# Patient Record
Sex: MALE | Race: BLACK OR AFRICAN AMERICAN | Employment: OTHER | URBAN - METROPOLITAN AREA
[De-identification: names, ages, dates, MRNs, and addresses within clinical notes are randomized per-mention and may not be internally consistent; named-entity substitution may affect disease eponyms.]

---

## 2018-09-11 ENCOUNTER — HOSPITAL ENCOUNTER (OUTPATIENT)
Dept: GENERAL RADIOLOGY | Age: 63
Discharge: HOME | End: 2018-09-11
Attending: INTERNAL MEDICINE | Admitting: INTERNAL MEDICINE

## 2018-09-11 LAB
ALBUMIN SERPL-MCNC: 3.7 GM/DL (ref 3.4–5)
ALP BLD-CCNC: 76 IU/L (ref 40–128)
ALT SERPL-CCNC: 11 U/L (ref 10–40)
ANION GAP SERPL CALCULATED.3IONS-SCNC: 11 MMOL/L (ref 4–16)
AST SERPL-CCNC: 14 IU/L (ref 15–37)
BASOPHILS ABSOLUTE: 0.1 K/CU MM
BASOPHILS RELATIVE PERCENT: 0.7 % (ref 0–1)
BILIRUB SERPL-MCNC: 0.2 MG/DL (ref 0–1)
BUN BLDV-MCNC: 9 MG/DL (ref 6–23)
CALCIUM SERPL-MCNC: 9.3 MG/DL (ref 8.3–10.6)
CHLORIDE BLD-SCNC: 104 MMOL/L (ref 99–110)
CO2: 26 MMOL/L (ref 21–32)
CREAT SERPL-MCNC: 0.9 MG/DL (ref 0.9–1.3)
DIFFERENTIAL TYPE: ABNORMAL
EOSINOPHILS ABSOLUTE: 0.3 K/CU MM
EOSINOPHILS RELATIVE PERCENT: 3.6 % (ref 0–3)
GFR AFRICAN AMERICAN: >60 ML/MIN/1.73M2
GFR NON-AFRICAN AMERICAN: >60 ML/MIN/1.73M2
GLUCOSE BLD-MCNC: 84 MG/DL (ref 70–99)
HCT VFR BLD CALC: 43.2 % (ref 42–52)
HEMOGLOBIN: 13.7 GM/DL (ref 13.5–18)
IMMATURE NEUTROPHIL %: 0.3 % (ref 0–0.43)
LYMPHOCYTES ABSOLUTE: 1.2 K/CU MM
LYMPHOCYTES RELATIVE PERCENT: 15.3 % (ref 24–44)
MCH RBC QN AUTO: 28 PG (ref 27–31)
MCHC RBC AUTO-ENTMCNC: 31.7 % (ref 32–36)
MCV RBC AUTO: 88.3 FL (ref 78–100)
MONOCYTES ABSOLUTE: 0.7 K/CU MM
MONOCYTES RELATIVE PERCENT: 8.7 % (ref 0–4)
NUCLEATED RBC %: 0 %
PDW BLD-RTO: 15.4 % (ref 11.7–14.9)
PLATELET # BLD: 285 K/CU MM (ref 140–440)
PMV BLD AUTO: 10.8 FL (ref 7.5–11.1)
POTASSIUM SERPL-SCNC: 4.7 MMOL/L (ref 3.5–5.1)
RBC # BLD: 4.89 M/CU MM (ref 4.6–6.2)
SEGMENTED NEUTROPHILS ABSOLUTE COUNT: 5.4 K/CU MM
SEGMENTED NEUTROPHILS RELATIVE PERCENT: 71.4 % (ref 36–66)
SODIUM BLD-SCNC: 141 MMOL/L (ref 135–145)
TOTAL IMMATURE NEUTOROPHIL: 0.02 K/CU MM
TOTAL NUCLEATED RBC: 0 K/CU MM
TOTAL PROTEIN: 6.2 GM/DL (ref 6.4–8.2)
WBC # BLD: 7.6 K/CU MM (ref 4–10.5)

## 2018-09-20 PROBLEM — I73.9 PVD (PERIPHERAL VASCULAR DISEASE) (HCC): Status: ACTIVE | Noted: 2018-09-20

## 2018-09-28 ENCOUNTER — HOSPITAL ENCOUNTER (OUTPATIENT)
Age: 63
Setting detail: OBSERVATION
Discharge: HOME | End: 2018-09-29
Attending: EMERGENCY MEDICINE | Admitting: INTERNAL MEDICINE
Payer: MEDICARE

## 2018-09-28 ENCOUNTER — APPOINTMENT (OUTPATIENT)
Dept: CT IMAGING | Age: 63
End: 2018-09-28
Payer: MEDICARE

## 2018-09-28 ENCOUNTER — APPOINTMENT (OUTPATIENT)
Dept: GENERAL RADIOLOGY | Age: 63
End: 2018-09-28
Payer: MEDICARE

## 2018-09-28 ENCOUNTER — APPOINTMENT (OUTPATIENT)
Dept: ULTRASOUND IMAGING | Age: 63
End: 2018-09-28
Payer: MEDICARE

## 2018-09-28 DIAGNOSIS — R94.31 ABNORMAL EKG: ICD-10-CM

## 2018-09-28 DIAGNOSIS — R07.9 ACUTE CHEST PAIN: Primary | ICD-10-CM

## 2018-09-28 DIAGNOSIS — I73.9 PVD (PERIPHERAL VASCULAR DISEASE) (HCC): ICD-10-CM

## 2018-09-28 LAB
ALBUMIN SERPL-MCNC: 3.2 GM/DL (ref 3.4–5)
ALP BLD-CCNC: 65 IU/L (ref 40–129)
ALT SERPL-CCNC: 8 U/L (ref 10–40)
AMPHETAMINES: NEGATIVE
ANION GAP SERPL CALCULATED.3IONS-SCNC: 10 MMOL/L (ref 4–16)
APTT: 29.1 SECONDS (ref 21.2–33)
AST SERPL-CCNC: 15 IU/L (ref 15–37)
BACTERIA: NEGATIVE /HPF
BARBITURATE SCREEN URINE: NEGATIVE
BASOPHILS ABSOLUTE: 0 K/CU MM
BASOPHILS RELATIVE PERCENT: 0.4 % (ref 0–1)
BENZODIAZEPINE SCREEN, URINE: NEGATIVE
BILIRUB SERPL-MCNC: 0.2 MG/DL (ref 0–1)
BILIRUBIN URINE: NEGATIVE MG/DL
BLOOD, URINE: NEGATIVE
BUN BLDV-MCNC: 11 MG/DL (ref 6–23)
CALCIUM SERPL-MCNC: 8.8 MG/DL (ref 8.3–10.6)
CANNABINOID SCREEN URINE: ABNORMAL
CHLORIDE BLD-SCNC: 102 MMOL/L (ref 99–110)
CLARITY: CLEAR
CO2: 26 MMOL/L (ref 21–32)
COCAINE METABOLITE: ABNORMAL
COLOR: YELLOW
CREAT SERPL-MCNC: 0.9 MG/DL (ref 0.9–1.3)
DIFFERENTIAL TYPE: ABNORMAL
EOSINOPHILS ABSOLUTE: 0.3 K/CU MM
EOSINOPHILS RELATIVE PERCENT: 3.2 % (ref 0–3)
GFR AFRICAN AMERICAN: >60 ML/MIN/1.73M2
GFR NON-AFRICAN AMERICAN: >60 ML/MIN/1.73M2
GLUCOSE BLD-MCNC: 115 MG/DL (ref 70–99)
GLUCOSE, URINE: NEGATIVE MG/DL
HCT VFR BLD CALC: 40.7 % (ref 42–52)
HEMOGLOBIN: 13.2 GM/DL (ref 13.5–18)
IMMATURE NEUTROPHIL %: 0.6 % (ref 0–0.43)
INR BLD: 1.18 INDEX
KETONES, URINE: NEGATIVE MG/DL
LEUKOCYTE ESTERASE, URINE: NEGATIVE
LIPASE: 31 IU/L (ref 13–60)
LYMPHOCYTES ABSOLUTE: 1.3 K/CU MM
LYMPHOCYTES RELATIVE PERCENT: 14 % (ref 24–44)
MAGNESIUM: 2.1 MG/DL (ref 1.8–2.4)
MCH RBC QN AUTO: 27.9 PG (ref 27–31)
MCHC RBC AUTO-ENTMCNC: 32.4 % (ref 32–36)
MCV RBC AUTO: 86 FL (ref 78–100)
MONOCYTES ABSOLUTE: 1 K/CU MM
MONOCYTES RELATIVE PERCENT: 10.8 % (ref 0–4)
MUCUS: ABNORMAL HPF
NITRITE URINE, QUANTITATIVE: NEGATIVE
NUCLEATED RBC %: 0 %
OPIATES, URINE: NEGATIVE
OXYCODONE: NEGATIVE
PDW BLD-RTO: 14.6 % (ref 11.7–14.9)
PH, URINE: 5 (ref 5–8)
PHENCYCLIDINE, URINE: NEGATIVE
PLATELET # BLD: 298 K/CU MM (ref 140–440)
PMV BLD AUTO: 9.5 FL (ref 7.5–11.1)
POTASSIUM SERPL-SCNC: 4.2 MMOL/L (ref 3.5–5.1)
PROTEIN UA: NEGATIVE MG/DL
PROTHROMBIN TIME: 13.4 SECONDS (ref 9.12–12.5)
RBC # BLD: 4.73 M/CU MM (ref 4.6–6.2)
RBC URINE: 1 /HPF (ref 0–3)
SEGMENTED NEUTROPHILS ABSOLUTE COUNT: 6.4 K/CU MM
SEGMENTED NEUTROPHILS RELATIVE PERCENT: 71 % (ref 36–66)
SODIUM BLD-SCNC: 138 MMOL/L (ref 135–145)
SPECIFIC GRAVITY UA: 1.04 (ref 1–1.03)
SQUAMOUS EPITHELIAL: <1 /HPF
T4 FREE: 1.07 NG/DL (ref 0.9–1.8)
TOTAL IMMATURE NEUTOROPHIL: 0.05 K/CU MM
TOTAL NUCLEATED RBC: 0 K/CU MM
TOTAL PROTEIN: 6.9 GM/DL (ref 6.4–8.2)
TRICHOMONAS: ABNORMAL /HPF
TROPONIN T: <0.01 NG/ML
TROPONIN T: <0.01 NG/ML
TSH HIGH SENSITIVITY: 1.98 UIU/ML (ref 0.27–4.2)
UROBILINOGEN, URINE: 1 MG/DL (ref 0.2–1)
WBC # BLD: 9 K/CU MM (ref 4–10.5)
WBC UA: <1 /HPF (ref 0–2)

## 2018-09-28 PROCEDURE — 81001 URINALYSIS AUTO W/SCOPE: CPT

## 2018-09-28 PROCEDURE — 85610 PROTHROMBIN TIME: CPT

## 2018-09-28 PROCEDURE — 71046 X-RAY EXAM CHEST 2 VIEWS: CPT

## 2018-09-28 PROCEDURE — 84484 ASSAY OF TROPONIN QUANT: CPT

## 2018-09-28 PROCEDURE — 6370000000 HC RX 637 (ALT 250 FOR IP): Performed by: INTERNAL MEDICINE

## 2018-09-28 PROCEDURE — 80050 GENERAL HEALTH PANEL: CPT

## 2018-09-28 PROCEDURE — 2580000003 HC RX 258: Performed by: INTERNAL MEDICINE

## 2018-09-28 PROCEDURE — 93925 LOWER EXTREMITY STUDY: CPT

## 2018-09-28 PROCEDURE — 93005 ELECTROCARDIOGRAM TRACING: CPT | Performed by: EMERGENCY MEDICINE

## 2018-09-28 PROCEDURE — G0378 HOSPITAL OBSERVATION PER HR: HCPCS

## 2018-09-28 PROCEDURE — 83735 ASSAY OF MAGNESIUM: CPT

## 2018-09-28 PROCEDURE — G0480 DRUG TEST DEF 1-7 CLASSES: HCPCS

## 2018-09-28 PROCEDURE — 96372 THER/PROPH/DIAG INJ SC/IM: CPT

## 2018-09-28 PROCEDURE — 36415 COLL VENOUS BLD VENIPUNCTURE: CPT

## 2018-09-28 PROCEDURE — 83690 ASSAY OF LIPASE: CPT

## 2018-09-28 PROCEDURE — 6360000004 HC RX CONTRAST MEDICATION: Performed by: EMERGENCY MEDICINE

## 2018-09-28 PROCEDURE — 80053 COMPREHEN METABOLIC PANEL: CPT

## 2018-09-28 PROCEDURE — 85025 COMPLETE CBC W/AUTO DIFF WBC: CPT

## 2018-09-28 PROCEDURE — 84439 ASSAY OF FREE THYROXINE: CPT

## 2018-09-28 PROCEDURE — 85730 THROMBOPLASTIN TIME PARTIAL: CPT

## 2018-09-28 PROCEDURE — 99285 EMERGENCY DEPT VISIT HI MDM: CPT

## 2018-09-28 PROCEDURE — 74174 CTA ABD&PLVS W/CONTRAST: CPT

## 2018-09-28 PROCEDURE — 84443 ASSAY THYROID STIM HORMONE: CPT

## 2018-09-28 PROCEDURE — 6360000002 HC RX W HCPCS: Performed by: INTERNAL MEDICINE

## 2018-09-28 PROCEDURE — 71275 CT ANGIOGRAPHY CHEST: CPT

## 2018-09-28 RX ORDER — POLYETHYLENE GLYCOL 3350 17 G/17G
17 POWDER, FOR SOLUTION ORAL 2 TIMES DAILY
Status: DISCONTINUED | OUTPATIENT
Start: 2018-09-28 | End: 2018-09-29 | Stop reason: HOSPADM

## 2018-09-28 RX ORDER — ASPIRIN 81 MG/1
81 TABLET, CHEWABLE ORAL DAILY
Status: DISCONTINUED | OUTPATIENT
Start: 2018-09-29 | End: 2018-09-29 | Stop reason: HOSPADM

## 2018-09-28 RX ORDER — SODIUM CHLORIDE 0.9 % (FLUSH) 0.9 %
10 SYRINGE (ML) INJECTION PRN
Status: DISCONTINUED | OUTPATIENT
Start: 2018-09-28 | End: 2018-09-29 | Stop reason: HOSPADM

## 2018-09-28 RX ORDER — ACETAMINOPHEN 325 MG/1
650 TABLET ORAL EVERY 4 HOURS PRN
Status: DISCONTINUED | OUTPATIENT
Start: 2018-09-28 | End: 2018-09-29 | Stop reason: HOSPADM

## 2018-09-28 RX ORDER — ASPIRIN 81 MG/1
324 TABLET, CHEWABLE ORAL ONCE
Status: DISCONTINUED | OUTPATIENT
Start: 2018-09-28 | End: 2018-09-29 | Stop reason: HOSPADM

## 2018-09-28 RX ORDER — ONDANSETRON 2 MG/ML
4 INJECTION INTRAMUSCULAR; INTRAVENOUS EVERY 6 HOURS PRN
Status: DISCONTINUED | OUTPATIENT
Start: 2018-09-28 | End: 2018-09-29 | Stop reason: HOSPADM

## 2018-09-28 RX ORDER — SODIUM CHLORIDE 0.9 % (FLUSH) 0.9 %
10 SYRINGE (ML) INJECTION EVERY 12 HOURS SCHEDULED
Status: DISCONTINUED | OUTPATIENT
Start: 2018-09-28 | End: 2018-09-29 | Stop reason: HOSPADM

## 2018-09-28 RX ORDER — SODIUM CHLORIDE 9 MG/ML
INJECTION, SOLUTION INTRAVENOUS CONTINUOUS
Status: DISCONTINUED | OUTPATIENT
Start: 2018-09-28 | End: 2018-09-29 | Stop reason: HOSPADM

## 2018-09-28 RX ORDER — NITROGLYCERIN 0.4 MG/1
0.4 TABLET SUBLINGUAL EVERY 5 MIN PRN
Status: DISCONTINUED | OUTPATIENT
Start: 2018-09-28 | End: 2018-09-29 | Stop reason: HOSPADM

## 2018-09-28 RX ADMIN — SODIUM CHLORIDE: 9 INJECTION, SOLUTION INTRAVENOUS at 18:36

## 2018-09-28 RX ADMIN — ENOXAPARIN SODIUM 40 MG: 40 INJECTION SUBCUTANEOUS at 20:53

## 2018-09-28 RX ADMIN — IOPAMIDOL 90 ML: 755 INJECTION, SOLUTION INTRAVENOUS at 13:54

## 2018-09-28 RX ADMIN — POLYETHYLENE GLYCOL 3350 17 G: 17 POWDER, FOR SOLUTION ORAL at 20:53

## 2018-09-28 ASSESSMENT — PAIN DESCRIPTION - PAIN TYPE: TYPE: ACUTE PAIN

## 2018-09-28 ASSESSMENT — PAIN SCALES - GENERAL
PAINLEVEL_OUTOF10: 7
PAINLEVEL_OUTOF10: 0

## 2018-09-28 ASSESSMENT — PAIN DESCRIPTION - ORIENTATION: ORIENTATION: MID

## 2018-09-28 ASSESSMENT — PAIN DESCRIPTION - LOCATION: LOCATION: ABDOMEN

## 2018-09-28 ASSESSMENT — PAIN DESCRIPTION - DESCRIPTORS: DESCRIPTORS: ACHING

## 2018-09-28 ASSESSMENT — PAIN DESCRIPTION - FREQUENCY: FREQUENCY: CONTINUOUS

## 2018-09-28 NOTE — PROGRESS NOTES
Medication History  [facility]    Patient Name: Ruy Kessler 1955     Medication history has been completed by: Britany Galvin CPhT    Source(s) of information: Patient and Insurance claims    Primary Care Physician: Che Lindsey MD     Pharmacy: [facility]    Allergies as of 09/28/2018 - Review Complete 09/28/2018   Allergen Reaction Noted    Ibuprofen Other (See Comments) 10/18/2012        Prior to Admission medications    Not on File       Other Comments:  · Patient states he is not taking any medications and claims support this     To my knowledge the above medication history is accurate as of 9/28/2018 3:51 PM.   Britany Galvin CPhT   9/28/2018 3:51 PM

## 2018-09-29 VITALS
TEMPERATURE: 97.6 F | WEIGHT: 130.4 LBS | RESPIRATION RATE: 16 BRPM | BODY MASS INDEX: 18.26 KG/M2 | HEART RATE: 83 BPM | SYSTOLIC BLOOD PRESSURE: 121 MMHG | DIASTOLIC BLOOD PRESSURE: 71 MMHG | HEIGHT: 71 IN | OXYGEN SATURATION: 96 %

## 2018-09-29 LAB
CHOLESTEROL: 167 MG/DL
EKG ATRIAL RATE: 71 BPM
EKG ATRIAL RATE: 77 BPM
EKG DIAGNOSIS: NORMAL
EKG DIAGNOSIS: NORMAL
EKG P AXIS: 74 DEGREES
EKG P AXIS: 80 DEGREES
EKG P-R INTERVAL: 148 MS
EKG P-R INTERVAL: 150 MS
EKG Q-T INTERVAL: 388 MS
EKG Q-T INTERVAL: 398 MS
EKG QRS DURATION: 80 MS
EKG QRS DURATION: 90 MS
EKG QTC CALCULATION (BAZETT): 432 MS
EKG QTC CALCULATION (BAZETT): 439 MS
EKG R AXIS: -30 DEGREES
EKG R AXIS: 66 DEGREES
EKG T AXIS: -15 DEGREES
EKG T AXIS: 49 DEGREES
EKG VENTRICULAR RATE: 71 BPM
EKG VENTRICULAR RATE: 77 BPM
HDLC SERPL-MCNC: 45 MG/DL
LDL CHOLESTEROL DIRECT: 122 MG/DL
TRIGL SERPL-MCNC: 56 MG/DL
TROPONIN T: <0.01 NG/ML

## 2018-09-29 PROCEDURE — G0378 HOSPITAL OBSERVATION PER HR: HCPCS

## 2018-09-29 PROCEDURE — 2580000003 HC RX 258: Performed by: INTERNAL MEDICINE

## 2018-09-29 PROCEDURE — 96372 THER/PROPH/DIAG INJ SC/IM: CPT

## 2018-09-29 PROCEDURE — 6370000000 HC RX 637 (ALT 250 FOR IP): Performed by: INTERNAL MEDICINE

## 2018-09-29 PROCEDURE — 84484 ASSAY OF TROPONIN QUANT: CPT

## 2018-09-29 PROCEDURE — 36415 COLL VENOUS BLD VENIPUNCTURE: CPT

## 2018-09-29 PROCEDURE — 6360000002 HC RX W HCPCS: Performed by: INTERNAL MEDICINE

## 2018-09-29 PROCEDURE — 80061 LIPID PANEL: CPT

## 2018-09-29 PROCEDURE — 93010 ELECTROCARDIOGRAM REPORT: CPT | Performed by: INTERNAL MEDICINE

## 2018-09-29 PROCEDURE — 83721 ASSAY OF BLOOD LIPOPROTEIN: CPT

## 2018-09-29 RX ORDER — TRAMADOL HYDROCHLORIDE 50 MG/1
50 TABLET ORAL EVERY 6 HOURS PRN
Qty: 20 TABLET | Refills: 0 | Status: SHIPPED | OUTPATIENT
Start: 2018-09-29 | End: 2018-10-09

## 2018-09-29 RX ORDER — ASPIRIN 81 MG/1
81 TABLET, CHEWABLE ORAL DAILY
Qty: 30 TABLET | Refills: 3 | Status: SHIPPED | OUTPATIENT
Start: 2018-09-30 | End: 2019-01-31

## 2018-09-29 RX ORDER — NITROGLYCERIN 0.4 MG/1
TABLET SUBLINGUAL
Qty: 25 TABLET | Refills: 3 | Status: SHIPPED | OUTPATIENT
Start: 2018-09-29 | End: 2019-01-31

## 2018-09-29 RX ORDER — POLYETHYLENE GLYCOL 3350 17 G/17G
17 POWDER, FOR SOLUTION ORAL DAILY
Qty: 30 EACH | Refills: 1 | Status: SHIPPED | OUTPATIENT
Start: 2018-09-29 | End: 2018-10-29

## 2018-09-29 RX ADMIN — POLYETHYLENE GLYCOL 3350 17 G: 17 POWDER, FOR SOLUTION ORAL at 08:59

## 2018-09-29 RX ADMIN — SODIUM CHLORIDE: 9 INJECTION, SOLUTION INTRAVENOUS at 02:46

## 2018-09-29 RX ADMIN — SODIUM CHLORIDE: 9 INJECTION, SOLUTION INTRAVENOUS at 12:57

## 2018-09-29 RX ADMIN — ENOXAPARIN SODIUM 40 MG: 40 INJECTION SUBCUTANEOUS at 08:59

## 2018-09-29 RX ADMIN — ASPIRIN 81 MG CHEWABLE TABLET 81 MG: 81 TABLET CHEWABLE at 08:59

## 2018-09-29 NOTE — PROGRESS NOTES
Daily Progress Note     patient is awake alert feeling better  No chest pain  Stable from cardiac stand  Positive for cocaine use  Ok to d/c home  Will plan for outpatient echo and stress test  Keep on ASA for descending chronic aortic dissection   Check duplex of legs will follow up      Objective:   /71   Pulse 83   Temp 97.6 °F (36.4 °C) (Oral)   Resp 16   Ht 5' 11\" (1.803 m)   Wt 130 lb 6.4 oz (59.1 kg)   SpO2 96%   BMI 18.19 kg/m²     Intake/Output Summary (Last 24 hours) at 09/29/18 0943  Last data filed at 09/29/18 5147   Gross per 24 hour   Intake              360 ml   Output              651 ml   Net             -291 ml       Medications:   Scheduled Meds:   aspirin  324 mg Oral Once    sodium chloride flush  10 mL Intravenous 2 times per day    aspirin  81 mg Oral Daily    enoxaparin  40 mg Subcutaneous Daily    polyethylene glycol  17 g Oral BID      Infusions:   sodium chloride 100 mL/hr at 09/29/18 0246      PRN Meds:  sodium chloride flush, acetaminophen, ondansetron, nitroGLYCERIN       Physical Exam:  Vitals:    09/29/18 0904   BP: 121/71   Pulse: 83   Resp: 16   Temp: 97.6 °F (36.4 °C)   SpO2:         General: AAO, NAD  Chest: Nontender  Cardiac: First and Second Heart Sounds are Normal, No Murmurs or Gallops noted  Lungs:Clear to auscultation and percussion. Abdomen: Soft, NT, ND, +BS  Extremities: No clubbing, no edema  Vascular:  Equal 2+ peripheral pulses.         Lab Data:  CBC: Recent Labs      09/28/18   1255   WBC  9.0   HGB  13.2*   HCT  40.7*   MCV  86.0   PLT  298     BMP: Recent Labs      09/28/18   1255   NA  138   K  4.2   CL  102   CO2  26   BUN  11   CREATININE  0.9     LIVER PROFILE:   Recent Labs      09/28/18   1255  09/28/18 1958   AST  15   --    ALT  8*   --    LIPASE   --   31   BILITOT  0.2   --    ALKPHOS  65   --      PT/INR:   Recent Labs      09/28/18 1958   PROTIME  13.4*   INR  1.18     APTT:   Recent Labs      09/28/18 1958   APTT  29.1

## 2018-09-29 NOTE — ED PROVIDER NOTES
EOM's grossly intact. Sclera anicteric. ENT: Tolerates saliva. No trismus. NECK: Supple. Trachea midline. CARDIO: RRR. Radial pulse 2+. LUNGS: Respirations unlabored. CTAB. ABDOMEN: Soft. Non-distended. Non-tender. EXTREMITIES: No acute deformities. No lower extremity tenderness, edema or asymmetry. SKIN: Warm and dry. NEUROLOGICAL: No gross facial drooping. Moves all 4 extremities spontaneously. PSYCHIATRIC: Normal mood.      Labs:  Results for orders placed or performed during the hospital encounter of 09/28/18   CBC Auto Differential   Result Value Ref Range    WBC 9.0 4.0 - 10.5 K/CU MM    RBC 4.73 4.6 - 6.2 M/CU MM    Hemoglobin 13.2 (L) 13.5 - 18.0 GM/DL    Hematocrit 40.7 (L) 42 - 52 %    MCV 86.0 78 - 100 FL    MCH 27.9 27 - 31 PG    MCHC 32.4 32.0 - 36.0 %    RDW 14.6 11.7 - 14.9 %    Platelets 824 911 - 317 K/CU MM    MPV 9.5 7.5 - 11.1 FL    Differential Type AUTOMATED DIFFERENTIAL     Segs Relative 71.0 (H) 36 - 66 %    Lymphocytes % 14.0 (L) 24 - 44 %    Monocytes % 10.8 (H) 0 - 4 %    Eosinophils % 3.2 (H) 0 - 3 %    Basophils % 0.4 0 - 1 %    Segs Absolute 6.4 K/CU MM    Lymphocytes # 1.3 K/CU MM    Monocytes # 1.0 K/CU MM    Eosinophils # 0.3 K/CU MM    Basophils # 0.0 K/CU MM    Nucleated RBC % 0.0 %    Total Nucleated RBC 0.0 K/CU MM    Total Immature Neutrophil 0.05 K/CU MM    Immature Neutrophil % 0.6 (H) 0 - 0.43 %   Comprehensive Metabolic Panel w/ Reflex to MG   Result Value Ref Range    Sodium 138 135 - 145 MMOL/L    Potassium 4.2 3.5 - 5.1 MMOL/L    Chloride 102 99 - 110 mMol/L    CO2 26 21 - 32 MMOL/L    BUN 11 6 - 23 MG/DL    CREATININE 0.9 0.9 - 1.3 MG/DL    Glucose 115 (H) 70 - 99 MG/DL    Calcium 8.8 8.3 - 10.6 MG/DL    Alb 3.2 (L) 3.4 - 5.0 GM/DL    Total Protein 6.9 6.4 - 8.2 GM/DL    Total Bilirubin 0.2 0.0 - 1.0 MG/DL    ALT 8 (L) 10 - 40 U/L    AST 15 15 - 37 IU/L    Alkaline Phosphatase 65 40 - 129 IU/L    GFR Non-African American >60 >60 mL/min/1.73m2    GFR contrast. Multiplanar reformatted images are provided for review. Dose modulation, iterative reconstruction, and/or weight based adjustment of the mA/kV was utilized to reduce the radiation dose to as low as reasonably achievable. COMPARISON: None. HISTORY: ORDERING SYSTEM PROVIDED HISTORY: lower abdominal pain, BRBPR TECHNOLOGIST PROVIDED HISTORY: Additional Contrast?->None Ordering Physician Provided Reason for Exam: lower abdominal pain, BRBPR Acuity: Acute Type of Encounter: Initial Additional signs and symptoms: None Relevant Medical/Surgical History: 80cc [id number] FINDINGS: Lower Chest: Calcified granuloma within the right lung base. Lung bases otherwise appear clear. Organs: The liver appears unremarkable. The gallbladder is within normal limits with no evidence for biliary dilatation. The pancreas appears unremarkable. No significant dilatation of the pancreatic duct identified. The spleen is within normal limits. Enlarged bilateral adrenal glands with hypoattenuating lesions present which are indeterminate on this exam.  These measure approximately 1.4 by 2.1 cm on the left and 1.4 x 2.7 cm on the right. The kidneys enhance symmetrically with no evidence for hydronephrosis. GI/Bowel: No evidence for bowel obstruction. Evaluation of the distal bowel loops within the pelvis is severely limited due to streak artifact from bilateral hip arthroplasties. Note is made of crowding of bowel loops related to paucity of intra-fat. Findings also limited evaluation of the bowel wall. Pelvis: Evaluation of the pelvic structures is severely limited due to metallic streak artifact. Peritoneum/Retroperitoneum: The infrarenal abdominal aorta demonstrates a distal dissection spanning a length of approximately 5.6 cm to the level of the aortic bifurcation. The dissection extends into the left and right common iliac origins. Moderate to severe atherosclerotic plaque noted within the distal abdominal aorta.   Soft

## 2018-09-29 NOTE — CONSULTS
[address] [place] [address], [address] [place] [address]                                   CONSULTATION    PATIENT NAME: Pearl Riddle                     :        1955  MED REC NO:   [id number]                          ROOM:       9629  ACCOUNT NO:   [de-identified]                           ADMIT DATE: 2018  PROVIDER:     Stefano Jean MD    CONSULT DATE:  2018    INDICATION:  Chest pain. HISTORY OF PRESENT ILLNESS:  This is a 27-year-old  male  patient who is kind of hard of hearing. He had a nerve damage I think that  is why he has difficulty hearing. He came in to the hospital with having  abdominal pain and chest pain present. The patient is having significant  abdominal pain and chest pain present. Pain with exertion present. No  fevers. No chills. No cough or sputum production. No other  or GI  complaints. No syncopal episode is present. He has some difficulty with  walking also. PAST MEDICAL HISTORY:  History of having nerve damage present leading to  difficulty with hearing because of that. PAST SURGICAL HISTORY:  None. SOCIAL HISTORY:  He does not smoke, does not drink. ALLERGIES:  IBUPROFEN. PHYSICAL EXAMINATION:  GENERAL:  The patient is awake, alert and answering questions, not in acute  distress, in sinus rhythm. VITAL SIGNS:  Temperature is afebrile, pulse is 70, blood pressure is  125/80. HEENT:  Head is normocephalic and atraumatic. Pupils are equal and  reactive to light. CHEST:  Equal expansion. LUNGS:  Clear to auscultation. No wheezing or rhonchi. HEART:  Regular rate and rhythm. ABDOMEN:  Soft and nontender. Bowel sounds are present. No  hepatosplenomegaly or guarding appreciated. EXTREMITIES:  No cyanosis or clubbing noted. NEUROLOGIC:  Cranial nerves II through XII are grossly intact. DATA:  EKG shows sinus rhythm present.   Electrolytes were normal.  BUN

## 2018-09-29 NOTE — PROGRESS NOTES
9/29/2018  No further chest pain. VSS. Heart regular, lungs clear, abd soft. Stable for discharge and further outpt testing.   Acosta Rodriguez MD

## 2018-11-02 ENCOUNTER — APPOINTMENT (OUTPATIENT)
Dept: GENERAL RADIOLOGY | Age: 63
DRG: 331 | End: 2018-11-02
Attending: INTERNAL MEDICINE
Payer: MEDICARE

## 2018-11-02 ENCOUNTER — HOSPITAL ENCOUNTER (INPATIENT)
Age: 63
LOS: 7 days | Discharge: NURSING, REHAB OR LONG-TERM CARE | DRG: 331 | End: 2018-11-09
Attending: INTERNAL MEDICINE | Admitting: INTERNAL MEDICINE
Payer: MEDICARE

## 2018-11-02 ENCOUNTER — ANESTHESIA (OUTPATIENT)
Dept: OPERATING ROOM | Age: 63
DRG: 331 | End: 2018-11-02
Payer: MEDICARE

## 2018-11-02 ENCOUNTER — ANESTHESIA EVENT (OUTPATIENT)
Dept: OPERATING ROOM | Age: 63
DRG: 331 | End: 2018-11-02
Payer: MEDICARE

## 2018-11-02 VITALS
OXYGEN SATURATION: 100 % | RESPIRATION RATE: 10 BRPM | SYSTOLIC BLOOD PRESSURE: 130 MMHG | TEMPERATURE: 97.7 F | DIASTOLIC BLOOD PRESSURE: 94 MMHG

## 2018-11-02 DIAGNOSIS — D49.0 RECTAL TUMOR: Primary | ICD-10-CM

## 2018-11-02 LAB
ALBUMIN SERPL-MCNC: 3.4 GM/DL (ref 3.4–5)
ALP BLD-CCNC: 64 IU/L (ref 40–129)
ALT SERPL-CCNC: 11 U/L (ref 10–40)
ANION GAP SERPL CALCULATED.3IONS-SCNC: 9 MMOL/L (ref 4–16)
AST SERPL-CCNC: 14 IU/L (ref 15–37)
BASOPHILS ABSOLUTE: 0.1 K/CU MM
BASOPHILS RELATIVE PERCENT: 0.6 % (ref 0–1)
BILIRUB SERPL-MCNC: 0.2 MG/DL (ref 0–1)
BUN BLDV-MCNC: 10 MG/DL (ref 6–23)
CALCIUM SERPL-MCNC: 8.7 MG/DL (ref 8.3–10.6)
CHLORIDE BLD-SCNC: 96 MMOL/L (ref 99–110)
CO2: 28 MMOL/L (ref 21–32)
CREAT SERPL-MCNC: 0.8 MG/DL (ref 0.9–1.3)
DIFFERENTIAL TYPE: ABNORMAL
EOSINOPHILS ABSOLUTE: 0.3 K/CU MM
EOSINOPHILS RELATIVE PERCENT: 3 % (ref 0–3)
GFR AFRICAN AMERICAN: >60 ML/MIN/1.73M2
GFR NON-AFRICAN AMERICAN: >60 ML/MIN/1.73M2
GLUCOSE BLD-MCNC: 84 MG/DL (ref 70–99)
HCT VFR BLD CALC: 37.7 % (ref 42–52)
HEMOGLOBIN: 11.7 GM/DL (ref 13.5–18)
IMMATURE NEUTROPHIL %: 0.4 % (ref 0–0.43)
LYMPHOCYTES ABSOLUTE: 1.2 K/CU MM
LYMPHOCYTES RELATIVE PERCENT: 11.9 % (ref 24–44)
MCH RBC QN AUTO: 26.7 PG (ref 27–31)
MCHC RBC AUTO-ENTMCNC: 31 % (ref 32–36)
MCV RBC AUTO: 86.1 FL (ref 78–100)
MONOCYTES ABSOLUTE: 1.1 K/CU MM
MONOCYTES RELATIVE PERCENT: 10.7 % (ref 0–4)
NUCLEATED RBC %: 0 %
PDW BLD-RTO: 14.8 % (ref 11.7–14.9)
PLATELET # BLD: 338 K/CU MM (ref 140–440)
PMV BLD AUTO: 9.5 FL (ref 7.5–11.1)
POTASSIUM SERPL-SCNC: 4.3 MMOL/L (ref 3.5–5.1)
RBC # BLD: 4.38 M/CU MM (ref 4.6–6.2)
SEGMENTED NEUTROPHILS ABSOLUTE COUNT: 7.4 K/CU MM
SEGMENTED NEUTROPHILS RELATIVE PERCENT: 73.4 % (ref 36–66)
SODIUM BLD-SCNC: 133 MMOL/L (ref 135–145)
TOTAL IMMATURE NEUTOROPHIL: 0.04 K/CU MM
TOTAL NUCLEATED RBC: 0 K/CU MM
TOTAL PROTEIN: 6.6 GM/DL (ref 6.4–8.2)
WBC # BLD: 10.1 K/CU MM (ref 4–10.5)

## 2018-11-02 PROCEDURE — 36415 COLL VENOUS BLD VENIPUNCTURE: CPT

## 2018-11-02 PROCEDURE — 6360000002 HC RX W HCPCS: Performed by: NURSE ANESTHETIST, CERTIFIED REGISTERED

## 2018-11-02 PROCEDURE — 7100000001 HC PACU RECOVERY - ADDTL 15 MIN: Performed by: SURGERY

## 2018-11-02 PROCEDURE — 6370000000 HC RX 637 (ALT 250 FOR IP): Performed by: NURSE ANESTHETIST, CERTIFIED REGISTERED

## 2018-11-02 PROCEDURE — 2580000003 HC RX 258: Performed by: INTERNAL MEDICINE

## 2018-11-02 PROCEDURE — 94761 N-INVAS EAR/PLS OXIMETRY MLT: CPT

## 2018-11-02 PROCEDURE — 6360000002 HC RX W HCPCS: Performed by: SURGERY

## 2018-11-02 PROCEDURE — 3700000000 HC ANESTHESIA ATTENDED CARE: Performed by: SURGERY

## 2018-11-02 PROCEDURE — 44206 LAP PART COLECTOMY W/STOMA: CPT | Performed by: SURGERY

## 2018-11-02 PROCEDURE — 2500000003 HC RX 250 WO HCPCS: Performed by: NURSE ANESTHETIST, CERTIFIED REGISTERED

## 2018-11-02 PROCEDURE — 3600000014 HC SURGERY LEVEL 4 ADDTL 15MIN: Performed by: SURGERY

## 2018-11-02 PROCEDURE — 7100000000 HC PACU RECOVERY - FIRST 15 MIN: Performed by: SURGERY

## 2018-11-02 PROCEDURE — 6360000002 HC RX W HCPCS: Performed by: ANESTHESIOLOGY

## 2018-11-02 PROCEDURE — 2720000010 HC SURG SUPPLY STERILE: Performed by: SURGERY

## 2018-11-02 PROCEDURE — 85025 COMPLETE CBC W/AUTO DIFF WBC: CPT

## 2018-11-02 PROCEDURE — 80053 COMPREHEN METABOLIC PANEL: CPT

## 2018-11-02 PROCEDURE — 2580000003 HC RX 258: Performed by: SURGERY

## 2018-11-02 PROCEDURE — 2500000003 HC RX 250 WO HCPCS: Performed by: SURGERY

## 2018-11-02 PROCEDURE — 3600000004 HC SURGERY LEVEL 4 BASE: Performed by: SURGERY

## 2018-11-02 PROCEDURE — 2709999900 HC NON-CHARGEABLE SUPPLY: Performed by: SURGERY

## 2018-11-02 PROCEDURE — 1200000000 HC SEMI PRIVATE

## 2018-11-02 PROCEDURE — 0D1N4Z4 BYPASS SIGMOID COLON TO CUTANEOUS, PERCUTANEOUS ENDOSCOPIC APPROACH: ICD-10-PCS | Performed by: SURGERY

## 2018-11-02 PROCEDURE — 74019 RADEX ABDOMEN 2 VIEWS: CPT

## 2018-11-02 PROCEDURE — 3700000001 HC ADD 15 MINUTES (ANESTHESIA): Performed by: SURGERY

## 2018-11-02 PROCEDURE — 71046 X-RAY EXAM CHEST 2 VIEWS: CPT

## 2018-11-02 RX ORDER — HYDROMORPHONE HCL 110MG/55ML
0.5 PATIENT CONTROLLED ANALGESIA SYRINGE INTRAVENOUS EVERY 5 MIN PRN
Status: DISCONTINUED | OUTPATIENT
Start: 2018-11-02 | End: 2018-11-02 | Stop reason: HOSPADM

## 2018-11-02 RX ORDER — BUPIVACAINE HYDROCHLORIDE 5 MG/ML
INJECTION, SOLUTION EPIDURAL; INTRACAUDAL
Status: COMPLETED | OUTPATIENT
Start: 2018-11-02 | End: 2018-11-02

## 2018-11-02 RX ORDER — SUCCINYLCHOLINE/SOD CL,ISO/PF 100 MG/5ML
SYRINGE (ML) INTRAVENOUS PRN
Status: DISCONTINUED | OUTPATIENT
Start: 2018-11-02 | End: 2018-11-02 | Stop reason: SDUPTHER

## 2018-11-02 RX ORDER — FENTANYL CITRATE 50 UG/ML
50 INJECTION, SOLUTION INTRAMUSCULAR; INTRAVENOUS EVERY 5 MIN PRN
Status: DISCONTINUED | OUTPATIENT
Start: 2018-11-02 | End: 2018-11-02 | Stop reason: HOSPADM

## 2018-11-02 RX ORDER — MAGNESIUM HYDROXIDE 1200 MG/15ML
LIQUID ORAL
Status: COMPLETED | OUTPATIENT
Start: 2018-11-02 | End: 2018-11-02

## 2018-11-02 RX ORDER — SODIUM CHLORIDE 0.9 % (FLUSH) 0.9 %
10 SYRINGE (ML) INJECTION PRN
Status: DISCONTINUED | OUTPATIENT
Start: 2018-11-02 | End: 2018-11-09 | Stop reason: HOSPADM

## 2018-11-02 RX ORDER — ONDANSETRON 2 MG/ML
INJECTION INTRAMUSCULAR; INTRAVENOUS PRN
Status: DISCONTINUED | OUTPATIENT
Start: 2018-11-02 | End: 2018-11-02 | Stop reason: SDUPTHER

## 2018-11-02 RX ORDER — PROPOFOL 10 MG/ML
INJECTION, EMULSION INTRAVENOUS PRN
Status: DISCONTINUED | OUTPATIENT
Start: 2018-11-02 | End: 2018-11-02 | Stop reason: SDUPTHER

## 2018-11-02 RX ORDER — HYDROMORPHONE HCL 110MG/55ML
PATIENT CONTROLLED ANALGESIA SYRINGE INTRAVENOUS PRN
Status: DISCONTINUED | OUTPATIENT
Start: 2018-11-02 | End: 2018-11-02 | Stop reason: SDUPTHER

## 2018-11-02 RX ORDER — SODIUM CHLORIDE 0.9 % (FLUSH) 0.9 %
10 SYRINGE (ML) INJECTION EVERY 12 HOURS SCHEDULED
Status: DISCONTINUED | OUTPATIENT
Start: 2018-11-02 | End: 2018-11-09 | Stop reason: HOSPADM

## 2018-11-02 RX ORDER — HYDROMORPHONE HCL 110MG/55ML
1 PATIENT CONTROLLED ANALGESIA SYRINGE INTRAVENOUS
Status: DISCONTINUED | OUTPATIENT
Start: 2018-11-02 | End: 2018-11-09 | Stop reason: HOSPADM

## 2018-11-02 RX ORDER — FENTANYL CITRATE 50 UG/ML
INJECTION, SOLUTION INTRAMUSCULAR; INTRAVENOUS PRN
Status: DISCONTINUED | OUTPATIENT
Start: 2018-11-02 | End: 2018-11-02 | Stop reason: SDUPTHER

## 2018-11-02 RX ORDER — ALBUTEROL SULFATE 90 UG/1
AEROSOL, METERED RESPIRATORY (INHALATION) PRN
Status: DISCONTINUED | OUTPATIENT
Start: 2018-11-02 | End: 2018-11-02 | Stop reason: SDUPTHER

## 2018-11-02 RX ORDER — HYDRALAZINE HYDROCHLORIDE 20 MG/ML
5 INJECTION INTRAMUSCULAR; INTRAVENOUS EVERY 10 MIN PRN
Status: DISCONTINUED | OUTPATIENT
Start: 2018-11-02 | End: 2018-11-02 | Stop reason: HOSPADM

## 2018-11-02 RX ORDER — DIPHENHYDRAMINE HYDROCHLORIDE 50 MG/ML
12.5 INJECTION INTRAMUSCULAR; INTRAVENOUS
Status: DISCONTINUED | OUTPATIENT
Start: 2018-11-02 | End: 2018-11-02 | Stop reason: HOSPADM

## 2018-11-02 RX ORDER — CEFAZOLIN SODIUM 2 G/100ML
2 INJECTION, SOLUTION INTRAVENOUS ONCE
Status: COMPLETED | OUTPATIENT
Start: 2018-11-02 | End: 2018-11-02

## 2018-11-02 RX ORDER — ACETAMINOPHEN 325 MG/1
650 TABLET ORAL EVERY 4 HOURS PRN
Status: DISCONTINUED | OUTPATIENT
Start: 2018-11-02 | End: 2018-11-09 | Stop reason: HOSPADM

## 2018-11-02 RX ORDER — NITROGLYCERIN 0.4 MG/1
0.4 TABLET SUBLINGUAL EVERY 5 MIN PRN
Status: DISCONTINUED | OUTPATIENT
Start: 2018-11-02 | End: 2018-11-09 | Stop reason: HOSPADM

## 2018-11-02 RX ORDER — HYDROMORPHONE HCL 110MG/55ML
0.5 PATIENT CONTROLLED ANALGESIA SYRINGE INTRAVENOUS EVERY 4 HOURS PRN
Status: DISCONTINUED | OUTPATIENT
Start: 2018-11-02 | End: 2018-11-09 | Stop reason: HOSPADM

## 2018-11-02 RX ORDER — LABETALOL HYDROCHLORIDE 5 MG/ML
5 INJECTION, SOLUTION INTRAVENOUS EVERY 10 MIN PRN
Status: DISCONTINUED | OUTPATIENT
Start: 2018-11-02 | End: 2018-11-02 | Stop reason: HOSPADM

## 2018-11-02 RX ORDER — ONDANSETRON 2 MG/ML
4 INJECTION INTRAMUSCULAR; INTRAVENOUS EVERY 6 HOURS PRN
Status: DISCONTINUED | OUTPATIENT
Start: 2018-11-02 | End: 2018-11-09 | Stop reason: HOSPADM

## 2018-11-02 RX ORDER — LIDOCAINE HYDROCHLORIDE 20 MG/ML
INJECTION, SOLUTION INFILTRATION; PERINEURAL PRN
Status: DISCONTINUED | OUTPATIENT
Start: 2018-11-02 | End: 2018-11-02 | Stop reason: SDUPTHER

## 2018-11-02 RX ORDER — SODIUM CHLORIDE 9 MG/ML
INJECTION, SOLUTION INTRAVENOUS CONTINUOUS
Status: DISCONTINUED | OUTPATIENT
Start: 2018-11-02 | End: 2018-11-06

## 2018-11-02 RX ORDER — DEXAMETHASONE SODIUM PHOSPHATE 4 MG/ML
INJECTION, SOLUTION INTRA-ARTICULAR; INTRALESIONAL; INTRAMUSCULAR; INTRAVENOUS; SOFT TISSUE PRN
Status: DISCONTINUED | OUTPATIENT
Start: 2018-11-02 | End: 2018-11-02 | Stop reason: SDUPTHER

## 2018-11-02 RX ORDER — PROMETHAZINE HYDROCHLORIDE 25 MG/ML
6.25 INJECTION, SOLUTION INTRAMUSCULAR; INTRAVENOUS
Status: DISCONTINUED | OUTPATIENT
Start: 2018-11-02 | End: 2018-11-02 | Stop reason: HOSPADM

## 2018-11-02 RX ORDER — MEPERIDINE HYDROCHLORIDE 25 MG/ML
12.5 INJECTION INTRAMUSCULAR; INTRAVENOUS; SUBCUTANEOUS EVERY 5 MIN PRN
Status: DISCONTINUED | OUTPATIENT
Start: 2018-11-02 | End: 2018-11-02 | Stop reason: HOSPADM

## 2018-11-02 RX ORDER — LABETALOL HYDROCHLORIDE 5 MG/ML
INJECTION, SOLUTION INTRAVENOUS PRN
Status: DISCONTINUED | OUTPATIENT
Start: 2018-11-02 | End: 2018-11-02 | Stop reason: SDUPTHER

## 2018-11-02 RX ORDER — MIDAZOLAM HYDROCHLORIDE 1 MG/ML
INJECTION INTRAMUSCULAR; INTRAVENOUS PRN
Status: DISCONTINUED | OUTPATIENT
Start: 2018-11-02 | End: 2018-11-02 | Stop reason: SDUPTHER

## 2018-11-02 RX ORDER — ROCURONIUM BROMIDE 10 MG/ML
INJECTION, SOLUTION INTRAVENOUS PRN
Status: DISCONTINUED | OUTPATIENT
Start: 2018-11-02 | End: 2018-11-02 | Stop reason: SDUPTHER

## 2018-11-02 RX ADMIN — FENTANYL CITRATE 50 MCG: 50 INJECTION INTRAMUSCULAR; INTRAVENOUS at 14:40

## 2018-11-02 RX ADMIN — PROPOFOL 60 MG: 10 INJECTION, EMULSION INTRAVENOUS at 14:46

## 2018-11-02 RX ADMIN — ROCURONIUM BROMIDE 30 MG: 50 INJECTION, SOLUTION INTRAVENOUS at 14:51

## 2018-11-02 RX ADMIN — HYDROMORPHONE HYDROCHLORIDE 1 MG: 2 INJECTION INTRAMUSCULAR; INTRAVENOUS; SUBCUTANEOUS at 21:50

## 2018-11-02 RX ADMIN — HYDRALAZINE HYDROCHLORIDE 5 MG: 20 INJECTION INTRAMUSCULAR; INTRAVENOUS at 17:04

## 2018-11-02 RX ADMIN — DEXAMETHASONE SODIUM PHOSPHATE 4 MG: 4 INJECTION, SOLUTION INTRAMUSCULAR; INTRAVENOUS at 14:51

## 2018-11-02 RX ADMIN — ROCURONIUM BROMIDE 20 MG: 50 INJECTION, SOLUTION INTRAVENOUS at 15:30

## 2018-11-02 RX ADMIN — HYDRALAZINE HYDROCHLORIDE 5 MG: 20 INJECTION INTRAMUSCULAR; INTRAVENOUS at 17:17

## 2018-11-02 RX ADMIN — ONDANSETRON HYDROCHLORIDE 4 MG: 2 SOLUTION INTRAMUSCULAR; INTRAVENOUS at 15:55

## 2018-11-02 RX ADMIN — LIDOCAINE HYDROCHLORIDE 100 MG: 20 INJECTION, SOLUTION INFILTRATION; PERINEURAL at 14:43

## 2018-11-02 RX ADMIN — PROPOFOL 20 MG: 10 INJECTION, EMULSION INTRAVENOUS at 15:18

## 2018-11-02 RX ADMIN — CEFAZOLIN SODIUM 2 G: 2 INJECTION, SOLUTION INTRAVENOUS at 15:03

## 2018-11-02 RX ADMIN — HYDRALAZINE HYDROCHLORIDE 5 MG: 20 INJECTION INTRAMUSCULAR; INTRAVENOUS at 16:40

## 2018-11-02 RX ADMIN — SODIUM CHLORIDE: 9 INJECTION, SOLUTION INTRAVENOUS at 17:39

## 2018-11-02 RX ADMIN — FENTANYL CITRATE 50 MCG: 50 INJECTION, SOLUTION INTRAMUSCULAR; INTRAVENOUS at 17:00

## 2018-11-02 RX ADMIN — HYDROMORPHONE HYDROCHLORIDE 0.5 MG: 2 INJECTION INTRAMUSCULAR; INTRAVENOUS; SUBCUTANEOUS at 16:20

## 2018-11-02 RX ADMIN — PROPOFOL 120 MG: 10 INJECTION, EMULSION INTRAVENOUS at 14:43

## 2018-11-02 RX ADMIN — LABETALOL HYDROCHLORIDE 5 MG: 5 INJECTION, SOLUTION INTRAVENOUS at 15:18

## 2018-11-02 RX ADMIN — Medication 100 MG: at 14:44

## 2018-11-02 RX ADMIN — SODIUM CHLORIDE: 9 INJECTION, SOLUTION INTRAVENOUS at 12:15

## 2018-11-02 RX ADMIN — HYDROMORPHONE HYDROCHLORIDE 1 MG: 2 INJECTION INTRAMUSCULAR; INTRAVENOUS; SUBCUTANEOUS at 15:23

## 2018-11-02 RX ADMIN — MIDAZOLAM HYDROCHLORIDE 2 MG: 1 INJECTION, SOLUTION INTRAMUSCULAR; INTRAVENOUS at 14:35

## 2018-11-02 RX ADMIN — FENTANYL CITRATE 100 MCG: 50 INJECTION INTRAMUSCULAR; INTRAVENOUS at 15:15

## 2018-11-02 RX ADMIN — FENTANYL CITRATE 50 MCG: 50 INJECTION, SOLUTION INTRAMUSCULAR; INTRAVENOUS at 16:44

## 2018-11-02 RX ADMIN — HYDROMORPHONE HYDROCHLORIDE 0.5 MG: 2 INJECTION INTRAMUSCULAR; INTRAVENOUS; SUBCUTANEOUS at 15:25

## 2018-11-02 RX ADMIN — ALBUTEROL SULFATE 6 PUFF: 90 AEROSOL, METERED RESPIRATORY (INHALATION) at 16:21

## 2018-11-02 RX ADMIN — FENTANYL CITRATE 50 MCG: 50 INJECTION INTRAMUSCULAR; INTRAVENOUS at 14:43

## 2018-11-02 RX ADMIN — LABETALOL HYDROCHLORIDE 5 MG: 5 INJECTION, SOLUTION INTRAVENOUS at 15:30

## 2018-11-02 RX ADMIN — SUGAMMADEX 200 MG: 100 INJECTION, SOLUTION INTRAVENOUS at 16:08

## 2018-11-02 ASSESSMENT — PULMONARY FUNCTION TESTS
PIF_VALUE: 18
PIF_VALUE: 1
PIF_VALUE: 18
PIF_VALUE: 23
PIF_VALUE: 17
PIF_VALUE: 23
PIF_VALUE: 18
PIF_VALUE: 15
PIF_VALUE: 17
PIF_VALUE: 12
PIF_VALUE: 16
PIF_VALUE: 16
PIF_VALUE: 20
PIF_VALUE: 17
PIF_VALUE: 17
PIF_VALUE: 23
PIF_VALUE: 4
PIF_VALUE: 17
PIF_VALUE: 1
PIF_VALUE: 23
PIF_VALUE: 17
PIF_VALUE: 17
PIF_VALUE: 23
PIF_VALUE: 16
PIF_VALUE: 18
PIF_VALUE: 1
PIF_VALUE: 1
PIF_VALUE: 20
PIF_VALUE: 17
PIF_VALUE: 18
PIF_VALUE: 23
PIF_VALUE: 20
PIF_VALUE: 17
PIF_VALUE: 11
PIF_VALUE: 17
PIF_VALUE: 17
PIF_VALUE: 16
PIF_VALUE: 17
PIF_VALUE: 17
PIF_VALUE: 18
PIF_VALUE: 14
PIF_VALUE: 17
PIF_VALUE: 18
PIF_VALUE: 23
PIF_VALUE: 17
PIF_VALUE: 18
PIF_VALUE: 17
PIF_VALUE: 0
PIF_VALUE: 1
PIF_VALUE: 1
PIF_VALUE: 22
PIF_VALUE: 23
PIF_VALUE: 17
PIF_VALUE: 1
PIF_VALUE: 18
PIF_VALUE: 22
PIF_VALUE: 18
PIF_VALUE: 23
PIF_VALUE: 0
PIF_VALUE: 17
PIF_VALUE: 21
PIF_VALUE: 20
PIF_VALUE: 18
PIF_VALUE: 24
PIF_VALUE: 0
PIF_VALUE: 17
PIF_VALUE: 19
PIF_VALUE: 17
PIF_VALUE: 15
PIF_VALUE: 18
PIF_VALUE: 17
PIF_VALUE: 15
PIF_VALUE: 23
PIF_VALUE: 23
PIF_VALUE: 0
PIF_VALUE: 22
PIF_VALUE: 24
PIF_VALUE: 0
PIF_VALUE: 23
PIF_VALUE: 16
PIF_VALUE: 17
PIF_VALUE: 14
PIF_VALUE: 12
PIF_VALUE: 16
PIF_VALUE: 17
PIF_VALUE: 18
PIF_VALUE: 18
PIF_VALUE: 14
PIF_VALUE: 6
PIF_VALUE: 17
PIF_VALUE: 24
PIF_VALUE: 17
PIF_VALUE: 23
PIF_VALUE: 18
PIF_VALUE: 1
PIF_VALUE: 17
PIF_VALUE: 17
PIF_VALUE: 16
PIF_VALUE: 19
PIF_VALUE: 23
PIF_VALUE: 23
PIF_VALUE: 1

## 2018-11-02 ASSESSMENT — LIFESTYLE VARIABLES: SMOKING_STATUS: 0

## 2018-11-02 ASSESSMENT — PAIN DESCRIPTION - PAIN TYPE
TYPE: SURGICAL PAIN
TYPE: SURGICAL PAIN
TYPE: ACUTE PAIN;SURGICAL PAIN

## 2018-11-02 ASSESSMENT — PAIN DESCRIPTION - DESCRIPTORS
DESCRIPTORS: PATIENT UNABLE TO DESCRIBE
DESCRIPTORS: PATIENT UNABLE TO DESCRIBE

## 2018-11-02 ASSESSMENT — PAIN DESCRIPTION - ORIENTATION
ORIENTATION: LOWER

## 2018-11-02 ASSESSMENT — PAIN SCALES - GENERAL
PAINLEVEL_OUTOF10: 7
PAINLEVEL_OUTOF10: 0
PAINLEVEL_OUTOF10: 7
PAINLEVEL_OUTOF10: 8

## 2018-11-02 ASSESSMENT — ENCOUNTER SYMPTOMS
CONSTIPATION: 0
EYE REDNESS: 0
ABDOMINAL PAIN: 1
BLOOD IN STOOL: 1
RECTAL PAIN: 0
SORE THROAT: 0
ABDOMINAL DISTENTION: 1
BACK PAIN: 0
STRIDOR: 0
PHOTOPHOBIA: 0
CHOKING: 0
EYE ITCHING: 0
APNEA: 0
ANAL BLEEDING: 0
COLOR CHANGE: 0

## 2018-11-02 ASSESSMENT — PAIN DESCRIPTION - LOCATION
LOCATION: ABDOMEN

## 2018-11-02 NOTE — ANESTHESIA PRE PROCEDURE
Value Date    PROTIME 13.4 09/28/2018    INR 1.18 09/28/2018    APTT 29.1 09/28/2018       HCG (If Applicable): No results found for: PREGTESTUR, PREGSERUM, HCG, HCGQUANT     ABGs: No results found for: PHART, PO2ART, BGH3ITC, KGS1COT, BEART, W3LOWBRX     Type & Screen (If Applicable):  No results found for: LABABO, [address]    Anesthesia Evaluation  Patient summary reviewed and Nursing notes reviewed  Airway: Mallampati: II  TM distance: >3 FB   Neck ROM: full  Mouth opening: > = 3 FB Dental:    (+) upper dentures  Comment: Multiple missing lower teeth. Denies loose teeth    Pulmonary:Negative Pulmonary ROS breath sounds clear to auscultation      (-) not a current smoker                           Cardiovascular:Negative CV ROS  Exercise tolerance: poor (<4 METS),           Rhythm: regular  Rate: normal           Beta Blocker:  Not on Beta Blocker      ROS comment: ECG:  Sinus rhythm with sinus arrhythmia with occasional premature ventricular complexes   Nonspecific T wave abnormality   Abnormal ECG   When compared with ECG of 28-SEP-2018 12:44,   premature ventricular complexes are now present     Neuro/Psych:   Negative Neuro/Psych ROS              GI/Hepatic/Renal:             Endo/Other:    (+) malignancy/cancer ( rectal). ROS comment: Deaf on right side, [place] on left side Abdominal:           Vascular:   + PVD, aortic or cerebral, . Anesthesia Plan      general     ASA 3 - emergent       Induction: intravenous and rapid sequence. MIPS: Postoperative opioids intended and Prophylactic antiemetics administered. Plan discussed with CRNA.                   Melvin Escalera, RUBEN - CRNA   11/2/2018

## 2018-11-03 LAB
CEA: 9.5 NG/ML
FERRITIN: 165 NG/ML (ref 30–400)
INR BLD: 1.27 INDEX
PROTHROMBIN TIME: 14.7 SECONDS (ref 9.12–12.5)

## 2018-11-03 PROCEDURE — 82728 ASSAY OF FERRITIN: CPT

## 2018-11-03 PROCEDURE — 82378 CARCINOEMBRYONIC ANTIGEN: CPT

## 2018-11-03 PROCEDURE — 2580000003 HC RX 258: Performed by: INTERNAL MEDICINE

## 2018-11-03 PROCEDURE — 1200000000 HC SEMI PRIVATE

## 2018-11-03 PROCEDURE — 6360000002 HC RX W HCPCS: Performed by: SURGERY

## 2018-11-03 PROCEDURE — 85610 PROTHROMBIN TIME: CPT

## 2018-11-03 PROCEDURE — 6370000000 HC RX 637 (ALT 250 FOR IP): Performed by: INTERNAL MEDICINE

## 2018-11-03 PROCEDURE — 99024 POSTOP FOLLOW-UP VISIT: CPT | Performed by: SURGERY

## 2018-11-03 PROCEDURE — 36415 COLL VENOUS BLD VENIPUNCTURE: CPT

## 2018-11-03 RX ORDER — AMLODIPINE BESYLATE 5 MG/1
5 TABLET ORAL ONCE
Status: COMPLETED | OUTPATIENT
Start: 2018-11-03 | End: 2018-11-03

## 2018-11-03 RX ORDER — TRAMADOL HYDROCHLORIDE 50 MG/1
50 TABLET ORAL
COMMUNITY
End: 2019-03-08 | Stop reason: ALTCHOICE

## 2018-11-03 RX ADMIN — SODIUM CHLORIDE: 9 INJECTION, SOLUTION INTRAVENOUS at 14:41

## 2018-11-03 RX ADMIN — AMLODIPINE BESYLATE 5 MG: 5 TABLET ORAL at 22:36

## 2018-11-03 RX ADMIN — SODIUM CHLORIDE, PRESERVATIVE FREE 10 ML: 5 INJECTION INTRAVENOUS at 22:38

## 2018-11-03 RX ADMIN — HYDROMORPHONE HYDROCHLORIDE 1 MG: 2 INJECTION INTRAMUSCULAR; INTRAVENOUS; SUBCUTANEOUS at 09:31

## 2018-11-03 RX ADMIN — HYDROMORPHONE HYDROCHLORIDE 1 MG: 2 INJECTION INTRAMUSCULAR; INTRAVENOUS; SUBCUTANEOUS at 02:44

## 2018-11-03 RX ADMIN — HYDROMORPHONE HYDROCHLORIDE 1 MG: 2 INJECTION INTRAMUSCULAR; INTRAVENOUS; SUBCUTANEOUS at 21:10

## 2018-11-03 RX ADMIN — HYDROMORPHONE HYDROCHLORIDE 1 MG: 2 INJECTION INTRAMUSCULAR; INTRAVENOUS; SUBCUTANEOUS at 14:30

## 2018-11-03 RX ADMIN — SODIUM CHLORIDE: 9 INJECTION, SOLUTION INTRAVENOUS at 05:05

## 2018-11-03 RX ADMIN — HYDROMORPHONE HYDROCHLORIDE 1 MG: 2 INJECTION INTRAMUSCULAR; INTRAVENOUS; SUBCUTANEOUS at 18:11

## 2018-11-03 ASSESSMENT — ENCOUNTER SYMPTOMS
ABDOMINAL PAIN: 1
EYE ITCHING: 0
APNEA: 0
BACK PAIN: 0
CHOKING: 0
STRIDOR: 0
SORE THROAT: 0
EYE REDNESS: 0
ANAL BLEEDING: 0
COLOR CHANGE: 0
CONSTIPATION: 0
PHOTOPHOBIA: 0
RECTAL PAIN: 0

## 2018-11-03 ASSESSMENT — PAIN DESCRIPTION - ONSET
ONSET: ON-GOING
ONSET: ON-GOING

## 2018-11-03 ASSESSMENT — PAIN DESCRIPTION - PROGRESSION
CLINICAL_PROGRESSION: NOT CHANGED

## 2018-11-03 ASSESSMENT — PAIN SCALES - GENERAL
PAINLEVEL_OUTOF10: 8
PAINLEVEL_OUTOF10: 9
PAINLEVEL_OUTOF10: 9
PAINLEVEL_OUTOF10: 8
PAINLEVEL_OUTOF10: 8

## 2018-11-03 ASSESSMENT — PAIN DESCRIPTION - PAIN TYPE
TYPE: SURGICAL PAIN

## 2018-11-03 ASSESSMENT — PAIN DESCRIPTION - DESCRIPTORS
DESCRIPTORS: SHARP;SORE
DESCRIPTORS: SHARP;SORE

## 2018-11-03 ASSESSMENT — PAIN DESCRIPTION - FREQUENCY
FREQUENCY: CONTINUOUS
FREQUENCY: CONTINUOUS

## 2018-11-03 ASSESSMENT — PAIN DESCRIPTION - ORIENTATION
ORIENTATION: LEFT;LOWER

## 2018-11-03 ASSESSMENT — PAIN DESCRIPTION - LOCATION
LOCATION: ABDOMEN

## 2018-11-03 NOTE — DISCHARGE INSTR - COC
applicable)   · Name:  · Address:  · Dialysis Schedule:  · Phone:  · Fax:    / signature: Electronically signed by OSMIN Espinal on 18 at 9:42 AM    PHYSICIAN SECTION    Nutrition Therapy:  Current Nutrition Therapy:   - Oral Diet:  General    Routes of Feeding: Oral  Liquids: Thin Liquids  Daily Fluid Restriction: no  Last Modified Barium Swallow with Video (Video Swallowing Test): not done    Treatments at the Time of Hospital Discharge:   Respiratory Treatments: none  Oxygen Therapy:  {Therapy; copd oxygen:09504}  Ventilator:    { CC Vent VIIB:478235640}    Rehab Therapies: Physical Therapy, Occupational Therapy and ostomy nurse/wound care for new ostomy. Weight Bearing Status/Restrictions: [facility] Weight Bearin}  Other Medical Equipment (for information only, NOT a DME order):  {EQUIPMENT:603975951}  Other Treatments: ***    Prognosis: Good    Condition at Discharge: Stable    Rehab Potential (if transferring to Rehab): Good    Recommended Labs or Other Treatments After Discharge: cmp, cbc in one week. Physician at [facility] can determine if any meds needed for constipation, etc.     Physician Certification: I certify the above information and transfer of Ben Sims  is necessary for the continuing treatment of the diagnosis listed and that he requires [facility] for less 30 days. Update Admission H&P: Changes in H&P as follows - pt had diverting colostomy placed by Dr Aide Calderón.      PHYSICIAN SIGNATURE:  Electronically signed by Clayton Betancur MD on 18 at 3:55 PM

## 2018-11-03 NOTE — PROGRESS NOTES
11/3/2018  Pt resting comfortably in bed, ostomy bag contains small amount of blood. Heart regular lungs clear, abd soft with rare bowel sounds. No leg edema. Will follow labs and blood count. Notes from oncology appreciated. Awaiting function of colostomy. Continue with post surgical management.     Roosevelt Dunlap MD

## 2018-11-03 NOTE — OP NOTE
Operative Report    Patient ID:  Riki Lee  [id number]  47 y.o.  1955    Indications: The patient is a 59 y.o. male who presents with lower abdominal pain associated with rectal cancer with obstruction. Pain is described as colicky, cramping and dull. There is no radiation. Pt feels like he is constipated. Pt was seen at the cancer center today and was direct admitted for obstructive symptoms. He is distended. He has severe hearing loss. Pt was recently scoped by Dr Lucita Degroot for this. Dr Lucita Degroot did traverse the rectal tumor which was at 5 cm from anal verge. Pt was recommended for radiation tx but he is too obstructed for that. Pre-operative Diagnosis: Obstructing rectal cancer    Post-operative Diagnosis: same    Procedure:  Laparoscopic loop colostomy placement    Surgeon: Emerson Hsu MD    Findings:  Same, no liver mets seen    Estimated Blood Loss:  Minimal           Total IV Fluids: 500 ml            Complications:  None; patient tolerated the procedure well. Disposition: PACU - hemodynamically stable. Condition: stable    Procedure Details: The patient was seen again in the Holding Room. The risks, benefits, complications, treatment options, and expected outcomes were discussed with the patient. The possibilities of reaction to medication, pulmonary aspiration, perforation of viscus, bleeding, recurrent infection, the need for additional procedures, and development of a complication requiring transfusion or further operation were discussed with the patient and/or family. There was concurrence with the proposed plan, and informed consent was obtained. The site of surgery was properly noted/marked. The patient was taken to the Operating Room, identified as Riki Lee, and the procedure verified. A Time Out was held and the above information confirmed. The patient was brought into the operating room and placed supine.  The abdomen was prepped and draped in the usual

## 2018-11-04 LAB
ANION GAP SERPL CALCULATED.3IONS-SCNC: 10 MMOL/L (ref 4–16)
BUN BLDV-MCNC: 9 MG/DL (ref 6–23)
CALCIUM SERPL-MCNC: 8.7 MG/DL (ref 8.3–10.6)
CHLORIDE BLD-SCNC: 97 MMOL/L (ref 99–110)
CO2: 27 MMOL/L (ref 21–32)
CREAT SERPL-MCNC: 0.7 MG/DL (ref 0.9–1.3)
GFR AFRICAN AMERICAN: >60 ML/MIN/1.73M2
GFR NON-AFRICAN AMERICAN: >60 ML/MIN/1.73M2
GLUCOSE BLD-MCNC: 104 MG/DL (ref 70–99)
HCT VFR BLD CALC: 41.3 % (ref 42–52)
HEMOGLOBIN: 12.9 GM/DL (ref 13.5–18)
MCH RBC QN AUTO: 26.5 PG (ref 27–31)
MCHC RBC AUTO-ENTMCNC: 31.2 % (ref 32–36)
MCV RBC AUTO: 85 FL (ref 78–100)
PDW BLD-RTO: 14.7 % (ref 11.7–14.9)
PLATELET # BLD: 332 K/CU MM (ref 140–440)
PMV BLD AUTO: 9.5 FL (ref 7.5–11.1)
POTASSIUM SERPL-SCNC: 5.2 MMOL/L (ref 3.5–5.1)
RBC # BLD: 4.86 M/CU MM (ref 4.6–6.2)
SODIUM BLD-SCNC: 134 MMOL/L (ref 135–145)
WBC # BLD: 12.3 K/CU MM (ref 4–10.5)

## 2018-11-04 PROCEDURE — 85027 COMPLETE CBC AUTOMATED: CPT

## 2018-11-04 PROCEDURE — 2580000003 HC RX 258: Performed by: INTERNAL MEDICINE

## 2018-11-04 PROCEDURE — 36415 COLL VENOUS BLD VENIPUNCTURE: CPT

## 2018-11-04 PROCEDURE — G8979 MOBILITY GOAL STATUS: HCPCS

## 2018-11-04 PROCEDURE — 6360000002 HC RX W HCPCS: Performed by: INTERNAL MEDICINE

## 2018-11-04 PROCEDURE — 97530 THERAPEUTIC ACTIVITIES: CPT

## 2018-11-04 PROCEDURE — 1200000000 HC SEMI PRIVATE

## 2018-11-04 PROCEDURE — 6370000000 HC RX 637 (ALT 250 FOR IP): Performed by: INTERNAL MEDICINE

## 2018-11-04 PROCEDURE — 6360000002 HC RX W HCPCS: Performed by: SURGERY

## 2018-11-04 PROCEDURE — G8978 MOBILITY CURRENT STATUS: HCPCS

## 2018-11-04 PROCEDURE — 94761 N-INVAS EAR/PLS OXIMETRY MLT: CPT

## 2018-11-04 PROCEDURE — 97163 PT EVAL HIGH COMPLEX 45 MIN: CPT

## 2018-11-04 PROCEDURE — 99024 POSTOP FOLLOW-UP VISIT: CPT | Performed by: SURGERY

## 2018-11-04 PROCEDURE — 80048 BASIC METABOLIC PNL TOTAL CA: CPT

## 2018-11-04 RX ORDER — AMLODIPINE BESYLATE 5 MG/1
5 TABLET ORAL DAILY
Status: DISCONTINUED | OUTPATIENT
Start: 2018-11-04 | End: 2018-11-08

## 2018-11-04 RX ADMIN — SODIUM CHLORIDE: 9 INJECTION, SOLUTION INTRAVENOUS at 19:52

## 2018-11-04 RX ADMIN — HYDROMORPHONE HYDROCHLORIDE 1 MG: 2 INJECTION INTRAMUSCULAR; INTRAVENOUS; SUBCUTANEOUS at 19:01

## 2018-11-04 RX ADMIN — SODIUM CHLORIDE: 9 INJECTION, SOLUTION INTRAVENOUS at 02:15

## 2018-11-04 RX ADMIN — SODIUM CHLORIDE, PRESERVATIVE FREE 10 ML: 5 INJECTION INTRAVENOUS at 22:07

## 2018-11-04 RX ADMIN — SODIUM CHLORIDE: 9 INJECTION, SOLUTION INTRAVENOUS at 09:22

## 2018-11-04 RX ADMIN — HYDROMORPHONE HYDROCHLORIDE 1 MG: 2 INJECTION INTRAMUSCULAR; INTRAVENOUS; SUBCUTANEOUS at 15:42

## 2018-11-04 RX ADMIN — HYDROMORPHONE HYDROCHLORIDE 0.5 MG: 2 INJECTION INTRAMUSCULAR; INTRAVENOUS; SUBCUTANEOUS at 22:06

## 2018-11-04 RX ADMIN — HYDROMORPHONE HYDROCHLORIDE 1 MG: 2 INJECTION INTRAMUSCULAR; INTRAVENOUS; SUBCUTANEOUS at 08:11

## 2018-11-04 RX ADMIN — AMLODIPINE BESYLATE 5 MG: 5 TABLET ORAL at 09:23

## 2018-11-04 RX ADMIN — HYDROMORPHONE HYDROCHLORIDE 1 MG: 2 INJECTION INTRAMUSCULAR; INTRAVENOUS; SUBCUTANEOUS at 02:14

## 2018-11-04 RX ADMIN — HYDROMORPHONE HYDROCHLORIDE 1 MG: 2 INJECTION INTRAMUSCULAR; INTRAVENOUS; SUBCUTANEOUS at 05:17

## 2018-11-04 RX ADMIN — HYDROMORPHONE HYDROCHLORIDE 1 MG: 2 INJECTION INTRAMUSCULAR; INTRAVENOUS; SUBCUTANEOUS at 12:07

## 2018-11-04 RX ADMIN — HYDROMORPHONE HYDROCHLORIDE 1 MG: 2 INJECTION INTRAMUSCULAR; INTRAVENOUS; SUBCUTANEOUS at 00:02

## 2018-11-04 ASSESSMENT — ENCOUNTER SYMPTOMS
RECTAL PAIN: 0
EYE REDNESS: 0
CONSTIPATION: 0
EYE ITCHING: 0
APNEA: 0
STRIDOR: 0
CHOKING: 0
COLOR CHANGE: 0
ABDOMINAL PAIN: 1
BACK PAIN: 0
PHOTOPHOBIA: 0
SORE THROAT: 0
ANAL BLEEDING: 0

## 2018-11-04 ASSESSMENT — PAIN DESCRIPTION - ORIENTATION
ORIENTATION: LEFT;UPPER
ORIENTATION: LEFT;UPPER

## 2018-11-04 ASSESSMENT — PAIN DESCRIPTION - PAIN TYPE
TYPE: SURGICAL PAIN

## 2018-11-04 ASSESSMENT — PAIN DESCRIPTION - FREQUENCY
FREQUENCY: CONTINUOUS
FREQUENCY: CONTINUOUS

## 2018-11-04 ASSESSMENT — PAIN DESCRIPTION - DESCRIPTORS: DESCRIPTORS: CONSTANT;SHARP

## 2018-11-04 ASSESSMENT — PAIN SCALES - GENERAL
PAINLEVEL_OUTOF10: 8
PAINLEVEL_OUTOF10: 8
PAINLEVEL_OUTOF10: 5
PAINLEVEL_OUTOF10: 8
PAINLEVEL_OUTOF10: 8
PAINLEVEL_OUTOF10: 7
PAINLEVEL_OUTOF10: 7
PAINLEVEL_OUTOF10: 8
PAINLEVEL_OUTOF10: 10
PAINLEVEL_OUTOF10: 8
PAINLEVEL_OUTOF10: 5
PAINLEVEL_OUTOF10: 8

## 2018-11-04 ASSESSMENT — PAIN DESCRIPTION - LOCATION
LOCATION: ABDOMEN

## 2018-11-04 NOTE — PROGRESS NOTES
Physical Therapy    Facility/Department: [facility] 4E  Initial Assessment    NAME: Mary Roe  : 1955  MRN: [id number]    Date of Service: 2018    Discharge Recommendations:  Subacute/Skilled Nursing Facility   PT Equipment Recommendations  Other: defer    Patient Diagnosis(es): There were no encounter diagnoses. has a past medical history of Nerve deafness on one side only. has no past surgical history on file. Restrictions  Restrictions/Precautions  Restrictions/Precautions: General Precautions, Fall Risk, Up as Tolerated  Position Activity Restriction  Other position/activity restrictions: colostomy; 2 L NC  Vision/Hearing  Vision: Impaired  Vision Exceptions:  (pt squints when reading words from 5 ft away )  Hearing: Exceptions to [facility]  Hearing Exceptions: Hard of hearing/hearing concerns (extremely [place] with poor lip reading skills)     Subjective  General  Chart Reviewed: Yes  Patient assessed for rehabilitation services?: Yes  Family / Caregiver Present: No  Referring Practitioner: Mecca Cross MD  Referral Date : 18  Diagnosis: Rectal Tumor  Follows Commands: Within Functional Limits  General Comment  Comments: in bed upon arrival. extremely [place]  Subjective  Subjective: pt is perplexed as to why he should move with the amount of pain he is in--attempted to explain importance of mobility for pain mgt  Pain Screening  Patient Currently in Pain: Yes  Pain Assessment  Pain Assessment: 0-10  Pain Level: 8  Vital Signs  Patient Currently in Pain: Yes       Orientation  Orientation  Overall Orientation Status: Within Functional Limits  Social/Functional History  Social/Functional History  Additional Comments: see CM note--pt lives alone in 2 story home and does not use device for mobility at baseline.  Unable to communicate effectively d/t [place] concerns for complete social/functional at this time  Cognition        Objective     Observation/Palpation  Posture: Fair  Observation: red abdominal pain. Colostomy 11/2. evaluated POD where pt exhibits the above impairments. He is significantly limited from baseline mobility and is experiencing intense abdominal and LBP with movement. currently min-mod A for mobility, but pt does exhibit foundational abilities to demonstrate greater independence if he can better cope with pain. pt is extremely [place] and does not interpret mouthing of words or gesturing well, which limits his ability to utilize education and cueing. He will require skilled PT to address impairments and activity limitation with rec d/c to SNF when medically appropriate  Specific instructions for Next Treatment: trial written communication   Prognosis: Good  Decision Making: High Complexity  Patient Education: log roll, abd precautions, importance of mobility for pain mgt and recovery , POC  REQUIRES PT FOLLOW UP: Yes  Activity Tolerance  Activity Tolerance: Patient limited by pain  Activity Tolerance: limited by pain, insight into recovery process, and difficulty hearing          Plan   Plan  Times per week: 3+  Times per day: Daily  Specific instructions for Next Treatment: trial written communication   Current Treatment Recommendations: Strengthening, Balance Training, Functional Mobility Training, Transfer Training, Endurance Training, Gait Training, Patient/Caregiver Education & Training, Equipment Evaluation, Education, & procurement  Safety Devices  Type of devices: Patient at risk for falls, Left in chair, Chair alarm in place, Call light within reach    G-Code  PT G-Codes  Functional Assessment Tool Used: [facility] mobility   Functional Limitation: Mobility: Walking and moving around  Mobility: Walking and Moving Around Current Status (): At least 40 percent but less than 60 percent impaired, limited or restricted  Mobility: Walking and Moving Around Goal Status ():  At least 20 percent but less than 40 percent impaired, limited or restricted  OutComes Score     AM-PAC

## 2018-11-04 NOTE — PROGRESS NOTES
11/4/2018  Afebrile, with some elevated BP. Amlodipine has been ordered. Lungs clear, heart regular, abd soft with acitve bowel sounds, tender in lower quadrants. Sodium 134 (L) MMOL/L    Potassium 5.2 (H) MMOL/L    Chloride 97 (L) mMol/L    CO2 27 MMOL/L    Anion Gap 10    BUN 9 MG/DL    CREATININE 0.7 (L) MG/DL    Glucose 104 (H) MG/DL    Calcium 8.7 MG/DL    GFR Non-African American >60 mL/min/1.73m2      WBC 12.3 (H) K/CU MM    RBC 4.86 M/CU MM    Hemoglobin 12.9 (L) GM/DL    Hematocrit 41.3 (L) %    MCV 85.0 FL    MCH 26.5 (L) PG    MCHC 31.2 (L) %    RDW 14.7 %    Platelets 404 K/CU MM   Diet being advanced by surgery. Will follow electrolytes, if hyperkalemia perissts, may add HCTZ for improved BP control and potassium excretion.    Roosevelt Dunlap MD

## 2018-11-05 PROBLEM — I10 ESSENTIAL HYPERTENSION: Status: ACTIVE | Noted: 2018-11-05

## 2018-11-05 LAB
ANION GAP SERPL CALCULATED.3IONS-SCNC: 10 MMOL/L (ref 4–16)
BUN BLDV-MCNC: 11 MG/DL (ref 6–23)
CALCIUM SERPL-MCNC: 8.6 MG/DL (ref 8.3–10.6)
CHLORIDE BLD-SCNC: 95 MMOL/L (ref 99–110)
CO2: 27 MMOL/L (ref 21–32)
CREAT SERPL-MCNC: 0.7 MG/DL (ref 0.9–1.3)
GFR AFRICAN AMERICAN: >60 ML/MIN/1.73M2
GFR NON-AFRICAN AMERICAN: >60 ML/MIN/1.73M2
GLUCOSE BLD-MCNC: 100 MG/DL (ref 70–99)
POTASSIUM SERPL-SCNC: 4.3 MMOL/L (ref 3.5–5.1)
SODIUM BLD-SCNC: 132 MMOL/L (ref 135–145)

## 2018-11-05 PROCEDURE — 99211 OFF/OP EST MAY X REQ PHY/QHP: CPT

## 2018-11-05 PROCEDURE — 6370000000 HC RX 637 (ALT 250 FOR IP): Performed by: INTERNAL MEDICINE

## 2018-11-05 PROCEDURE — 2580000003 HC RX 258: Performed by: INTERNAL MEDICINE

## 2018-11-05 PROCEDURE — 6360000002 HC RX W HCPCS: Performed by: SURGERY

## 2018-11-05 PROCEDURE — 1200000000 HC SEMI PRIVATE

## 2018-11-05 PROCEDURE — 80048 BASIC METABOLIC PNL TOTAL CA: CPT

## 2018-11-05 PROCEDURE — 99024 POSTOP FOLLOW-UP VISIT: CPT | Performed by: SURGERY

## 2018-11-05 PROCEDURE — 36415 COLL VENOUS BLD VENIPUNCTURE: CPT

## 2018-11-05 RX ADMIN — HYDROMORPHONE HYDROCHLORIDE 1 MG: 2 INJECTION INTRAMUSCULAR; INTRAVENOUS; SUBCUTANEOUS at 07:34

## 2018-11-05 RX ADMIN — AMLODIPINE BESYLATE 5 MG: 5 TABLET ORAL at 10:38

## 2018-11-05 RX ADMIN — SODIUM CHLORIDE: 9 INJECTION, SOLUTION INTRAVENOUS at 17:55

## 2018-11-05 RX ADMIN — HYDROMORPHONE HYDROCHLORIDE 1 MG: 2 INJECTION INTRAMUSCULAR; INTRAVENOUS; SUBCUTANEOUS at 21:21

## 2018-11-05 RX ADMIN — HYDROMORPHONE HYDROCHLORIDE 1 MG: 2 INJECTION INTRAMUSCULAR; INTRAVENOUS; SUBCUTANEOUS at 18:15

## 2018-11-05 RX ADMIN — SODIUM CHLORIDE: 9 INJECTION, SOLUTION INTRAVENOUS at 07:36

## 2018-11-05 RX ADMIN — HYDROMORPHONE HYDROCHLORIDE 1 MG: 2 INJECTION INTRAMUSCULAR; INTRAVENOUS; SUBCUTANEOUS at 04:22

## 2018-11-05 RX ADMIN — HYDROMORPHONE HYDROCHLORIDE 1 MG: 2 INJECTION INTRAMUSCULAR; INTRAVENOUS; SUBCUTANEOUS at 10:38

## 2018-11-05 RX ADMIN — HYDROMORPHONE HYDROCHLORIDE 1 MG: 2 INJECTION INTRAMUSCULAR; INTRAVENOUS; SUBCUTANEOUS at 13:46

## 2018-11-05 RX ADMIN — HYDROMORPHONE HYDROCHLORIDE 1 MG: 2 INJECTION INTRAMUSCULAR; INTRAVENOUS; SUBCUTANEOUS at 01:18

## 2018-11-05 ASSESSMENT — ENCOUNTER SYMPTOMS
EYE ITCHING: 0
CONSTIPATION: 0
ANAL BLEEDING: 0
EYE REDNESS: 0
RECTAL PAIN: 0
ABDOMINAL PAIN: 1
PHOTOPHOBIA: 0
CHOKING: 0
APNEA: 0
STRIDOR: 0
COLOR CHANGE: 0
SORE THROAT: 0
BACK PAIN: 0

## 2018-11-05 ASSESSMENT — PAIN SCALES - GENERAL
PAINLEVEL_OUTOF10: 9
PAINLEVEL_OUTOF10: 10
PAINLEVEL_OUTOF10: 8
PAINLEVEL_OUTOF10: 8
PAINLEVEL_OUTOF10: 9
PAINLEVEL_OUTOF10: 0
PAINLEVEL_OUTOF10: 8
PAINLEVEL_OUTOF10: 9
PAINLEVEL_OUTOF10: 9
PAINLEVEL_OUTOF10: 8

## 2018-11-05 ASSESSMENT — PAIN DESCRIPTION - PAIN TYPE: TYPE: SURGICAL PAIN

## 2018-11-05 ASSESSMENT — PAIN DESCRIPTION - ORIENTATION: ORIENTATION: LOWER

## 2018-11-05 ASSESSMENT — PAIN DESCRIPTION - DESCRIPTORS: DESCRIPTORS: ACHING;DISCOMFORT

## 2018-11-05 ASSESSMENT — PAIN DESCRIPTION - FREQUENCY: FREQUENCY: CONTINUOUS

## 2018-11-05 ASSESSMENT — PAIN DESCRIPTION - PROGRESSION: CLINICAL_PROGRESSION: NOT CHANGED

## 2018-11-05 ASSESSMENT — PAIN DESCRIPTION - ONSET: ONSET: ON-GOING

## 2018-11-05 ASSESSMENT — PAIN DESCRIPTION - LOCATION: LOCATION: ABDOMEN

## 2018-11-05 NOTE — PROGRESS NOTES
Recovering from surgery fairly ok  Ferritin ok, CEA 9.5  For port tomorrow.   Plan to start chemo and RT next couple of weeks

## 2018-11-05 NOTE — PROGRESS NOTES
meals at least 70%    · Monitoring: Diet Progression, Meal Intake, Diet Tolerance, Pertinent Labs, Weight    See Adult Nutrition Doc Flowsheet for more detail.      Electronically signed by Mariah Dejesus RD, MINH on 11/5/18 at 2:37 PM    Contact Number: [phone]

## 2018-11-05 NOTE — PROGRESS NOTES
General Surgery-Dr [facility] Day: 3    ChiefComplaint on Admission: obstructive rectal cancer      Subjective:     Buzz Esparza is a 59 y.o. male with POD #2 s/p lap colostomy placement . Patient reports abdominal pain. Tolerating DIET CLEAR LIQUID;. - BM. Has flatus in the bag    ROS:  Review of Systems   Constitutional: Negative for chills and fever. HENT: Negative for ear pain, mouth sores, sore throat and tinnitus. Eyes: Negative for photophobia, redness and itching. Respiratory: Negative for apnea, choking and stridor. Cardiovascular: Negative for chest pain and palpitations. Gastrointestinal: Positive for abdominal pain. Negative for anal bleeding, constipation and rectal pain. Endocrine: Negative for polydipsia. Genitourinary: Negative for enuresis, flank pain and hematuria. Musculoskeletal: Negative for back pain, joint swelling and myalgias. Skin: Negative for color change and pallor. Allergic/Immunologic: Negative for environmental allergies. Neurological: Negative for syncope and speech difficulty. Psychiatric/Behavioral: Negative for confusion and hallucinations. Allergies  Ibuprofen          Diagnosis Date    Nerve deafness on one side only     states can hear nothing on right side and only a little hearing out of left side. denies injury       Objective:     Vitals:    18 1701   BP:    Pulse:    Resp: 18   Temp:    SpO2: 99%       TEMPERATURE:  Current - Temp: 99 °F (37.2 °C); Max - Temp  Av.5 °F (36.9 °C)  Min: 98 °F (36.7 °C)  Max: 99 °F (37.2 °C)    I/O this shift:  In: -   Out: 600 [Urine:600]I/O last 3 completed shifts: In: 8297 [P.O.:120; I.V.:1494]  Out: 1305 [Urine:1305]      Physical Exam:    Physical Exam   Constitutional: He is oriented to person, place, and time. He appears well-developed and well-nourished. HENT:   Head: Normocephalic. Eyes: Pupils are equal, round, and reactive to light. Neck: Normal range of motion.  Neck

## 2018-11-05 NOTE — H&P
heart rate is 74, he is afebrile  and respirations are 10. HEENT:  Head is normocephalic, atraumatic. PERRLA. EOMI. Sclerae  anicteric. Oral mucosa is moist.  NECK:  Supple. No TMG or LA. No carotid bruits were appreciated. LUNGS:  Clear to A and P with good air movement. No wheezes, rales or  rhonchi. HEART:  Shows a regular rate and rhythm. No thrills, murmurs or rubs. Pulse 1 to 2+ in all extremities. ABDOMEN:  Distended, but no masses are noted. Mildly tender in the  lower quadrant of the abdomen. No rebound or guarding. No CVAT. RECTAL:  He has a mass that is felt above the anorectal junction. MUSCULOSKELETAL:  Shows normal range of motion. No cyanosis or edema,  but appeared to have slight clubbing. NEUROLOGICALLY:  He is oriented to person, place and time. He is quite  hard of hearing. SKIN:  Warm and dry. PSYCHIATRIC:  Appears appropriate. DIAGNOSTIC DATA:  Laboratory values obtained day after admission show  white count is 10,100, H and H of 11 and 37, platelets are 537,094. Biochem profile, sodium 133, potassium 4.3, chloride 96, CO2 is 28, BUN  is 10, creatinine is 0.8. Chest x-ray is negative for acute infiltrate. IMPRESSION:  Rectal tumor with obstruction, chest pain intermittently,  peripheral vascular disease. At this time, the patient has been  referred to surgery for consultation. He will need to have a diverting  colostomy. He may also need a port placed for future chemotherapy. He  would also benefit in the future from radiation and chemo to _____ his  tumor before definitive surgery. The patient does live alone and may need some help with his care after  discharge to learn how to use his ostomy correctly and to help with his  activity. He has severe arthritis of the hips, although he had previous  hip replacements, but he has not been eating or drinking very well and  may need some recovery time.         Annmarie Torre MD    D: 11/05/2018 12:26:55

## 2018-11-06 ENCOUNTER — APPOINTMENT (OUTPATIENT)
Dept: GENERAL RADIOLOGY | Age: 63
DRG: 331 | End: 2018-11-06
Attending: INTERNAL MEDICINE
Payer: MEDICARE

## 2018-11-06 ENCOUNTER — ANESTHESIA (OUTPATIENT)
Dept: OPERATING ROOM | Age: 63
DRG: 331 | End: 2018-11-06
Payer: MEDICARE

## 2018-11-06 ENCOUNTER — ANESTHESIA EVENT (OUTPATIENT)
Dept: OPERATING ROOM | Age: 63
DRG: 331 | End: 2018-11-06
Payer: MEDICARE

## 2018-11-06 VITALS — DIASTOLIC BLOOD PRESSURE: 85 MMHG | SYSTOLIC BLOOD PRESSURE: 147 MMHG | OXYGEN SATURATION: 100 %

## 2018-11-06 PROCEDURE — 3700000001 HC ADD 15 MINUTES (ANESTHESIA): Performed by: SURGERY

## 2018-11-06 PROCEDURE — 36561 INSERT TUNNELED CV CATH: CPT | Performed by: SURGERY

## 2018-11-06 PROCEDURE — 2709999900 HC NON-CHARGEABLE SUPPLY: Performed by: SURGERY

## 2018-11-06 PROCEDURE — 1200000000 HC SEMI PRIVATE

## 2018-11-06 PROCEDURE — 2500000003 HC RX 250 WO HCPCS: Performed by: SURGERY

## 2018-11-06 PROCEDURE — 2580000003 HC RX 258: Performed by: NURSE ANESTHETIST, CERTIFIED REGISTERED

## 2018-11-06 PROCEDURE — 3600000013 HC SURGERY LEVEL 3 ADDTL 15MIN: Performed by: SURGERY

## 2018-11-06 PROCEDURE — 76000 FLUOROSCOPY <1 HR PHYS/QHP: CPT

## 2018-11-06 PROCEDURE — 6360000002 HC RX W HCPCS: Performed by: SURGERY

## 2018-11-06 PROCEDURE — 77001 FLUOROGUIDE FOR VEIN DEVICE: CPT | Performed by: SURGERY

## 2018-11-06 PROCEDURE — 2700000000 HC OXYGEN THERAPY PER DAY

## 2018-11-06 PROCEDURE — 3600000003 HC SURGERY LEVEL 3 BASE: Performed by: SURGERY

## 2018-11-06 PROCEDURE — 6370000000 HC RX 637 (ALT 250 FOR IP): Performed by: INTERNAL MEDICINE

## 2018-11-06 PROCEDURE — 05H633Z INSERTION OF INFUSION DEVICE INTO LEFT SUBCLAVIAN VEIN, PERCUTANEOUS APPROACH: ICD-10-PCS | Performed by: SURGERY

## 2018-11-06 PROCEDURE — 3700000000 HC ANESTHESIA ATTENDED CARE: Performed by: SURGERY

## 2018-11-06 PROCEDURE — C1788 PORT, INDWELLING, IMP: HCPCS | Performed by: SURGERY

## 2018-11-06 PROCEDURE — 2580000003 HC RX 258: Performed by: INTERNAL MEDICINE

## 2018-11-06 PROCEDURE — 94761 N-INVAS EAR/PLS OXIMETRY MLT: CPT

## 2018-11-06 PROCEDURE — 6360000002 HC RX W HCPCS: Performed by: NURSE ANESTHETIST, CERTIFIED REGISTERED

## 2018-11-06 DEVICE — PORT INFUS L55CM 0.016ML 0.4ML CATH OD2.2MM ID1.4MM INTRO: Type: IMPLANTABLE DEVICE | Site: CHEST | Status: FUNCTIONAL

## 2018-11-06 RX ORDER — SODIUM CHLORIDE, SODIUM LACTATE, POTASSIUM CHLORIDE, CALCIUM CHLORIDE 600; 310; 30; 20 MG/100ML; MG/100ML; MG/100ML; MG/100ML
INJECTION, SOLUTION INTRAVENOUS CONTINUOUS PRN
Status: DISCONTINUED | OUTPATIENT
Start: 2018-11-06 | End: 2018-11-06 | Stop reason: SDUPTHER

## 2018-11-06 RX ORDER — HYDROCODONE BITARTRATE AND ACETAMINOPHEN 7.5; 325 MG/1; MG/1
1 TABLET ORAL EVERY 4 HOURS PRN
Status: DISCONTINUED | OUTPATIENT
Start: 2018-11-06 | End: 2018-11-07

## 2018-11-06 RX ORDER — HEPARIN SODIUM 5000 [USP'U]/ML
INJECTION, SOLUTION INTRAVENOUS; SUBCUTANEOUS
Status: COMPLETED | OUTPATIENT
Start: 2018-11-06 | End: 2018-11-06

## 2018-11-06 RX ORDER — LIDOCAINE HYDROCHLORIDE 10 MG/ML
INJECTION, SOLUTION INFILTRATION; PERINEURAL
Status: COMPLETED | OUTPATIENT
Start: 2018-11-06 | End: 2018-11-06

## 2018-11-06 RX ORDER — CEFAZOLIN SODIUM 1 G/50ML
1 INJECTION, SOLUTION INTRAVENOUS EVERY 8 HOURS
Status: DISCONTINUED | OUTPATIENT
Start: 2018-11-06 | End: 2018-11-06 | Stop reason: HOSPADM

## 2018-11-06 RX ORDER — PROPOFOL 10 MG/ML
INJECTION, EMULSION INTRAVENOUS PRN
Status: DISCONTINUED | OUTPATIENT
Start: 2018-11-06 | End: 2018-11-06 | Stop reason: SDUPTHER

## 2018-11-06 RX ORDER — CEFAZOLIN SODIUM 1 G/50ML
INJECTION, SOLUTION INTRAVENOUS
Status: COMPLETED
Start: 2018-11-06 | End: 2018-11-06

## 2018-11-06 RX ADMIN — PROPOFOL 50 MG: 10 INJECTION, EMULSION INTRAVENOUS at 13:49

## 2018-11-06 RX ADMIN — SODIUM CHLORIDE, POTASSIUM CHLORIDE, SODIUM LACTATE AND CALCIUM CHLORIDE: 600; 310; 30; 20 INJECTION, SOLUTION INTRAVENOUS at 13:35

## 2018-11-06 RX ADMIN — PROPOFOL 50 MG: 10 INJECTION, EMULSION INTRAVENOUS at 13:51

## 2018-11-06 RX ADMIN — PROPOFOL 20 MG: 10 INJECTION, EMULSION INTRAVENOUS at 13:58

## 2018-11-06 RX ADMIN — PROPOFOL 50 MG: 10 INJECTION, EMULSION INTRAVENOUS at 13:53

## 2018-11-06 RX ADMIN — CEFAZOLIN SODIUM 1 G: 1 INJECTION, SOLUTION INTRAVENOUS at 13:50

## 2018-11-06 RX ADMIN — HYDROMORPHONE HYDROCHLORIDE 1 MG: 2 INJECTION INTRAMUSCULAR; INTRAVENOUS; SUBCUTANEOUS at 16:08

## 2018-11-06 RX ADMIN — HYDROMORPHONE HYDROCHLORIDE 1 MG: 2 INJECTION INTRAMUSCULAR; INTRAVENOUS; SUBCUTANEOUS at 08:50

## 2018-11-06 RX ADMIN — HYDROCODONE BITARTRATE AND ACETAMINOPHEN 1 TABLET: 7.5; 325 TABLET ORAL at 19:55

## 2018-11-06 RX ADMIN — PROPOFOL 50 MG: 10 INJECTION, EMULSION INTRAVENOUS at 13:48

## 2018-11-06 RX ADMIN — SODIUM CHLORIDE: 9 INJECTION, SOLUTION INTRAVENOUS at 04:47

## 2018-11-06 RX ADMIN — PROPOFOL 20 MG: 10 INJECTION, EMULSION INTRAVENOUS at 14:01

## 2018-11-06 RX ADMIN — HYDROMORPHONE HYDROCHLORIDE 1 MG: 2 INJECTION INTRAMUSCULAR; INTRAVENOUS; SUBCUTANEOUS at 00:28

## 2018-11-06 RX ADMIN — PROPOFOL 20 MG: 10 INJECTION, EMULSION INTRAVENOUS at 14:04

## 2018-11-06 RX ADMIN — PROPOFOL 20 MG: 10 INJECTION, EMULSION INTRAVENOUS at 13:56

## 2018-11-06 RX ADMIN — PROPOFOL 20 MG: 10 INJECTION, EMULSION INTRAVENOUS at 14:06

## 2018-11-06 RX ADMIN — HYDROMORPHONE HYDROCHLORIDE 1 MG: 2 INJECTION INTRAMUSCULAR; INTRAVENOUS; SUBCUTANEOUS at 04:47

## 2018-11-06 RX ADMIN — PROPOFOL 20 MG: 10 INJECTION, EMULSION INTRAVENOUS at 14:08

## 2018-11-06 ASSESSMENT — PAIN SCALES - GENERAL
PAINLEVEL_OUTOF10: 8

## 2018-11-06 ASSESSMENT — PULMONARY FUNCTION TESTS
PIF_VALUE: 1
PIF_VALUE: 0
PIF_VALUE: 1

## 2018-11-06 ASSESSMENT — PAIN DESCRIPTION - DESCRIPTORS
DESCRIPTORS: ACHING;DISCOMFORT

## 2018-11-06 ASSESSMENT — PAIN DESCRIPTION - ORIENTATION
ORIENTATION: LEFT;LOWER
ORIENTATION: LEFT;LOWER
ORIENTATION: LOWER;LEFT;MID

## 2018-11-06 ASSESSMENT — PAIN DESCRIPTION - ONSET
ONSET: ON-GOING

## 2018-11-06 ASSESSMENT — PAIN DESCRIPTION - LOCATION
LOCATION: ABDOMEN

## 2018-11-06 ASSESSMENT — PAIN DESCRIPTION - FREQUENCY
FREQUENCY: CONTINUOUS

## 2018-11-06 ASSESSMENT — PAIN DESCRIPTION - PROGRESSION
CLINICAL_PROGRESSION: GRADUALLY WORSENING

## 2018-11-06 ASSESSMENT — PAIN DESCRIPTION - PAIN TYPE
TYPE: SURGICAL PAIN

## 2018-11-06 NOTE — ANESTHESIA PRE PROCEDURE
Department of Anesthesiology  Preprocedure Note       Name:  Terence Capone   Age:  59 y.o.  :  1955                                          MRN:  [id number]         Date:  2018      Surgeon: Jeremy Boykin):  Irineo Oh MD    Procedure: PORT INSERTION (N/A Chest)    Medications prior to admission:   Prior to Admission medications    Medication Sig Start Date End Date Taking? Authorizing Provider   traMADol (ULTRAM) 50 MG tablet Take 50 mg by mouth. Halley Fried Historical Provider, MD   aspirin 81 MG chewable tablet Take 1 tablet by mouth daily 18  Yes Mikie Calderon MD   nitroGLYCERIN (NITROSTAT) 0.4 MG SL tablet up to max of 3 total doses.  If no relief after 1 dose, call 911. 18   Mikie Calderon MD       Current medications:    Current Facility-Administered Medications   Medication Dose Route Frequency Provider Last Rate Last Dose    amLODIPine (NORVASC) tablet 5 mg  5 mg Oral Daily Mikie Calderon MD   5 mg at 18 1038    nitroGLYCERIN (NITROSTAT) SL tablet 0.4 mg  0.4 mg Sublingual Q5 Min PRN Mikie Calderon MD        sodium chloride flush 0.9 % injection 10 mL  10 mL Intravenous 2 times per day Mikie Calderon MD   10 mL at 18    sodium chloride flush 0.9 % injection 10 mL  10 mL Intravenous PRN Mikie Calderon MD        magnesium hydroxide (MILK OF MAGNESIA) 400 MG/5ML suspension 30 mL  30 mL Oral Daily PRN Mikie Calderon MD        ondansetron [facility]) injection 4 mg  4 mg Intravenous Q6H PRN Mikie Calderon MD        0.9 % sodium chloride infusion   Intravenous Continuous Mikie Calderon  mL/hr at 18 0447      acetaminophen (TYLENOL) tablet 650 mg  650 mg Oral Q4H PRN Mikie Calderon MD        HYDROmorphone (DILAUDID) injection 0.5 mg  0.5 mg Intravenous Q4H PRN Mikie Calderon MD   0.5 mg at 18    HYDROmorphone (DILAUDID) injection 1 mg  1 mg Intravenous Q3H PRN Edna Holguin HGB 12.9 11/04/2018    HCT 41.3 11/04/2018    MCV 85.0 11/04/2018    RDW 14.7 11/04/2018     11/04/2018       CMP:   Lab Results   Component Value Date     11/05/2018    K 4.3 11/05/2018    CL 95 11/05/2018    CO2 27 11/05/2018    BUN 11 11/05/2018    CREATININE 0.7 11/05/2018    GFRAA >60 11/05/2018    LABGLOM >60 11/05/2018    GLUCOSE 100 11/05/2018    PROT 6.6 11/02/2018    CALCIUM 8.6 11/05/2018    BILITOT 0.2 11/02/2018    ALKPHOS 64 11/02/2018    AST 14 11/02/2018    ALT 11 11/02/2018       POC Tests: No results for input(s): POCGLU, POCNA, POCK, POCCL, POCBUN, POCHEMO, POCHCT in the last 72 hours. Coags:   Lab Results   Component Value Date    PROTIME 14.7 11/03/2018    INR 1.27 11/03/2018    APTT 29.1 09/28/2018       HCG (If Applicable): No results found for: PREGTESTUR, PREGSERUM, HCG, HCGQUANT     ABGs: No results found for: PHART, PO2ART, ITI4HBP, BNV2FPR, BEART, D1HWGRSY     Type & Screen (If Applicable):  No results found for: LABABO, [address]    Anesthesia Evaluation  Patient summary reviewed and Nursing notes reviewed  Airway: Mallampati: II  TM distance: >3 FB   Neck ROM: limited  Mouth opening: > = 3 FB Dental:          Pulmonary:Negative Pulmonary ROS and normal exam                               Cardiovascular:  Exercise tolerance: good (>4 METS),   (+) hypertension:,       NYHA Classification: I                 Beta Blocker:  Not on Beta Blocker         Neuro/Psych:   Negative Neuro/Psych ROS              GI/Hepatic/Renal:            ROS comment: Rectal cancer. Endo/Other: Negative Endo/Other ROS                    Abdominal:   (+) scaphoid        Vascular:   + PVD, aortic or cerebral, . Anesthesia Plan      general and MAC     ASA 3       Induction: intravenous. Anesthetic plan and risks discussed with patient. Plan discussed with CRNA. Pre Anesthesia Assessment complete.  Chart reviewed on 11/6/2018        RUBEN Felix -

## 2018-11-06 NOTE — FLOWSHEET NOTE
11/05/18 2045   Mobility   Activity Return to bed;Ambulate in room  (pt ambulated to door of room and back)   Level of Assistance Contact guard assist, steading assist   Assistive Device None   Distance Ambulated (ft) 30 ft   Ambulation Response Tolerated poorly

## 2018-11-07 PROCEDURE — 6370000000 HC RX 637 (ALT 250 FOR IP): Performed by: INTERNAL MEDICINE

## 2018-11-07 PROCEDURE — 2700000000 HC OXYGEN THERAPY PER DAY

## 2018-11-07 PROCEDURE — 6360000002 HC RX W HCPCS: Performed by: INTERNAL MEDICINE

## 2018-11-07 PROCEDURE — 2580000003 HC RX 258: Performed by: INTERNAL MEDICINE

## 2018-11-07 PROCEDURE — 1200000000 HC SEMI PRIVATE

## 2018-11-07 PROCEDURE — 94761 N-INVAS EAR/PLS OXIMETRY MLT: CPT

## 2018-11-07 PROCEDURE — 99213 OFFICE O/P EST LOW 20 MIN: CPT

## 2018-11-07 RX ORDER — OXYCODONE HYDROCHLORIDE AND ACETAMINOPHEN 5; 325 MG/1; MG/1
1 TABLET ORAL EVERY 6 HOURS PRN
Status: DISCONTINUED | OUTPATIENT
Start: 2018-11-07 | End: 2018-11-09 | Stop reason: HOSPADM

## 2018-11-07 RX ADMIN — AMLODIPINE BESYLATE 5 MG: 5 TABLET ORAL at 08:51

## 2018-11-07 RX ADMIN — HYDROMORPHONE HYDROCHLORIDE 0.5 MG: 2 INJECTION INTRAMUSCULAR; INTRAVENOUS; SUBCUTANEOUS at 01:11

## 2018-11-07 RX ADMIN — HYDROMORPHONE HYDROCHLORIDE 0.5 MG: 2 INJECTION INTRAMUSCULAR; INTRAVENOUS; SUBCUTANEOUS at 10:05

## 2018-11-07 RX ADMIN — HYDROMORPHONE HYDROCHLORIDE 0.5 MG: 2 INJECTION INTRAMUSCULAR; INTRAVENOUS; SUBCUTANEOUS at 05:44

## 2018-11-07 RX ADMIN — SODIUM CHLORIDE, PRESERVATIVE FREE 10 ML: 5 INJECTION INTRAVENOUS at 22:04

## 2018-11-07 RX ADMIN — HYDROMORPHONE HYDROCHLORIDE 0.5 MG: 2 INJECTION INTRAMUSCULAR; INTRAVENOUS; SUBCUTANEOUS at 20:22

## 2018-11-07 RX ADMIN — HYDROMORPHONE HYDROCHLORIDE 0.5 MG: 2 INJECTION INTRAMUSCULAR; INTRAVENOUS; SUBCUTANEOUS at 16:06

## 2018-11-07 ASSESSMENT — PAIN DESCRIPTION - PROGRESSION
CLINICAL_PROGRESSION: GRADUALLY WORSENING
CLINICAL_PROGRESSION: GRADUALLY WORSENING

## 2018-11-07 ASSESSMENT — PAIN SCALES - GENERAL
PAINLEVEL_OUTOF10: 8
PAINLEVEL_OUTOF10: 0
PAINLEVEL_OUTOF10: 0
PAINLEVEL_OUTOF10: 6
PAINLEVEL_OUTOF10: 8
PAINLEVEL_OUTOF10: 4
PAINLEVEL_OUTOF10: 8

## 2018-11-07 ASSESSMENT — PAIN DESCRIPTION - ONSET
ONSET: PROGRESSIVE
ONSET: PROGRESSIVE

## 2018-11-07 ASSESSMENT — PAIN DESCRIPTION - PAIN TYPE
TYPE: SURGICAL PAIN
TYPE: SURGICAL PAIN

## 2018-11-07 ASSESSMENT — PAIN DESCRIPTION - LOCATION
LOCATION: ABDOMEN
LOCATION: ABDOMEN

## 2018-11-07 ASSESSMENT — PAIN DESCRIPTION - ORIENTATION
ORIENTATION: LEFT;MID
ORIENTATION: LEFT;MID

## 2018-11-07 ASSESSMENT — PAIN DESCRIPTION - DESCRIPTORS
DESCRIPTORS: ACHING;CRAMPING
DESCRIPTORS: ACHING;CRAMPING

## 2018-11-07 ASSESSMENT — PAIN DESCRIPTION - FREQUENCY
FREQUENCY: INTERMITTENT
FREQUENCY: CONTINUOUS

## 2018-11-07 NOTE — CONSULTS
11/04/2018    RDW 14.7 11/04/2018     11/04/2018    MPV 9.5 11/04/2018     CMP:    Lab Results   Component Value Date     11/05/2018    K 4.3 11/05/2018    CL 95 11/05/2018    CO2 27 11/05/2018    BUN 11 11/05/2018    CREATININE 0.7 11/05/2018    GFRAA >60 11/05/2018    LABGLOM >60 11/05/2018    GLUCOSE 100 11/05/2018    PROT 6.6 11/02/2018    LABALBU 3.4 11/02/2018    CALCIUM 8.6 11/05/2018    BILITOT 0.2 11/02/2018    ALKPHOS 64 11/02/2018    AST 14 11/02/2018    ALT 11 11/02/2018     Albumin:    Lab Results   Component Value Date    LABALBU 3.4 11/02/2018     PT/INR:    Lab Results   Component Value Date    PROTIME 14.7 11/03/2018    INR 1.27 11/03/2018     HgBA1c:  No results found for: LABA1C      Assessment:     Patient Active Problem List   Diagnosis    PVD (peripheral vascular disease) ([facility] [address]    Chest pain    Rectal tumor    Essential hypertension       Measurements:  Incision 11/02/18 Abdomen (Active)   Wound Assessment MANDA 11/7/2018  4:27 PM   Tawana-wound Assessment Other (Comment) 11/7/2018  8:51 AM   Closure Other (Comment) 11/7/2018  8:51 AM   Culture Taken No 11/4/2018  9:47 PM   Drainage Amount None 11/7/2018  8:51 AM   Odor None 11/5/2018 10:24 AM   Dressing/Treatment Dry dressing 11/7/2018  4:27 PM   Dressing Changed Changed/New 11/2/2018  5:47 PM   Dressing Status Clean;Dry; Intact 11/7/2018  4:27 PM   Number of days: 5       Incision 11/06/18 Chest Left (Active)   Wound Assessment Clean;Dry; Intact 11/7/2018  8:51 AM   Tawana-wound Assessment Clean;Dry; Intact 11/7/2018  8:51 AM   Closure Surgical glue 11/7/2018  8:51 AM   Drainage Amount None 11/7/2018  8:51 AM   Odor None 11/7/2018  8:51 AM   Dressing/Treatment Open to air 11/7/2018  8:51 AM   Number of days: 1       Response to treatment:  Well tolerated by patient. Pain Assessment:  Severity:  denies  Quality of pain: na  Wound Pain Timing/Severity: na  Premedicated: no    Plan:     Plan of Care:  Incision 11/06/18 Chest
reactive to light. Neck: Normal range of motion. Neck supple. Cardiovascular: Normal rate. Pulmonary/Chest: Effort normal.   Abdominal: Soft. He exhibits distension. He exhibits no mass. There is no tenderness. There is no rebound and no guarding. Genitourinary: Rectal exam shows mass. Musculoskeletal: Normal range of motion. Neurological: He is alert and oriented to person, place, and time. Skin: Skin is warm. Psychiatric: He has a normal mood and affect. DATA:    CBC with Differential:    Lab Results   Component Value Date    WBC 10.1 11/02/2018    RBC 4.38 11/02/2018    HGB 11.7 11/02/2018    HCT 37.7 11/02/2018     11/02/2018    MCV 86.1 11/02/2018    MCH 26.7 11/02/2018    MCHC 31.0 11/02/2018    RDW 14.8 11/02/2018    SEGSPCT 73.4 11/02/2018    LYMPHOPCT 11.9 11/02/2018    MONOPCT 10.7 11/02/2018    BASOPCT 0.6 11/02/2018    MONOSABS 1.1 11/02/2018    LYMPHSABS 1.2 11/02/2018    EOSABS 0.3 11/02/2018    BASOSABS 0.1 11/02/2018    DIFFTYPE AUTOMATED DIFFERENTIAL 11/02/2018     CMP:    Lab Results   Component Value Date     11/02/2018    K 4.3 11/02/2018    CL 96 11/02/2018    CO2 28 11/02/2018    BUN 10 11/02/2018    CREATININE 0.8 11/02/2018    GFRAA >60 11/02/2018    LABGLOM >60 11/02/2018    GLUCOSE 84 11/02/2018    PROT 6.6 11/02/2018    LABALBU 3.4 11/02/2018    CALCIUM 8.7 11/02/2018    BILITOT 0.2 11/02/2018    ALKPHOS 64 11/02/2018    AST 14 11/02/2018    ALT 11 11/02/2018       IMPRESSION:        Patient Active Problem List:     PVD (peripheral vascular disease) ([address]     Chest pain     Rectal tumor with obstruction    PLAN:  Care discussed with his brother and Dr Yuliet Gomes. Pt is obstructed and distended as a result of rectal cancer. Will proceed with laparoscopic diverting colostomy placement. Pt will need mediport for chemo.          Lauren Singh MD

## 2018-11-07 NOTE — PROGRESS NOTES
Patient had a couple episodes of incontinence of watery stool from the rectum. Bed pad changed as needed. Serosanguinous fluid and flatus noted in colostomy bag.

## 2018-11-08 LAB
ALBUMIN SERPL-MCNC: 3 GM/DL (ref 3.4–5)
ALP BLD-CCNC: 57 IU/L (ref 40–128)
ALT SERPL-CCNC: 9 U/L (ref 10–40)
ANION GAP SERPL CALCULATED.3IONS-SCNC: 7 MMOL/L (ref 4–16)
AST SERPL-CCNC: 13 IU/L (ref 15–37)
BASOPHILS ABSOLUTE: 0 K/CU MM
BASOPHILS RELATIVE PERCENT: 0.3 % (ref 0–1)
BILIRUB SERPL-MCNC: 0.2 MG/DL (ref 0–1)
BUN BLDV-MCNC: 9 MG/DL (ref 6–23)
CALCIUM SERPL-MCNC: 8.2 MG/DL (ref 8.3–10.6)
CHLORIDE BLD-SCNC: 97 MMOL/L (ref 99–110)
CO2: 31 MMOL/L (ref 21–32)
CREAT SERPL-MCNC: 0.7 MG/DL (ref 0.9–1.3)
DIFFERENTIAL TYPE: ABNORMAL
EOSINOPHILS ABSOLUTE: 0.3 K/CU MM
EOSINOPHILS RELATIVE PERCENT: 3.2 % (ref 0–3)
GFR AFRICAN AMERICAN: >60 ML/MIN/1.73M2
GFR NON-AFRICAN AMERICAN: >60 ML/MIN/1.73M2
GLUCOSE BLD-MCNC: 117 MG/DL (ref 70–99)
HCT VFR BLD CALC: 33.7 % (ref 42–52)
HEMOGLOBIN: 10.7 GM/DL (ref 13.5–18)
IMMATURE NEUTROPHIL %: 0.5 % (ref 0–0.43)
LYMPHOCYTES ABSOLUTE: 0.9 K/CU MM
LYMPHOCYTES RELATIVE PERCENT: 8.9 % (ref 24–44)
MCH RBC QN AUTO: 27 PG (ref 27–31)
MCHC RBC AUTO-ENTMCNC: 31.8 % (ref 32–36)
MCV RBC AUTO: 84.9 FL (ref 78–100)
MONOCYTES ABSOLUTE: 1.2 K/CU MM
MONOCYTES RELATIVE PERCENT: 12.3 % (ref 0–4)
NUCLEATED RBC %: 0 %
PDW BLD-RTO: 14.6 % (ref 11.7–14.9)
PLATELET # BLD: 247 K/CU MM (ref 140–440)
PMV BLD AUTO: 9.3 FL (ref 7.5–11.1)
POTASSIUM SERPL-SCNC: 3.6 MMOL/L (ref 3.5–5.1)
RBC # BLD: 3.97 M/CU MM (ref 4.6–6.2)
SEGMENTED NEUTROPHILS ABSOLUTE COUNT: 7.2 K/CU MM
SEGMENTED NEUTROPHILS RELATIVE PERCENT: 74.8 % (ref 36–66)
SODIUM BLD-SCNC: 135 MMOL/L (ref 135–145)
TOTAL IMMATURE NEUTOROPHIL: 0.05 K/CU MM
TOTAL NUCLEATED RBC: 0 K/CU MM
TOTAL PROTEIN: 5.3 GM/DL (ref 6.4–8.2)
WBC # BLD: 9.7 K/CU MM (ref 4–10.5)

## 2018-11-08 PROCEDURE — 99024 POSTOP FOLLOW-UP VISIT: CPT | Performed by: SURGERY

## 2018-11-08 PROCEDURE — 6370000000 HC RX 637 (ALT 250 FOR IP): Performed by: SURGERY

## 2018-11-08 PROCEDURE — 6360000002 HC RX W HCPCS: Performed by: SURGERY

## 2018-11-08 PROCEDURE — 6360000002 HC RX W HCPCS: Performed by: INTERNAL MEDICINE

## 2018-11-08 PROCEDURE — 1200000000 HC SEMI PRIVATE

## 2018-11-08 PROCEDURE — 36415 COLL VENOUS BLD VENIPUNCTURE: CPT

## 2018-11-08 PROCEDURE — 99213 OFFICE O/P EST LOW 20 MIN: CPT

## 2018-11-08 PROCEDURE — 2700000000 HC OXYGEN THERAPY PER DAY

## 2018-11-08 PROCEDURE — 94761 N-INVAS EAR/PLS OXIMETRY MLT: CPT

## 2018-11-08 PROCEDURE — 97530 THERAPEUTIC ACTIVITIES: CPT

## 2018-11-08 PROCEDURE — 6370000000 HC RX 637 (ALT 250 FOR IP): Performed by: INTERNAL MEDICINE

## 2018-11-08 PROCEDURE — G8987 SELF CARE CURRENT STATUS: HCPCS

## 2018-11-08 PROCEDURE — G8988 SELF CARE GOAL STATUS: HCPCS

## 2018-11-08 PROCEDURE — 80053 COMPREHEN METABOLIC PANEL: CPT

## 2018-11-08 PROCEDURE — 85025 COMPLETE CBC W/AUTO DIFF WBC: CPT

## 2018-11-08 PROCEDURE — 97167 OT EVAL HIGH COMPLEX 60 MIN: CPT

## 2018-11-08 PROCEDURE — 2580000003 HC RX 258: Performed by: INTERNAL MEDICINE

## 2018-11-08 RX ORDER — AMLODIPINE BESYLATE 2.5 MG/1
2.5 TABLET ORAL DAILY
Status: DISCONTINUED | OUTPATIENT
Start: 2018-11-08 | End: 2018-11-09 | Stop reason: HOSPADM

## 2018-11-08 RX ADMIN — MAGNESIUM HYDROXIDE 30 ML: 400 SUSPENSION ORAL at 17:24

## 2018-11-08 RX ADMIN — HYDROMORPHONE HYDROCHLORIDE 0.5 MG: 2 INJECTION INTRAMUSCULAR; INTRAVENOUS; SUBCUTANEOUS at 08:57

## 2018-11-08 RX ADMIN — SODIUM CHLORIDE, PRESERVATIVE FREE 10 ML: 5 INJECTION INTRAVENOUS at 22:38

## 2018-11-08 RX ADMIN — HYDROMORPHONE HYDROCHLORIDE 0.5 MG: 2 INJECTION INTRAMUSCULAR; INTRAVENOUS; SUBCUTANEOUS at 17:33

## 2018-11-08 RX ADMIN — SODIUM CHLORIDE, PRESERVATIVE FREE 10 ML: 5 INJECTION INTRAVENOUS at 09:00

## 2018-11-08 RX ADMIN — HYDROMORPHONE HYDROCHLORIDE 0.5 MG: 2 INJECTION INTRAMUSCULAR; INTRAVENOUS; SUBCUTANEOUS at 12:57

## 2018-11-08 RX ADMIN — HYDROMORPHONE HYDROCHLORIDE 0.5 MG: 2 INJECTION INTRAMUSCULAR; INTRAVENOUS; SUBCUTANEOUS at 04:54

## 2018-11-08 RX ADMIN — HYDROMORPHONE HYDROCHLORIDE 1 MG: 2 INJECTION INTRAMUSCULAR; INTRAVENOUS; SUBCUTANEOUS at 22:37

## 2018-11-08 RX ADMIN — AMLODIPINE BESYLATE 2.5 MG: 2.5 TABLET ORAL at 08:57

## 2018-11-08 RX ADMIN — HYDROMORPHONE HYDROCHLORIDE 0.5 MG: 2 INJECTION INTRAMUSCULAR; INTRAVENOUS; SUBCUTANEOUS at 00:27

## 2018-11-08 ASSESSMENT — PAIN SCALES - GENERAL
PAINLEVEL_OUTOF10: 0
PAINLEVEL_OUTOF10: 7
PAINLEVEL_OUTOF10: 4
PAINLEVEL_OUTOF10: 8
PAINLEVEL_OUTOF10: 0
PAINLEVEL_OUTOF10: 0
PAINLEVEL_OUTOF10: 8
PAINLEVEL_OUTOF10: 0
PAINLEVEL_OUTOF10: 9

## 2018-11-08 ASSESSMENT — PAIN DESCRIPTION - LOCATION
LOCATION: ABDOMEN

## 2018-11-08 ASSESSMENT — PAIN DESCRIPTION - PROGRESSION
CLINICAL_PROGRESSION: GRADUALLY WORSENING

## 2018-11-08 ASSESSMENT — PAIN DESCRIPTION - DESCRIPTORS
DESCRIPTORS: ACHING
DESCRIPTORS: ACHING
DESCRIPTORS: ACHING;DISCOMFORT
DESCRIPTORS: ACHING
DESCRIPTORS: ACHING;DISCOMFORT

## 2018-11-08 ASSESSMENT — PAIN DESCRIPTION - FREQUENCY
FREQUENCY: INTERMITTENT

## 2018-11-08 ASSESSMENT — PAIN DESCRIPTION - PAIN TYPE
TYPE: SURGICAL PAIN

## 2018-11-08 ASSESSMENT — PAIN DESCRIPTION - ONSET
ONSET: ON-GOING

## 2018-11-08 NOTE — PROGRESS NOTES
Nutrition Assessment    Type and Reason for Visit: Reassess    Nutrition Recommendations:   · Standard high calorie oral supplement BID     Nutrition Assessment: Pt with mild malnutrition 2/2 moderate muscle mass loss. Per pt, his appetite was poor PTA. However, it is now \"coming around\" and intake has improved. Will provide supplement to ensure nutritional needs are met. Encouraged continued good po, will follow. Malnutrition Assessment:  · Malnutrition Status: Mild Malnutrition  · Context: Chronic illness  · Findings of the 6 clinical characteristics of malnutrition (Minimum of 2 out of 6 clinical characteristics is required to make the diagnosis of moderate or severe Protein Calorie Malnutrition based on AND/ASPEN Guidelines):  1. Energy Intake-Less than 50% of estimated energy requirement for greater than or equal to 5 days, greater than or equal to 5 days    2. Weight Loss-No significant weight loss, in 1 month  3. Fat Loss-No significant subcutaneous fat loss, Orbital  4. Muscle Loss-Moderate muscle mass loss, Clavicles (pectoralis and deltoids)  5. Fluid Accumulation-No significant fluid accumulation, Extremities  6.  Strength-Not measured    Nutrition Risk Level:  Moderate    Nutrient Needs:  · Estimated Daily Total Kcal: 1191-3370  · Estimated Daily Protein (g):   · Estimated Daily Total Fluid (ml/day): 4561-0027    Nutrition Diagnosis:   · Problem: Inadequate oral intake  · Etiology: related to Alteration in GI function, Other (GI sugery )     Signs and symptoms:  as evidenced by Diet history of poor intake, GI abnormality    Objective Information:  · Wound Type: Surgical Wound  · Current Nutrition Therapies:  · Oral Diet Orders: General   · Oral Diet intake: 26-50%, %  · Oral Nutrition Supplement (ONS) Orders: None  · Anthropometric Measures:  · Ht: 5' 11\" (180.3 cm)   · Current Body Wt: 165 lb (74.8 kg)  · Ideal Body Wt: 172 lb (78 kg), % [place] Body 95  · BMI Classification: BMI 18.5 - 24.9 Normal Weight (BMI-23.1 )    Nutrition Interventions:   Continue current diet, Start ONS  Continued Inpatient Monitoring, Education Not Indicated, Coordination of Care    Nutrition Evaluation:   · Evaluation: Goals set   · Goals: Meal and supplement intake will be at least 75% for LOS    · Monitoring: Meal Intake, Supplement Intake, Diet Tolerance, Weight, Pertinent Labs      Electronically signed by Vicenta Rodriguez RD, MINH on 11/8/18 at 12:29 PM    Contact Number: [phone]

## 2018-11-08 NOTE — PROGRESS NOTES
11/8/2018  Afebrile,VSS. Some liquid stool  In ostomy bag. Tolerating full diet. Heart regular abd soft lungs clear. Sodium 135 MMOL/L    Potassium 3.6 MMOL/L    Chloride 97 (L) mMol/L    CO2 31 MMOL/L    BUN 9 MG/DL    CREATININE 0.7 (L) MG/DL    Glucose 117 (H) MG/DL    Calcium 8.2 (L) MG/DL    Alb 3.0 (L) GM/DL    Total Protein 5.3 (L) GM/DL    Total Bilirubin 0.2 MG/DL    ALT 9 (L) U/L    AST 13 (L) IU/L    Alkaline Phosphatase 57 IU/L    GFR Non-African American >60 mL/min/1.73m2      WBC 9.7 K/CU MM    RBC 3.97 (L) M/CU MM    Hemoglobin 10.7 (L) GM/DL    Hematocrit 33.7 (L) %    MCV 84.9 FL    MCH 27.0 PG    MCHC 31.8 (L) %    RDW 14.6 %    Platelets 208 K/CU MM    MPV 9.3 FL    Differential Type AUTOMATED DIFFERENTIAL    Segs Relative 74.8 (H) %    Lymphocytes % 8.9 (L) %    Monocytes % 12.3 (H) %   Anticipate discharge tomorrow if he passes more solid stool. MOM being given tonight.    Yadira Dean MD

## 2018-11-08 NOTE — PROGRESS NOTES
Night Shift RN Notes:  0000 Patient refused to have bath tonight when asked and would like to have it tomorrow. 0400 Patient had 200cc brownish loose colostomy output. Patient was given education on how to empty colostomy bag as well as to burp bag.

## 2018-11-08 NOTE — PROGRESS NOTES
General Surgery- [facility] Day: 7    ChiefComplaint on Admission: rectal cancer with obstruction      Subjective:     Addison Aj is a 59 y.o. male with POD #6 lap colostomy and #2 mediport placement . Patient reports abdominal pain. Tolerating DIET GENERAL;  Dietary Nutrition Supplements: Standard High Calorie Oral Supplement. - BM but has flatus. 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 denies injury       Objective:     Vitals:    18 1607   BP: 109/65   Pulse: 88   Resp: 16   Temp: 98.9 °F (37.2 °C)   SpO2: 96%       TEMPERATURE:  Current - Temp: 98.9 °F (37.2 °C); Max - Temp  Av.4 °F (36.9 °C)  Min: 97.9 °F (36.6 °C)  Max: 98.9 °F (37.2 °C)    No intake/output data recorded. I/O last 3 completed shifts: In: 0 [P.O.:1735]  Out: 0 [Urine:450; Stool:200]      Physical Exam:    Physical Exam   Constitutional: He is oriented to person, place, and time. He appears well-developed and well-nourished. HENT:   Head: Normocephalic. Eyes: Pupils are equal, round, and reactive to light. Neck: Normal range of motion. Neck supple. Cardiovascular: Normal rate. Pulmonary/Chest: Effort normal.   Abdominal: Soft. He exhibits no distension and no mass. There is tenderness. There is no rebound and no guarding. Stoma with flatus pink and viable   Musculoskeletal: Normal range of motion. Neurological: He is alert and oriented to person, place, and time. Skin: Skin is warm. Psychiatric: He has a normal mood and affect.            Scheduled Meds:   amLODIPine  2.5 mg Oral Daily    magnesium hydroxide  30 mL Oral Once    sodium chloride flush  10 mL Intravenous 2 times per day     Continuous Infusions:  PRN Meds:oxyCODONE-acetaminophen, nitroGLYCERIN, sodium chloride flush, magnesium hydroxide, ondansetron, acetaminophen, HYDROmorphone, HYDROmorphone      Labs/Imaging Results:   Lab Results   Component Value Date    WBC 9.7 11/08/2018    HGB 10.7 (L) 11/08/2018    HCT 33.7 (L) 11/08/2018    MCV 84.9 11/08/2018     11/08/2018     Lab Results   Component Value Date     11/08/2018    K 3.6 11/08/2018    CL 97 (L) 11/08/2018    CO2 31 11/08/2018    BUN 9 11/08/2018    CREATININE 0.7 (L) 11/08/2018    GLUCOSE 117 (H) 11/08/2018    CALCIUM 8.2 (L) 11/08/2018    PROT 5.3 (L) 11/08/2018    LABALBU 3.0 (L) 11/08/2018    BILITOT 0.2 11/08/2018    ALKPHOS 57 11/08/2018    AST 13 (L) 11/08/2018    ALT 9 (L) 11/08/2018    LABGLOM >60 11/08/2018    GFRAA >60 11/08/2018       Assessment:     Patient Active Problem List:     PVD (peripheral vascular disease) ([facility] [address]     Chest pain     Rectal tumor     Essential hypertension    Plan:        Will give milk of mag  Awaiting full bowel function prior to d/c      Kaleigh Biggs MD

## 2018-11-08 NOTE — PROGRESS NOTES
Occupational Therapy   Occupational Therapy Initial Assessment  Date: 2018   Patient Name: Terence Capone  MRN: [id number]     : 1955    Date of Service: 2018    Discharge Recommendations:  [address]       Patient Diagnosis(es): There were no encounter diagnoses. 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 has a past surgical history that includes colostomy (N/A, 2018) and pr insj prph ctr vad w/subq port age 12 yr/> (N/A, 2018). Treatment Diagnosis: Obstructing rectal cancer s/p Laparoscopic loop colostomy placement        Restrictions  Restrictions/Precautions  Restrictions/Precautions: General Precautions, Fall Risk, Up as Tolerated  Position Activity Restriction  Other position/activity restrictions: Colostomy, Bed exit alarm      Subjective   General  Chart Reviewed: Yes  Patient assessed for rehabilitation services?: Yes  Family / Caregiver Present: No  Subjective  Subjective: Pt received in supine upon OT arrival. Pt agreeable to therapy eval. Became frustrated mid-session with application of gait belt. Became more pleasant again at end of session. Pain Assessment  Patient Currently in Pain: Denies (at rest)      Social/Functional History  Social/Functional History  Lives With: Alone  Type of Home: House  Home Layout: Two level  ADL Assistance: Independent  Homemaking Assistance: Independent  Homemaking Responsibilities: Yes  Ambulation Assistance: Independent  Transfer Assistance: Independent  Occupation: Retired  Additional Comments: Above information taken from case mgmt note. Limited social history this date due to pt's severe hearing impairments.          Objective   Vision: Impaired (Squints with reading therapist's badge and paper)  Hearing: Exceptions to [facility]  Hearing Exceptions: Hard of hearing/hearing concerns ([place]-communication via pen/paper this date)    Orientation  Overall Orientation Status: Within Functional Limits  Observation/Palpation  Posture: Fair       Balance  Sitting Balance: Stand by assistance  Standing Balance: Contact guard assistance  Standing Balance  Sit to stand: Contact guard assistance (with min cues for safe hand/foot placement)  Stand to sit: Contact guard assistance (with min cues for reaching back with arms)  Functional Mobility  Functional - Mobility Device: No device  Assist Level: Minimal assistance  Functional Mobility Comments: 50 ft; unsteady gait with deviations to both Rt and Lt sides, intermittent reaching for falls, unpredictable. Would benefit from RW at this time.        ADL  Feeding: Independent (clear liquid diet)  Grooming: Contact guard assistance;Setup (in standing at sink)  UE Bathing: Stand by assistance;Setup  LE Bathing: Minimal assistance;Setup  UE Dressing: Stand by assistance;Setup (for donning robe EOB)  LE Dressing: Maximum assistance;Setup (for donning socks due to abdominal discomfort when leaning forward)  Toileting: Contact guard assistance;Setup (with urinating in standing-colostomy in place)  Additional Comments: Full ADL performance based on functional observation this date       Tone RUE  RUE Tone: Normotonic  Tone LUE  LUE Tone: Normotonic  Coordination  Movements Are Fluid And Coordinated: Yes        Bed mobility  Supine to Sit: Stand by assistance (with HOB elevated, transitioned to long sitting prior to sitting EOB)  Sit to Supine: Stand by assistance  Transfers  Sit to stand: Contact guard assistance (with min cues for safe hand/foot placement)  Stand to sit: Contact guard assistance (with min cues for reaching back with arms)        Cognition  Overall Cognitive Status: WFL  Cognition Comment: Pt becomes frustrated easily, very pleasant at times however           Sensation  Overall Sensation Status: WFL  Left Hand PROM (degrees)  Left Hand PROM: WFL  RUE AROM (degrees)  RUE AROM : WFL  LUE Strength  Gross LUE Strength: WFL  L Hand presents with the above impairments, and is not safe for immediate return home alone. Recommend continued OT services in SNF at discharge. Treatment Diagnosis: Obstructing rectal cancer s/p Laparoscopic loop colostomy placement  Prognosis: Good  Decision Making: High Complexity  Barriers to Learning: Intermittent agitation  REQUIRES OT FOLLOW UP: Yes  Activity Tolerance  Activity Tolerance: Patient Tolerated treatment well;Patient limited by fatigue  Safety Devices  Safety Devices in place: Yes  Type of devices: All fall risk precautions in place; Patient at risk for falls;Nurse notified;Gait belt;Call light within reach; Left in bed;Bed alarm in place          Time In: 1305  Time Out: 1325  Timed Treatment Minutes: 10  Total Treatment Time: 20       Plan   Plan  Times per week: 2x  Times per day: Daily  Current Treatment Recommendations: Strengthening, ROM, Balance Training, Functional Mobility Training, Endurance Training, Neuromuscular Re-education, Pain Management, Safety Education & Training, Patient/Caregiver Education & Training, Equipment Evaluation, Education, & procurement, Positioning, Self-Care / ADL, Home Management Training      G-Code  OT G-codes  Functional Limitation: Self care  Self Care Current Status (): At least 40 percent but less than 60 percent impaired, limited or restricted  Self Care Goal Status ():  At least 20 percent but less than 40 percent impaired, limited or restricted      Goals  Short term goals  Time Frame for Short term goals: Until discharge or all goals met  Short term goal 1: Pt will complete all apsects of bed mobility for EOB/OOB ADLs mod I with HOB flat  Short term goal 2: Pt will complete UB/LB bathing SBA with setup  Short term goal 3: Pt will complete all aspects of LB dressing CGA with setup using AE PRN  Short term goal 4: Pt will complete all functional transfers to and from bed, chair, toilet, shower chair with supervision/good safety awareness  Short term

## 2018-11-09 VITALS
RESPIRATION RATE: 17 BRPM | HEIGHT: 71 IN | DIASTOLIC BLOOD PRESSURE: 61 MMHG | HEART RATE: 80 BPM | BODY MASS INDEX: 23.1 KG/M2 | TEMPERATURE: 98.2 F | OXYGEN SATURATION: 96 % | SYSTOLIC BLOOD PRESSURE: 109 MMHG | WEIGHT: 165 LBS

## 2018-11-09 PROBLEM — Z93.3 COLOSTOMY PRESENT (HCC): Status: ACTIVE | Noted: 2018-11-09

## 2018-11-09 PROCEDURE — 2580000003 HC RX 258: Performed by: INTERNAL MEDICINE

## 2018-11-09 PROCEDURE — 6370000000 HC RX 637 (ALT 250 FOR IP): Performed by: INTERNAL MEDICINE

## 2018-11-09 PROCEDURE — 6360000002 HC RX W HCPCS: Performed by: SURGERY

## 2018-11-09 PROCEDURE — 99211 OFF/OP EST MAY X REQ PHY/QHP: CPT

## 2018-11-09 PROCEDURE — 97116 GAIT TRAINING THERAPY: CPT

## 2018-11-09 PROCEDURE — 94761 N-INVAS EAR/PLS OXIMETRY MLT: CPT

## 2018-11-09 RX ORDER — AMLODIPINE BESYLATE 2.5 MG/1
2.5 TABLET ORAL DAILY
Qty: 30 TABLET | Refills: 3 | DISCHARGE
Start: 2018-11-10 | End: 2019-01-31

## 2018-11-09 RX ORDER — OXYCODONE HYDROCHLORIDE AND ACETAMINOPHEN 5; 325 MG/1; MG/1
1 TABLET ORAL EVERY 6 HOURS PRN
Qty: 15 TABLET | Refills: 0 | Status: SHIPPED | OUTPATIENT
Start: 2018-11-09 | End: 2018-11-16

## 2018-11-09 RX ADMIN — HYDROMORPHONE HYDROCHLORIDE 1 MG: 2 INJECTION INTRAMUSCULAR; INTRAVENOUS; SUBCUTANEOUS at 07:41

## 2018-11-09 RX ADMIN — SODIUM CHLORIDE, PRESERVATIVE FREE 10 ML: 5 INJECTION INTRAVENOUS at 11:04

## 2018-11-09 RX ADMIN — HYDROMORPHONE HYDROCHLORIDE 1 MG: 2 INJECTION INTRAMUSCULAR; INTRAVENOUS; SUBCUTANEOUS at 04:42

## 2018-11-09 RX ADMIN — AMLODIPINE BESYLATE 2.5 MG: 2.5 TABLET ORAL at 11:04

## 2018-11-09 RX ADMIN — HYDROMORPHONE HYDROCHLORIDE 1 MG: 2 INJECTION INTRAMUSCULAR; INTRAVENOUS; SUBCUTANEOUS at 01:39

## 2018-11-09 ASSESSMENT — PAIN SCALES - GENERAL
PAINLEVEL_OUTOF10: 8

## 2018-11-12 LAB
EKG ATRIAL RATE: 79 BPM
EKG DIAGNOSIS: NORMAL
EKG P AXIS: 75 DEGREES
EKG P-R INTERVAL: 140 MS
EKG Q-T INTERVAL: 398 MS
EKG QRS DURATION: 90 MS
EKG QTC CALCULATION (BAZETT): 456 MS
EKG R AXIS: -13 DEGREES
EKG T AXIS: 40 DEGREES
EKG VENTRICULAR RATE: 79 BPM

## 2018-11-21 ENCOUNTER — HOSPITAL ENCOUNTER (OUTPATIENT)
Age: 63
Discharge: HOME | End: 2018-11-21
Payer: MEDICARE

## 2018-11-21 ENCOUNTER — HOSPITAL ENCOUNTER (OUTPATIENT)
Dept: MRI IMAGING | Age: 63
Discharge: HOME | End: 2018-11-21
Payer: MEDICARE

## 2018-11-21 DIAGNOSIS — C20 RECTAL CANCER (HCC): ICD-10-CM

## 2018-11-21 LAB
BUN BLDV-MCNC: 11 MG/DL (ref 6–23)
CREAT SERPL-MCNC: 0.7 MG/DL (ref 0.9–1.3)
GFR AFRICAN AMERICAN: >60 ML/MIN/1.73M2
GFR NON-AFRICAN AMERICAN: >60 ML/MIN/1.73M2

## 2018-11-21 PROCEDURE — 82565 ASSAY OF CREATININE: CPT

## 2018-11-21 PROCEDURE — 36415 COLL VENOUS BLD VENIPUNCTURE: CPT

## 2018-11-21 PROCEDURE — 72195 MRI PELVIS W/O DYE: CPT

## 2018-11-21 PROCEDURE — 84520 ASSAY OF UREA NITROGEN: CPT

## 2018-12-07 ENCOUNTER — HOSPITAL ENCOUNTER (OUTPATIENT)
Age: 63
Setting detail: SPECIMEN
Discharge: HOME | End: 2018-12-07
Payer: MEDICARE

## 2018-12-07 LAB
ALBUMIN SERPL-MCNC: 3.5 GM/DL (ref 3.4–5)
ALP BLD-CCNC: 58 IU/L (ref 40–128)
ALT SERPL-CCNC: 8 U/L (ref 10–40)
ANION GAP SERPL CALCULATED.3IONS-SCNC: 10 MMOL/L (ref 4–16)
AST SERPL-CCNC: 11 IU/L (ref 15–37)
BILIRUB SERPL-MCNC: 0.2 MG/DL (ref 0–1)
BUN BLDV-MCNC: 13 MG/DL (ref 6–23)
CALCIUM SERPL-MCNC: 9 MG/DL (ref 8.3–10.6)
CHLORIDE BLD-SCNC: 96 MMOL/L (ref 99–110)
CO2: 30 MMOL/L (ref 21–32)
CREAT SERPL-MCNC: 0.7 MG/DL (ref 0.9–1.3)
GFR AFRICAN AMERICAN: >60 ML/MIN/1.73M2
GFR NON-AFRICAN AMERICAN: >60 ML/MIN/1.73M2
GLUCOSE BLD-MCNC: 113 MG/DL (ref 70–99)
POTASSIUM SERPL-SCNC: 4.3 MMOL/L (ref 3.5–5.1)
SODIUM BLD-SCNC: 136 MMOL/L (ref 135–145)
TOTAL PROTEIN: 6.6 GM/DL (ref 6.4–8.2)

## 2018-12-07 PROCEDURE — 80053 COMPREHEN METABOLIC PANEL: CPT

## 2019-01-04 ENCOUNTER — HOSPITAL ENCOUNTER (OUTPATIENT)
Age: 64
Setting detail: SPECIMEN
Discharge: HOME | End: 2019-01-04
Payer: MEDICARE

## 2019-01-04 LAB
ALBUMIN SERPL-MCNC: 3.5 GM/DL (ref 3.4–5)
ALP BLD-CCNC: 54 IU/L (ref 40–128)
ALT SERPL-CCNC: 5 U/L (ref 10–40)
ANION GAP SERPL CALCULATED.3IONS-SCNC: 12 MMOL/L (ref 4–16)
AST SERPL-CCNC: 10 IU/L (ref 15–37)
BILIRUB SERPL-MCNC: 0.3 MG/DL (ref 0–1)
BUN BLDV-MCNC: 12 MG/DL (ref 6–23)
CALCIUM SERPL-MCNC: 9.1 MG/DL (ref 8.3–10.6)
CHLORIDE BLD-SCNC: 92 MMOL/L (ref 99–110)
CO2: 32 MMOL/L (ref 21–32)
CREAT SERPL-MCNC: 0.8 MG/DL (ref 0.9–1.3)
GFR AFRICAN AMERICAN: >60 ML/MIN/1.73M2
GFR NON-AFRICAN AMERICAN: >60 ML/MIN/1.73M2
GLUCOSE BLD-MCNC: 96 MG/DL (ref 70–99)
POTASSIUM SERPL-SCNC: 4.3 MMOL/L (ref 3.5–5.1)
SODIUM BLD-SCNC: 136 MMOL/L (ref 135–145)
TOTAL PROTEIN: 6.8 GM/DL (ref 6.4–8.2)

## 2019-01-04 PROCEDURE — 80053 COMPREHEN METABOLIC PANEL: CPT

## 2019-01-31 ENCOUNTER — OFFICE VISIT (OUTPATIENT)
Dept: SURGERY | Age: 64
End: 2019-01-31

## 2019-01-31 VITALS
DIASTOLIC BLOOD PRESSURE: 64 MMHG | RESPIRATION RATE: 16 BRPM | SYSTOLIC BLOOD PRESSURE: 116 MMHG | WEIGHT: 126.6 LBS | BODY MASS INDEX: 17.66 KG/M2 | HEART RATE: 95 BPM | OXYGEN SATURATION: 96 %

## 2019-01-31 DIAGNOSIS — D49.0 RECTAL TUMOR: Primary | ICD-10-CM

## 2019-01-31 PROCEDURE — 99024 POSTOP FOLLOW-UP VISIT: CPT | Performed by: SURGERY

## 2019-01-31 PROCEDURE — 1036F TOBACCO NON-USER: CPT | Performed by: SURGERY

## 2019-01-31 PROCEDURE — G8427 DOCREV CUR MEDS BY ELIG CLIN: HCPCS | Performed by: SURGERY

## 2019-01-31 PROCEDURE — G8484 FLU IMMUNIZE NO ADMIN: HCPCS | Performed by: SURGERY

## 2019-01-31 PROCEDURE — 3017F COLORECTAL CA SCREEN DOC REV: CPT | Performed by: SURGERY

## 2019-01-31 PROCEDURE — G8419 CALC BMI OUT NRM PARAM NOF/U: HCPCS | Performed by: SURGERY

## 2019-02-11 ENCOUNTER — HOSPITAL ENCOUNTER (OUTPATIENT)
Dept: MRI IMAGING | Age: 64
Discharge: HOME | End: 2019-02-11
Payer: MEDICARE

## 2019-02-11 DIAGNOSIS — D49.0 RECTAL TUMOR: ICD-10-CM

## 2019-02-11 DIAGNOSIS — D49.0 RECTAL NEOPLASM: ICD-10-CM

## 2019-02-11 DIAGNOSIS — D49.0 RECTAL NEOPLASM: Primary | ICD-10-CM

## 2019-02-11 LAB
GFR AFRICAN AMERICAN: >60 ML/MIN/1.73M2
GFR NON-AFRICAN AMERICAN: >60 ML/MIN/1.73M2
POC CREATININE: 0.7 MG/DL (ref 0.9–1.3)

## 2019-02-11 PROCEDURE — A9577 INJ MULTIHANCE: HCPCS | Performed by: SURGERY

## 2019-02-11 PROCEDURE — 72197 MRI PELVIS W/O & W/DYE: CPT

## 2019-02-11 PROCEDURE — 6360000004 HC RX CONTRAST MEDICATION: Performed by: SURGERY

## 2019-02-11 RX ADMIN — GADOBENATE DIMEGLUMINE 12 ML: 529 INJECTION, SOLUTION INTRAVENOUS at 12:14

## 2019-02-18 ASSESSMENT — ENCOUNTER SYMPTOMS
SORE THROAT: 0
PHOTOPHOBIA: 0
EYE REDNESS: 0
BACK PAIN: 0
EYE ITCHING: 0
COLOR CHANGE: 0
ANAL BLEEDING: 0
STRIDOR: 0
CONSTIPATION: 0
CHOKING: 0
RECTAL PAIN: 0
APNEA: 0

## 2019-03-04 ENCOUNTER — TELEPHONE (OUTPATIENT)
Dept: SURGERY | Age: 64
End: 2019-03-04

## 2019-03-04 ENCOUNTER — OFFICE VISIT (OUTPATIENT)
Dept: SURGERY | Age: 64
End: 2019-03-04
Payer: MEDICARE

## 2019-03-04 VITALS
DIASTOLIC BLOOD PRESSURE: 76 MMHG | RESPIRATION RATE: 16 BRPM | HEART RATE: 76 BPM | WEIGHT: 128 LBS | BODY MASS INDEX: 17.85 KG/M2 | OXYGEN SATURATION: 97 % | SYSTOLIC BLOOD PRESSURE: 132 MMHG

## 2019-03-04 DIAGNOSIS — D49.0 RECTAL NEOPLASM: Primary | ICD-10-CM

## 2019-03-04 PROCEDURE — G8484 FLU IMMUNIZE NO ADMIN: HCPCS | Performed by: SURGERY

## 2019-03-04 PROCEDURE — 99214 OFFICE O/P EST MOD 30 MIN: CPT | Performed by: SURGERY

## 2019-03-04 PROCEDURE — 1036F TOBACCO NON-USER: CPT | Performed by: SURGERY

## 2019-03-04 PROCEDURE — 3017F COLORECTAL CA SCREEN DOC REV: CPT | Performed by: SURGERY

## 2019-03-04 PROCEDURE — G8427 DOCREV CUR MEDS BY ELIG CLIN: HCPCS | Performed by: SURGERY

## 2019-03-04 PROCEDURE — G8419 CALC BMI OUT NRM PARAM NOF/U: HCPCS | Performed by: SURGERY

## 2019-03-04 ASSESSMENT — ENCOUNTER SYMPTOMS
ANAL BLEEDING: 0
APNEA: 0
EYE REDNESS: 0
SORE THROAT: 0
STRIDOR: 0
PHOTOPHOBIA: 0
CHOKING: 0
COLOR CHANGE: 0
EYE ITCHING: 0
CONSTIPATION: 0
RECTAL PAIN: 0
BACK PAIN: 0

## 2019-03-05 ENCOUNTER — TELEPHONE (OUTPATIENT)
Dept: SURGERY | Age: 64
End: 2019-03-05

## 2019-03-06 ENCOUNTER — ANESTHESIA EVENT (OUTPATIENT)
Dept: OPERATING ROOM | Age: 64
DRG: 331 | End: 2019-03-06
Payer: MEDICARE

## 2019-03-08 ENCOUNTER — HOSPITAL ENCOUNTER (OUTPATIENT)
Dept: PREADMISSION TESTING | Age: 64
Discharge: HOME | End: 2019-03-12
Payer: MEDICARE

## 2019-03-08 VITALS
WEIGHT: 125 LBS | OXYGEN SATURATION: 99 % | RESPIRATION RATE: 16 BRPM | TEMPERATURE: 99.8 F | DIASTOLIC BLOOD PRESSURE: 79 MMHG | HEIGHT: 71 IN | HEART RATE: 72 BPM | SYSTOLIC BLOOD PRESSURE: 126 MMHG | BODY MASS INDEX: 17.5 KG/M2

## 2019-03-08 LAB
ANION GAP SERPL CALCULATED.3IONS-SCNC: 9 MMOL/L (ref 4–16)
BUN BLDV-MCNC: 13 MG/DL (ref 6–23)
CALCIUM SERPL-MCNC: 8.9 MG/DL (ref 8.3–10.6)
CHLORIDE BLD-SCNC: 102 MMOL/L (ref 99–110)
CO2: 28 MMOL/L (ref 21–32)
CREAT SERPL-MCNC: 0.8 MG/DL (ref 0.9–1.3)
EKG ATRIAL RATE: 71 BPM
EKG DIAGNOSIS: NORMAL
EKG P AXIS: 67 DEGREES
EKG P-R INTERVAL: 154 MS
EKG Q-T INTERVAL: 412 MS
EKG QRS DURATION: 84 MS
EKG QTC CALCULATION (BAZETT): 447 MS
EKG R AXIS: -18 DEGREES
EKG T AXIS: 9 DEGREES
EKG VENTRICULAR RATE: 71 BPM
GFR AFRICAN AMERICAN: >60 ML/MIN/1.73M2
GFR NON-AFRICAN AMERICAN: >60 ML/MIN/1.73M2
GLUCOSE BLD-MCNC: 67 MG/DL (ref 70–99)
HCT VFR BLD CALC: 36 % (ref 42–52)
HEMOGLOBIN: 10.8 GM/DL (ref 13.5–18)
MCH RBC QN AUTO: 25.7 PG (ref 27–31)
MCHC RBC AUTO-ENTMCNC: 30 % (ref 32–36)
MCV RBC AUTO: 85.5 FL (ref 78–100)
PDW BLD-RTO: 18.7 % (ref 11.7–14.9)
PLATELET # BLD: 270 K/CU MM (ref 140–440)
PMV BLD AUTO: 9.1 FL (ref 7.5–11.1)
POTASSIUM SERPL-SCNC: 4.1 MMOL/L (ref 3.5–5.1)
RBC # BLD: 4.21 M/CU MM (ref 4.6–6.2)
SODIUM BLD-SCNC: 139 MMOL/L (ref 135–145)
WBC # BLD: 6.4 K/CU MM (ref 4–10.5)

## 2019-03-08 PROCEDURE — 93005 ELECTROCARDIOGRAM TRACING: CPT

## 2019-03-08 PROCEDURE — 85027 COMPLETE CBC AUTOMATED: CPT

## 2019-03-08 PROCEDURE — 80048 BASIC METABOLIC PNL TOTAL CA: CPT

## 2019-03-08 PROCEDURE — 36415 COLL VENOUS BLD VENIPUNCTURE: CPT

## 2019-03-13 ENCOUNTER — HOSPITAL ENCOUNTER (INPATIENT)
Age: 64
LOS: 5 days | Discharge: NURSING, REHAB OR LONG-TERM CARE | DRG: 331 | End: 2019-03-18
Attending: SURGERY | Admitting: SURGERY
Payer: MEDICARE

## 2019-03-13 ENCOUNTER — ANESTHESIA (OUTPATIENT)
Dept: OPERATING ROOM | Age: 64
DRG: 331 | End: 2019-03-13
Payer: MEDICARE

## 2019-03-13 VITALS
RESPIRATION RATE: 17 BRPM | SYSTOLIC BLOOD PRESSURE: 186 MMHG | DIASTOLIC BLOOD PRESSURE: 105 MMHG | OXYGEN SATURATION: 100 % | TEMPERATURE: 97.3 F

## 2019-03-13 DIAGNOSIS — C20 RECTAL CANCER (HCC): Primary | ICD-10-CM

## 2019-03-13 PROCEDURE — 88304 TISSUE EXAM BY PATHOLOGIST: CPT

## 2019-03-13 PROCEDURE — 0DTP4ZZ RESECTION OF RECTUM, PERCUTANEOUS ENDOSCOPIC APPROACH: ICD-10-PCS | Performed by: SURGERY

## 2019-03-13 PROCEDURE — 2580000003 HC RX 258: Performed by: NURSE ANESTHETIST, CERTIFIED REGISTERED

## 2019-03-13 PROCEDURE — 2580000003 HC RX 258

## 2019-03-13 PROCEDURE — 2500000003 HC RX 250 WO HCPCS: Performed by: ANESTHESIOLOGY

## 2019-03-13 PROCEDURE — 2060000000 HC ICU INTERMEDIATE R&B

## 2019-03-13 PROCEDURE — 44213 LAP MOBIL SPLENIC FL ADD-ON: CPT | Performed by: SURGERY

## 2019-03-13 PROCEDURE — 0DJD8ZZ INSPECTION OF LOWER INTESTINAL TRACT, VIA NATURAL OR ARTIFICIAL OPENING ENDOSCOPIC: ICD-10-PCS | Performed by: SURGERY

## 2019-03-13 PROCEDURE — 6360000002 HC RX W HCPCS: Performed by: NURSE ANESTHETIST, CERTIFIED REGISTERED

## 2019-03-13 PROCEDURE — 3600000009 HC SURGERY ROBOT BASE: Performed by: SURGERY

## 2019-03-13 PROCEDURE — 3700000000 HC ANESTHESIA ATTENDED CARE: Performed by: SURGERY

## 2019-03-13 PROCEDURE — 44187 LAP ILEO/JEJUNO-STOMY: CPT | Performed by: SURGERY

## 2019-03-13 PROCEDURE — 3600000019 HC SURGERY ROBOT ADDTL 15MIN: Performed by: SURGERY

## 2019-03-13 PROCEDURE — 44207 L COLECTOMY/COLOPROCTOSTOMY: CPT | Performed by: SURGERY

## 2019-03-13 PROCEDURE — 6360000002 HC RX W HCPCS: Performed by: SURGERY

## 2019-03-13 PROCEDURE — 2720000010 HC SURG SUPPLY STERILE: Performed by: SURGERY

## 2019-03-13 PROCEDURE — 88305 TISSUE EXAM BY PATHOLOGIST: CPT

## 2019-03-13 PROCEDURE — 2500000003 HC RX 250 WO HCPCS: Performed by: NURSE ANESTHETIST, CERTIFIED REGISTERED

## 2019-03-13 PROCEDURE — 6360000002 HC RX W HCPCS: Performed by: ANESTHESIOLOGY

## 2019-03-13 PROCEDURE — 8E0W4CZ ROBOTIC ASSISTED PROCEDURE OF TRUNK REGION, PERCUTANEOUS ENDOSCOPIC APPROACH: ICD-10-PCS | Performed by: SURGERY

## 2019-03-13 PROCEDURE — 2580000003 HC RX 258: Performed by: SURGERY

## 2019-03-13 PROCEDURE — S2900 ROBOTIC SURGICAL SYSTEM: HCPCS | Performed by: SURGERY

## 2019-03-13 PROCEDURE — 0D1B0Z4 BYPASS ILEUM TO CUTANEOUS, OPEN APPROACH: ICD-10-PCS | Performed by: SURGERY

## 2019-03-13 PROCEDURE — 6360000002 HC RX W HCPCS: Performed by: PHYSICIAN ASSISTANT

## 2019-03-13 PROCEDURE — 7100000001 HC PACU RECOVERY - ADDTL 15 MIN: Performed by: SURGERY

## 2019-03-13 PROCEDURE — 2709999900 HC NON-CHARGEABLE SUPPLY: Performed by: SURGERY

## 2019-03-13 PROCEDURE — 3700000001 HC ADD 15 MINUTES (ANESTHESIA): Performed by: SURGERY

## 2019-03-13 PROCEDURE — 88309 TISSUE EXAM BY PATHOLOGIST: CPT

## 2019-03-13 PROCEDURE — 2500000003 HC RX 250 WO HCPCS: Performed by: PHYSICIAN ASSISTANT

## 2019-03-13 PROCEDURE — 2500000003 HC RX 250 WO HCPCS: Performed by: SURGERY

## 2019-03-13 PROCEDURE — 7100000000 HC PACU RECOVERY - FIRST 15 MIN: Performed by: SURGERY

## 2019-03-13 RX ORDER — HYDRALAZINE HYDROCHLORIDE 20 MG/ML
5 INJECTION INTRAMUSCULAR; INTRAVENOUS EVERY 10 MIN PRN
Status: DISCONTINUED | OUTPATIENT
Start: 2019-03-13 | End: 2019-03-13

## 2019-03-13 RX ORDER — ONDANSETRON 2 MG/ML
4 INJECTION INTRAMUSCULAR; INTRAVENOUS
Status: DISCONTINUED | OUTPATIENT
Start: 2019-03-13 | End: 2019-03-13

## 2019-03-13 RX ORDER — FENTANYL CITRATE 50 UG/ML
50 INJECTION, SOLUTION INTRAMUSCULAR; INTRAVENOUS EVERY 5 MIN PRN
Status: DISCONTINUED | OUTPATIENT
Start: 2019-03-13 | End: 2019-03-13

## 2019-03-13 RX ORDER — PANTOPRAZOLE SODIUM 40 MG/10ML
40 INJECTION, POWDER, LYOPHILIZED, FOR SOLUTION INTRAVENOUS DAILY
Status: DISCONTINUED | OUTPATIENT
Start: 2019-03-14 | End: 2019-03-14

## 2019-03-13 RX ORDER — SODIUM CHLORIDE 0.9 % (FLUSH) 0.9 %
10 SYRINGE (ML) INJECTION PRN
Status: DISCONTINUED | OUTPATIENT
Start: 2019-03-13 | End: 2019-03-19 | Stop reason: HOSPADM

## 2019-03-13 RX ORDER — SODIUM CHLORIDE, SODIUM LACTATE, POTASSIUM CHLORIDE, CALCIUM CHLORIDE 600; 310; 30; 20 MG/100ML; MG/100ML; MG/100ML; MG/100ML
INJECTION, SOLUTION INTRAVENOUS
Status: COMPLETED
Start: 2019-03-13 | End: 2019-03-13

## 2019-03-13 RX ORDER — PROPOFOL 10 MG/ML
INJECTION, EMULSION INTRAVENOUS PRN
Status: DISCONTINUED | OUTPATIENT
Start: 2019-03-13 | End: 2019-03-13 | Stop reason: SDUPTHER

## 2019-03-13 RX ORDER — BUPIVACAINE HYDROCHLORIDE 5 MG/ML
INJECTION, SOLUTION EPIDURAL; INTRACAUDAL
Status: COMPLETED | OUTPATIENT
Start: 2019-03-13 | End: 2019-03-13

## 2019-03-13 RX ORDER — HYDROMORPHONE HCL 110MG/55ML
1 PATIENT CONTROLLED ANALGESIA SYRINGE INTRAVENOUS
Status: DISCONTINUED | OUTPATIENT
Start: 2019-03-13 | End: 2019-03-14

## 2019-03-13 RX ORDER — SODIUM CHLORIDE, SODIUM LACTATE, POTASSIUM CHLORIDE, CALCIUM CHLORIDE 600; 310; 30; 20 MG/100ML; MG/100ML; MG/100ML; MG/100ML
INJECTION, SOLUTION INTRAVENOUS CONTINUOUS PRN
Status: DISCONTINUED | OUTPATIENT
Start: 2019-03-13 | End: 2019-03-13 | Stop reason: SDUPTHER

## 2019-03-13 RX ORDER — SODIUM CHLORIDE, SODIUM LACTATE, POTASSIUM CHLORIDE, CALCIUM CHLORIDE 600; 310; 30; 20 MG/100ML; MG/100ML; MG/100ML; MG/100ML
INJECTION, SOLUTION INTRAVENOUS ONCE
Status: COMPLETED | OUTPATIENT
Start: 2019-03-13 | End: 2019-03-13

## 2019-03-13 RX ORDER — HYDROMORPHONE HCL 110MG/55ML
PATIENT CONTROLLED ANALGESIA SYRINGE INTRAVENOUS PRN
Status: DISCONTINUED | OUTPATIENT
Start: 2019-03-13 | End: 2019-03-13 | Stop reason: SDUPTHER

## 2019-03-13 RX ORDER — DEXAMETHASONE SODIUM PHOSPHATE 4 MG/ML
INJECTION, SOLUTION INTRA-ARTICULAR; INTRALESIONAL; INTRAMUSCULAR; INTRAVENOUS; SOFT TISSUE PRN
Status: DISCONTINUED | OUTPATIENT
Start: 2019-03-13 | End: 2019-03-13 | Stop reason: SDUPTHER

## 2019-03-13 RX ORDER — FENTANYL CITRATE 50 UG/ML
INJECTION, SOLUTION INTRAMUSCULAR; INTRAVENOUS PRN
Status: DISCONTINUED | OUTPATIENT
Start: 2019-03-13 | End: 2019-03-13 | Stop reason: SDUPTHER

## 2019-03-13 RX ORDER — LABETALOL HYDROCHLORIDE 5 MG/ML
INJECTION, SOLUTION INTRAVENOUS
Status: DISPENSED
Start: 2019-03-13 | End: 2019-03-14

## 2019-03-13 RX ORDER — FENTANYL CITRATE 50 UG/ML
25 INJECTION, SOLUTION INTRAMUSCULAR; INTRAVENOUS EVERY 5 MIN PRN
Status: DISCONTINUED | OUTPATIENT
Start: 2019-03-13 | End: 2019-03-13

## 2019-03-13 RX ORDER — 0.9 % SODIUM CHLORIDE 0.9 %
10 VIAL (ML) INJECTION DAILY
Status: DISCONTINUED | OUTPATIENT
Start: 2019-03-14 | End: 2019-03-14

## 2019-03-13 RX ORDER — ONDANSETRON 2 MG/ML
4 INJECTION INTRAMUSCULAR; INTRAVENOUS EVERY 6 HOURS PRN
Status: DISCONTINUED | OUTPATIENT
Start: 2019-03-13 | End: 2019-03-19 | Stop reason: HOSPADM

## 2019-03-13 RX ORDER — LABETALOL HYDROCHLORIDE 5 MG/ML
5 INJECTION, SOLUTION INTRAVENOUS EVERY 10 MIN PRN
Status: DISCONTINUED | OUTPATIENT
Start: 2019-03-13 | End: 2019-03-13

## 2019-03-13 RX ORDER — HYDROMORPHONE HCL 110MG/55ML
1 PATIENT CONTROLLED ANALGESIA SYRINGE INTRAVENOUS ONCE
Status: COMPLETED | OUTPATIENT
Start: 2019-03-13 | End: 2019-03-13

## 2019-03-13 RX ORDER — SODIUM CHLORIDE 9 MG/ML
INJECTION, SOLUTION INTRAVENOUS CONTINUOUS
Status: DISCONTINUED | OUTPATIENT
Start: 2019-03-13 | End: 2019-03-19 | Stop reason: HOSPADM

## 2019-03-13 RX ORDER — ROCURONIUM BROMIDE 10 MG/ML
INJECTION, SOLUTION INTRAVENOUS PRN
Status: DISCONTINUED | OUTPATIENT
Start: 2019-03-13 | End: 2019-03-13 | Stop reason: SDUPTHER

## 2019-03-13 RX ORDER — CIPROFLOXACIN 2 MG/ML
400 INJECTION, SOLUTION INTRAVENOUS ONCE
Status: COMPLETED | OUTPATIENT
Start: 2019-03-13 | End: 2019-03-13

## 2019-03-13 RX ORDER — ONDANSETRON 2 MG/ML
INJECTION INTRAMUSCULAR; INTRAVENOUS PRN
Status: DISCONTINUED | OUTPATIENT
Start: 2019-03-13 | End: 2019-03-13 | Stop reason: SDUPTHER

## 2019-03-13 RX ORDER — CIPROFLOXACIN 2 MG/ML
INJECTION, SOLUTION INTRAVENOUS
Status: COMPLETED
Start: 2019-03-13 | End: 2019-03-13

## 2019-03-13 RX ORDER — HYDRALAZINE HYDROCHLORIDE 20 MG/ML
INJECTION INTRAMUSCULAR; INTRAVENOUS PRN
Status: DISCONTINUED | OUTPATIENT
Start: 2019-03-13 | End: 2019-03-13 | Stop reason: SDUPTHER

## 2019-03-13 RX ORDER — SODIUM CHLORIDE 0.9 % (FLUSH) 0.9 %
10 SYRINGE (ML) INJECTION EVERY 12 HOURS SCHEDULED
Status: DISCONTINUED | OUTPATIENT
Start: 2019-03-13 | End: 2019-03-19 | Stop reason: HOSPADM

## 2019-03-13 RX ADMIN — LABETALOL HYDROCHLORIDE 5 MG: 5 INJECTION, SOLUTION INTRAVENOUS at 19:06

## 2019-03-13 RX ADMIN — ROCURONIUM BROMIDE 50 MG: 50 INJECTION, SOLUTION INTRAVENOUS at 11:46

## 2019-03-13 RX ADMIN — PROPOFOL 100 MG: 10 INJECTION, EMULSION INTRAVENOUS at 11:46

## 2019-03-13 RX ADMIN — SODIUM CHLORIDE, POTASSIUM CHLORIDE, SODIUM LACTATE AND CALCIUM CHLORIDE: 600; 310; 30; 20 INJECTION, SOLUTION INTRAVENOUS at 14:30

## 2019-03-13 RX ADMIN — FENTANYL CITRATE 50 MCG: 50 INJECTION INTRAMUSCULAR; INTRAVENOUS at 17:23

## 2019-03-13 RX ADMIN — ONDANSETRON 4 MG: 2 INJECTION INTRAMUSCULAR; INTRAVENOUS at 11:46

## 2019-03-13 RX ADMIN — FENTANYL CITRATE 100 MCG: 50 INJECTION INTRAMUSCULAR; INTRAVENOUS at 11:46

## 2019-03-13 RX ADMIN — DEXAMETHASONE SODIUM PHOSPHATE 8 MG: 4 INJECTION, SOLUTION INTRAMUSCULAR; INTRAVENOUS at 11:46

## 2019-03-13 RX ADMIN — FENTANYL CITRATE 50 MCG: 50 INJECTION INTRAMUSCULAR; INTRAVENOUS at 17:50

## 2019-03-13 RX ADMIN — HYDRALAZINE HYDROCHLORIDE 4 MG: 20 INJECTION INTRAMUSCULAR; INTRAVENOUS at 13:01

## 2019-03-13 RX ADMIN — FENTANYL CITRATE 50 MCG: 50 INJECTION INTRAMUSCULAR; INTRAVENOUS at 15:47

## 2019-03-13 RX ADMIN — HYDRALAZINE HYDROCHLORIDE 10 MG: 20 INJECTION INTRAMUSCULAR; INTRAVENOUS at 13:33

## 2019-03-13 RX ADMIN — ROCURONIUM BROMIDE 30 MG: 50 INJECTION, SOLUTION INTRAVENOUS at 14:18

## 2019-03-13 RX ADMIN — HYDRALAZINE HYDROCHLORIDE 6 MG: 20 INJECTION INTRAMUSCULAR; INTRAVENOUS at 13:12

## 2019-03-13 RX ADMIN — HYDROMORPHONE HYDROCHLORIDE 0.5 MG: 2 INJECTION INTRAMUSCULAR; INTRAVENOUS; SUBCUTANEOUS at 17:06

## 2019-03-13 RX ADMIN — HYDROMORPHONE HYDROCHLORIDE 0.5 MG: 2 INJECTION INTRAMUSCULAR; INTRAVENOUS; SUBCUTANEOUS at 16:56

## 2019-03-13 RX ADMIN — MIDAZOLAM HYDROCHLORIDE 2 MG: 1 INJECTION, SOLUTION INTRAMUSCULAR; INTRAVENOUS at 11:46

## 2019-03-13 RX ADMIN — CIPROFLOXACIN 400 MG: 2 INJECTION, SOLUTION INTRAVENOUS at 11:45

## 2019-03-13 RX ADMIN — FENTANYL CITRATE 50 MCG: 50 INJECTION INTRAMUSCULAR; INTRAVENOUS at 16:56

## 2019-03-13 RX ADMIN — HYDROMORPHONE HYDROCHLORIDE 0.5 MG: 2 INJECTION INTRAMUSCULAR; INTRAVENOUS; SUBCUTANEOUS at 16:58

## 2019-03-13 RX ADMIN — SODIUM CHLORIDE, POTASSIUM CHLORIDE, SODIUM LACTATE AND CALCIUM CHLORIDE: 600; 310; 30; 20 INJECTION, SOLUTION INTRAVENOUS at 11:40

## 2019-03-13 RX ADMIN — HYDROMORPHONE HYDROCHLORIDE 1 MG: 2 INJECTION INTRAMUSCULAR; INTRAVENOUS; SUBCUTANEOUS at 18:40

## 2019-03-13 RX ADMIN — SODIUM CHLORIDE: 9 INJECTION, SOLUTION INTRAVENOUS at 18:18

## 2019-03-13 RX ADMIN — LIDOCAINE HYDROCHLORIDE 50 MG: 20 INJECTION, SOLUTION INTRAVENOUS at 11:46

## 2019-03-13 RX ADMIN — ONDANSETRON 4 MG: 2 INJECTION INTRAMUSCULAR; INTRAVENOUS at 21:34

## 2019-03-13 RX ADMIN — SODIUM CHLORIDE, SODIUM LACTATE, POTASSIUM CHLORIDE, CALCIUM CHLORIDE: 600; 310; 30; 20 INJECTION, SOLUTION INTRAVENOUS at 09:35

## 2019-03-13 RX ADMIN — SODIUM CHLORIDE, POTASSIUM CHLORIDE, SODIUM LACTATE AND CALCIUM CHLORIDE: 600; 310; 30; 20 INJECTION, SOLUTION INTRAVENOUS at 09:35

## 2019-03-13 RX ADMIN — HYDROMORPHONE HYDROCHLORIDE 0.5 MG: 2 INJECTION INTRAMUSCULAR; INTRAVENOUS; SUBCUTANEOUS at 17:03

## 2019-03-13 RX ADMIN — FENTANYL CITRATE 100 MCG: 50 INJECTION INTRAMUSCULAR; INTRAVENOUS at 12:48

## 2019-03-13 RX ADMIN — ROCURONIUM BROMIDE 20 MG: 50 INJECTION, SOLUTION INTRAVENOUS at 12:36

## 2019-03-13 RX ADMIN — HYDROMORPHONE HYDROCHLORIDE 1 MG: 2 INJECTION INTRAMUSCULAR; INTRAVENOUS; SUBCUTANEOUS at 21:33

## 2019-03-13 RX ADMIN — LABETALOL HYDROCHLORIDE 5 MG: 5 INJECTION, SOLUTION INTRAVENOUS at 18:22

## 2019-03-13 RX ADMIN — METRONIDAZOLE 500 MG: 500 INJECTION, SOLUTION INTRAVENOUS at 12:08

## 2019-03-13 RX ADMIN — SODIUM CHLORIDE, POTASSIUM CHLORIDE, SODIUM LACTATE AND CALCIUM CHLORIDE: 600; 310; 30; 20 INJECTION, SOLUTION INTRAVENOUS at 16:31

## 2019-03-13 ASSESSMENT — PULMONARY FUNCTION TESTS
PIF_VALUE: 21
PIF_VALUE: 16
PIF_VALUE: 0
PIF_VALUE: 26
PIF_VALUE: 27
PIF_VALUE: 28
PIF_VALUE: 26
PIF_VALUE: 16
PIF_VALUE: 27
PIF_VALUE: 1
PIF_VALUE: 19
PIF_VALUE: 17
PIF_VALUE: 27
PIF_VALUE: 16
PIF_VALUE: 22
PIF_VALUE: 27
PIF_VALUE: 27
PIF_VALUE: 16
PIF_VALUE: 28
PIF_VALUE: 28
PIF_VALUE: 25
PIF_VALUE: 27
PIF_VALUE: 25
PIF_VALUE: 29
PIF_VALUE: 27
PIF_VALUE: 27
PIF_VALUE: 25
PIF_VALUE: 26
PIF_VALUE: 25
PIF_VALUE: 26
PIF_VALUE: 26
PIF_VALUE: 27
PIF_VALUE: 19
PIF_VALUE: 15
PIF_VALUE: 16
PIF_VALUE: 27
PIF_VALUE: 28
PIF_VALUE: 25
PIF_VALUE: 27
PIF_VALUE: 28
PIF_VALUE: 16
PIF_VALUE: 28
PIF_VALUE: 26
PIF_VALUE: 27
PIF_VALUE: 18
PIF_VALUE: 28
PIF_VALUE: 16
PIF_VALUE: 26
PIF_VALUE: 29
PIF_VALUE: 15
PIF_VALUE: 27
PIF_VALUE: 18
PIF_VALUE: 27
PIF_VALUE: 26
PIF_VALUE: 27
PIF_VALUE: 16
PIF_VALUE: 27
PIF_VALUE: 26
PIF_VALUE: 24
PIF_VALUE: 28
PIF_VALUE: 16
PIF_VALUE: 0
PIF_VALUE: 27
PIF_VALUE: 26
PIF_VALUE: 26
PIF_VALUE: 28
PIF_VALUE: 18
PIF_VALUE: 6
PIF_VALUE: 26
PIF_VALUE: 25
PIF_VALUE: 16
PIF_VALUE: 0
PIF_VALUE: 14
PIF_VALUE: 27
PIF_VALUE: 16
PIF_VALUE: 18
PIF_VALUE: 16
PIF_VALUE: 18
PIF_VALUE: 26
PIF_VALUE: 25
PIF_VALUE: 27
PIF_VALUE: 26
PIF_VALUE: 21
PIF_VALUE: 18
PIF_VALUE: 1
PIF_VALUE: 27
PIF_VALUE: 19
PIF_VALUE: 26
PIF_VALUE: 27
PIF_VALUE: 28
PIF_VALUE: 0
PIF_VALUE: 16
PIF_VALUE: 19
PIF_VALUE: 18
PIF_VALUE: 17
PIF_VALUE: 27
PIF_VALUE: 25
PIF_VALUE: 28
PIF_VALUE: 27
PIF_VALUE: 20
PIF_VALUE: 10
PIF_VALUE: 26
PIF_VALUE: 27
PIF_VALUE: 25
PIF_VALUE: 17
PIF_VALUE: 17
PIF_VALUE: 27
PIF_VALUE: 27
PIF_VALUE: 1
PIF_VALUE: 27
PIF_VALUE: 23
PIF_VALUE: 26
PIF_VALUE: 27
PIF_VALUE: 18
PIF_VALUE: 0
PIF_VALUE: 27
PIF_VALUE: 18
PIF_VALUE: 17
PIF_VALUE: 26
PIF_VALUE: 27
PIF_VALUE: 27
PIF_VALUE: 2
PIF_VALUE: 23
PIF_VALUE: 18
PIF_VALUE: 27
PIF_VALUE: 27
PIF_VALUE: 16
PIF_VALUE: 26
PIF_VALUE: 20
PIF_VALUE: 28
PIF_VALUE: 16
PIF_VALUE: 20
PIF_VALUE: 18
PIF_VALUE: 18
PIF_VALUE: 19
PIF_VALUE: 27
PIF_VALUE: 2
PIF_VALUE: 21
PIF_VALUE: 16
PIF_VALUE: 28
PIF_VALUE: 27
PIF_VALUE: 26
PIF_VALUE: 27
PIF_VALUE: 23
PIF_VALUE: 19
PIF_VALUE: 27
PIF_VALUE: 27
PIF_VALUE: 28
PIF_VALUE: 28
PIF_VALUE: 16
PIF_VALUE: 1
PIF_VALUE: 26
PIF_VALUE: 8
PIF_VALUE: 18
PIF_VALUE: 20
PIF_VALUE: 26
PIF_VALUE: 19
PIF_VALUE: 24
PIF_VALUE: 27
PIF_VALUE: 27
PIF_VALUE: 19
PIF_VALUE: 26
PIF_VALUE: 26
PIF_VALUE: 18
PIF_VALUE: 19
PIF_VALUE: 17
PIF_VALUE: 7
PIF_VALUE: 27
PIF_VALUE: 27
PIF_VALUE: 24
PIF_VALUE: 19
PIF_VALUE: 16
PIF_VALUE: 19
PIF_VALUE: 25
PIF_VALUE: 18
PIF_VALUE: 26
PIF_VALUE: 1
PIF_VALUE: 26
PIF_VALUE: 17
PIF_VALUE: 24
PIF_VALUE: 27
PIF_VALUE: 26
PIF_VALUE: 18
PIF_VALUE: 18
PIF_VALUE: 26
PIF_VALUE: 28
PIF_VALUE: 19
PIF_VALUE: 25
PIF_VALUE: 27
PIF_VALUE: 6
PIF_VALUE: 20
PIF_VALUE: 24
PIF_VALUE: 26
PIF_VALUE: 27
PIF_VALUE: 26
PIF_VALUE: 28
PIF_VALUE: 27
PIF_VALUE: 18
PIF_VALUE: 27
PIF_VALUE: 26
PIF_VALUE: 20
PIF_VALUE: 21
PIF_VALUE: 24
PIF_VALUE: 19
PIF_VALUE: 26
PIF_VALUE: 25
PIF_VALUE: 26
PIF_VALUE: 18
PIF_VALUE: 27
PIF_VALUE: 27
PIF_VALUE: 17
PIF_VALUE: 28
PIF_VALUE: 20
PIF_VALUE: 26
PIF_VALUE: 16
PIF_VALUE: 28
PIF_VALUE: 27
PIF_VALUE: 28
PIF_VALUE: 26
PIF_VALUE: 27
PIF_VALUE: 27
PIF_VALUE: 26
PIF_VALUE: 15
PIF_VALUE: 28
PIF_VALUE: 16
PIF_VALUE: 26
PIF_VALUE: 27
PIF_VALUE: 28
PIF_VALUE: 20
PIF_VALUE: 24
PIF_VALUE: 27
PIF_VALUE: 15
PIF_VALUE: 24
PIF_VALUE: 15
PIF_VALUE: 26
PIF_VALUE: 26
PIF_VALUE: 18
PIF_VALUE: 16
PIF_VALUE: 16
PIF_VALUE: 26
PIF_VALUE: 26
PIF_VALUE: 28
PIF_VALUE: 18
PIF_VALUE: 15
PIF_VALUE: 27
PIF_VALUE: 25
PIF_VALUE: 26
PIF_VALUE: 16
PIF_VALUE: 17
PIF_VALUE: 27
PIF_VALUE: 26
PIF_VALUE: 28
PIF_VALUE: 23
PIF_VALUE: 27
PIF_VALUE: 27
PIF_VALUE: 25
PIF_VALUE: 26
PIF_VALUE: 27
PIF_VALUE: 16
PIF_VALUE: 28
PIF_VALUE: 27
PIF_VALUE: 20
PIF_VALUE: 27
PIF_VALUE: 23
PIF_VALUE: 19
PIF_VALUE: 1
PIF_VALUE: 16
PIF_VALUE: 28
PIF_VALUE: 23
PIF_VALUE: 28
PIF_VALUE: 28
PIF_VALUE: 24
PIF_VALUE: 16
PIF_VALUE: 27
PIF_VALUE: 21
PIF_VALUE: 28
PIF_VALUE: 16
PIF_VALUE: 24
PIF_VALUE: 5
PIF_VALUE: 22
PIF_VALUE: 29
PIF_VALUE: 20
PIF_VALUE: 27
PIF_VALUE: 26
PIF_VALUE: 18
PIF_VALUE: 28
PIF_VALUE: 19
PIF_VALUE: 28
PIF_VALUE: 27
PIF_VALUE: 4
PIF_VALUE: 26
PIF_VALUE: 26
PIF_VALUE: 15
PIF_VALUE: 24
PIF_VALUE: 18
PIF_VALUE: 21
PIF_VALUE: 27
PIF_VALUE: 16
PIF_VALUE: 16
PIF_VALUE: 26
PIF_VALUE: 22
PIF_VALUE: 27
PIF_VALUE: 19
PIF_VALUE: 29
PIF_VALUE: 26
PIF_VALUE: 25
PIF_VALUE: 16
PIF_VALUE: 16
PIF_VALUE: 26
PIF_VALUE: 27
PIF_VALUE: 18
PIF_VALUE: 27
PIF_VALUE: 16

## 2019-03-13 ASSESSMENT — PAIN DESCRIPTION - LOCATION
LOCATION: ABDOMEN

## 2019-03-13 ASSESSMENT — PAIN SCALES - GENERAL
PAINLEVEL_OUTOF10: 9
PAINLEVEL_OUTOF10: 7
PAINLEVEL_OUTOF10: 8
PAINLEVEL_OUTOF10: 8

## 2019-03-13 ASSESSMENT — PAIN DESCRIPTION - DESCRIPTORS
DESCRIPTORS: CONSTANT

## 2019-03-13 ASSESSMENT — PAIN DESCRIPTION - PAIN TYPE
TYPE: SURGICAL PAIN

## 2019-03-13 ASSESSMENT — PAIN - FUNCTIONAL ASSESSMENT: PAIN_FUNCTIONAL_ASSESSMENT: 0-10

## 2019-03-14 PROBLEM — E43 SEVERE MALNUTRITION (HCC): Chronic | Status: ACTIVE | Noted: 2019-03-14

## 2019-03-14 LAB
ANION GAP SERPL CALCULATED.3IONS-SCNC: 11 MMOL/L (ref 4–16)
BASOPHILS ABSOLUTE: 0 K/CU MM
BASOPHILS RELATIVE PERCENT: 0.1 % (ref 0–1)
BUN BLDV-MCNC: 10 MG/DL (ref 6–23)
CALCIUM SERPL-MCNC: 8.5 MG/DL (ref 8.3–10.6)
CHLORIDE BLD-SCNC: 100 MMOL/L (ref 99–110)
CO2: 27 MMOL/L (ref 21–32)
CREAT SERPL-MCNC: 0.7 MG/DL (ref 0.9–1.3)
DIFFERENTIAL TYPE: ABNORMAL
EOSINOPHILS ABSOLUTE: 0 K/CU MM
EOSINOPHILS RELATIVE PERCENT: 0 % (ref 0–3)
GFR AFRICAN AMERICAN: >60 ML/MIN/1.73M2
GFR NON-AFRICAN AMERICAN: >60 ML/MIN/1.73M2
GLUCOSE BLD-MCNC: 112 MG/DL (ref 70–99)
HCT VFR BLD CALC: 40.1 % (ref 42–52)
HEMOGLOBIN: 12.9 GM/DL (ref 13.5–18)
IMMATURE NEUTROPHIL %: 0.3 % (ref 0–0.43)
LYMPHOCYTES ABSOLUTE: 0.3 K/CU MM
LYMPHOCYTES RELATIVE PERCENT: 1.8 % (ref 24–44)
MCH RBC QN AUTO: 25.7 PG (ref 27–31)
MCHC RBC AUTO-ENTMCNC: 32.2 % (ref 32–36)
MCV RBC AUTO: 79.9 FL (ref 78–100)
MONOCYTES ABSOLUTE: 0.7 K/CU MM
MONOCYTES RELATIVE PERCENT: 4.7 % (ref 0–4)
PDW BLD-RTO: 17.8 % (ref 11.7–14.9)
PLATELET # BLD: 270 K/CU MM (ref 140–440)
PMV BLD AUTO: 9.7 FL (ref 7.5–11.1)
POTASSIUM SERPL-SCNC: 3.7 MMOL/L (ref 3.5–5.1)
RBC # BLD: 5.02 M/CU MM (ref 4.6–6.2)
SEGMENTED NEUTROPHILS ABSOLUTE COUNT: 13.8 K/CU MM
SEGMENTED NEUTROPHILS RELATIVE PERCENT: 93.1 % (ref 36–66)
SODIUM BLD-SCNC: 138 MMOL/L (ref 135–145)
TOTAL IMMATURE NEUTOROPHIL: 0.04 K/CU MM
WBC # BLD: 14.8 K/CU MM (ref 4–10.5)

## 2019-03-14 PROCEDURE — 94761 N-INVAS EAR/PLS OXIMETRY MLT: CPT

## 2019-03-14 PROCEDURE — 6370000000 HC RX 637 (ALT 250 FOR IP): Performed by: INTERNAL MEDICINE

## 2019-03-14 PROCEDURE — 85025 COMPLETE CBC W/AUTO DIFF WBC: CPT

## 2019-03-14 PROCEDURE — 36415 COLL VENOUS BLD VENIPUNCTURE: CPT

## 2019-03-14 PROCEDURE — 99024 POSTOP FOLLOW-UP VISIT: CPT | Performed by: SURGERY

## 2019-03-14 PROCEDURE — 97163 PT EVAL HIGH COMPLEX 45 MIN: CPT

## 2019-03-14 PROCEDURE — 97530 THERAPEUTIC ACTIVITIES: CPT

## 2019-03-14 PROCEDURE — 80048 BASIC METABOLIC PNL TOTAL CA: CPT

## 2019-03-14 PROCEDURE — 2580000003 HC RX 258: Performed by: SURGERY

## 2019-03-14 PROCEDURE — 6360000002 HC RX W HCPCS: Performed by: SURGERY

## 2019-03-14 PROCEDURE — 2060000000 HC ICU INTERMEDIATE R&B

## 2019-03-14 PROCEDURE — 94150 VITAL CAPACITY TEST: CPT

## 2019-03-14 PROCEDURE — C9113 INJ PANTOPRAZOLE SODIUM, VIA: HCPCS | Performed by: SURGERY

## 2019-03-14 PROCEDURE — 97167 OT EVAL HIGH COMPLEX 60 MIN: CPT

## 2019-03-14 RX ORDER — HYDROMORPHONE HCL 110MG/55ML
1 PATIENT CONTROLLED ANALGESIA SYRINGE INTRAVENOUS
Status: DISCONTINUED | OUTPATIENT
Start: 2019-03-14 | End: 2019-03-19 | Stop reason: HOSPADM

## 2019-03-14 RX ORDER — 0.9 % SODIUM CHLORIDE 0.9 %
10 VIAL (ML) INJECTION DAILY
Status: DISCONTINUED | OUTPATIENT
Start: 2019-03-14 | End: 2019-03-19 | Stop reason: HOSPADM

## 2019-03-14 RX ORDER — MIDAZOLAM HYDROCHLORIDE 1 MG/ML
INJECTION INTRAMUSCULAR; INTRAVENOUS PRN
Status: DISCONTINUED | OUTPATIENT
Start: 2019-03-13 | End: 2019-03-14 | Stop reason: SDUPTHER

## 2019-03-14 RX ORDER — PANTOPRAZOLE SODIUM 40 MG/10ML
40 INJECTION, POWDER, LYOPHILIZED, FOR SOLUTION INTRAVENOUS DAILY
Status: DISCONTINUED | OUTPATIENT
Start: 2019-03-14 | End: 2019-03-19 | Stop reason: HOSPADM

## 2019-03-14 RX ORDER — METOPROLOL SUCCINATE 25 MG/1
25 TABLET, EXTENDED RELEASE ORAL DAILY
Status: DISCONTINUED | OUTPATIENT
Start: 2019-03-14 | End: 2019-03-19 | Stop reason: HOSPADM

## 2019-03-14 RX ORDER — AMLODIPINE BESYLATE 5 MG/1
5 TABLET ORAL DAILY
Status: DISCONTINUED | OUTPATIENT
Start: 2019-03-14 | End: 2019-03-19 | Stop reason: HOSPADM

## 2019-03-14 RX ADMIN — HYDROMORPHONE HYDROCHLORIDE 1 MG: 2 INJECTION INTRAMUSCULAR; INTRAVENOUS; SUBCUTANEOUS at 07:28

## 2019-03-14 RX ADMIN — HYDROMORPHONE HYDROCHLORIDE 1 MG: 2 INJECTION INTRAMUSCULAR; INTRAVENOUS; SUBCUTANEOUS at 16:40

## 2019-03-14 RX ADMIN — SODIUM CHLORIDE: 9 INJECTION, SOLUTION INTRAVENOUS at 04:06

## 2019-03-14 RX ADMIN — HYDROMORPHONE HYDROCHLORIDE 1 MG: 2 INJECTION, SOLUTION INTRAMUSCULAR; INTRAVENOUS; SUBCUTANEOUS at 20:49

## 2019-03-14 RX ADMIN — HYDROMORPHONE HYDROCHLORIDE 1 MG: 2 INJECTION, SOLUTION INTRAMUSCULAR; INTRAVENOUS; SUBCUTANEOUS at 18:47

## 2019-03-14 RX ADMIN — SODIUM CHLORIDE, PRESERVATIVE FREE 10 ML: 5 INJECTION INTRAVENOUS at 20:50

## 2019-03-14 RX ADMIN — AMLODIPINE BESYLATE 5 MG: 5 TABLET ORAL at 09:32

## 2019-03-14 RX ADMIN — METOPROLOL SUCCINATE 25 MG: 25 TABLET, EXTENDED RELEASE ORAL at 18:52

## 2019-03-14 RX ADMIN — SODIUM CHLORIDE, PRESERVATIVE FREE 10 ML: 5 INJECTION INTRAVENOUS at 18:58

## 2019-03-14 RX ADMIN — HYDROMORPHONE HYDROCHLORIDE 1 MG: 2 INJECTION INTRAMUSCULAR; INTRAVENOUS; SUBCUTANEOUS at 13:39

## 2019-03-14 RX ADMIN — HYDROMORPHONE HYDROCHLORIDE 1 MG: 2 INJECTION INTRAMUSCULAR; INTRAVENOUS; SUBCUTANEOUS at 03:56

## 2019-03-14 RX ADMIN — PANTOPRAZOLE SODIUM 40 MG: 40 INJECTION, POWDER, FOR SOLUTION INTRAVENOUS at 18:47

## 2019-03-14 RX ADMIN — ENOXAPARIN SODIUM 40 MG: 40 INJECTION SUBCUTANEOUS at 09:34

## 2019-03-14 RX ADMIN — HYDROMORPHONE HYDROCHLORIDE 1 MG: 2 INJECTION INTRAMUSCULAR; INTRAVENOUS; SUBCUTANEOUS at 00:49

## 2019-03-14 RX ADMIN — SODIUM CHLORIDE: 9 INJECTION, SOLUTION INTRAVENOUS at 13:44

## 2019-03-14 RX ADMIN — HYDROMORPHONE HYDROCHLORIDE 1 MG: 2 INJECTION, SOLUTION INTRAMUSCULAR; INTRAVENOUS; SUBCUTANEOUS at 22:50

## 2019-03-14 RX ADMIN — HYDROMORPHONE HYDROCHLORIDE 1 MG: 2 INJECTION INTRAMUSCULAR; INTRAVENOUS; SUBCUTANEOUS at 10:29

## 2019-03-14 ASSESSMENT — PAIN DESCRIPTION - ONSET
ONSET: ON-GOING

## 2019-03-14 ASSESSMENT — PAIN DESCRIPTION - DESCRIPTORS
DESCRIPTORS: ACHING
DESCRIPTORS: ACHING;CONSTANT
DESCRIPTORS: ACHING

## 2019-03-14 ASSESSMENT — PAIN DESCRIPTION - PAIN TYPE
TYPE: SURGICAL PAIN
TYPE: CHRONIC PAIN
TYPE: SURGICAL PAIN

## 2019-03-14 ASSESSMENT — PAIN DESCRIPTION - ORIENTATION
ORIENTATION: MID
ORIENTATION: RIGHT

## 2019-03-14 ASSESSMENT — PAIN DESCRIPTION - LOCATION
LOCATION: ABDOMEN

## 2019-03-14 ASSESSMENT — PAIN SCALES - GENERAL
PAINLEVEL_OUTOF10: 2
PAINLEVEL_OUTOF10: 9
PAINLEVEL_OUTOF10: 2
PAINLEVEL_OUTOF10: 6
PAINLEVEL_OUTOF10: 9
PAINLEVEL_OUTOF10: 3
PAINLEVEL_OUTOF10: 9
PAINLEVEL_OUTOF10: 9
PAINLEVEL_OUTOF10: 10
PAINLEVEL_OUTOF10: 1
PAINLEVEL_OUTOF10: 6
PAINLEVEL_OUTOF10: 10
PAINLEVEL_OUTOF10: 9
PAINLEVEL_OUTOF10: 2
PAINLEVEL_OUTOF10: 2
PAINLEVEL_OUTOF10: 9
PAINLEVEL_OUTOF10: 6
PAINLEVEL_OUTOF10: 6

## 2019-03-14 ASSESSMENT — PAIN DESCRIPTION - FREQUENCY
FREQUENCY: CONTINUOUS

## 2019-03-14 ASSESSMENT — PAIN DESCRIPTION - PROGRESSION
CLINICAL_PROGRESSION: NOT CHANGED

## 2019-03-15 PROCEDURE — 2060000000 HC ICU INTERMEDIATE R&B

## 2019-03-15 PROCEDURE — 6360000002 HC RX W HCPCS: Performed by: SURGERY

## 2019-03-15 PROCEDURE — 2580000003 HC RX 258: Performed by: SURGERY

## 2019-03-15 PROCEDURE — 99024 POSTOP FOLLOW-UP VISIT: CPT | Performed by: SURGERY

## 2019-03-15 PROCEDURE — 2700000000 HC OXYGEN THERAPY PER DAY

## 2019-03-15 PROCEDURE — 94761 N-INVAS EAR/PLS OXIMETRY MLT: CPT

## 2019-03-15 PROCEDURE — 6370000000 HC RX 637 (ALT 250 FOR IP): Performed by: SURGERY

## 2019-03-15 PROCEDURE — 6370000000 HC RX 637 (ALT 250 FOR IP): Performed by: INTERNAL MEDICINE

## 2019-03-15 RX ORDER — HYDROCODONE BITARTRATE AND ACETAMINOPHEN 5; 325 MG/1; MG/1
1 TABLET ORAL EVERY 4 HOURS PRN
Status: DISCONTINUED | OUTPATIENT
Start: 2019-03-15 | End: 2019-03-19 | Stop reason: HOSPADM

## 2019-03-15 RX ADMIN — HYDROMORPHONE HYDROCHLORIDE 1 MG: 2 INJECTION, SOLUTION INTRAMUSCULAR; INTRAVENOUS; SUBCUTANEOUS at 11:46

## 2019-03-15 RX ADMIN — ENOXAPARIN SODIUM 40 MG: 40 INJECTION SUBCUTANEOUS at 08:41

## 2019-03-15 RX ADMIN — HYDROCODONE BITARTRATE AND ACETAMINOPHEN 1 TABLET: 5; 325 TABLET ORAL at 18:00

## 2019-03-15 RX ADMIN — AMLODIPINE BESYLATE 5 MG: 5 TABLET ORAL at 08:40

## 2019-03-15 RX ADMIN — SODIUM CHLORIDE: 9 INJECTION, SOLUTION INTRAVENOUS at 08:40

## 2019-03-15 RX ADMIN — SODIUM CHLORIDE, PRESERVATIVE FREE 10 ML: 5 INJECTION INTRAVENOUS at 19:32

## 2019-03-15 RX ADMIN — HYDROMORPHONE HYDROCHLORIDE 1 MG: 2 INJECTION, SOLUTION INTRAMUSCULAR; INTRAVENOUS; SUBCUTANEOUS at 01:30

## 2019-03-15 RX ADMIN — HYDROMORPHONE HYDROCHLORIDE 1 MG: 2 INJECTION, SOLUTION INTRAMUSCULAR; INTRAVENOUS; SUBCUTANEOUS at 19:31

## 2019-03-15 RX ADMIN — METOPROLOL SUCCINATE 25 MG: 25 TABLET, EXTENDED RELEASE ORAL at 08:40

## 2019-03-15 RX ADMIN — HYDROMORPHONE HYDROCHLORIDE 1 MG: 2 INJECTION, SOLUTION INTRAMUSCULAR; INTRAVENOUS; SUBCUTANEOUS at 14:29

## 2019-03-15 RX ADMIN — HYDROMORPHONE HYDROCHLORIDE 1 MG: 2 INJECTION, SOLUTION INTRAMUSCULAR; INTRAVENOUS; SUBCUTANEOUS at 23:06

## 2019-03-15 RX ADMIN — SODIUM CHLORIDE: 9 INJECTION, SOLUTION INTRAVENOUS at 01:31

## 2019-03-15 RX ADMIN — HYDROMORPHONE HYDROCHLORIDE 1 MG: 2 INJECTION, SOLUTION INTRAMUSCULAR; INTRAVENOUS; SUBCUTANEOUS at 09:14

## 2019-03-15 RX ADMIN — HYDROMORPHONE HYDROCHLORIDE 1 MG: 2 INJECTION, SOLUTION INTRAMUSCULAR; INTRAVENOUS; SUBCUTANEOUS at 04:06

## 2019-03-15 RX ADMIN — SODIUM CHLORIDE: 9 INJECTION, SOLUTION INTRAVENOUS at 23:08

## 2019-03-15 RX ADMIN — HYDROMORPHONE HYDROCHLORIDE 1 MG: 2 INJECTION, SOLUTION INTRAMUSCULAR; INTRAVENOUS; SUBCUTANEOUS at 06:24

## 2019-03-15 ASSESSMENT — PAIN DESCRIPTION - DESCRIPTORS
DESCRIPTORS: ACHING

## 2019-03-15 ASSESSMENT — PAIN DESCRIPTION - FREQUENCY
FREQUENCY: INTERMITTENT
FREQUENCY: CONTINUOUS
FREQUENCY: INTERMITTENT

## 2019-03-15 ASSESSMENT — PAIN DESCRIPTION - ONSET
ONSET: GRADUAL
ONSET: GRADUAL
ONSET: ON-GOING

## 2019-03-15 ASSESSMENT — PAIN SCALES - GENERAL
PAINLEVEL_OUTOF10: 0
PAINLEVEL_OUTOF10: 3
PAINLEVEL_OUTOF10: 5
PAINLEVEL_OUTOF10: 9
PAINLEVEL_OUTOF10: 2
PAINLEVEL_OUTOF10: 9
PAINLEVEL_OUTOF10: 2
PAINLEVEL_OUTOF10: 9
PAINLEVEL_OUTOF10: 5
PAINLEVEL_OUTOF10: 2
PAINLEVEL_OUTOF10: 9
PAINLEVEL_OUTOF10: 5
PAINLEVEL_OUTOF10: 6
PAINLEVEL_OUTOF10: 2

## 2019-03-15 ASSESSMENT — PAIN DESCRIPTION - PAIN TYPE
TYPE: SURGICAL PAIN

## 2019-03-15 ASSESSMENT — PAIN DESCRIPTION - LOCATION
LOCATION: ABDOMEN

## 2019-03-15 ASSESSMENT — PAIN DESCRIPTION - PROGRESSION
CLINICAL_PROGRESSION: NOT CHANGED
CLINICAL_PROGRESSION: GRADUALLY IMPROVING

## 2019-03-15 ASSESSMENT — PAIN DESCRIPTION - ORIENTATION
ORIENTATION: RIGHT

## 2019-03-16 PROCEDURE — 6370000000 HC RX 637 (ALT 250 FOR IP): Performed by: SURGERY

## 2019-03-16 PROCEDURE — 6360000002 HC RX W HCPCS: Performed by: SURGERY

## 2019-03-16 PROCEDURE — 94761 N-INVAS EAR/PLS OXIMETRY MLT: CPT

## 2019-03-16 PROCEDURE — 6370000000 HC RX 637 (ALT 250 FOR IP): Performed by: INTERNAL MEDICINE

## 2019-03-16 PROCEDURE — 99024 POSTOP FOLLOW-UP VISIT: CPT | Performed by: SURGERY

## 2019-03-16 PROCEDURE — 2580000003 HC RX 258: Performed by: SURGERY

## 2019-03-16 PROCEDURE — 2060000000 HC ICU INTERMEDIATE R&B

## 2019-03-16 RX ADMIN — ENOXAPARIN SODIUM 40 MG: 40 INJECTION SUBCUTANEOUS at 09:44

## 2019-03-16 RX ADMIN — HYDROMORPHONE HYDROCHLORIDE 1 MG: 2 INJECTION, SOLUTION INTRAMUSCULAR; INTRAVENOUS; SUBCUTANEOUS at 05:23

## 2019-03-16 RX ADMIN — HYDROMORPHONE HYDROCHLORIDE 1 MG: 2 INJECTION, SOLUTION INTRAMUSCULAR; INTRAVENOUS; SUBCUTANEOUS at 02:44

## 2019-03-16 RX ADMIN — HYDROCODONE BITARTRATE AND ACETAMINOPHEN 1 TABLET: 5; 325 TABLET ORAL at 18:21

## 2019-03-16 RX ADMIN — HYDROCODONE BITARTRATE AND ACETAMINOPHEN 1 TABLET: 5; 325 TABLET ORAL at 14:03

## 2019-03-16 RX ADMIN — METOPROLOL SUCCINATE 25 MG: 25 TABLET, EXTENDED RELEASE ORAL at 09:42

## 2019-03-16 RX ADMIN — SODIUM CHLORIDE: 9 INJECTION, SOLUTION INTRAVENOUS at 18:24

## 2019-03-16 RX ADMIN — HYDROCODONE BITARTRATE AND ACETAMINOPHEN 1 TABLET: 5; 325 TABLET ORAL at 22:30

## 2019-03-16 RX ADMIN — AMLODIPINE BESYLATE 5 MG: 5 TABLET ORAL at 09:44

## 2019-03-16 RX ADMIN — HYDROCODONE BITARTRATE AND ACETAMINOPHEN 1 TABLET: 5; 325 TABLET ORAL at 09:44

## 2019-03-16 ASSESSMENT — PAIN DESCRIPTION - ORIENTATION
ORIENTATION: RIGHT
ORIENTATION: RIGHT

## 2019-03-16 ASSESSMENT — PAIN DESCRIPTION - PAIN TYPE
TYPE: SURGICAL PAIN

## 2019-03-16 ASSESSMENT — PAIN SCALES - GENERAL
PAINLEVEL_OUTOF10: 8
PAINLEVEL_OUTOF10: 9
PAINLEVEL_OUTOF10: 2
PAINLEVEL_OUTOF10: 2
PAINLEVEL_OUTOF10: 9
PAINLEVEL_OUTOF10: 6
PAINLEVEL_OUTOF10: 5
PAINLEVEL_OUTOF10: 2
PAINLEVEL_OUTOF10: 6
PAINLEVEL_OUTOF10: 1

## 2019-03-16 ASSESSMENT — PAIN DESCRIPTION - FREQUENCY
FREQUENCY: CONTINUOUS
FREQUENCY: CONTINUOUS

## 2019-03-16 ASSESSMENT — PAIN DESCRIPTION - ONSET
ONSET: ON-GOING
ONSET: ON-GOING

## 2019-03-16 ASSESSMENT — PAIN DESCRIPTION - LOCATION
LOCATION: ABDOMEN

## 2019-03-16 ASSESSMENT — PAIN DESCRIPTION - PROGRESSION
CLINICAL_PROGRESSION: NOT CHANGED
CLINICAL_PROGRESSION: NOT CHANGED

## 2019-03-16 ASSESSMENT — PAIN DESCRIPTION - DESCRIPTORS
DESCRIPTORS: ACHING
DESCRIPTORS: ACHING

## 2019-03-17 PROCEDURE — 6370000000 HC RX 637 (ALT 250 FOR IP): Performed by: INTERNAL MEDICINE

## 2019-03-17 PROCEDURE — 6370000000 HC RX 637 (ALT 250 FOR IP): Performed by: SURGERY

## 2019-03-17 PROCEDURE — 6360000002 HC RX W HCPCS: Performed by: SURGERY

## 2019-03-17 PROCEDURE — 2060000000 HC ICU INTERMEDIATE R&B

## 2019-03-17 PROCEDURE — C9113 INJ PANTOPRAZOLE SODIUM, VIA: HCPCS | Performed by: SURGERY

## 2019-03-17 PROCEDURE — 99024 POSTOP FOLLOW-UP VISIT: CPT | Performed by: SURGERY

## 2019-03-17 PROCEDURE — 2580000003 HC RX 258: Performed by: SURGERY

## 2019-03-17 RX ADMIN — HYDROCODONE BITARTRATE AND ACETAMINOPHEN 1 TABLET: 5; 325 TABLET ORAL at 16:21

## 2019-03-17 RX ADMIN — METOPROLOL SUCCINATE 25 MG: 25 TABLET, EXTENDED RELEASE ORAL at 08:18

## 2019-03-17 RX ADMIN — AMLODIPINE BESYLATE 5 MG: 5 TABLET ORAL at 08:17

## 2019-03-17 RX ADMIN — HYDROCODONE BITARTRATE AND ACETAMINOPHEN 1 TABLET: 5; 325 TABLET ORAL at 11:38

## 2019-03-17 RX ADMIN — PANTOPRAZOLE SODIUM 40 MG: 40 INJECTION, POWDER, FOR SOLUTION INTRAVENOUS at 20:32

## 2019-03-17 RX ADMIN — HYDROCODONE BITARTRATE AND ACETAMINOPHEN 1 TABLET: 5; 325 TABLET ORAL at 07:22

## 2019-03-17 RX ADMIN — SODIUM CHLORIDE: 9 INJECTION, SOLUTION INTRAVENOUS at 19:33

## 2019-03-17 RX ADMIN — ENOXAPARIN SODIUM 40 MG: 40 INJECTION SUBCUTANEOUS at 08:18

## 2019-03-17 RX ADMIN — HYDROCODONE BITARTRATE AND ACETAMINOPHEN 1 TABLET: 5; 325 TABLET ORAL at 20:31

## 2019-03-17 RX ADMIN — HYDROCODONE BITARTRATE AND ACETAMINOPHEN 1 TABLET: 5; 325 TABLET ORAL at 02:41

## 2019-03-17 ASSESSMENT — PAIN SCALES - GENERAL
PAINLEVEL_OUTOF10: 6
PAINLEVEL_OUTOF10: 7
PAINLEVEL_OUTOF10: 4
PAINLEVEL_OUTOF10: 5
PAINLEVEL_OUTOF10: 6
PAINLEVEL_OUTOF10: 1
PAINLEVEL_OUTOF10: 5

## 2019-03-17 ASSESSMENT — PAIN DESCRIPTION - DESCRIPTORS
DESCRIPTORS: DISCOMFORT
DESCRIPTORS: ACHING

## 2019-03-17 ASSESSMENT — PAIN DESCRIPTION - ORIENTATION
ORIENTATION: RIGHT
ORIENTATION: RIGHT

## 2019-03-17 ASSESSMENT — PAIN DESCRIPTION - PAIN TYPE
TYPE: SURGICAL PAIN
TYPE: ACUTE PAIN

## 2019-03-17 ASSESSMENT — PAIN DESCRIPTION - ONSET: ONSET: GRADUAL

## 2019-03-17 ASSESSMENT — PAIN DESCRIPTION - FREQUENCY: FREQUENCY: INTERMITTENT

## 2019-03-17 ASSESSMENT — PAIN DESCRIPTION - LOCATION
LOCATION: ABDOMEN
LOCATION: ABDOMEN

## 2019-03-18 PROCEDURE — 94761 N-INVAS EAR/PLS OXIMETRY MLT: CPT

## 2019-03-18 PROCEDURE — C9113 INJ PANTOPRAZOLE SODIUM, VIA: HCPCS | Performed by: SURGERY

## 2019-03-18 PROCEDURE — 2060000000 HC ICU INTERMEDIATE R&B

## 2019-03-18 PROCEDURE — 6370000000 HC RX 637 (ALT 250 FOR IP): Performed by: SURGERY

## 2019-03-18 PROCEDURE — 2580000003 HC RX 258: Performed by: SURGERY

## 2019-03-18 PROCEDURE — 97110 THERAPEUTIC EXERCISES: CPT

## 2019-03-18 PROCEDURE — 99024 POSTOP FOLLOW-UP VISIT: CPT | Performed by: PHYSICIAN ASSISTANT

## 2019-03-18 PROCEDURE — 6370000000 HC RX 637 (ALT 250 FOR IP): Performed by: INTERNAL MEDICINE

## 2019-03-18 PROCEDURE — 6360000002 HC RX W HCPCS: Performed by: SURGERY

## 2019-03-18 PROCEDURE — 94150 VITAL CAPACITY TEST: CPT

## 2019-03-18 RX ORDER — HYDROCODONE BITARTRATE AND ACETAMINOPHEN 5; 325 MG/1; MG/1
1 TABLET ORAL EVERY 6 HOURS PRN
Qty: 28 TABLET | Refills: 0 | Status: SHIPPED | OUTPATIENT
Start: 2019-03-18 | End: 2019-03-25

## 2019-03-18 RX ADMIN — HYDROCODONE BITARTRATE AND ACETAMINOPHEN 1 TABLET: 5; 325 TABLET ORAL at 18:50

## 2019-03-18 RX ADMIN — HYDROCODONE BITARTRATE AND ACETAMINOPHEN 1 TABLET: 5; 325 TABLET ORAL at 10:23

## 2019-03-18 RX ADMIN — HYDROCODONE BITARTRATE AND ACETAMINOPHEN 1 TABLET: 5; 325 TABLET ORAL at 23:11

## 2019-03-18 RX ADMIN — ENOXAPARIN SODIUM 40 MG: 40 INJECTION SUBCUTANEOUS at 09:03

## 2019-03-18 RX ADMIN — AMLODIPINE BESYLATE 5 MG: 5 TABLET ORAL at 09:02

## 2019-03-18 RX ADMIN — HYDROCODONE BITARTRATE AND ACETAMINOPHEN 1 TABLET: 5; 325 TABLET ORAL at 05:42

## 2019-03-18 RX ADMIN — HYDROCODONE BITARTRATE AND ACETAMINOPHEN 1 TABLET: 5; 325 TABLET ORAL at 00:36

## 2019-03-18 RX ADMIN — METOPROLOL SUCCINATE 25 MG: 25 TABLET, EXTENDED RELEASE ORAL at 09:03

## 2019-03-18 ASSESSMENT — PAIN DESCRIPTION - ORIENTATION
ORIENTATION: RIGHT
ORIENTATION: RIGHT

## 2019-03-18 ASSESSMENT — PAIN SCALES - GENERAL
PAINLEVEL_OUTOF10: 9
PAINLEVEL_OUTOF10: 6
PAINLEVEL_OUTOF10: 8
PAINLEVEL_OUTOF10: 6
PAINLEVEL_OUTOF10: 8
PAINLEVEL_OUTOF10: 8

## 2019-03-18 ASSESSMENT — PAIN DESCRIPTION - PROGRESSION
CLINICAL_PROGRESSION: NOT CHANGED
CLINICAL_PROGRESSION: NOT CHANGED

## 2019-03-18 ASSESSMENT — PAIN DESCRIPTION - PAIN TYPE
TYPE: SURGICAL PAIN

## 2019-03-18 ASSESSMENT — PAIN DESCRIPTION - LOCATION
LOCATION: ABDOMEN

## 2019-03-18 ASSESSMENT — PAIN DESCRIPTION - DESCRIPTORS
DESCRIPTORS: CONSTANT
DESCRIPTORS: PRESSURE;DISCOMFORT
DESCRIPTORS: PRESSURE
DESCRIPTORS: CONSTANT;ACHING;PRESSURE
DESCRIPTORS: DISCOMFORT;PRESSURE

## 2019-03-18 ASSESSMENT — PAIN DESCRIPTION - ONSET
ONSET: GRADUAL
ONSET: ON-GOING
ONSET: GRADUAL
ONSET: ON-GOING

## 2019-03-18 ASSESSMENT — PAIN DESCRIPTION - FREQUENCY
FREQUENCY: CONTINUOUS

## 2019-03-19 VITALS
BODY MASS INDEX: 17.53 KG/M2 | DIASTOLIC BLOOD PRESSURE: 83 MMHG | SYSTOLIC BLOOD PRESSURE: 130 MMHG | TEMPERATURE: 98.4 F | OXYGEN SATURATION: 100 % | HEIGHT: 71 IN | WEIGHT: 125.22 LBS | HEART RATE: 82 BPM | RESPIRATION RATE: 17 BRPM

## 2019-03-19 PROCEDURE — 6370000000 HC RX 637 (ALT 250 FOR IP): Performed by: INTERNAL MEDICINE

## 2019-03-19 PROCEDURE — 6360000002 HC RX W HCPCS: Performed by: SURGERY

## 2019-03-19 PROCEDURE — 6370000000 HC RX 637 (ALT 250 FOR IP): Performed by: SURGERY

## 2019-03-19 RX ADMIN — METOPROLOL SUCCINATE 25 MG: 25 TABLET, EXTENDED RELEASE ORAL at 09:13

## 2019-03-19 RX ADMIN — HYDROCODONE BITARTRATE AND ACETAMINOPHEN 1 TABLET: 5; 325 TABLET ORAL at 13:45

## 2019-03-19 RX ADMIN — AMLODIPINE BESYLATE 5 MG: 5 TABLET ORAL at 09:13

## 2019-03-19 RX ADMIN — ENOXAPARIN SODIUM 40 MG: 40 INJECTION SUBCUTANEOUS at 09:13

## 2019-03-19 RX ADMIN — HYDROCODONE BITARTRATE AND ACETAMINOPHEN 1 TABLET: 5; 325 TABLET ORAL at 03:41

## 2019-03-19 RX ADMIN — HYDROCODONE BITARTRATE AND ACETAMINOPHEN 1 TABLET: 5; 325 TABLET ORAL at 09:12

## 2019-03-19 RX ADMIN — HYDROCODONE BITARTRATE AND ACETAMINOPHEN 1 TABLET: 5; 325 TABLET ORAL at 17:49

## 2019-03-19 ASSESSMENT — PAIN SCALES - GENERAL
PAINLEVEL_OUTOF10: 8

## 2019-03-19 ASSESSMENT — PAIN DESCRIPTION - FREQUENCY
FREQUENCY: CONTINUOUS
FREQUENCY: CONTINUOUS

## 2019-03-19 ASSESSMENT — PAIN DESCRIPTION - PAIN TYPE
TYPE: SURGICAL PAIN
TYPE: SURGICAL PAIN

## 2019-03-19 ASSESSMENT — PAIN DESCRIPTION - ORIENTATION
ORIENTATION: RIGHT
ORIENTATION: RIGHT

## 2019-03-19 ASSESSMENT — PAIN DESCRIPTION - DESCRIPTORS
DESCRIPTORS: CONSTANT;DISCOMFORT
DESCRIPTORS: CONSTANT;DISCOMFORT

## 2019-03-19 ASSESSMENT — PAIN DESCRIPTION - PROGRESSION
CLINICAL_PROGRESSION: NOT CHANGED
CLINICAL_PROGRESSION: NOT CHANGED

## 2019-03-19 ASSESSMENT — PAIN DESCRIPTION - LOCATION
LOCATION: ABDOMEN
LOCATION: ABDOMEN

## 2019-03-19 ASSESSMENT — PAIN DESCRIPTION - ONSET
ONSET: ON-GOING
ONSET: ON-GOING

## 2019-03-28 ENCOUNTER — OFFICE VISIT (OUTPATIENT)
Dept: SURGERY | Age: 64
End: 2019-03-28

## 2019-03-28 VITALS
SYSTOLIC BLOOD PRESSURE: 112 MMHG | DIASTOLIC BLOOD PRESSURE: 66 MMHG | HEART RATE: 80 BPM | BODY MASS INDEX: 17.46 KG/M2 | WEIGHT: 125.22 LBS

## 2019-03-28 DIAGNOSIS — D49.0 RECTAL NEOPLASM: Primary | ICD-10-CM

## 2019-03-28 PROCEDURE — 99024 POSTOP FOLLOW-UP VISIT: CPT | Performed by: SURGERY

## 2019-03-28 RX ORDER — ONDANSETRON HYDROCHLORIDE 8 MG/1
TABLET, FILM COATED ORAL
Refills: 0 | Status: ON HOLD | COMMUNITY
Start: 2019-01-14 | End: 2019-05-21 | Stop reason: ALTCHOICE

## 2019-03-28 RX ORDER — ACETAMINOPHEN 325 MG/1
650 TABLET ORAL EVERY 6 HOURS PRN
Status: ON HOLD | COMMUNITY
End: 2019-05-25 | Stop reason: HOSPADM

## 2019-04-18 ENCOUNTER — TELEPHONE (OUTPATIENT)
Dept: SURGERY | Age: 64
End: 2019-04-18

## 2019-04-18 RX ORDER — ONDANSETRON 4 MG/1
4 TABLET, ORALLY DISINTEGRATING ORAL EVERY 6 HOURS PRN
Qty: 20 TABLET | Refills: 0 | Status: ON HOLD | OUTPATIENT
Start: 2019-04-18 | End: 2019-05-21 | Stop reason: ALTCHOICE

## 2019-04-18 NOTE — TELEPHONE ENCOUNTER
Hugo  nurse calling about patient, he had been vomiting for 3 days and now that has subsided he still has severe nausea with food intake. Pt is post op colon resection as of 3/13/19, nurse reports good ostomy output, just requesting an antiemetic to be sent to pharmacy. Rescheduled patient to return 4/22/19.

## 2019-04-22 ENCOUNTER — OFFICE VISIT (OUTPATIENT)
Dept: SURGERY | Age: 64
End: 2019-04-22

## 2019-04-22 VITALS
WEIGHT: 127 LBS | HEART RATE: 80 BPM | DIASTOLIC BLOOD PRESSURE: 62 MMHG | SYSTOLIC BLOOD PRESSURE: 86 MMHG | HEIGHT: 71 IN | BODY MASS INDEX: 17.78 KG/M2

## 2019-04-22 DIAGNOSIS — Z93.3 COLOSTOMY PRESENT (HCC): Primary | ICD-10-CM

## 2019-04-22 DIAGNOSIS — D49.0 RECTAL NEOPLASM: ICD-10-CM

## 2019-04-22 PROCEDURE — G8419 CALC BMI OUT NRM PARAM NOF/U: HCPCS | Performed by: SURGERY

## 2019-04-22 PROCEDURE — 1036F TOBACCO NON-USER: CPT | Performed by: SURGERY

## 2019-04-22 PROCEDURE — 99024 POSTOP FOLLOW-UP VISIT: CPT | Performed by: SURGERY

## 2019-04-22 PROCEDURE — 3017F COLORECTAL CA SCREEN DOC REV: CPT | Performed by: SURGERY

## 2019-04-22 PROCEDURE — G8427 DOCREV CUR MEDS BY ELIG CLIN: HCPCS | Performed by: SURGERY

## 2019-04-22 RX ORDER — ZINC OXIDE 216 MG/ML
237 LOTION TOPICAL
Qty: 90 CAN | Refills: 0 | Status: SHIPPED | OUTPATIENT
Start: 2019-04-22 | End: 2020-10-26

## 2019-04-22 NOTE — PROGRESS NOTES
Chief Complaint   Patient presents with    Post-Op Check     2nd P/O Lap Low Anterior Colon Resection with Splenic Fixture Mobilization, 3/13/19         SUBJECTIVE:  Patient here for post op visit. Pain is minimal.  Wounds: minbruising and no discharge.     Past Surgical History:   Procedure Laterality Date    COLOSTOMY N/A 11/2/2018    DIVERTING COLOSTOMY PLACEMENT LAPAROSCOPIC performed by Glen Pena MD at [facility] Bilateral early 2010's    hips    [place] INSJ PRPH CTR VAD W/SUBQ PORT AGE 5 YR/> N/A 11/6/2018    PORT INSERTION performed by Glen Pena MD at [facility] N/A 3/13/2019    BOWEL RESECTION LOW ANTERIOR LAPAROSCOPIC ROBOTIC WITH DIVERTING LOOP ILEOSTOMY performed by Glen Pena MD at [address] [facility] OR     Past Medical History:   Diagnosis Date    Cancer [facility])     Rectal cancer--treated with both chemo and radiation--finished 01/2019    [address]     Profound hearing loss of both ears     no hearing in right ear --very little in left    PVD (peripheral vascular disease) ([facility]      Family History   Problem Relation Age of Onset    Dementia Mother     Early Death Father     High Blood Pressure Father     Early Death Sister     Diabetes Brother     No Known Problems Brother      Social History     Socioeconomic History    Marital status: Single     Spouse name: Not on file    Number of children: Not on file    Years of education: Not on file    Highest education level: Not on file   Occupational History    Not on file   Social Needs    Financial resource strain: Not on file    Food insecurity:     Worry: Not on file     Inability: Not on file    Transportation needs:     Medical: Not on file     Non-medical: Not on file   Tobacco Use    Smoking status: Never Smoker    Smokeless tobacco: Never Used   Substance and Sexual Activity    Alcohol use: No    Drug use: No    Sexual activity: Not on file   Lifestyle    Physical activity: Days per week: Not on file     Minutes per session: Not on file    Stress: Not on file   Relationships    Social connections:     Talks on phone: Not on file     Gets together: Not on file     Attends Jainism service: Not on file     Active member of club or organization: Not on file     Attends meetings of clubs or organizations: Not on file     Relationship status: Not on file    Intimate partner violence:     Fear of current or ex partner: Not on file     Emotionally abused: Not on file     Physically abused: Not on file     Forced sexual activity: Not on file   Other Topics Concern    Not on file   Social History Narrative    Not on file       OBJECTIVE:   Physical Exam   Constitutional: He is oriented to person, place, and time. He appears well-developed and well-nourished. HENT:   Head: Normocephalic. Eyes: Pupils are equal, round, and reactive to light. Neck: Normal range of motion. Neck supple. Cardiovascular: Normal rate. Pulmonary/Chest: Effort normal.   Abdominal: Soft. He exhibits no distension and no mass. There is no tenderness. There is no rebound and no guarding. Musculoskeletal: Normal range of motion. Neurological: He is alert and oriented to person, place, and time. Skin: Skin is warm. Psychiatric: He has a normal mood and affect. Wound well healed without signs of active infection. Suture line intact. Abdomen soft, nontender, nondistended. ASSESSMENT:  Patient doing well on this post operative check. Wounds well healed. 1. Colostomy present ([facility] [address]        PLAN:  Will need a barium enema prior to reversal of the small bowel.   Continue same  Increase activity as tolerated        Orders Placed This Encounter   Procedures    FL BARIUM ENEMA        Orders Placed This Encounter   Medications    Nutritional Supplements (NUTRITIONAL SUPPLEMENT PLUS) LIQD     Sig: Take 237 mLs by mouth 3 times daily (with meals)     Dispense:  90 Can     Refill:  0 Follow Up: No follow-ups on file.     Rachel Israel MD

## 2019-04-23 ENCOUNTER — TELEPHONE (OUTPATIENT)
Dept: SURGERY | Age: 64
End: 2019-04-23

## 2019-04-23 RX ORDER — BISACODYL 10 MG
10 SUPPOSITORY, RECTAL RECTAL ONCE
Qty: 1 SUPPOSITORY | Refills: 0 | Status: SHIPPED | OUTPATIENT
Start: 2019-04-23 | End: 2019-04-23

## 2019-04-23 NOTE — TELEPHONE ENCOUNTER
Called christina to inform him about [facility] Barium Enema, it is scheduled at [facility] [address] 4/29/19 @1200 (arrival 1100). No answer, LMTC. Instructions mailed to patient. rx's sent tp pharmacy.

## 2019-04-29 ENCOUNTER — HOSPITAL ENCOUNTER (OUTPATIENT)
Dept: GENERAL RADIOLOGY | Age: 64
Discharge: HOME | End: 2019-04-29
Payer: MEDICARE

## 2019-04-29 DIAGNOSIS — Z93.3 COLOSTOMY PRESENT (HCC): ICD-10-CM

## 2019-04-29 PROCEDURE — 74270 X-RAY XM COLON 1CNTRST STD: CPT

## 2019-04-30 ENCOUNTER — TELEPHONE (OUTPATIENT)
Dept: BARIATRICS/WEIGHT MGMT | Age: 64
End: 2019-04-30

## 2019-05-06 ENCOUNTER — OFFICE VISIT (OUTPATIENT)
Dept: SURGERY | Age: 64
End: 2019-05-06

## 2019-05-06 VITALS
BODY MASS INDEX: 17.78 KG/M2 | HEIGHT: 71 IN | SYSTOLIC BLOOD PRESSURE: 90 MMHG | WEIGHT: 126.98 LBS | DIASTOLIC BLOOD PRESSURE: 60 MMHG | HEART RATE: 75 BPM

## 2019-05-06 DIAGNOSIS — Z93.3 COLOSTOMY PRESENT (HCC): Primary | ICD-10-CM

## 2019-05-06 PROCEDURE — 99024 POSTOP FOLLOW-UP VISIT: CPT | Performed by: PHYSICIAN ASSISTANT

## 2019-05-07 ENCOUNTER — TELEPHONE (OUTPATIENT)
Dept: SURGERY | Age: 64
End: 2019-05-07

## 2019-05-07 NOTE — TELEPHONE ENCOUNTER
Scheduled Flex Sigmoidoscopy & Ileostomy Takedown at [facility]  Flex Si/10/19 at 1130. Surgery: 19 at 1030. PAT: 19 at 0900. P/O appt: Call after discharge to schedule appointment  Prep: NPO after midnight. Called and left message for Avis Harrell to call back.

## 2019-05-08 ENCOUNTER — TELEPHONE (OUTPATIENT)
Dept: SURGERY | Age: 64
End: 2019-05-08

## 2019-05-08 ENCOUNTER — ANESTHESIA EVENT (OUTPATIENT)
Dept: OPERATING ROOM | Age: 64
DRG: 331 | End: 2019-05-08
Payer: COMMERCIAL

## 2019-05-09 NOTE — ANESTHESIA PRE PROCEDURE
Department of Anesthesiology  Preprocedure Note       Name:  Osmel Yates   Age:  59 y.o.  :  1955                                          MRN:  [id number]         Date:  2019      Surgeon: Kevin Enriquez):  Mami Gunderson MD    Procedure: COLOSTOMY ILEOSTOMY TAKEDOWN/REVERSAL (N/A )    Medications prior to admission:   Prior to Admission medications    Medication Sig Start Date End Date Taking? Authorizing Provider   magnesium citrate solution Take 296 mLs by mouth once for 1 dose 19  Mami Gunderson MD   Nutritional Supplements (NUTRITIONAL SUPPLEMENT PLUS) LIQD Take 237 mLs by mouth 3 times daily (with meals) 19  Mary Zepeda PA-C   ondansetron (ZOFRAN-ODT) 4 MG disintegrating tablet Take 1 tablet by mouth every 6 hours as needed for Nausea or Vomiting 19   Mami Gunderson MD   ondansetron (ZOFRAN) 8 MG tablet TAKE 1 TABLET BY MOUTH EVERY 8 HOURS AS NEEDED FOR NAUSEA OR VOMITING 19   Historical Provider, MD   acetaminophen (TYLENOL) 325 MG tablet Take 650 mg by mouth every 6 hours as needed for Pain    Historical Provider, MD       Current medications:    Current Outpatient Medications   Medication Sig Dispense Refill    magnesium citrate solution Take 296 mLs by mouth once for 1 dose 296 mL 0    Nutritional Supplements (NUTRITIONAL SUPPLEMENT PLUS) LIQD Take 237 mLs by mouth 3 times daily (with meals) 90 Can 0    ondansetron (ZOFRAN-ODT) 4 MG disintegrating tablet Take 1 tablet by mouth every 6 hours as needed for Nausea or Vomiting 20 tablet 0    ondansetron (ZOFRAN) 8 MG tablet TAKE 1 TABLET BY MOUTH EVERY 8 HOURS AS NEEDED FOR NAUSEA OR VOMITING  0    acetaminophen (TYLENOL) 325 MG tablet Take 650 mg by mouth every 6 hours as needed for Pain       No current facility-administered medications for this encounter. Allergies:     Allergies   Allergen Reactions    Oxycodone Anaphylaxis    Ibuprofen        Problem List:    Patient Active Problem List RDW 17.8 03/14/2019     03/14/2019       CMP:   Lab Results   Component Value Date     03/14/2019    K 3.7 03/14/2019     03/14/2019    CO2 27 03/14/2019    BUN 10 03/14/2019    CREATININE 0.7 03/14/2019    GFRAA >60 03/14/2019    LABGLOM >60 03/14/2019    GLUCOSE 112 03/14/2019    PROT 6.8 01/04/2019    CALCIUM 8.5 03/14/2019    BILITOT 0.3 01/04/2019    ALKPHOS 54 01/04/2019    AST 10 01/04/2019    ALT 5 01/04/2019       POC Tests: No results for input(s): POCGLU, POCNA, POCK, POCCL, POCBUN, POCHEMO, POCHCT in the last 72 hours. Coags:   Lab Results   Component Value Date    PROTIME 14.7 11/03/2018    INR 1.27 11/03/2018    APTT 29.1 09/28/2018       HCG (If Applicable): No results found for: PREGTESTUR, PREGSERUM, HCG, HCGQUANT     ABGs: No results found for: PHART, PO2ART, ZQO2HIU, IHM7NHU, BEART, N1ZVOVXC     Type & Screen (If Applicable):  No results found for: LABABO, [address]    Anesthesia Evaluation  Patient summary reviewed and Nursing notes reviewed  Airway: Mallampati: II        Dental:          Pulmonary:                             ROS comment:      Cardiovascular:  Exercise tolerance: poor (<4 METS),   (+) hypertension:,       ECG reviewed                     ROS comment:     EKG: 3/2019  Sinus rhythm with marked sinus arrhythmia   Nonspecific T wave abnormality   Abnormal ECG   When compared with ECG of 02-NOV-2018 12:47,   premature ventricular complexes are no longer present   Nonspecific T wave abnormality, worse in Inferior leads      Neuro/Psych:   (+) neuromuscular disease (multiple brain lesion ,2015):, psychiatric history (hx cocaine and marijuana use. UDS + 5/13/2019):             ROS comment: Profound [place]    MRI 2015  BILOBED HYPERDENSE LESION WITHIN THE REGION OF THE SEPTUM PELLUCIDUM ALONG THE ROOF OF THE THIRD VENTRICLE. PRIMARY DIFFERENTIAL CONSIDERATIONS INCLUDE SUBEPENDYMOMA, DERMOID, OR MENINGIOMA.  GI/Hepatic/Renal:            ROS comment: Colon CA s/p bowel resection with colostomy, 11/2018. Treated with chemo/radiation, completed 1/2019    C-scope 5/13/2019 . Endo/Other:    (+) : arthritis (s/p stevenson ALESIA, ~2011): OA., . Pt had no PAT visit       Abdominal:           Vascular:   + PVD, aortic or cerebral, . Anesthesia Plan      general     ASA 3       Induction: intravenous. Anesthetic plan and risks discussed with patient. Plan discussed with CRNA.                 [facility] , RUBEN - CNP   5/9/2019

## 2019-05-10 ENCOUNTER — TELEPHONE (OUTPATIENT)
Dept: SURGERY | Age: 64
End: 2019-05-10

## 2019-05-10 NOTE — TELEPHONE ENCOUNTER
LEFT A MESSAGE REGARDING ERICS FLEX SIG BEING RESCHEDULED.  PATIENT MUST HAVE FLEX SIG BEFORE SURGERY CAN BE PERFORMED PER DR KLEIN    FLEX SIG - 5/13/19 @ 745  ARRIVE @ 600AM   NPO AFTER MIDNIGHT

## 2019-05-13 ENCOUNTER — HOSPITAL ENCOUNTER (OUTPATIENT)
Age: 64
Setting detail: OUTPATIENT SURGERY
Discharge: HOME | End: 2019-05-13
Attending: SURGERY | Admitting: SURGERY
Payer: COMMERCIAL

## 2019-05-13 VITALS
BODY MASS INDEX: 17.64 KG/M2 | SYSTOLIC BLOOD PRESSURE: 132 MMHG | HEART RATE: 62 BPM | OXYGEN SATURATION: 98 % | TEMPERATURE: 97.1 F | DIASTOLIC BLOOD PRESSURE: 86 MMHG | HEIGHT: 71 IN | WEIGHT: 126 LBS | RESPIRATION RATE: 16 BRPM

## 2019-05-13 LAB
AMPHETAMINES: NEGATIVE
ANION GAP SERPL CALCULATED.3IONS-SCNC: 5 MMOL/L (ref 4–16)
BARBITURATE SCREEN URINE: NEGATIVE
BENZODIAZEPINE SCREEN, URINE: NEGATIVE
BUN BLDV-MCNC: 5 MG/DL (ref 6–23)
CALCIUM SERPL-MCNC: 8.6 MG/DL (ref 8.3–10.6)
CANNABINOID SCREEN URINE: NEGATIVE
CHLORIDE BLD-SCNC: 107 MMOL/L (ref 99–110)
CO2: 28 MMOL/L (ref 21–32)
COCAINE METABOLITE: ABNORMAL
CREAT SERPL-MCNC: 0.8 MG/DL (ref 0.9–1.3)
GFR AFRICAN AMERICAN: >60 ML/MIN/1.73M2
GFR NON-AFRICAN AMERICAN: >60 ML/MIN/1.73M2
GLUCOSE BLD-MCNC: 96 MG/DL (ref 70–99)
HCT VFR BLD CALC: 37.1 % (ref 42–52)
HEMOGLOBIN: 11.4 GM/DL (ref 13.5–18)
MCH RBC QN AUTO: 25.6 PG (ref 27–31)
MCHC RBC AUTO-ENTMCNC: 30.7 % (ref 32–36)
MCV RBC AUTO: 83.4 FL (ref 78–100)
OPIATES, URINE: ABNORMAL
OXYCODONE: ABNORMAL
PDW BLD-RTO: 18.7 % (ref 11.7–14.9)
PHENCYCLIDINE, URINE: NEGATIVE
PLATELET # BLD: 230 K/CU MM (ref 140–440)
PMV BLD AUTO: 9.6 FL (ref 7.5–11.1)
POTASSIUM SERPL-SCNC: 4.1 MMOL/L (ref 3.5–5.1)
RBC # BLD: 4.45 M/CU MM (ref 4.6–6.2)
SODIUM BLD-SCNC: 140 MMOL/L (ref 135–145)
WBC # BLD: 3.4 K/CU MM (ref 4–10.5)

## 2019-05-13 PROCEDURE — 88304 TISSUE EXAM BY PATHOLOGIST: CPT

## 2019-05-13 PROCEDURE — 85027 COMPLETE CBC AUTOMATED: CPT

## 2019-05-13 PROCEDURE — 7100000010 HC PHASE II RECOVERY - FIRST 15 MIN: Performed by: SURGERY

## 2019-05-13 PROCEDURE — 2580000003 HC RX 258: Performed by: ANESTHESIOLOGY

## 2019-05-13 PROCEDURE — 80307 DRUG TEST PRSMV CHEM ANLYZR: CPT

## 2019-05-13 PROCEDURE — 80048 BASIC METABOLIC PNL TOTAL CA: CPT

## 2019-05-13 PROCEDURE — 2709999900 HC NON-CHARGEABLE SUPPLY: Performed by: SURGERY

## 2019-05-13 PROCEDURE — 45331 SIGMOIDOSCOPY AND BIOPSY: CPT | Performed by: SURGERY

## 2019-05-13 PROCEDURE — 3609008300 HC SIGMOIDOSCOPY W/BIOPSY SINGLE/MULTIPLE: Performed by: SURGERY

## 2019-05-13 RX ORDER — SODIUM CHLORIDE, SODIUM LACTATE, POTASSIUM CHLORIDE, CALCIUM CHLORIDE 600; 310; 30; 20 MG/100ML; MG/100ML; MG/100ML; MG/100ML
INJECTION, SOLUTION INTRAVENOUS CONTINUOUS
Status: DISCONTINUED | OUTPATIENT
Start: 2019-05-13 | End: 2019-05-13 | Stop reason: HOSPADM

## 2019-05-13 RX ADMIN — SODIUM CHLORIDE, POTASSIUM CHLORIDE, SODIUM LACTATE AND CALCIUM CHLORIDE: 600; 310; 30; 20 INJECTION, SOLUTION INTRAVENOUS at 07:12

## 2019-05-13 ASSESSMENT — ENCOUNTER SYMPTOMS
COLOR CHANGE: 0
APNEA: 0
EYE ITCHING: 0
PHOTOPHOBIA: 0
ANAL BLEEDING: 0
CHOKING: 0
CONSTIPATION: 0
EYE REDNESS: 0
BACK PAIN: 0
RECTAL PAIN: 0
STRIDOR: 0
SORE THROAT: 0

## 2019-05-13 ASSESSMENT — PAIN SCALES - GENERAL
PAINLEVEL_OUTOF10: 0
PAINLEVEL_OUTOF10: 0

## 2019-05-13 ASSESSMENT — PAIN - FUNCTIONAL ASSESSMENT: PAIN_FUNCTIONAL_ASSESSMENT: 0-10

## 2019-05-13 NOTE — H&P
Ayush Mcgovern MD      General Surgery       Subjective:     Patient is a 59 y.o.  male scheduled for flex sig. Indications for procedure are att to ileostomy. Pt is post rectal resection for cancer.       Discussed Blood/Blood Products: yes    Patient Active Problem List    Diagnosis Date Noted    Severe malnutrition ([address] 03/14/2019    Rectal cancer ([address] 03/13/2019    Colostomy present ([address] 11/09/2018    Essential hypertension 11/05/2018    Rectal tumor 11/02/2018    Chest pain 09/28/2018    PVD (peripheral vascular disease) ([address] 09/20/2018     Past Medical History:   Diagnosis Date    Cancer [facility])     Rectal cancer--treated with both chemo and radiation--finished 01/2019    Colostomy present ([address]     Profound hearing loss of both ears     no hearing in right ear --very little in left    PVD (peripheral vascular disease) ([address]       Past Surgical History:   Procedure Laterality Date    COLOSTOMY N/A 11/2/2018    DIVERTING COLOSTOMY PLACEMENT LAPAROSCOPIC performed by Yimi Mcgovern MD at [address] [facility] Bilateral early 2010's    hips    [place] INSJ PRPH CTR VAD W/SUBQ PORT AGE 5 YR/> N/A 11/6/2018    PORT INSERTION performed by Yimi Mcgovern MD at [address] N/A 3/13/2019    BOWEL RESECTION LOW ANTERIOR LAPAROSCOPIC ROBOTIC WITH DIVERTING LOOP ILEOSTOMY performed by Yimi Mcgovern MD at [facility] OR      Current Facility-Administered Medications   Medication Dose Route Frequency Provider Last Rate Last Dose    lactated ringers infusion   Intravenous Continuous Ashlee Steward DO 50 mL/hr at 05/13/19 0712       Allergies   Allergen Reactions    Oxycodone Anaphylaxis    Ibuprofen       Social History     Tobacco Use    Smoking status: Never Smoker    Smokeless tobacco: Never Used   Substance Use Topics    Alcohol use: No      Family History   Problem Relation Age of Onset    Dementia Mother     Early Death Father     High Blood Pressure Father     Early Death Sister     Diabetes Brother           Review of Systems  Review of Systems   Constitutional: Negative for chills and fever. HENT: Negative for ear pain, mouth sores, sore throat and tinnitus. Eyes: Negative for photophobia, redness and itching. Respiratory: Negative for apnea, choking and stridor. Cardiovascular: Negative for chest pain and palpitations. Gastrointestinal: Negative for anal bleeding, constipation and rectal pain. Endocrine: Negative for polydipsia. Genitourinary: Negative for enuresis, flank pain and hematuria. Musculoskeletal: Negative for back pain, joint swelling and myalgias. Skin: Negative for color change and pallor. Allergic/Immunologic: Negative for environmental allergies. Neurological: Negative for syncope and speech difficulty. Psychiatric/Behavioral: Negative for confusion and hallucinations. Objective:     Patient Vitals for the past 8 hrs:   BP Temp src Pulse Resp SpO2 Height Weight   05/13/19 0645 136/81 Temporal 64 16 99 % 5' 11\" (1.803 m) 126 lb (57.2 kg)       Physical Exam   Constitutional: He is oriented to person, place, and time. He appears well-developed and well-nourished. HENT:   Head: Normocephalic. Eyes: Pupils are equal, round, and reactive to light. Neck: Normal range of motion. Neck supple. Cardiovascular: Normal rate. Pulmonary/Chest: Effort normal.   Abdominal: Soft. He exhibits no distension and no mass. There is no tenderness. There is no rebound and no guarding. Musculoskeletal: Normal range of motion. Neurological: He is alert and oriented to person, place, and time. Skin: Skin is warm. Psychiatric: He has a normal mood and affect.        Data Review  CBC:   Lab Results   Component Value Date    WBC 14.8 03/14/2019    RBC 5.02 03/14/2019     BMP:   Lab Results   Component Value Date    GLUCOSE 112 03/14/2019    CO2 27 03/14/2019    BUN 10 03/14/2019    CREATININE 0.7 03/14/2019

## 2019-05-14 ENCOUNTER — ANESTHESIA (OUTPATIENT)
Dept: OPERATING ROOM | Age: 64
DRG: 331 | End: 2019-05-14
Payer: COMMERCIAL

## 2019-05-14 ENCOUNTER — TELEPHONE (OUTPATIENT)
Dept: SURGERY | Age: 64
End: 2019-05-14

## 2019-05-14 NOTE — TELEPHONE ENCOUNTER
LEFT MESSAGE FOR [address] (ileostomy takedown/reversal) SCHEDULED @ [facility]     PHONE ASSESSMENT /PAT - 5/17/19 @ 10  SURGERY - 5/21/19 @ 8529  P/O - call after discharge    *patient will need have a drug screening @ PAT.  If positive, will cx surgery per anesthesia    NPO AFTER MIDNIGHT  HOLD BLOOD THINNERS 5 days prior if taking any   SENT

## 2019-05-16 NOTE — OP NOTE
PROCEDURE NOTE    DATE OF PROCEDURE: 5/16/2019    SURGEON: DARWIN Gonzales Pat: None    PREOPERATIVE DIAGNOSIS: rectal cancer    POSTOPERATIVE DIAGNOSIS: Same     OPERATION: Sigmoidoscope with forceps biospy    ANESTHESIA: Local monitored anesthesia. ESTIMATED BLOOD LOSS: None. COMPLICATIONS: None. SPECIMENS: were obtained    HISTORY: The patient is a 59y.o. year old male with history of above preop diagnosis. I recommended colonoscopy with possible biopsy or polypectomy and I explained the risk, benefits, expected outcome, and alternatives to the procedure. Risks included but are not limited to bleeding, infection, respiratory distress, hypotension, and perforation of the colon. The patient understands and is in agreement. PROCEDURE: The patient was given IV conscious sedation per anesthesia. The patient was given 4 L of O2 /minute by nasal cannula. The patient's SPO2 remained above 90% throughout the procedure. The colonoscope was inserted per rectum and advanced under direct vision to the descending colon without difficulty. Findings:    Descending/Sigmoid colon: normal    Rectum/Anus: examined in normal and retroflexed positions and was normal. Random bx obtained from the anastomosis. The anastomosis appeared well intact    The colon was decompressed and the scope was removed. The patient tolerated the procedure well. IMPRESSION/PLAN:   1. Normal anastomosis   2.  Will proceed with ileostomy reversal    Electronically signed by Amy Soto MD  on 5/16/2019 at 8:50 AM

## 2019-05-20 ENCOUNTER — TELEPHONE (OUTPATIENT)
Dept: SURGERY | Age: 64
End: 2019-05-20

## 2019-05-20 NOTE — PROGRESS NOTES
Patient no show for pretesting 5/17/2019- called Dr Solorzano Solvay office at [phone] today- per  pt was probably going to be cancelled for tomorrow- would let us know. At 8503- pt still on OR list for tomorrow- called procedure scheduling they said the office said to leave him on OR schedule for tomorrow.  Called the patient 5/20/2019@ 1615- left msg surg 5/21/2019@ 1300 now with arrival at 1100- to bring med list, insurance info- npo after midnight except any medications as directed by Dr Kristal Finch am of surgery and follow bowel prep per office

## 2019-05-21 ENCOUNTER — HOSPITAL ENCOUNTER (INPATIENT)
Age: 64
LOS: 4 days | Discharge: HOME HEALTH | DRG: 331 | End: 2019-05-25
Attending: SURGERY | Admitting: SURGERY
Payer: COMMERCIAL

## 2019-05-21 VITALS
OXYGEN SATURATION: 100 % | DIASTOLIC BLOOD PRESSURE: 100 MMHG | SYSTOLIC BLOOD PRESSURE: 148 MMHG | TEMPERATURE: 96 F | RESPIRATION RATE: 6 BRPM

## 2019-05-21 DIAGNOSIS — Z43.2 ATTENTION TO ILEOSTOMY (HCC): Primary | ICD-10-CM

## 2019-05-21 LAB
AMPHETAMINES: NEGATIVE
BARBITURATE SCREEN URINE: NEGATIVE
BENZODIAZEPINE SCREEN, URINE: NEGATIVE
CANNABINOID SCREEN URINE: NEGATIVE
COCAINE METABOLITE: NEGATIVE
OPIATES, URINE: NEGATIVE
OXYCODONE: NORMAL
PHENCYCLIDINE, URINE: NEGATIVE

## 2019-05-21 PROCEDURE — C9113 INJ PANTOPRAZOLE SODIUM, VIA: HCPCS | Performed by: PHYSICIAN ASSISTANT

## 2019-05-21 PROCEDURE — 3700000001 HC ADD 15 MINUTES (ANESTHESIA): Performed by: SURGERY

## 2019-05-21 PROCEDURE — 6360000002 HC RX W HCPCS: Performed by: NURSE ANESTHETIST, CERTIFIED REGISTERED

## 2019-05-21 PROCEDURE — 0D1B0Z4 BYPASS ILEUM TO CUTANEOUS, OPEN APPROACH: ICD-10-PCS | Performed by: SURGERY

## 2019-05-21 PROCEDURE — 2580000003 HC RX 258: Performed by: PHYSICIAN ASSISTANT

## 2019-05-21 PROCEDURE — 3600000004 HC SURGERY LEVEL 4 BASE: Performed by: SURGERY

## 2019-05-21 PROCEDURE — 3600000014 HC SURGERY LEVEL 4 ADDTL 15MIN: Performed by: SURGERY

## 2019-05-21 PROCEDURE — 80307 DRUG TEST PRSMV CHEM ANLYZR: CPT

## 2019-05-21 PROCEDURE — 6360000002 HC RX W HCPCS: Performed by: PHYSICIAN ASSISTANT

## 2019-05-21 PROCEDURE — 2709999900 HC NON-CHARGEABLE SUPPLY: Performed by: SURGERY

## 2019-05-21 PROCEDURE — 3700000000 HC ANESTHESIA ATTENDED CARE: Performed by: SURGERY

## 2019-05-21 PROCEDURE — 7100000001 HC PACU RECOVERY - ADDTL 15 MIN: Performed by: SURGERY

## 2019-05-21 PROCEDURE — 2500000003 HC RX 250 WO HCPCS: Performed by: NURSE ANESTHETIST, CERTIFIED REGISTERED

## 2019-05-21 PROCEDURE — 88307 TISSUE EXAM BY PATHOLOGIST: CPT

## 2019-05-21 PROCEDURE — 44625 REPAIR BOWEL OPENING: CPT | Performed by: SURGERY

## 2019-05-21 PROCEDURE — 44625 REPAIR BOWEL OPENING: CPT | Performed by: PHYSICIAN ASSISTANT

## 2019-05-21 PROCEDURE — 6360000002 HC RX W HCPCS: Performed by: ANESTHESIOLOGY

## 2019-05-21 PROCEDURE — 2580000003 HC RX 258: Performed by: ANESTHESIOLOGY

## 2019-05-21 PROCEDURE — 1200000000 HC SEMI PRIVATE

## 2019-05-21 PROCEDURE — 7100000000 HC PACU RECOVERY - FIRST 15 MIN: Performed by: SURGERY

## 2019-05-21 PROCEDURE — 2580000003 HC RX 258: Performed by: NURSE ANESTHETIST, CERTIFIED REGISTERED

## 2019-05-21 PROCEDURE — 6360000002 HC RX W HCPCS: Performed by: SURGERY

## 2019-05-21 PROCEDURE — 6370000000 HC RX 637 (ALT 250 FOR IP): Performed by: PHYSICIAN ASSISTANT

## 2019-05-21 PROCEDURE — 2500000003 HC RX 250 WO HCPCS: Performed by: ANESTHESIOLOGY

## 2019-05-21 RX ORDER — MORPHINE SULFATE 4 MG/ML
2 INJECTION, SOLUTION INTRAMUSCULAR; INTRAVENOUS
Status: DISCONTINUED | OUTPATIENT
Start: 2019-05-21 | End: 2019-05-25 | Stop reason: HOSPADM

## 2019-05-21 RX ORDER — LABETALOL HYDROCHLORIDE 5 MG/ML
INJECTION, SOLUTION INTRAVENOUS PRN
Status: DISCONTINUED | OUTPATIENT
Start: 2019-05-21 | End: 2019-05-21 | Stop reason: SDUPTHER

## 2019-05-21 RX ORDER — SODIUM CHLORIDE, SODIUM LACTATE, POTASSIUM CHLORIDE, CALCIUM CHLORIDE 600; 310; 30; 20 MG/100ML; MG/100ML; MG/100ML; MG/100ML
INJECTION, SOLUTION INTRAVENOUS CONTINUOUS PRN
Status: DISCONTINUED | OUTPATIENT
Start: 2019-05-21 | End: 2019-05-21 | Stop reason: SDUPTHER

## 2019-05-21 RX ORDER — SODIUM CHLORIDE 0.9 % (FLUSH) 0.9 %
10 SYRINGE (ML) INJECTION EVERY 12 HOURS SCHEDULED
Status: DISCONTINUED | OUTPATIENT
Start: 2019-05-21 | End: 2019-05-25 | Stop reason: HOSPADM

## 2019-05-21 RX ORDER — PANTOPRAZOLE SODIUM 40 MG/10ML
40 INJECTION, POWDER, LYOPHILIZED, FOR SOLUTION INTRAVENOUS DAILY
Status: DISCONTINUED | OUTPATIENT
Start: 2019-05-21 | End: 2019-05-25 | Stop reason: HOSPADM

## 2019-05-21 RX ORDER — CEFAZOLIN SODIUM 2 G/100ML
2 INJECTION, SOLUTION INTRAVENOUS ONCE
Status: COMPLETED | OUTPATIENT
Start: 2019-05-21 | End: 2019-05-21

## 2019-05-21 RX ORDER — HYDRALAZINE HYDROCHLORIDE 20 MG/ML
5 INJECTION INTRAMUSCULAR; INTRAVENOUS EVERY 10 MIN PRN
Status: DISCONTINUED | OUTPATIENT
Start: 2019-05-21 | End: 2019-05-21 | Stop reason: HOSPADM

## 2019-05-21 RX ORDER — MIDAZOLAM HYDROCHLORIDE 1 MG/ML
INJECTION INTRAMUSCULAR; INTRAVENOUS PRN
Status: DISCONTINUED | OUTPATIENT
Start: 2019-05-21 | End: 2019-05-21 | Stop reason: SDUPTHER

## 2019-05-21 RX ORDER — ESMOLOL HYDROCHLORIDE 10 MG/ML
INJECTION INTRAVENOUS PRN
Status: DISCONTINUED | OUTPATIENT
Start: 2019-05-21 | End: 2019-05-21 | Stop reason: SDUPTHER

## 2019-05-21 RX ORDER — SODIUM CHLORIDE 0.9 % (FLUSH) 0.9 %
10 SYRINGE (ML) INJECTION PRN
Status: DISCONTINUED | OUTPATIENT
Start: 2019-05-21 | End: 2019-05-25 | Stop reason: HOSPADM

## 2019-05-21 RX ORDER — DEXAMETHASONE SODIUM PHOSPHATE 4 MG/ML
INJECTION, SOLUTION INTRA-ARTICULAR; INTRALESIONAL; INTRAMUSCULAR; INTRAVENOUS; SOFT TISSUE PRN
Status: DISCONTINUED | OUTPATIENT
Start: 2019-05-21 | End: 2019-05-21 | Stop reason: SDUPTHER

## 2019-05-21 RX ORDER — HYDROCODONE BITARTRATE AND ACETAMINOPHEN 5; 325 MG/1; MG/1
1 TABLET ORAL EVERY 4 HOURS PRN
Status: DISCONTINUED | OUTPATIENT
Start: 2019-05-21 | End: 2019-05-25 | Stop reason: HOSPADM

## 2019-05-21 RX ORDER — FENTANYL CITRATE 50 UG/ML
25 INJECTION, SOLUTION INTRAMUSCULAR; INTRAVENOUS EVERY 5 MIN PRN
Status: DISCONTINUED | OUTPATIENT
Start: 2019-05-21 | End: 2019-05-21 | Stop reason: HOSPADM

## 2019-05-21 RX ORDER — HYDROMORPHONE HCL 110MG/55ML
PATIENT CONTROLLED ANALGESIA SYRINGE INTRAVENOUS PRN
Status: DISCONTINUED | OUTPATIENT
Start: 2019-05-21 | End: 2019-05-21 | Stop reason: SDUPTHER

## 2019-05-21 RX ORDER — 0.9 % SODIUM CHLORIDE 0.9 %
10 VIAL (ML) INJECTION DAILY
Status: DISCONTINUED | OUTPATIENT
Start: 2019-05-21 | End: 2019-05-25 | Stop reason: HOSPADM

## 2019-05-21 RX ORDER — FENTANYL CITRATE 50 UG/ML
INJECTION, SOLUTION INTRAMUSCULAR; INTRAVENOUS PRN
Status: DISCONTINUED | OUTPATIENT
Start: 2019-05-21 | End: 2019-05-21 | Stop reason: SDUPTHER

## 2019-05-21 RX ORDER — ONDANSETRON 2 MG/ML
4 INJECTION INTRAMUSCULAR; INTRAVENOUS EVERY 6 HOURS PRN
Status: DISCONTINUED | OUTPATIENT
Start: 2019-05-21 | End: 2019-05-25 | Stop reason: HOSPADM

## 2019-05-21 RX ORDER — ROCURONIUM BROMIDE 10 MG/ML
INJECTION, SOLUTION INTRAVENOUS PRN
Status: DISCONTINUED | OUTPATIENT
Start: 2019-05-21 | End: 2019-05-21 | Stop reason: SDUPTHER

## 2019-05-21 RX ORDER — ONDANSETRON 2 MG/ML
4 INJECTION INTRAMUSCULAR; INTRAVENOUS
Status: DISCONTINUED | OUTPATIENT
Start: 2019-05-21 | End: 2019-05-21 | Stop reason: HOSPADM

## 2019-05-21 RX ORDER — GLYCOPYRROLATE 1 MG/5 ML
SYRINGE (ML) INTRAVENOUS PRN
Status: DISCONTINUED | OUTPATIENT
Start: 2019-05-21 | End: 2019-05-21 | Stop reason: SDUPTHER

## 2019-05-21 RX ORDER — NEOSTIGMINE METHYLSULFATE 5 MG/5 ML
SYRINGE (ML) INTRAVENOUS PRN
Status: DISCONTINUED | OUTPATIENT
Start: 2019-05-21 | End: 2019-05-21 | Stop reason: SDUPTHER

## 2019-05-21 RX ORDER — LABETALOL HYDROCHLORIDE 5 MG/ML
5 INJECTION, SOLUTION INTRAVENOUS EVERY 10 MIN PRN
Status: DISCONTINUED | OUTPATIENT
Start: 2019-05-21 | End: 2019-05-21 | Stop reason: HOSPADM

## 2019-05-21 RX ORDER — PROPOFOL 10 MG/ML
INJECTION, EMULSION INTRAVENOUS PRN
Status: DISCONTINUED | OUTPATIENT
Start: 2019-05-21 | End: 2019-05-21 | Stop reason: SDUPTHER

## 2019-05-21 RX ORDER — SODIUM CHLORIDE 9 MG/ML
INJECTION, SOLUTION INTRAVENOUS CONTINUOUS
Status: DISCONTINUED | OUTPATIENT
Start: 2019-05-21 | End: 2019-05-25 | Stop reason: HOSPADM

## 2019-05-21 RX ORDER — SODIUM CHLORIDE, SODIUM LACTATE, POTASSIUM CHLORIDE, CALCIUM CHLORIDE 600; 310; 30; 20 MG/100ML; MG/100ML; MG/100ML; MG/100ML
INJECTION, SOLUTION INTRAVENOUS CONTINUOUS
Status: DISCONTINUED | OUTPATIENT
Start: 2019-05-21 | End: 2019-05-21

## 2019-05-21 RX ORDER — ONDANSETRON 2 MG/ML
INJECTION INTRAMUSCULAR; INTRAVENOUS PRN
Status: DISCONTINUED | OUTPATIENT
Start: 2019-05-21 | End: 2019-05-21 | Stop reason: SDUPTHER

## 2019-05-21 RX ORDER — LIDOCAINE HYDROCHLORIDE 20 MG/ML
INJECTION, SOLUTION INTRAVENOUS PRN
Status: DISCONTINUED | OUTPATIENT
Start: 2019-05-21 | End: 2019-05-21 | Stop reason: SDUPTHER

## 2019-05-21 RX ADMIN — MIDAZOLAM HYDROCHLORIDE 2 MG: 1 INJECTION, SOLUTION INTRAMUSCULAR; INTRAVENOUS at 15:14

## 2019-05-21 RX ADMIN — MORPHINE SULFATE 2 MG: 4 INJECTION, SOLUTION INTRAMUSCULAR; INTRAVENOUS at 23:13

## 2019-05-21 RX ADMIN — Medication 0.6 MG: at 16:56

## 2019-05-21 RX ADMIN — HYDRALAZINE HYDROCHLORIDE 5 MG: 20 INJECTION INTRAMUSCULAR; INTRAVENOUS at 17:50

## 2019-05-21 RX ADMIN — LIDOCAINE HYDROCHLORIDE 100 MG: 20 INJECTION, SOLUTION INTRAVENOUS at 15:28

## 2019-05-21 RX ADMIN — HYDRALAZINE HYDROCHLORIDE 5 MG: 20 INJECTION INTRAMUSCULAR; INTRAVENOUS at 18:04

## 2019-05-21 RX ADMIN — ONDANSETRON 4 MG: 2 INJECTION INTRAMUSCULAR; INTRAVENOUS at 20:45

## 2019-05-21 RX ADMIN — LABETALOL HYDROCHLORIDE 5 MG: 5 INJECTION, SOLUTION INTRAVENOUS at 18:33

## 2019-05-21 RX ADMIN — ESMOLOL HYDROCHLORIDE 40 MG: 10 INJECTION, SOLUTION INTRAVENOUS at 16:09

## 2019-05-21 RX ADMIN — FENTANYL CITRATE 100 MCG: 50 INJECTION INTRAMUSCULAR; INTRAVENOUS at 15:26

## 2019-05-21 RX ADMIN — HYDROMORPHONE HYDROCHLORIDE 0.5 MG: 2 INJECTION INTRAMUSCULAR; INTRAVENOUS; SUBCUTANEOUS at 16:08

## 2019-05-21 RX ADMIN — DEXAMETHASONE SODIUM PHOSPHATE 8 MG: 4 INJECTION, SOLUTION INTRAMUSCULAR; INTRAVENOUS at 15:54

## 2019-05-21 RX ADMIN — SODIUM CHLORIDE, POTASSIUM CHLORIDE, SODIUM LACTATE AND CALCIUM CHLORIDE: 600; 310; 30; 20 INJECTION, SOLUTION INTRAVENOUS at 11:39

## 2019-05-21 RX ADMIN — SODIUM CHLORIDE, PRESERVATIVE FREE 10 ML: 5 INJECTION INTRAVENOUS at 20:45

## 2019-05-21 RX ADMIN — ROCURONIUM BROMIDE 50 MG: 10 INJECTION INTRAVENOUS at 15:28

## 2019-05-21 RX ADMIN — FENTANYL CITRATE 25 MCG: 50 INJECTION, SOLUTION INTRAMUSCULAR; INTRAVENOUS at 17:59

## 2019-05-21 RX ADMIN — PROPOFOL 150 MG: 10 INJECTION, EMULSION INTRAVENOUS at 15:28

## 2019-05-21 RX ADMIN — Medication 3 MG: at 16:56

## 2019-05-21 RX ADMIN — CEFAZOLIN SODIUM 2 G: 2 INJECTION, SOLUTION INTRAVENOUS at 15:46

## 2019-05-21 RX ADMIN — HYDROMORPHONE HYDROCHLORIDE 0.5 MG: 2 INJECTION INTRAMUSCULAR; INTRAVENOUS; SUBCUTANEOUS at 17:09

## 2019-05-21 RX ADMIN — MORPHINE SULFATE 2 MG: 4 INJECTION, SOLUTION INTRAMUSCULAR; INTRAVENOUS at 19:23

## 2019-05-21 RX ADMIN — HYDROCODONE BITARTRATE AND ACETAMINOPHEN 1 TABLET: 5; 325 TABLET ORAL at 20:45

## 2019-05-21 RX ADMIN — LABETALOL HYDROCHLORIDE 10 MG: 5 INJECTION, SOLUTION INTRAVENOUS at 16:23

## 2019-05-21 RX ADMIN — SODIUM CHLORIDE, POTASSIUM CHLORIDE, SODIUM LACTATE AND CALCIUM CHLORIDE: 600; 310; 30; 20 INJECTION, SOLUTION INTRAVENOUS at 15:18

## 2019-05-21 RX ADMIN — FENTANYL CITRATE 25 MCG: 50 INJECTION, SOLUTION INTRAMUSCULAR; INTRAVENOUS at 17:40

## 2019-05-21 RX ADMIN — HYDROMORPHONE HYDROCHLORIDE 0.5 MG: 2 INJECTION INTRAMUSCULAR; INTRAVENOUS; SUBCUTANEOUS at 16:59

## 2019-05-21 RX ADMIN — SODIUM CHLORIDE: 9 INJECTION, SOLUTION INTRAVENOUS at 17:55

## 2019-05-21 RX ADMIN — PROPOFOL 40 MG: 10 INJECTION, EMULSION INTRAVENOUS at 16:02

## 2019-05-21 RX ADMIN — PANTOPRAZOLE SODIUM 40 MG: 40 INJECTION, POWDER, FOR SOLUTION INTRAVENOUS at 20:45

## 2019-05-21 RX ADMIN — ONDANSETRON 4 MG: 2 INJECTION INTRAMUSCULAR; INTRAVENOUS at 16:22

## 2019-05-21 RX ADMIN — HYDROMORPHONE HYDROCHLORIDE 0.5 MG: 2 INJECTION INTRAMUSCULAR; INTRAVENOUS; SUBCUTANEOUS at 16:20

## 2019-05-21 RX ADMIN — SODIUM CHLORIDE, POTASSIUM CHLORIDE, SODIUM LACTATE AND CALCIUM CHLORIDE: 600; 310; 30; 20 INJECTION, SOLUTION INTRAVENOUS at 16:33

## 2019-05-21 RX ADMIN — FENTANYL CITRATE 25 MCG: 50 INJECTION, SOLUTION INTRAMUSCULAR; INTRAVENOUS at 18:24

## 2019-05-21 RX ADMIN — LABETALOL HYDROCHLORIDE 5 MG: 5 INJECTION, SOLUTION INTRAVENOUS at 17:34

## 2019-05-21 RX ADMIN — HYDRALAZINE HYDROCHLORIDE 5 MG: 20 INJECTION INTRAMUSCULAR; INTRAVENOUS at 18:23

## 2019-05-21 RX ADMIN — FENTANYL CITRATE 100 MCG: 50 INJECTION INTRAMUSCULAR; INTRAVENOUS at 16:01

## 2019-05-21 ASSESSMENT — PULMONARY FUNCTION TESTS
PIF_VALUE: 2
PIF_VALUE: 19
PIF_VALUE: 14
PIF_VALUE: 1
PIF_VALUE: 4
PIF_VALUE: 14
PIF_VALUE: 0
PIF_VALUE: 14
PIF_VALUE: 14
PIF_VALUE: 15
PIF_VALUE: 14
PIF_VALUE: 13
PIF_VALUE: 13
PIF_VALUE: 4
PIF_VALUE: 13
PIF_VALUE: 14
PIF_VALUE: 14
PIF_VALUE: 1
PIF_VALUE: 15
PIF_VALUE: 14
PIF_VALUE: 12
PIF_VALUE: 14
PIF_VALUE: 14
PIF_VALUE: 15
PIF_VALUE: 14
PIF_VALUE: 3
PIF_VALUE: 14
PIF_VALUE: 15
PIF_VALUE: 15
PIF_VALUE: 13
PIF_VALUE: 14
PIF_VALUE: 10
PIF_VALUE: 13
PIF_VALUE: 14
PIF_VALUE: 19
PIF_VALUE: 13
PIF_VALUE: 14
PIF_VALUE: 3
PIF_VALUE: 14
PIF_VALUE: 14
PIF_VALUE: 13
PIF_VALUE: 14
PIF_VALUE: 3
PIF_VALUE: 1
PIF_VALUE: 15
PIF_VALUE: 14
PIF_VALUE: 13
PIF_VALUE: 14
PIF_VALUE: 2
PIF_VALUE: 15
PIF_VALUE: 0
PIF_VALUE: 13
PIF_VALUE: 15
PIF_VALUE: 15
PIF_VALUE: 14
PIF_VALUE: 13
PIF_VALUE: 14
PIF_VALUE: 15
PIF_VALUE: 2
PIF_VALUE: 13
PIF_VALUE: 0
PIF_VALUE: 14
PIF_VALUE: 15
PIF_VALUE: 3
PIF_VALUE: 2
PIF_VALUE: 12
PIF_VALUE: 14
PIF_VALUE: 14
PIF_VALUE: 0
PIF_VALUE: 12
PIF_VALUE: 14
PIF_VALUE: 4
PIF_VALUE: 14
PIF_VALUE: 15
PIF_VALUE: 14
PIF_VALUE: 13
PIF_VALUE: 14
PIF_VALUE: 14
PIF_VALUE: 2
PIF_VALUE: 0
PIF_VALUE: 15
PIF_VALUE: 21
PIF_VALUE: 15
PIF_VALUE: 3
PIF_VALUE: 14
PIF_VALUE: 13
PIF_VALUE: 14
PIF_VALUE: 1
PIF_VALUE: 14
PIF_VALUE: 3
PIF_VALUE: 14
PIF_VALUE: 15
PIF_VALUE: 1
PIF_VALUE: 14
PIF_VALUE: 1
PIF_VALUE: 12
PIF_VALUE: 14
PIF_VALUE: 13
PIF_VALUE: 0
PIF_VALUE: 13
PIF_VALUE: 4

## 2019-05-21 ASSESSMENT — PAIN SCALES - GENERAL
PAINLEVEL_OUTOF10: 5
PAINLEVEL_OUTOF10: 4
PAINLEVEL_OUTOF10: 5
PAINLEVEL_OUTOF10: 8
PAINLEVEL_OUTOF10: 4
PAINLEVEL_OUTOF10: 7

## 2019-05-21 ASSESSMENT — PAIN - FUNCTIONAL ASSESSMENT: PAIN_FUNCTIONAL_ASSESSMENT: 0-10

## 2019-05-21 ASSESSMENT — PAIN DESCRIPTION - PAIN TYPE: TYPE: SURGICAL PAIN

## 2019-05-21 ASSESSMENT — PAIN DESCRIPTION - LOCATION: LOCATION: ABDOMEN

## 2019-05-21 NOTE — PROGRESS NOTES
Dr J Carlos Mccann informed patient's SBP in 170s. Per dr J Carlos Mccann, do not give any more BP medications.

## 2019-05-21 NOTE — ANESTHESIA POSTPROCEDURE EVALUATION
Department of Anesthesiology  Postprocedure Note    Patient: Sandy Sepulveda  MRN: [id number]  YOB: 1955  Date of evaluation: 5/21/2019  Time:  5:17 PM     Procedure Summary     Date:  05/21/19 Room / Location:  [place] [address] / [facility] OR    Anesthesia Start:  1518 Anesthesia Stop:      Procedure:  COLOSTOMY ILEOSTOMY TAKEDOWN/REVERSAL (N/A ) Diagnosis:  (attn to ileostomy)    Surgeon:  Genaro Cárdenas MD Responsible Provider:  Livan Johnson MD    Anesthesia Type:  general ASA Status:  3          Anesthesia Type: general    Last vitals: Reviewed and per EMR flowsheets.        Anesthesia Post Evaluation    Patient location during evaluation: PACU  Airway patency: patent  Nausea & Vomiting: no nausea and no vomiting  Complications: no  Cardiovascular status: hemodynamically stable  Respiratory status: acceptable

## 2019-05-21 NOTE — H&P
Regina Beck MD                                                  General Surgery        Subjective:      Patient is a 59 y.o.  male scheduled for ileostomy reversal. Indications for procedure are att to ileostomy.  Pt is post rectal resection for cancer.        Discussed Blood/Blood Products: yes          Patient Active Problem List     Diagnosis Date Noted    Severe malnutrition ([address] 03/14/2019    Rectal cancer ([address] 03/13/2019    Colostomy present ([address] 11/09/2018    Essential hypertension 11/05/2018    Rectal tumor 11/02/2018    Chest pain 09/28/2018    PVD (peripheral vascular disease) ([address] 09/20/2018      Past Medical History        Past Medical History:   Diagnosis Date    Cancer [facility])       Rectal cancer--treated with both chemo and radiation--finished 01/2019    Colostomy present ([address]      Profound hearing loss of both ears       no hearing in right ear --very little in left    PVD (peripheral vascular disease) ([address]           Past Surgical History         Past Surgical History:   Procedure Laterality Date    COLOSTOMY N/A 11/2/2018     DIVERTING COLOSTOMY PLACEMENT LAPAROSCOPIC performed by Maura Beck MD at / [facility] Bilateral early 2010's     hips    [place] INSJ PRPH CTR VAD W/SUBQ PORT AGE 5 YR/> N/A 11/6/2018     PORT INSERTION performed by Maura Beck MD at [address] [facility] [address] N/A 3/13/2019     BOWEL RESECTION LOW ANTERIOR LAPAROSCOPIC ROBOTIC WITH DIVERTING LOOP ILEOSTOMY performed by Maura Beck MD at [address] [facility] OR         Current Facility-Administered Medications             Current Facility-Administered Medications   Medication Dose Route Frequency Provider Last Rate Last Dose    lactated ringers infusion   Intravenous Continuous Gary Conn DO 50 mL/hr at 05/13/19 7755                Allergies   Allergen Reactions    Oxycodone Anaphylaxis    Skin is warm.    Psychiatric: He has a normal mood and affect.         Data Review  CBC:         Lab Results   Component Value Date     WBC 14.8 03/14/2019     RBC 5.02 03/14/2019      BMP:         Lab Results   Component Value Date     GLUCOSE 112 03/14/2019     CO2 27 03/14/2019     BUN 10 03/14/2019     CREATININE 0.7 03/14/2019     CALCIUM 8.5 03/14/2019         Assessment:      Att to ileostomy     Plan:      Will proceed open ileostomy reversal     Sol Alfred MD

## 2019-05-21 NOTE — PROGRESS NOTES
Patient arrived to pacu from OR; placed on monitor. Vital signs stable; appropriately responsive. Bedside report taken from [facility] [place] rn and [place], crna.

## 2019-05-21 NOTE — BRIEF OP NOTE
Brief Postoperative Note  ______________________________________________________________    Patient: Rad Jaeger  YOB: 1955  MRN: [id number]  Date of Procedure: 5/21/2019    Pre-Op Diagnosis: attn to ileostomy    Post-Op Diagnosis: Same       Procedure(s):  COLOSTOMY ILEOSTOMY TAKEDOWN/REVERSAL    Anesthesia: General    Surgeon(s):  Andrés Bryant MD    Assistant: Kyle Byrne PA-C      Estimated Blood Loss (mL): 50    Complications: None    Specimens:   ID Type Source Tests Collected by Time Destination   A : small bowel  Tissue ileum SURGICAL PATHOLOGY Andrés Bryant MD 5/21/2019 1621        Implants:  * No implants in log *      Drains:   Colostomy LLQ (Active)       Urethral Catheter Non-latex 16 fr (Active)       Findings: Same.      Kyle Byrne PA-C  Date: 5/21/2019  Time: 5:11 PM

## 2019-05-21 NOTE — PROGRESS NOTES
Patient transported to [id number]. Vital signs stable; pain controlled; no nausea; awake and appropriate; tolerating po ice chips. Telephone report called to Michele Peng rn.  RN in room.

## 2019-05-22 LAB
ALBUMIN SERPL-MCNC: 3.6 GM/DL (ref 3.4–5)
ALP BLD-CCNC: 58 IU/L (ref 40–128)
ALT SERPL-CCNC: 17 U/L (ref 10–40)
ANION GAP SERPL CALCULATED.3IONS-SCNC: 13 MMOL/L (ref 4–16)
AST SERPL-CCNC: 12 IU/L (ref 15–37)
BASOPHILS ABSOLUTE: 0 K/CU MM
BASOPHILS RELATIVE PERCENT: 0.1 % (ref 0–1)
BILIRUB SERPL-MCNC: 0.2 MG/DL (ref 0–1)
BUN BLDV-MCNC: 11 MG/DL (ref 6–23)
CALCIUM SERPL-MCNC: 8.9 MG/DL (ref 8.3–10.6)
CHLORIDE BLD-SCNC: 99 MMOL/L (ref 99–110)
CO2: 23 MMOL/L (ref 21–32)
CREAT SERPL-MCNC: 0.7 MG/DL (ref 0.9–1.3)
DIFFERENTIAL TYPE: ABNORMAL
EOSINOPHILS ABSOLUTE: 0 K/CU MM
EOSINOPHILS RELATIVE PERCENT: 0 % (ref 0–3)
GFR AFRICAN AMERICAN: >60 ML/MIN/1.73M2
GFR NON-AFRICAN AMERICAN: >60 ML/MIN/1.73M2
GLUCOSE BLD-MCNC: 118 MG/DL (ref 70–99)
HCT VFR BLD CALC: 39.1 % (ref 42–52)
HEMOGLOBIN: 12.1 GM/DL (ref 13.5–18)
IMMATURE NEUTROPHIL %: 0.6 % (ref 0–0.43)
LYMPHOCYTES ABSOLUTE: 0.5 K/CU MM
LYMPHOCYTES RELATIVE PERCENT: 4 % (ref 24–44)
MCH RBC QN AUTO: 25.3 PG (ref 27–31)
MCHC RBC AUTO-ENTMCNC: 30.9 % (ref 32–36)
MCV RBC AUTO: 81.8 FL (ref 78–100)
MONOCYTES ABSOLUTE: 0.7 K/CU MM
MONOCYTES RELATIVE PERCENT: 6.5 % (ref 0–4)
NUCLEATED RBC %: 0 %
PDW BLD-RTO: 19.5 % (ref 11.7–14.9)
PLATELET # BLD: 266 K/CU MM (ref 140–440)
PMV BLD AUTO: 9.3 FL (ref 7.5–11.1)
POTASSIUM SERPL-SCNC: 4.3 MMOL/L (ref 3.5–5.1)
RBC # BLD: 4.78 M/CU MM (ref 4.6–6.2)
SEGMENTED NEUTROPHILS ABSOLUTE COUNT: 10 K/CU MM
SEGMENTED NEUTROPHILS RELATIVE PERCENT: 88.8 % (ref 36–66)
SODIUM BLD-SCNC: 135 MMOL/L (ref 135–145)
TOTAL IMMATURE NEUTOROPHIL: 0.07 K/CU MM
TOTAL NUCLEATED RBC: 0 K/CU MM
TOTAL PROTEIN: 5.8 GM/DL (ref 6.4–8.2)
WBC # BLD: 11.3 K/CU MM (ref 4–10.5)

## 2019-05-22 PROCEDURE — C9113 INJ PANTOPRAZOLE SODIUM, VIA: HCPCS | Performed by: PHYSICIAN ASSISTANT

## 2019-05-22 PROCEDURE — 80053 COMPREHEN METABOLIC PANEL: CPT

## 2019-05-22 PROCEDURE — 1200000000 HC SEMI PRIVATE

## 2019-05-22 PROCEDURE — 6360000002 HC RX W HCPCS: Performed by: PHYSICIAN ASSISTANT

## 2019-05-22 PROCEDURE — 85025 COMPLETE CBC W/AUTO DIFF WBC: CPT

## 2019-05-22 PROCEDURE — 94761 N-INVAS EAR/PLS OXIMETRY MLT: CPT

## 2019-05-22 PROCEDURE — 2580000003 HC RX 258: Performed by: PHYSICIAN ASSISTANT

## 2019-05-22 PROCEDURE — 36415 COLL VENOUS BLD VENIPUNCTURE: CPT

## 2019-05-22 PROCEDURE — 6370000000 HC RX 637 (ALT 250 FOR IP): Performed by: PHYSICIAN ASSISTANT

## 2019-05-22 PROCEDURE — 99024 POSTOP FOLLOW-UP VISIT: CPT | Performed by: PHYSICIAN ASSISTANT

## 2019-05-22 RX ADMIN — SODIUM CHLORIDE, PRESERVATIVE FREE 10 ML: 5 INJECTION INTRAVENOUS at 09:30

## 2019-05-22 RX ADMIN — MORPHINE SULFATE 2 MG: 4 INJECTION, SOLUTION INTRAMUSCULAR; INTRAVENOUS at 15:35

## 2019-05-22 RX ADMIN — SODIUM CHLORIDE: 9 INJECTION, SOLUTION INTRAVENOUS at 21:28

## 2019-05-22 RX ADMIN — MORPHINE SULFATE 2 MG: 4 INJECTION, SOLUTION INTRAMUSCULAR; INTRAVENOUS at 18:33

## 2019-05-22 RX ADMIN — HYDROCODONE BITARTRATE AND ACETAMINOPHEN 1 TABLET: 5; 325 TABLET ORAL at 00:39

## 2019-05-22 RX ADMIN — MORPHINE SULFATE 2 MG: 4 INJECTION, SOLUTION INTRAMUSCULAR; INTRAVENOUS at 05:51

## 2019-05-22 RX ADMIN — MORPHINE SULFATE 2 MG: 4 INJECTION, SOLUTION INTRAMUSCULAR; INTRAVENOUS at 11:18

## 2019-05-22 RX ADMIN — HYDROCODONE BITARTRATE AND ACETAMINOPHEN 1 TABLET: 5; 325 TABLET ORAL at 05:05

## 2019-05-22 RX ADMIN — MORPHINE SULFATE 2 MG: 4 INJECTION, SOLUTION INTRAMUSCULAR; INTRAVENOUS at 02:33

## 2019-05-22 RX ADMIN — PANTOPRAZOLE SODIUM 40 MG: 40 INJECTION, POWDER, FOR SOLUTION INTRAVENOUS at 09:29

## 2019-05-22 RX ADMIN — MORPHINE SULFATE 2 MG: 4 INJECTION, SOLUTION INTRAMUSCULAR; INTRAVENOUS at 21:28

## 2019-05-22 RX ADMIN — ONDANSETRON 4 MG: 2 INJECTION INTRAMUSCULAR; INTRAVENOUS at 05:51

## 2019-05-22 RX ADMIN — SODIUM CHLORIDE: 9 INJECTION, SOLUTION INTRAVENOUS at 11:19

## 2019-05-22 RX ADMIN — ENOXAPARIN SODIUM 40 MG: 40 INJECTION SUBCUTANEOUS at 09:29

## 2019-05-22 ASSESSMENT — PAIN DESCRIPTION - FREQUENCY: FREQUENCY: CONTINUOUS

## 2019-05-22 ASSESSMENT — PAIN - FUNCTIONAL ASSESSMENT: PAIN_FUNCTIONAL_ASSESSMENT: PREVENTS OR INTERFERES SOME ACTIVE ACTIVITIES AND ADLS

## 2019-05-22 ASSESSMENT — ENCOUNTER SYMPTOMS
BACK PAIN: 0
STRIDOR: 0
ABDOMINAL PAIN: 1
VOMITING: 0
ANAL BLEEDING: 0
PHOTOPHOBIA: 0
NAUSEA: 0
COLOR CHANGE: 0
EYE REDNESS: 0
SORE THROAT: 0
CHOKING: 0
EYE ITCHING: 0
RECTAL PAIN: 0
APNEA: 0
CONSTIPATION: 1

## 2019-05-22 ASSESSMENT — PAIN SCALES - GENERAL
PAINLEVEL_OUTOF10: 6
PAINLEVEL_OUTOF10: 7
PAINLEVEL_OUTOF10: 6
PAINLEVEL_OUTOF10: 7
PAINLEVEL_OUTOF10: 8
PAINLEVEL_OUTOF10: 7
PAINLEVEL_OUTOF10: 0

## 2019-05-22 ASSESSMENT — PAIN DESCRIPTION - PAIN TYPE: TYPE: SURGICAL PAIN

## 2019-05-22 ASSESSMENT — PAIN DESCRIPTION - ORIENTATION: ORIENTATION: RIGHT

## 2019-05-22 ASSESSMENT — PAIN DESCRIPTION - ONSET: ONSET: ON-GOING

## 2019-05-22 ASSESSMENT — PAIN DESCRIPTION - DESCRIPTORS: DESCRIPTORS: ACHING

## 2019-05-22 ASSESSMENT — PAIN DESCRIPTION - LOCATION: LOCATION: ABDOMEN

## 2019-05-22 ASSESSMENT — PAIN DESCRIPTION - PROGRESSION: CLINICAL_PROGRESSION: NOT CHANGED

## 2019-05-22 NOTE — CONSULTS
Per the physical rehabilitation triage process, the PT referral was discontinued. Recommend mobility with nursing staff.   No skilled, acute care physical therapy needs exist.  Delmer James, PT  5/22/2019, 10:48 AM

## 2019-05-22 NOTE — PROGRESS NOTES
General Surgery-[facility] Day: 2    ChiefComplaint on Admission: Attention to ileostomy      Subjective:     Andrea Clark is a 59 y.o. male with POD #1 s/p ileostomy reversal. Patient reports abdominal pain. Denies N//V. Is eager to eat. Denies passage of flatus. Tolerating DIET CLEAR LIQUID;. - BM.     ROS:  Review of Systems   Constitutional: Negative for chills and fever. HENT: Positive for hearing loss. Negative for ear pain, mouth sores, sore throat and tinnitus. Eyes: Negative for photophobia, redness and itching. Respiratory: Negative for apnea, choking and stridor. Cardiovascular: Negative for chest pain and palpitations. Gastrointestinal: Positive for abdominal pain and constipation. Negative for anal bleeding, nausea, rectal pain and vomiting. Endocrine: Negative for polydipsia. Genitourinary: Negative for enuresis, flank pain and hematuria. Musculoskeletal: Negative for back pain, joint swelling and myalgias. Skin: Negative for color change and pallor. Allergic/Immunologic: Negative for environmental allergies. Neurological: Negative for syncope and speech difficulty. Psychiatric/Behavioral: Negative for confusion and hallucinations. Allergies  Oxycodone and Ibuprofen          Diagnosis Date    Cancer [facility])     Rectal cancer--treated with both chemo and radiation--finished 2019    [address] [address]     Profound hearing loss of both ears     no hearing in right ear --very little in left    PVD (peripheral vascular disease) (HCC)        Objective:     Vitals:    19 1012   BP: (!) 137/91   Pulse: 71   Resp: 18   Temp: 98.7 °F (37.1 °C)   SpO2: 100%       TEMPERATURE:  Current - Temp: 98.7 °F (37.1 °C); Max - Temp  Av.9 °F (35.5 °C)  Min: 95.3 °F (35.2 °C)  Max: 98.7 °F (37.1 °C)    No intake/output data recorded. I/O last 3 completed shifts: In: 963.7 [P.O.:200;  I.V.:763.7]  Out: 1525 [Urine:1500; Blood:25]      Physical Exam:  Physical Exam   Constitutional: He is oriented to person, place, and time. He appears well-developed and well-nourished. HENT:   Head: Normocephalic. Eyes: Pupils are equal, round, and reactive to light. Neck: Normal range of motion. Neck supple. Cardiovascular: Normal rate. Pulmonary/Chest: Effort normal.   Abdominal: Soft. He exhibits no distension and no mass. There is tenderness. There is no rebound and no guarding. Musculoskeletal: Normal range of motion. Neurological: He is alert and oriented to person, place, and time. Skin: Skin is warm. Surgical dressing clean, dry and intact. Psychiatric: He has a normal mood and affect.          Scheduled Meds:   sodium chloride flush  10 mL Intravenous 2 times per day    pantoprazole  40 mg Intravenous Daily    And    sodium chloride (PF)  10 mL Intravenous Daily    enoxaparin  40 mg Subcutaneous Daily     Continuous Infusions:   sodium chloride 50 mL/hr at 05/22/19 1119     PRN Meds:sodium chloride flush, ondansetron, HYDROcodone 5 mg - acetaminophen, morphine, magnesium hydroxide      Labs/Imaging Results:   Lab Results   Component Value Date    WBC 11.3 (H) 05/22/2019    HGB 12.1 (L) 05/22/2019    HCT 39.1 (L) 05/22/2019    MCV 81.8 05/22/2019     05/22/2019     Lab Results   Component Value Date     05/22/2019    K 4.3 05/22/2019    CL 99 05/22/2019    CO2 23 05/22/2019    BUN 11 05/22/2019    CREATININE 0.7 (L) 05/22/2019    GLUCOSE 118 (H) 05/22/2019    CALCIUM 8.9 05/22/2019    PROT 5.8 (L) 05/22/2019    LABALBU 3.6 05/22/2019    BILITOT 0.2 05/22/2019    ALKPHOS 58 05/22/2019    AST 12 (L) 05/22/2019    ALT 17 05/22/2019    LABGLOM >60 05/22/2019    GFRAA >60 05/22/2019       Assessment:     Patient Active Problem List:     PVD (peripheral vascular disease) ([address]     Chest pain     Rectal tumor     Essential hypertension     Colostomy present ([address]     Rectal cancer ([address]     Severe malnutrition ([address]     Attention to ileostomy [facility])      Plan: Will start clear liquid diet today. Explained to patient that we will advance diet slowly to reduce the risk of post-op ileus. Encourage OOB. Awaiting bowel function.        Tory Dunn PA-C

## 2019-05-22 NOTE — CARE COORDINATION
Reviewed chart and communicated with him via written assessment tool d/t pt being very [place]. Pt lives alone, PTA he was independent with ADL's and family assists him with transportation. Pt has a walker, can and uses a chair in the shower. He is active with [facility], called/faxed info to [place] at [place]. Pt has PCP and insurance that covers medications. Pt has been to [address] 4 times in past and does not feel he needs short term rehab again. Instead he would like to return home with [facility]. CM will continue to follow for needs. Patients white board updated and  card provided to pt/family.

## 2019-05-22 NOTE — OP NOTE
Procedure Note:      Patient ID:  Geronimo Suero  [id number]  59 y.o.  1955    Indications: Attention to ileostomy. Pt with rectal cancer s/p low anterior resection with diverting loop ileostomy. Pt underwent flex sig and showed intact anastomosis. Pt is here today to have ileostomy reversed. Pre-operative Diagnosis: loop ileostomy    Post-operative Diagnosis: same    Procedure:  Open ileostomy reversal with small bowel resection     Surgeon: Karla Gaytan MD    First Assistant: Keely Welsh PA-C  The  Use of a first assistant was necessary for the proper positioning, prepping, and draping of the patient, as well as the safe and expeditious execution of the case and closure of skin and subcutaneous tissues. Findings:  same    Estimated Blood Loss:  Minimal           Total IV Fluids: 500 ml            Complications:  None; patient tolerated the procedure well. Disposition: PACU - hemodynamically stable. Condition: stable    Procedure Details   The patient was seen in the Holding Room. The risks, benefits, complications, treatment options, and expected outcomes were discussed with the patient. The possibilities of reaction to medication, pulmonary aspiration, perforation of viscus, bleeding, recurrent infection, finding a normal colon, the need for additional procedures, failure to diagnose a condition, and creating a complication requiring transfusion or operation were discussed with the patient. The patient concurred with the proposed plan, giving informed consent. The patient was taken to the operating room, identified and the procedure verified as the consent form. A Time Out was held and the above information confirmed. Procedure Description    The patient was brought into the operating room placed supine. After induction of anesthesia the abdomen was prepped and draped in a sterile fashion. Yo catheter was inserted aseptically.  The loop ileostomy was dissected circumferentially off the skin and rectus muscle. There was some dense adhesions noted and this took some time. The small bowel was examined and the I decided to resect and perform end-to-end hand sewn 2 layer anastomosis. This was done with 3-0 and 4-0 vicryl sutures. The mesenteric defect was also closed. Copious irrigation of the abdominal cavity was performed and hemostasis was achieved to my satisfaction. The abdominal wall fascia was closed using # 1 PDS suture. The subcutaneous tissue was then closed using 3-0 Vicryl followed by 4-0 nylon for the skin. The patient was ultimately transferred to recovery room in stable condition.       Cecelia Osborne MD

## 2019-05-23 PROCEDURE — 94761 N-INVAS EAR/PLS OXIMETRY MLT: CPT

## 2019-05-23 PROCEDURE — 6360000002 HC RX W HCPCS: Performed by: PHYSICIAN ASSISTANT

## 2019-05-23 PROCEDURE — C9113 INJ PANTOPRAZOLE SODIUM, VIA: HCPCS | Performed by: PHYSICIAN ASSISTANT

## 2019-05-23 PROCEDURE — 6370000000 HC RX 637 (ALT 250 FOR IP): Performed by: PHYSICIAN ASSISTANT

## 2019-05-23 PROCEDURE — 99024 POSTOP FOLLOW-UP VISIT: CPT | Performed by: PHYSICIAN ASSISTANT

## 2019-05-23 PROCEDURE — 2580000003 HC RX 258: Performed by: PHYSICIAN ASSISTANT

## 2019-05-23 PROCEDURE — 1200000000 HC SEMI PRIVATE

## 2019-05-23 RX ADMIN — MORPHINE SULFATE 2 MG: 4 INJECTION, SOLUTION INTRAMUSCULAR; INTRAVENOUS at 13:37

## 2019-05-23 RX ADMIN — HYDROCODONE BITARTRATE AND ACETAMINOPHEN 1 TABLET: 5; 325 TABLET ORAL at 04:14

## 2019-05-23 RX ADMIN — MORPHINE SULFATE 2 MG: 4 INJECTION, SOLUTION INTRAMUSCULAR; INTRAVENOUS at 08:21

## 2019-05-23 RX ADMIN — MORPHINE SULFATE 2 MG: 4 INJECTION, SOLUTION INTRAMUSCULAR; INTRAVENOUS at 05:12

## 2019-05-23 RX ADMIN — SODIUM CHLORIDE, PRESERVATIVE FREE 10 ML: 5 INJECTION INTRAVENOUS at 07:42

## 2019-05-23 RX ADMIN — ENOXAPARIN SODIUM 40 MG: 40 INJECTION SUBCUTANEOUS at 08:21

## 2019-05-23 RX ADMIN — MORPHINE SULFATE 2 MG: 4 INJECTION, SOLUTION INTRAMUSCULAR; INTRAVENOUS at 20:58

## 2019-05-23 RX ADMIN — PANTOPRAZOLE SODIUM 40 MG: 40 INJECTION, POWDER, FOR SOLUTION INTRAVENOUS at 05:12

## 2019-05-23 RX ADMIN — MORPHINE SULFATE 2 MG: 4 INJECTION, SOLUTION INTRAMUSCULAR; INTRAVENOUS at 17:18

## 2019-05-23 RX ADMIN — HYDROCODONE BITARTRATE AND ACETAMINOPHEN 1 TABLET: 5; 325 TABLET ORAL at 19:24

## 2019-05-23 RX ADMIN — MORPHINE SULFATE 2 MG: 4 INJECTION, SOLUTION INTRAMUSCULAR; INTRAVENOUS at 00:41

## 2019-05-23 ASSESSMENT — PAIN SCALES - GENERAL
PAINLEVEL_OUTOF10: 8
PAINLEVEL_OUTOF10: 5
PAINLEVEL_OUTOF10: 6
PAINLEVEL_OUTOF10: 7
PAINLEVEL_OUTOF10: 8
PAINLEVEL_OUTOF10: 7
PAINLEVEL_OUTOF10: 8
PAINLEVEL_OUTOF10: 5
PAINLEVEL_OUTOF10: 7
PAINLEVEL_OUTOF10: 7
PAINLEVEL_OUTOF10: 5

## 2019-05-23 ASSESSMENT — ENCOUNTER SYMPTOMS
VOMITING: 0
SORE THROAT: 0
EYE REDNESS: 0
RECTAL PAIN: 0
STRIDOR: 0
APNEA: 0
EYE ITCHING: 0
NAUSEA: 0
CONSTIPATION: 0
ABDOMINAL PAIN: 1
BACK PAIN: 0
COLOR CHANGE: 0
ANAL BLEEDING: 0
PHOTOPHOBIA: 0
CHOKING: 0

## 2019-05-23 NOTE — PLAN OF CARE
Problem: Pain:  Goal: Pain level will decrease  Description  Pain level will decrease  5/22/2019 2310 by Collin Curtis RN  Outcome: Ongoing  5/22/2019 1223 by Sabine Mauro RN  Outcome: Met This Shift  Goal: Control of acute pain  Description  Control of acute pain  5/22/2019 2310 by Collin Curtis RN  Outcome: Ongoing  5/22/2019 1223 by Sabine Mauro RN  Outcome: Met This Shift  Goal: Control of chronic pain  Description  Control of chronic pain  5/22/2019 2310 by Collin Curtis RN  Outcome: Ongoing  5/22/2019 1223 by Sabine Mauro RN  Outcome: Ongoing     Problem: Falls - Risk of:  Goal: Will remain free from falls  Description  Will remain free from falls  Outcome: Ongoing  Goal: Absence of physical injury  Description  Absence of physical injury  Outcome: Ongoing

## 2019-05-23 NOTE — PROGRESS NOTES
General Surgery-[facility] Day: 3    ChiefComplaint on Admission: Attention to ileostomy      Subjective:     Georges Adkins is a 59 y.o. male with POD #2 s/p ileostomy reversal. Patient reports abdominal pain that has slightly improved since yesterday. Denies N//V. Is eager to eat. Denies is passing flatus, and reports small BM last night. Tolerating DIET FULL LIQUID;. + BM. Refused to get OOB yesterday. ROS:  Review of Systems   Constitutional: Negative for chills and fever. HENT: Positive for hearing loss. Negative for ear pain, mouth sores, sore throat and tinnitus. Eyes: Negative for photophobia, redness and itching. Respiratory: Negative for apnea, choking and stridor. Cardiovascular: Negative for chest pain and palpitations. Gastrointestinal: Positive for abdominal pain. Negative for anal bleeding, constipation, nausea, rectal pain and vomiting. Endocrine: Negative for polydipsia. Genitourinary: Negative for enuresis, flank pain and hematuria. Musculoskeletal: Negative for back pain, joint swelling and myalgias. Skin: Negative for color change and pallor. Allergic/Immunologic: Negative for environmental allergies. Neurological: Negative for syncope and speech difficulty. Psychiatric/Behavioral: Negative for confusion and hallucinations. Allergies  Oxycodone and Ibuprofen          Diagnosis Date    Cancer [facility])     Rectal cancer--treated with both chemo and radiation--finished 2019    [facility] present ([facility] [address]     Profound hearing loss of both ears     no hearing in right ear --very little in left    PVD (peripheral vascular disease) (HCC)        Objective:     Vitals:    19 1706   BP: (!) 139/99   Pulse: 85   Resp: 16   Temp: 98.8 °F (37.1 °C)   SpO2: 99%       TEMPERATURE:  Current - Temp: 98.8 °F (37.1 °C); Max - Temp  Av.3 °F (36.8 °C)  Min: 97.4 °F (36.3 °C)  Max: 98.8 °F (37.1 °C)    No intake/output data recorded. I/O last 3 completed shifts: In: 80 [P.O.:480; I.V.:450]  Out: 500 [Urine:500]      Physical Exam:  Physical Exam   Constitutional: He is oriented to person, place, and time. He appears well-developed and well-nourished. HENT:   Head: Normocephalic. Eyes: Pupils are equal, round, and reactive to light. Neck: Normal range of motion. Neck supple. Cardiovascular: Normal rate. Pulmonary/Chest: Effort normal.   Abdominal: Soft. He exhibits no distension and no mass. There is tenderness. There is no rebound and no guarding. Musculoskeletal: Normal range of motion. Neurological: He is alert and oriented to person, place, and time. Skin: Skin is warm. Surgical dressing clean, dry and intact. Psychiatric: He has a normal mood and affect.          Scheduled Meds:   sodium chloride flush  10 mL Intravenous 2 times per day    pantoprazole  40 mg Intravenous Daily    And    sodium chloride (PF)  10 mL Intravenous Daily    enoxaparin  40 mg Subcutaneous Daily     Continuous Infusions:   sodium chloride 50 mL/hr at 05/22/19 2128     PRN Meds:sodium chloride flush, ondansetron, HYDROcodone 5 mg - acetaminophen, morphine, magnesium hydroxide      Labs/Imaging Results:   Lab Results   Component Value Date    WBC 11.3 (H) 05/22/2019    HGB 12.1 (L) 05/22/2019    HCT 39.1 (L) 05/22/2019    MCV 81.8 05/22/2019     05/22/2019     Lab Results   Component Value Date     05/22/2019    K 4.3 05/22/2019    CL 99 05/22/2019    CO2 23 05/22/2019    BUN 11 05/22/2019    CREATININE 0.7 (L) 05/22/2019    GLUCOSE 118 (H) 05/22/2019    CALCIUM 8.9 05/22/2019    PROT 5.8 (L) 05/22/2019    LABALBU 3.6 05/22/2019    BILITOT 0.2 05/22/2019    ALKPHOS 58 05/22/2019    AST 12 (L) 05/22/2019    ALT 17 05/22/2019    LABGLOM >60 05/22/2019    GFRAA >60 05/22/2019       Assessment:     Patient Active Problem List:     PVD (peripheral vascular disease) ([address]     Chest pain     Rectal tumor     Essential hypertension     Colostomy present [facility])     Rectal cancer ([facility] [address]     Severe malnutrition ([facility] [address]     Attention to ileostomy [facility])      Plan: Will advance to full liquid diet today. Explained to patient that we will advance diet slowly to reduce the risk of post-op ileus. Dressing change today    Encourage OOB. Awaiting bowel function.        Lupe Baltazar PA-C

## 2019-05-24 PROCEDURE — 99024 POSTOP FOLLOW-UP VISIT: CPT | Performed by: PHYSICIAN ASSISTANT

## 2019-05-24 PROCEDURE — 6370000000 HC RX 637 (ALT 250 FOR IP): Performed by: PHYSICIAN ASSISTANT

## 2019-05-24 PROCEDURE — 1200000000 HC SEMI PRIVATE

## 2019-05-24 PROCEDURE — C9113 INJ PANTOPRAZOLE SODIUM, VIA: HCPCS | Performed by: PHYSICIAN ASSISTANT

## 2019-05-24 PROCEDURE — 94761 N-INVAS EAR/PLS OXIMETRY MLT: CPT

## 2019-05-24 PROCEDURE — 2580000003 HC RX 258: Performed by: PHYSICIAN ASSISTANT

## 2019-05-24 PROCEDURE — 94150 VITAL CAPACITY TEST: CPT

## 2019-05-24 PROCEDURE — 6360000002 HC RX W HCPCS: Performed by: PHYSICIAN ASSISTANT

## 2019-05-24 RX ADMIN — SODIUM CHLORIDE: 9 INJECTION, SOLUTION INTRAVENOUS at 00:27

## 2019-05-24 RX ADMIN — MORPHINE SULFATE 2 MG: 4 INJECTION, SOLUTION INTRAMUSCULAR; INTRAVENOUS at 18:15

## 2019-05-24 RX ADMIN — HYDROCODONE BITARTRATE AND ACETAMINOPHEN 1 TABLET: 5; 325 TABLET ORAL at 08:22

## 2019-05-24 RX ADMIN — PANTOPRAZOLE SODIUM 40 MG: 40 INJECTION, POWDER, FOR SOLUTION INTRAVENOUS at 05:03

## 2019-05-24 RX ADMIN — MORPHINE SULFATE 2 MG: 4 INJECTION, SOLUTION INTRAMUSCULAR; INTRAVENOUS at 09:33

## 2019-05-24 RX ADMIN — HYDROCODONE BITARTRATE AND ACETAMINOPHEN 1 TABLET: 5; 325 TABLET ORAL at 20:39

## 2019-05-24 RX ADMIN — HYDROCODONE BITARTRATE AND ACETAMINOPHEN 1 TABLET: 5; 325 TABLET ORAL at 05:03

## 2019-05-24 RX ADMIN — SODIUM CHLORIDE, PRESERVATIVE FREE 10 ML: 5 INJECTION INTRAVENOUS at 07:27

## 2019-05-24 RX ADMIN — MORPHINE SULFATE 2 MG: 4 INJECTION, SOLUTION INTRAMUSCULAR; INTRAVENOUS at 06:01

## 2019-05-24 RX ADMIN — MORPHINE SULFATE 2 MG: 4 INJECTION, SOLUTION INTRAMUSCULAR; INTRAVENOUS at 22:11

## 2019-05-24 RX ADMIN — MORPHINE SULFATE 2 MG: 4 INJECTION, SOLUTION INTRAMUSCULAR; INTRAVENOUS at 01:29

## 2019-05-24 RX ADMIN — SODIUM CHLORIDE, PRESERVATIVE FREE 10 ML: 5 INJECTION INTRAVENOUS at 22:13

## 2019-05-24 RX ADMIN — ENOXAPARIN SODIUM 40 MG: 40 INJECTION SUBCUTANEOUS at 08:22

## 2019-05-24 RX ADMIN — HYDROCODONE BITARTRATE AND ACETAMINOPHEN 1 TABLET: 5; 325 TABLET ORAL at 16:23

## 2019-05-24 RX ADMIN — MORPHINE SULFATE 2 MG: 4 INJECTION, SOLUTION INTRAMUSCULAR; INTRAVENOUS at 14:55

## 2019-05-24 RX ADMIN — HYDROCODONE BITARTRATE AND ACETAMINOPHEN 1 TABLET: 5; 325 TABLET ORAL at 00:25

## 2019-05-24 ASSESSMENT — PAIN SCALES - GENERAL
PAINLEVEL_OUTOF10: 8
PAINLEVEL_OUTOF10: 6
PAINLEVEL_OUTOF10: 6
PAINLEVEL_OUTOF10: 7
PAINLEVEL_OUTOF10: 6
PAINLEVEL_OUTOF10: 7
PAINLEVEL_OUTOF10: 9
PAINLEVEL_OUTOF10: 6
PAINLEVEL_OUTOF10: 8
PAINLEVEL_OUTOF10: 9
PAINLEVEL_OUTOF10: 7
PAINLEVEL_OUTOF10: 8
PAINLEVEL_OUTOF10: 0
PAINLEVEL_OUTOF10: 6
PAINLEVEL_OUTOF10: 6
PAINLEVEL_OUTOF10: 9
PAINLEVEL_OUTOF10: 5

## 2019-05-24 ASSESSMENT — PAIN DESCRIPTION - ONSET: ONSET: ON-GOING

## 2019-05-24 ASSESSMENT — PAIN DESCRIPTION - PROGRESSION: CLINICAL_PROGRESSION: NOT CHANGED

## 2019-05-24 ASSESSMENT — PAIN DESCRIPTION - LOCATION: LOCATION: ABDOMEN

## 2019-05-24 ASSESSMENT — PAIN DESCRIPTION - PAIN TYPE: TYPE: SURGICAL PAIN

## 2019-05-24 ASSESSMENT — PAIN DESCRIPTION - ORIENTATION: ORIENTATION: RIGHT

## 2019-05-24 ASSESSMENT — PAIN DESCRIPTION - DESCRIPTORS: DESCRIPTORS: ACHING;CRAMPING

## 2019-05-24 ASSESSMENT — PAIN DESCRIPTION - FREQUENCY: FREQUENCY: CONTINUOUS

## 2019-05-24 NOTE — PROGRESS NOTES
General Surgery-[facility] Day: 4    ChiefComplaint on Admission: Attention to ileostomy      Subjective:     Dany Rowell is a 59 y.o. male with POD #3 s/p ileostomy reversal. Patient reports significant improvement in abdominal pain. Denies N//V. Denies is passing flatus, and reports 2 BMs yesterday. Tolerating DIET LOW FIBER;  DIET GENERAL;. + BM.      ROS:  Review of Systems   Constitutional: Negative for chills and fever. HENT: Positive for hearing loss. Negative for ear pain, mouth sores, sore throat and tinnitus. Eyes: Negative for photophobia, redness and itching. Respiratory: Negative for apnea, choking and stridor. Cardiovascular: Negative for chest pain and palpitations. Gastrointestinal: Positive for abdominal pain. Negative for anal bleeding, constipation, nausea, rectal pain and vomiting. Endocrine: Negative for polydipsia. Genitourinary: Negative for enuresis, flank pain and hematuria. Musculoskeletal: Negative for back pain, joint swelling and myalgias. Skin: Negative for color change and pallor. Allergic/Immunologic: Negative for environmental allergies. Neurological: Negative for syncope and speech difficulty. Psychiatric/Behavioral: Negative for confusion and hallucinations. Allergies  Oxycodone and Ibuprofen          Diagnosis Date    Cancer [facility])     Rectal cancer--treated with both chemo and radiation--finished 2019    [address] present [address] [address] [facility]     Profound hearing loss of both ears     no hearing in right ear --very little in left    PVD (peripheral vascular disease) (HCC)        Objective:     Vitals:    19 1257   BP: 103/68   Pulse: 73   Resp: 16   Temp: 98 °F (36.7 °C)   SpO2: 100%       TEMPERATURE:  Current - Temp: 98 °F (36.7 °C); Max - Temp  Av.2 °F (36.8 °C)  Min: 97.4 °F (36.3 °C)  Max: 98.8 °F (37.1 °C)    I/O this shift:  In: 680 [P.O.:680]  Out: 200 [Urine:200]I/O last 3 completed shifts:   In: 1080 [P.O.:480; I.V.:600]  Out: 1150 [Urine:750; Stool:400]      Physical Exam:  Physical Exam   Constitutional: He is oriented to person, place, and time. He appears well-developed and well-nourished. HENT:   Head: Normocephalic. Eyes: Pupils are equal, round, and reactive to light. Neck: Normal range of motion. Neck supple. Cardiovascular: Normal rate. Pulmonary/Chest: Effort normal.   Abdominal: Soft. He exhibits no distension and no mass. There is tenderness. There is no rebound and no guarding. Musculoskeletal: Normal range of motion. Neurological: He is alert and oriented to person, place, and time. Skin: Skin is warm. Dressing clean, dry and intact. Wound well approximated with simple interrupted sutures in place. Psychiatric: He has a normal mood and affect.          Scheduled Meds:   sodium chloride flush  10 mL Intravenous 2 times per day    pantoprazole  40 mg Intravenous Daily    And    sodium chloride (PF)  10 mL Intravenous Daily    enoxaparin  40 mg Subcutaneous Daily     Continuous Infusions:   sodium chloride 50 mL/hr at 05/24/19 0027     PRN Meds:sodium chloride flush, ondansetron, HYDROcodone 5 mg - acetaminophen, morphine, magnesium hydroxide      Labs/Imaging Results:   Lab Results   Component Value Date    WBC 11.3 (H) 05/22/2019    HGB 12.1 (L) 05/22/2019    HCT 39.1 (L) 05/22/2019    MCV 81.8 05/22/2019     05/22/2019     Lab Results   Component Value Date     05/22/2019    K 4.3 05/22/2019    CL 99 05/22/2019    CO2 23 05/22/2019    BUN 11 05/22/2019    CREATININE 0.7 (L) 05/22/2019    GLUCOSE 118 (H) 05/22/2019    CALCIUM 8.9 05/22/2019    PROT 5.8 (L) 05/22/2019    LABALBU 3.6 05/22/2019    BILITOT 0.2 05/22/2019    ALKPHOS 58 05/22/2019    AST 12 (L) 05/22/2019    ALT 17 05/22/2019    LABGLOM >60 05/22/2019    GFRAA >60 05/22/2019       Assessment:     Patient Active Problem List:     PVD (peripheral vascular disease) ([facility] [address] [facility]     Chest pain     Rectal tumor Essential hypertension     Colostomy present ([facility] [address]     Rectal cancer ([facility] [address]     Severe malnutrition ([facility] [address]     Attention to ileostomy [facility])      Plan:     Diet advanced to soft today. Will advance to general diet tomorrow. Encourage OOB. Probable d/c with [facility] this weekend if continues to do well.        Lupe Baltazar PA-C

## 2019-05-24 NOTE — PLAN OF CARE
Problem: Pain:  Goal: Pain level will decrease  Description  Pain level will decrease  5/24/2019 0200 by Bella Roberto RN  Outcome: Ongoing  5/23/2019 1617 by Mor Molina RN  Outcome: Met This Shift  Goal: Control of acute pain  Description  Control of acute pain  5/24/2019 0200 by Bella Roberto RN  Outcome: Ongoing  5/23/2019 1617 by Mor Molina RN  Outcome: Met This Shift  Goal: Control of chronic pain  Description  Control of chronic pain  5/24/2019 0200 by Bella Roberto RN  Outcome: Ongoing  5/23/2019 1617 by Mor Molina RN  Outcome: Ongoing     Problem: Falls - Risk of:  Goal: Will remain free from falls  Description  Will remain free from falls  5/24/2019 0200 by Bella Roberto RN  Outcome: Ongoing  5/23/2019 1617 by Mor Molina RN  Outcome: Met This Shift  Goal: Absence of physical injury  Description  Absence of physical injury  5/24/2019 0200 by Bella Roberto RN  Outcome: Ongoing  5/23/2019 1617 by Mor Molina RN  Outcome: Met This Shift

## 2019-05-25 VITALS
BODY MASS INDEX: 18.77 KG/M2 | SYSTOLIC BLOOD PRESSURE: 121 MMHG | RESPIRATION RATE: 16 BRPM | TEMPERATURE: 98.1 F | HEART RATE: 75 BPM | WEIGHT: 134.1 LBS | OXYGEN SATURATION: 95 % | HEIGHT: 71 IN | DIASTOLIC BLOOD PRESSURE: 77 MMHG

## 2019-05-25 PROCEDURE — 6360000002 HC RX W HCPCS: Performed by: PHYSICIAN ASSISTANT

## 2019-05-25 PROCEDURE — 99024 POSTOP FOLLOW-UP VISIT: CPT | Performed by: SURGERY

## 2019-05-25 PROCEDURE — 6370000000 HC RX 637 (ALT 250 FOR IP): Performed by: PHYSICIAN ASSISTANT

## 2019-05-25 PROCEDURE — C9113 INJ PANTOPRAZOLE SODIUM, VIA: HCPCS | Performed by: PHYSICIAN ASSISTANT

## 2019-05-25 PROCEDURE — 2580000003 HC RX 258: Performed by: PHYSICIAN ASSISTANT

## 2019-05-25 RX ORDER — HYDROCODONE BITARTRATE AND ACETAMINOPHEN 5; 325 MG/1; MG/1
1 TABLET ORAL EVERY 6 HOURS PRN
Qty: 28 TABLET | Refills: 0 | Status: SHIPPED | OUTPATIENT
Start: 2019-05-25 | End: 2019-06-01

## 2019-05-25 RX ADMIN — MORPHINE SULFATE 2 MG: 4 INJECTION, SOLUTION INTRAMUSCULAR; INTRAVENOUS at 01:17

## 2019-05-25 RX ADMIN — SODIUM CHLORIDE, PRESERVATIVE FREE 10 ML: 5 INJECTION INTRAVENOUS at 08:10

## 2019-05-25 RX ADMIN — HYDROCODONE BITARTRATE AND ACETAMINOPHEN 1 TABLET: 5; 325 TABLET ORAL at 08:10

## 2019-05-25 RX ADMIN — ENOXAPARIN SODIUM 40 MG: 40 INJECTION SUBCUTANEOUS at 08:10

## 2019-05-25 RX ADMIN — MORPHINE SULFATE 2 MG: 4 INJECTION, SOLUTION INTRAMUSCULAR; INTRAVENOUS at 06:46

## 2019-05-25 RX ADMIN — SODIUM CHLORIDE, PRESERVATIVE FREE 10 ML: 5 INJECTION INTRAVENOUS at 07:11

## 2019-05-25 RX ADMIN — PANTOPRAZOLE SODIUM 40 MG: 40 INJECTION, POWDER, FOR SOLUTION INTRAVENOUS at 07:11

## 2019-05-25 ASSESSMENT — PAIN SCALES - GENERAL
PAINLEVEL_OUTOF10: 8
PAINLEVEL_OUTOF10: 8
PAINLEVEL_OUTOF10: 0
PAINLEVEL_OUTOF10: 8

## 2019-05-25 ASSESSMENT — PAIN DESCRIPTION - PROGRESSION
CLINICAL_PROGRESSION: NOT CHANGED

## 2019-05-25 ASSESSMENT — PAIN DESCRIPTION - FREQUENCY: FREQUENCY: CONTINUOUS

## 2019-05-25 ASSESSMENT — PAIN DESCRIPTION - PAIN TYPE: TYPE: SURGICAL PAIN

## 2019-05-25 ASSESSMENT — PAIN DESCRIPTION - ONSET: ONSET: ON-GOING

## 2019-05-25 ASSESSMENT — PAIN DESCRIPTION - DESCRIPTORS: DESCRIPTORS: CRAMPING;DISCOMFORT;ACHING

## 2019-05-25 ASSESSMENT — PAIN DESCRIPTION - LOCATION: LOCATION: ABDOMEN

## 2019-05-25 ASSESSMENT — PAIN DESCRIPTION - ORIENTATION: ORIENTATION: LEFT

## 2019-05-25 NOTE — PROGRESS NOTES
Discharge instructions provided to patient with verbalized understanding received. Prescription for Norco given to patient. Colleen Alonso at [facility] notified of patient's discharge (patient currently followed by them)  AVS with Hugo Hadley orders included faxed to [facility]. Awaiting brother's return call to pick patient up for discharge.

## 2019-05-25 NOTE — DISCHARGE SUMMARY
[facility] - General Surgery    Patient ID:  Hemal Favor  Date: 2019 12:07 PM   MRN#: [id number] :1955   Admission Date:2019 Age/Sex:63 y.o. male       Discharge date and time: No discharge date for patient encounter., (expected 2019)    Admitting Physician: Corie Valencia MD     Discharge Physician: Mary Ortiz MD     Admission Diagnoses: Attention to ileostomy [facility]) [Z43.2]    Discharge Diagnoses: Active Problems:    Attention to ileostomy [facility])  Resolved Problems:    * No resolved hospital problems. *       Admission Condition: stable     Discharged Condition: stable    Indication for Admission: Active Problems:    Attention to ileostomy [facility])  Resolved Problems:    * No resolved hospital problems. *       Hospital Course:   Suresh Ren is a 59 y.o. male presented for scheduled surgery due to ileostomy and history of rectal cancer s/p resection. He underwent open ileostomy reversal with small bowel resection on 19. Postoperatively he was stable and tolerated a liquid diet until he started having bowel function; then was progressed to tolerating a diet, had adequate pain control, and felt ready for discharge home with home health. Consults:   n/a    Significant Diagnostic Studies: see chart    Treatments: surgery/procedure: open ileostomy reversal with small bowel resection on 19    Disposition: Home or Self Care    Patient Instructions:    Pao Proper   Home Medication Instructions TTA:[id number]    Printed on:19 5056   Medication Information                      HYDROcodone-acetaminophen (NORCO) 5-325 MG per tablet  Take 1 tablet by mouth every 6 hours as needed for Pain for up to 7 days. Intended supply: 7 days.  Take lowest dose possible to manage pain             Nutritional Supplements (NUTRITIONAL SUPPLEMENT PLUS) LIQD  Take 237 mLs by mouth 3 times daily (with meals)               Discharge Procedure Orders External Referral To Home Health   Referral Priority: Routine Referral Type: Home Health Care   Referral Reason: Specialty Services Required   Requested Specialty: [address]   Number of Visits Requested: 1     Discharge Procedure Orders   External Referral To Home Health   Referral Priority: Routine Referral Type: Home Health Care   Referral Reason: Specialty Services Required   Requested Specialty: [address]   Number of Visits Requested: 1       For further information regarding this hospitalization, please refer to the patient's medical record.      Electronically signed: Melody Gutierrez MD 5/25/2019 12:07 PM

## 2019-06-13 ENCOUNTER — OFFICE VISIT (OUTPATIENT)
Dept: SURGERY | Age: 64
End: 2019-06-13

## 2019-06-13 VITALS
HEART RATE: 84 BPM | OXYGEN SATURATION: 90 % | BODY MASS INDEX: 18.69 KG/M2 | SYSTOLIC BLOOD PRESSURE: 138 MMHG | DIASTOLIC BLOOD PRESSURE: 76 MMHG | WEIGHT: 134 LBS

## 2019-06-13 DIAGNOSIS — Z43.2 ATTENTION TO ILEOSTOMY (HCC): Primary | ICD-10-CM

## 2019-06-13 PROCEDURE — 99024 POSTOP FOLLOW-UP VISIT: CPT | Performed by: PHYSICIAN ASSISTANT

## 2019-06-13 RX ORDER — CEPHALEXIN 500 MG/1
500 CAPSULE ORAL 2 TIMES DAILY
Qty: 14 CAPSULE | Refills: 0 | Status: SHIPPED | OUTPATIENT
Start: 2019-06-13 | End: 2019-06-20

## 2019-06-14 NOTE — PROGRESS NOTES
insecurity:     Worry: Not on file     Inability: Not on file    Transportation needs:     Medical: Not on file     Non-medical: Not on file   Tobacco Use    Smoking status: Never Smoker    Smokeless tobacco: Never Used   Substance and Sexual Activity    Alcohol use: No    Drug use: No    Sexual activity: Not on file   Lifestyle    Physical activity:     Days per week: Not on file     Minutes per session: Not on file    Stress: Not on file   Relationships    Social connections:     Talks on phone: Not on file     Gets together: Not on file     Attends Gnosticism service: Not on file     Active member of club or organization: Not on file     Attends meetings of clubs or organizations: Not on file     Relationship status: Not on file    Intimate partner violence:     Fear of current or ex partner: Not on file     Emotionally abused: Not on file     Physically abused: Not on file     Forced sexual activity: Not on file   Other Topics Concern    Not on file   Social History Narrative    Not on file       OBJECTIVE:   Physical Exam    Wound well healed without signs of active infection. Suture line intact. Abdomen soft, nontender, nondistended. Simple interrupted sutures present at prior ileostomy site. Path reveals:   Final Pathologic Diagnosis:  Small bowel, ileum/ileostomy, excision:  -  Benign squamous epithelium and small bowel mucosa  containing focal chronic inflammation  and reactive change. -  Normal villi are identified.    -  Negative for malignancy. ASSESSMENT:  Patient doing well on this post operative check. Wound well healed. 1. Attention to ileostomy ([address]        PLAN:  Simple interrupted sutures removed without difficulty. Pt tolerated this well. Due to fact that sutures were in for prolonged period of time (3 weeks) will put on prophylactic antibiotic. Counseled on signs of infection that would require further evaluation. Diet as tolerated. Activity as tolerated.      RTC in one month. No orders of the defined types were placed in this encounter. Orders Placed This Encounter   Medications    cephALEXin (KEFLEX) 500 MG capsule     Sig: Take 1 capsule by mouth 2 times daily for 7 days     Dispense:  14 capsule     Refill:  0        Follow Up: Return in about 1 month (around 7/11/2019).     Nhi Baird PA-C

## 2019-07-30 ENCOUNTER — HOSPITAL ENCOUNTER (OUTPATIENT)
Age: 64
Setting detail: SPECIMEN
Discharge: HOME | End: 2019-07-30
Payer: COMMERCIAL

## 2019-07-30 LAB
ALBUMIN SERPL-MCNC: 4.2 GM/DL (ref 3.4–5)
ALP BLD-CCNC: 70 IU/L (ref 40–128)
ALT SERPL-CCNC: 14 U/L (ref 10–40)
ANION GAP SERPL CALCULATED.3IONS-SCNC: 10 MMOL/L (ref 4–16)
AST SERPL-CCNC: 16 IU/L (ref 15–37)
BILIRUB SERPL-MCNC: 0.3 MG/DL (ref 0–1)
BUN BLDV-MCNC: 13 MG/DL (ref 6–23)
CALCIUM SERPL-MCNC: 9.7 MG/DL (ref 8.3–10.6)
CHLORIDE BLD-SCNC: 103 MMOL/L (ref 99–110)
CO2: 28 MMOL/L (ref 21–32)
CREAT SERPL-MCNC: 0.9 MG/DL (ref 0.9–1.3)
GFR AFRICAN AMERICAN: >60 ML/MIN/1.73M2
GFR NON-AFRICAN AMERICAN: >60 ML/MIN/1.73M2
GLUCOSE BLD-MCNC: 72 MG/DL (ref 70–99)
POTASSIUM SERPL-SCNC: 4 MMOL/L (ref 3.5–5.1)
SODIUM BLD-SCNC: 141 MMOL/L (ref 135–145)
TOTAL PROTEIN: 7.3 GM/DL (ref 6.4–8.2)

## 2019-07-30 PROCEDURE — 80053 COMPREHEN METABOLIC PANEL: CPT

## 2020-10-12 ENCOUNTER — OFFICE VISIT (OUTPATIENT)
Dept: SURGERY | Age: 65
End: 2020-10-12
Payer: COMMERCIAL

## 2020-10-12 VITALS
TEMPERATURE: 97.2 F | WEIGHT: 134.5 LBS | OXYGEN SATURATION: 98 % | DIASTOLIC BLOOD PRESSURE: 80 MMHG | SYSTOLIC BLOOD PRESSURE: 120 MMHG | HEIGHT: 71 IN | HEART RATE: 76 BPM | BODY MASS INDEX: 18.83 KG/M2

## 2020-10-12 PROCEDURE — 99212 OFFICE O/P EST SF 10 MIN: CPT | Performed by: PHYSICIAN ASSISTANT

## 2020-10-12 PROCEDURE — G8420 CALC BMI NORM PARAMETERS: HCPCS | Performed by: PHYSICIAN ASSISTANT

## 2020-10-12 PROCEDURE — 1036F TOBACCO NON-USER: CPT | Performed by: PHYSICIAN ASSISTANT

## 2020-10-12 PROCEDURE — G8484 FLU IMMUNIZE NO ADMIN: HCPCS | Performed by: PHYSICIAN ASSISTANT

## 2020-10-12 PROCEDURE — G8428 CUR MEDS NOT DOCUMENT: HCPCS | Performed by: PHYSICIAN ASSISTANT

## 2020-10-12 PROCEDURE — 3017F COLORECTAL CA SCREEN DOC REV: CPT | Performed by: PHYSICIAN ASSISTANT

## 2020-10-12 ASSESSMENT — ENCOUNTER SYMPTOMS
EYE REDNESS: 0
CONSTIPATION: 0
RECTAL PAIN: 0
CHOKING: 0
SORE THROAT: 0
NAUSEA: 0
APNEA: 0
ABDOMINAL PAIN: 1
DIARRHEA: 0
STRIDOR: 0
VOMITING: 0
BACK PAIN: 0
EYE ITCHING: 0
ABDOMINAL DISTENTION: 0
BLOOD IN STOOL: 0
COLOR CHANGE: 0
ANAL BLEEDING: 0
PHOTOPHOBIA: 0

## 2020-10-12 NOTE — PROGRESS NOTES
of Onset    Dementia Mother     Early Death Father     High Blood Pressure Father     Early Death Sister     Diabetes Brother      Social History     Socioeconomic History    Marital status: Single     Spouse name: Not on file    Number of children: Not on file    Years of education: Not on file    Highest education level: Not on file   Occupational History    Not on file   Social Needs    Financial resource strain: Not on file    Food insecurity     Worry: Not on file     Inability: Not on file   [employer] needs     Medical: Not on file     Non-medical: Not on file   Tobacco Use    Smoking status: Never Smoker    Smokeless tobacco: Never Used   Substance and Sexual Activity    Alcohol use: No    Drug use: No    Sexual activity: Not on file   Lifestyle    Physical activity     Days per week: Not on file     Minutes per session: Not on file    Stress: Not on file   Relationships    Social connections     Talks on phone: Not on file     Gets together: Not on file     Attends Zoroastrian service: Not on file     Active member of club or organization: Not on file     Attends meetings of clubs or organizations: Not on file     Relationship status: Not on file    Intimate partner violence     Fear of current or ex partner: Not on file     Emotionally abused: Not on file     Physically abused: Not on file     Forced sexual activity: Not on file   Other Topics Concern    Not on file   Social History Narrative    Not on file        Current Outpatient Medications   Medication Sig Dispense Refill    Nutritional Supplements (NUTRITIONAL SUPPLEMENT PLUS) LIQD Take 237 mLs by mouth 3 times daily (with meals) 90 Can 0     No current facility-administered medications for this visit. Allergies   Allergen Reactions    Oxycodone Anaphylaxis    Ibuprofen      Review of Systems:       Review of Systems   Constitutional: Negative for chills and fever.    HENT: Negative for ear pain, mouth sores, sore throat and tinnitus. Eyes: Negative for photophobia, redness and itching. Respiratory: Negative for apnea, choking and stridor. Cardiovascular: Negative for chest pain and palpitations. Gastrointestinal: Positive for abdominal pain (intermittent). Negative for abdominal distention, anal bleeding, blood in stool, constipation, diarrhea, nausea, rectal pain and vomiting. Endocrine: Negative for polydipsia. Genitourinary: Negative for enuresis, flank pain and hematuria. Musculoskeletal: Negative for back pain, joint swelling and myalgias. Skin: Negative for color change and pallor. Allergic/Immunologic: Negative for environmental allergies. Neurological: Negative for syncope and speech difficulty. Psychiatric/Behavioral: Negative for confusion and hallucinations. OBJECTIVE:  Physical Exam:    There were no vitals taken for this visit. Physical Exam  Constitutional:       Appearance: He is well-developed. HENT:      Head: Normocephalic. Eyes:      Pupils: Pupils are equal, round, and reactive to light. Neck:      Musculoskeletal: Normal range of motion and neck supple. Cardiovascular:      Rate and Rhythm: Normal rate. Pulmonary:      Effort: Pulmonary effort is normal.   Abdominal:      General: There is no distension. Palpations: Abdomen is soft. There is no mass. Tenderness: There is no abdominal tenderness. There is no guarding or rebound. Hernia: No hernia is present. Comments: Abd incisions well healed. Musculoskeletal: Normal range of motion. Skin:     General: Skin is warm. Neurological:      Mental Status: He is alert and oriented to person, place, and time. ASSESSMENT:  1. Lower abdominal pain        PLAN:  Treatment: Recommend tylenol PRN for intermittent abd pain. If pain worsens, or becomes more frequent, recommend to call and we will consider further w/u. Pt will return PRN.      Patient counseled on risks, benefits, and alternatives of treatment plan at length today. Patient states an understanding and willingness to proceed with plan. No orders of the defined types were placed in this encounter. No orders of the defined types were placed in this encounter. Follow Up:  Return if symptoms worsen or fail to improve.       Gabriel Espitia PA-C

## 2020-10-20 NOTE — PROGRESS NOTES
Patient Name: Renella Meckel  Patient : 1955  Patient MRN: [id number]     Primary Oncologist: Tammy Frias MD  Referring Provider: Lizbeth Alaniz MD     Date of Service: 10/26/2020      Chief Complaint:   Chief Complaint   Patient presents with    Follow-up     Patient Active Problem List:     PVD (peripheral vascular disease) [facility])     Chest pain     Rectal tumor     Essential hypertension     Colostomy present ([address]     Rectal cancer ([address]     Severe malnutrition ([address]     Attention to ileostomy [facility])    HPI:   Very pleasant gentleman, with significant problem of not been able to hear, does most of the history obtained from his brother who had accompanied him to the office. He had been having the symptoms of significant lower suprapubic and anorectal area discomfort, since 2018, with gradual increase in intensity. Lately affecting his day-to-day life significantly. Also significant issue with progressive constipation, lately just having passage of some watery stools along with blood. Colonoscopy per Dr. Tiffany Benavides on 2018 noted to have a large hard polypoid appearing friable obstructing lesion noted in the rectum starting immediately above the anorectal junction moderate to large amount of solid and semisolid stools was noted throughout the colon therefore the mucosa could not be examined entirely. Pathology from the rectal mass revealed invasive adenocarcinoma. Earlier Mr. Mr. Ishan Moralez was admitted to the hospital in on 2018 with chest and abdominal pain at that time she had undergone CTA of the chest abdomen and pelvis in the chest there was no obvious lesions CTA of the abdomen and pelvis again revealed a large amount of stool no pathologic enlarged lymph nodes were noted there was redemonstration of chronic appearing dissection of the infrarenal abdominal aorta extending to the aortic bifurcation. .    He had also undergone CT scan of the abdomen and pelvis with IV contrast on September 18, 2018 was noted to have around 5.6 cm infrarenal abdominal aortic dissection spanning a length of 5.6 cm moderate to severe atherosclerotic plaque changes were noted slightly and enlarged bilateral adrenal glands were noted also described was changes of anasarca. Visit here on November 1, 2018 I had long discussion with him and his brother. I also discussed with Dr. Castro Johns. .  Basically large primary adenocarcinoma of the rectum with near complete obstruction. Quite uncomfortable. Would be difficult to proceed with neoadjuvant chemotherapy and radiation therapy. We could consider doing diverting colostomy first followed by definitive treatment for carcinoma of the rectum with chemotherapy radiation followed by surgery. We also would need to have port being placed. It might also be worthwhile to have vascular surgeon see him regarding the changes noted on the abdominal aorta. He was hospitalized at [facility] November 5, 2018. In the hospital was seen by cardiothoracic surgeon Dr. Ignacio Baker and advised him to follow up with him on an outpatient basis. He was also seen by a surgeon Dr. Alex Elder and did undergo a diverting colostomy no obvious abdominal metastases and/or liver metastases were noted. Prior to discharge from the hospital he also underwent placement of Mediport per Dr. Alex Elder . Blood work done in the hospital had revealed creatinine of 0.7 albumin of 3.0. Normal liver function studies hemoglobin 10.7 g% ferritin 165 and CEA of 9.5. MRI of the pelvis done on November 21, 2018 revealed 7 x 6.6 x 9.6 cm rectosigmoid mass was difficult to evaluate the muscularis propria given the lack of small field-of-view and rectal contrast but the mass seemed to be extending to the mesorectal fascia and possibly toward the peritoneal reflection finding favoring at least T3 lesion giving its size.   Also at least 1.3 cm left perirectal lymph node suspicious for metastatic disease there may be second 0.9 cm presacral lymph node at least was felt to have an N1 disease. He subsequently was started on 5-fluorouracil  infusion on December 10, 2018 and a few days later was also started on pelvic radiation therapy. He finished receiving radiation therapy treatments on January 23, 2019 and finished receiving chemotherapy treatments also of the week of January 14, 2019. He overall I think tolerated these treatments fairly and over the few months time his general condition actually did improve significantly he was able to eat more and denied any more pelvic pain. When seen in the office on February 26, 2019 he and his brother were overall quite pleased with his progress. MRI of the pelvis done on February 11, 2019 per Dr. Sisi López had revealed decreasing size of the large circumferential rectal mass still though fairly large mass persists and mass did about peritoneal reflection and mesorectal fascia no discrete pathologically enlarged lymph nodes were noted. He did undergo robotic assisted laparoscopic low anterior colon resection, splenic flexure mobilization, loop ileostomy and rigid proctoscopy per Dr. Sisi López on March 13, 2019. Pathology revealed residual very rare microscopic foci of adenocarcinoma and mucinous pool at the site of for neoplasm post neoadjuvant therapy effect. 20 mesenteric lymph nodes were negative for metastatic disease. Overall tumor size could not  assessed because of significant treatment effect mesorectal envelope though was quite incomplete with defect overall invasive moderate to high-grade adenocarcinoma per initial a biopsy report tumor cells were noted in the subserosal soft tissue deeper to the muscularis propria at least T3 difficult to  margins lymphovascular perineural invasion is were not noted. Overall pathology [id number]. Visit here on May 7, 2019 I did spend time with him and his brother.   We talked about MRI findings prior to surgery had revealed a large local disease as well as the presence of lymph nodes disease. We are talked about him benefiting significantly with neoadjuvant chemotherapy as well as radiation. We did talk about her considering the original presentation and having had significant benefit from the treatment still would like for him to receive additional adjuvant chemotherapy. We might consider giving him 4 months of treatment with FOLFOX type regimen but may hold off oxaliplatin after the first couple months he and his brother were were comfortable. May 16, 2019 he did undergo a sigmoidoscopy, was noted to have normal anastomosis. Subsequently on May 21, 2019 he did undergo open ileostomy reversal with small bowel resection. Tolerated the procedure well. June 20, 2019 we did talk about starting him back on adjuvant therapy with FOLFOX type regimen and may be stopping oxaliplatin after couple of months of the treatment. He was comfortable with that approach. But when seen in the office for the treatment on July 10 he did not want to spend 3 hours here and actually wanted to hold off any further treatments. He was subsequently advised to see me on July 23. Visit here on July 23 he was doing fairly well. Bowels and bladder were functioning fair appetite was fair no significant abdominal or pelvic pain. He was accompanied to the office by his brother    He finally agreed to have chemotherapy and he received his first cycle of FOLFOX on 7/31/2020. After first treatment, he seems to move to [place] to live with her daughter. He didn't receive any more therapy after that. On October 26, 2020, he presented to follow up with us for continuation of care for his rectal cancer. We have been following him for clinical stage IIIB rectal adenocarcinoma and he is status post neoadjuvant chemoradiation therapy, followed by surgery. He received one therapy with FOLFOX after surgery on 7/31/2020.  He didn't receive any more therapy after that. He missed follow up with us after 7/31/2019, until 10/26/2020. He stated that he is fine and he doesn't have any significant symptoms at today visit. He had sigmoidoscopy in 5/16/2019 by Dr. Gissell Klein before colostomy reversal.     I recommend him to have CT scan of the chest, abdomen and pelvis to make sure that he doesn't have any recurrent or metastatic rectal cancer. He does not have any scans after surgery. I also recommend him to check CBC, CMP and CEA level today. I will see him again after CAT scan. Review of Systems: \"Per interval history; otherwise 10 point ROS is negative. \"  His energy level is fine, appetite and sleep are good. He denies fever, chills, night sweats, cough, shortness of breath, chest pain, hemoptysis, or palpitations. He denies neuropathy and he does not have bleeding or clotting issues. He denies any pain on today visit. His bowel and bladder functions are normal. He doesn't have nausea, vomiting, abdominal pain, diarrhea, constipation, dysuria, loss of appetite, or weight loss. Denies anxiety or depression. The rest of the systems are unremarkable.      Vital Signs:  /79 (Site: Left Upper Arm, Position: Sitting, Cuff Size: Medium Adult)   Pulse 75   Temp 97.8 °F (36.6 °C) (Infrared)   Wt 135 lb 6.4 oz (61.4 kg)   SpO2 96%   BMI 18.88 kg/m²     Physical Exam:  CONSTITUTIONAL: awake, alert, cooperative, no apparent distress   EYES: pupils equal, round and reactive to light, sclera clear and conjunctiva normal  ENT: Normocephalic, without obvious abnormality, atraumatic  NECK: supple, symmetrical, no jugular venous distension and no carotid bruits   HEMATOLOGIC/LYMPHATIC: no cervical, supraclavicular or axillary lymphadenopathy   LUNGS: VBS, no wheezes, no crackles, no rhonchi, no increased work of breathing and clear to auscultation   CARDIOVASCULAR: regular rate and rhythm, normal S1 and S2, no murmur noted  ABDOMEN: normal bowel sounds x 4, soft, non-distended, non-tender, no masses palpated, no hepatosplenomegaly, previous scar noted. MUSCULOSKELETAL: full range of motion noted, tone is normal  NEUROLOGIC: awake, alert, oriented to name, place and time. Motor skills grossly intact. SKIN: Normal skin color, texture, turgor and no jaundice.  appears intact   EXTREMITIES: no LE edema, no leg swelling, no cyanosis, no clubbing     Labs:  Hematology:  Lab Results   Component Value Date    WBC 5.0 10/26/2020    RBC 4.90 10/26/2020    HGB 13.7 10/26/2020    HCT 39.8 (L) 10/26/2020    MCV 81.2 10/26/2020    MCH 28.0 10/26/2020    MCHC 34.4 10/26/2020    RDW 16.8 (H) 10/26/2020     10/26/2020    MPV 10.0 10/26/2020    SEGSPCT 66.0 10/26/2020    EOSRELPCT 2.4 10/26/2020    BASOPCT 0.4 10/26/2020    LYMPHOPCT 18.5 (L) 10/26/2020    MONOPCT 12.7 (H) 10/26/2020    SEGSABS 3.3 10/26/2020    EOSABS 0.1 10/26/2020    BASOSABS 0.0 10/26/2020    LYMPHSABS 0.9 10/26/2020    MONOSABS 0.6 10/26/2020    DIFFTYPE AUTOMATED DIFFERENTIAL 10/26/2020    POLYCHROM 1+ 02/23/2014    PLTM PLATELETS APPEAR NORMAL 02/23/2014     No results found for: ESR  Chemistry:  Lab Results   Component Value Date     07/30/2019    K 4.0 07/30/2019     07/30/2019    CO2 28 07/30/2019    BUN 13 07/30/2019    CREATININE 0.9 07/30/2019    GLUCOSE 72 07/30/2019    CALCIUM 9.7 07/30/2019    PROT 7.3 07/30/2019    LABALBU 4.2 07/30/2019    BILITOT 0.3 07/30/2019    ALKPHOS 70 07/30/2019    AST 16 07/30/2019    ALT 14 07/30/2019    LABGLOM >60 07/30/2019    GFRAA >60 07/30/2019    MG 2.1 09/28/2018    POCGLU 127 (H) 07/17/2013     No results found for: MMA, LDH, HOMOCYSTEINE  No components found for: LD  Lab Results   Component Value Date    TSHHS 1.980 09/28/2018    T4FREE 1.07 09/28/2018     Immunology:  Lab Results   Component Value Date    PROT 7.3 07/30/2019     No results found for: Sal Mancini, KLFLCR  No results found for: B2M  Coagulation Panel:  Lab Results   Component Value Date    PROTIME 14.7 (H) 11/03/2018    INR 1.27 11/03/2018    APTT 29.1 09/28/2018     Anemia Panel:  No results found for: Bryan Ro, FOLATE  Tumor Markers:  Lab Results   Component Value Date    CEA 9.5 11/03/2018    PSA 1.28 12/09/2013     Observations:  PHQ-9 Total Score: 0 (10/26/2020 11:11 AM)        Assessment & Plan:   He had been having the symptoms of significant lower suprapubic and anorectal area discomfort, since July 2018, with gradual increase in intensity. Lately affecting his day-to-day life significantly. Also significant issue with progressive constipation, lately just having passage of some watery stools along with blood. Colonoscopy per Dr. Celia Josehp on October 25, 2018 noted to have a large hard polypoid appearing friable obstructing lesion noted in the rectum starting immediately above the anorectal junction moderate to large amount of solid and semisolid stools was noted throughout the colon therefore the mucosa could not be examined entirely. Pathology from the rectal mass revealed invasive adenocarcinoma. Visit here on November 1, 2018 I had long discussion with him and his brother. I also discussed with Dr. Kathi Akhtar. .  Basically large primary adenocarcinoma of the rectum with near complete obstruction. Quite uncomfortable. Would be difficult to proceed with neoadjuvant chemotherapy and radiation therapy. We could consider doing diverting colostomy first followed by definitive treatment for carcinoma of the rectum with chemotherapy radiation followed by surgery. We also would need to have port being placed. He was hospitalized at [facility] November 5, 2018. In the hospital was seen by cardiothoracic surgeon Dr. Yvonne Hastings and advised him to follow up with him on an outpatient basis. He was also seen by a surgeon Dr. Ashwin Osorio and did undergo a diverting colostomy no obvious abdominal metastases and/or liver metastases were noted. Prior to discharge from the hospital he also underwent placement of Mediport per Dr. Brooke Alston . Blood work done in the hospital had revealed creatinine of 0.7 albumin of 3.0. Normal liver function studies hemoglobin 10.7 g% ferritin 165 and CEA of 9.5. Visit here on November 13, 2018 I did spend time again with his brother and him. We talked about need for proceeding with possibly neoadjuvant chemotherapy and radiation therapy for large obstructing lesion involving the rectum. At the time of surgery there was no obvious distant disease but for better staging we still may do MRI of the pelvic area. We will go ahead and make arrangements for him to be seen by radiation oncologist and set up visit with nurse navigator regarding discussion for continuous 5-fluorouracil infusion along with radiation therapy    MRI of the pelvis done on November 21, 2018 revealed 7 x 6.6 x 9.6 cm rectosigmoid mass was difficult to evaluate the muscularis propria given the lack of small field-of-view and rectal contrast but the mass seemed to be extending to the mesorectal fascia and possibly toward the peritoneal reflection finding favoring at least T3 lesion giving its size. Also at least 1.3 cm left perirectal lymph node suspicious for metastatic disease there may be second 0.9 cm presacral lymph node at least was felt to have an N1 disease    He subsequently was started on 5-fluorouracil  infusion on December 10, 2018 and a few days later was also started on pelvic radiation therapy. He finished receiving radiation therapy treatments on January 23, 2019 and finished receiving chemotherapy treatments also of the week of January 14, 2019. He overall I think tolerated these treatments fairly and over the few months time his general condition actually did improve significantly he was able to eat more and denied any more pelvic pain.     When seen in the office on February 26, 2019 he and his brother were overall quite pleased with his progress. MRI of the pelvis done on February 11, 2019 per Dr. Alex Elder had revealed decreasing size of the large circumferential rectal mass still though fairly large mass persists and mass did about peritoneal reflection and mesorectal fascia no discrete pathologically enlarged lymph nodes were noted. He did undergo robotic assisted laparoscopic low anterior colon resection, splenic flexure mobilization, loop ileostomy and rigid proctoscopy per Dr. Alex Elder on March 13, 2019. Pathology revealed residual very rare microscopic foci of adenocarcinoma and mucinous pool at the site of for neoplasm post neoadjuvant therapy effect. 20 mesenteric lymph nodes were negative for metastatic disease. Overall tumor size could not  assessed because of significant treatment effect mesorectal envelope though was quite incomplete with defect overall invasive moderate to high-grade adenocarcinoma per initial a biopsy report tumor cells were noted in the subserosal soft tissue deeper to the muscularis propria at least T3 difficult to  margins lymphovascular perineural invasion is were not noted. Overall pathology [id number]. Visit here on May 7, 2019 I did spend time with him and his brother. We talked about MRI findings prior to surgery had revealed a large local disease as well as the presence of lymph nodes disease. We are talked about him benefiting significantly with neoadjuvant chemotherapy as well as radiation. We did talk about her considering the original presentation and having had significant benefit from the treatment still would like for him to receive additional adjuvant chemotherapy. We might consider giving him 4 months of treatment with FOLFOX type regimen but may hold off oxaliplatin after the first couple months he and his brother were were comfortable. May 16, 2019 he did undergo a sigmoidoscopy, was noted to have normal anastomosis.   Subsequently on May 21, 2019 he did undergo open ileostomy reversal with small bowel resection. Tolerated the procedure well. June 20, 2019 we did talk about starting him back on adjuvant therapy with FOLFOX type regimen and may be stopping oxaliplatin after couple of months of the treatment. He was comfortable with that approach. But when seen in the office for the treatment on July 10 he did not want to spend 3 hours here and actually wanted to hold off any further treatments. He was subsequently advised to see me on July 23. He finally agreed to have chemotherapy and he received his first cycle of FOLFOX on 7/31/2020. After first treatment, he seems to move to [address] [place] [address] to live with her daughter. He didn't receive any more therapy after that. On October 26, 2020, he presented to follow up with us for continuation of care for his rectal cancer. We have been following him for clinical stage IIIB rectal adenocarcinoma and he is status post neoadjuvant chemoradiation therapy, followed by surgery. He received one therapy with FOLFOX after surgery on 7/31/2020. He didn't receive any more therapy after that. He missed follow up with us after 7/31/2019, until 10/26/2020. He stated that he is fine and he doesn't have any significant symptoms at today visit. He had sigmoidoscopy in 5/16/2019 by Dr. Colleen Knowles before colostomy reversal.     I recommend him to have CT scan of the chest, abdomen and pelvis to make sure that he doesn't have any recurrent or metastatic rectal cancer. He does not have any scans after surgery. I also recommend him to check CBC, CMP and CEA level today. I will see him again after CAT scan. Recent imaging and labs were reviewed and discussed with the patient.

## 2020-10-26 ENCOUNTER — OFFICE VISIT (OUTPATIENT)
Dept: ONCOLOGY | Age: 65
End: 2020-10-26
Payer: COMMERCIAL

## 2020-10-26 ENCOUNTER — HOSPITAL ENCOUNTER (OUTPATIENT)
Dept: INFUSION THERAPY | Age: 65
Discharge: HOME | End: 2020-10-26
Payer: COMMERCIAL

## 2020-10-26 VITALS
DIASTOLIC BLOOD PRESSURE: 79 MMHG | BODY MASS INDEX: 18.88 KG/M2 | TEMPERATURE: 97.8 F | HEART RATE: 75 BPM | WEIGHT: 135.4 LBS | SYSTOLIC BLOOD PRESSURE: 134 MMHG | OXYGEN SATURATION: 96 %

## 2020-10-26 DIAGNOSIS — C20 RECTAL CANCER (HCC): ICD-10-CM

## 2020-10-26 LAB
BASOPHILS ABSOLUTE: 0 K/CU MM
BASOPHILS RELATIVE PERCENT: 0.4 % (ref 0–1)
CEA: 3.9 NG/ML
DIFFERENTIAL TYPE: ABNORMAL
EOSINOPHILS ABSOLUTE: 0.1 K/CU MM
EOSINOPHILS RELATIVE PERCENT: 2.4 % (ref 0–3)
HCT VFR BLD CALC: 39.8 % (ref 42–52)
HEMOGLOBIN: 13.7 GM/DL (ref 13.5–18)
LYMPHOCYTES ABSOLUTE: 0.9 K/CU MM
LYMPHOCYTES RELATIVE PERCENT: 18.5 % (ref 24–44)
MCH RBC QN AUTO: 28 PG (ref 27–31)
MCHC RBC AUTO-ENTMCNC: 34.4 % (ref 32–36)
MCV RBC AUTO: 81.2 FL (ref 78–100)
MONOCYTES ABSOLUTE: 0.6 K/CU MM
MONOCYTES RELATIVE PERCENT: 12.7 % (ref 0–4)
PDW BLD-RTO: 16.8 % (ref 11.7–14.9)
PLATELET # BLD: 194 K/CU MM (ref 140–440)
PMV BLD AUTO: 10 FL (ref 7.5–11.1)
RBC # BLD: 4.9 M/CU MM (ref 4.6–6.2)
SEGMENTED NEUTROPHILS ABSOLUTE COUNT: 3.3 K/CU MM
SEGMENTED NEUTROPHILS RELATIVE PERCENT: 66 % (ref 36–66)
WBC # BLD: 5 K/CU MM (ref 4–10.5)

## 2020-10-26 PROCEDURE — G8420 CALC BMI NORM PARAMETERS: HCPCS | Performed by: INTERNAL MEDICINE

## 2020-10-26 PROCEDURE — G8484 FLU IMMUNIZE NO ADMIN: HCPCS | Performed by: INTERNAL MEDICINE

## 2020-10-26 PROCEDURE — G8427 DOCREV CUR MEDS BY ELIG CLIN: HCPCS | Performed by: INTERNAL MEDICINE

## 2020-10-26 PROCEDURE — 99211 OFF/OP EST MAY X REQ PHY/QHP: CPT

## 2020-10-26 PROCEDURE — 85025 COMPLETE CBC W/AUTO DIFF WBC: CPT

## 2020-10-26 PROCEDURE — 1036F TOBACCO NON-USER: CPT | Performed by: INTERNAL MEDICINE

## 2020-10-26 PROCEDURE — 3017F COLORECTAL CA SCREEN DOC REV: CPT | Performed by: INTERNAL MEDICINE

## 2020-10-26 PROCEDURE — 82378 CARCINOEMBRYONIC ANTIGEN: CPT

## 2020-10-26 PROCEDURE — 99213 OFFICE O/P EST LOW 20 MIN: CPT | Performed by: INTERNAL MEDICINE

## 2020-10-26 ASSESSMENT — PATIENT HEALTH QUESTIONNAIRE - PHQ9
SUM OF ALL RESPONSES TO PHQ9 QUESTIONS 1 & 2: 0
SUM OF ALL RESPONSES TO PHQ QUESTIONS 1-9: 0
1. LITTLE INTEREST OR PLEASURE IN DOING THINGS: 0
2. FEELING DOWN, DEPRESSED OR HOPELESS: 0
SUM OF ALL RESPONSES TO PHQ QUESTIONS 1-9: 0
SUM OF ALL RESPONSES TO PHQ QUESTIONS 1-9: 0

## 2020-10-26 NOTE — PROGRESS NOTES
MA Rooming Questions  Patient: Milady Arredondo  MRN: [id number]    Date: 10/26/2020        1. Do you have any new issues?   no         2. Do you need any refills on medications?    no    3. Have you had any imaging done since your last visit?   no    4. Have you been hospitalized or seen in the emergency room since your last visit here?   no    5. Did the patient have a depression screening completed today?  Yes    PHQ-9 Total Score: 0 (10/26/2020 11:11 AM)       PHQ-9 Given to (if applicable):               PHQ-9 Score (if applicable):                     [] Positive     []  Negative              Does question #9 need addressed (if applicable)                     [] Yes    []  No               Mary Grace Amato MA

## 2020-11-13 ENCOUNTER — TELEPHONE (OUTPATIENT)
Dept: ONCOLOGY | Age: 65
End: 2020-11-13

## 2020-11-13 NOTE — TELEPHONE ENCOUNTER
Spoke with patient contact and left message regarding his CT on 11.16.2020 at [facility] stated he hasn't seen Apollo Breeze in 2 days.

## 2020-11-16 ENCOUNTER — HOSPITAL ENCOUNTER (OUTPATIENT)
Dept: CT IMAGING | Age: 65
Discharge: HOME | End: 2020-11-16
Payer: COMMERCIAL

## 2020-11-16 LAB
GFR AFRICAN AMERICAN: >60 ML/MIN/1.73M2
GFR NON-AFRICAN AMERICAN: >60 ML/MIN/1.73M2
POC CREATININE: 0.8 MG/DL (ref 0.9–1.3)

## 2020-11-16 PROCEDURE — 74177 CT ABD & PELVIS W/CONTRAST: CPT

## 2020-11-16 PROCEDURE — 71260 CT THORAX DX C+: CPT

## 2020-11-16 PROCEDURE — 6360000004 HC RX CONTRAST MEDICATION: Performed by: INTERNAL MEDICINE

## 2020-11-16 RX ORDER — SODIUM CHLORIDE 0.9 % (FLUSH) 0.9 %
10 SYRINGE (ML) INJECTION PRN
Status: DISCONTINUED | OUTPATIENT
Start: 2020-11-16 | End: 2020-11-17 | Stop reason: HOSPADM

## 2020-11-16 RX ADMIN — IOHEXOL 50 ML: 240 INJECTION, SOLUTION INTRATHECAL; INTRAVASCULAR; INTRAVENOUS; ORAL at 13:35

## 2020-11-16 RX ADMIN — IOPAMIDOL 75 ML: 755 INJECTION, SOLUTION INTRAVENOUS at 14:55

## 2020-12-03 NOTE — PROGRESS NOTES
plaque changes were noted slightly and enlarged bilateral adrenal glands were noted also described was changes of anasarca. Visit here on November 1, 2018 I had long discussion with him and his brother. I also discussed with Dr. Armaan Landaverde. .  Basically large primary adenocarcinoma of the rectum with near complete obstruction. Quite uncomfortable. Would be difficult to proceed with neoadjuvant chemotherapy and radiation therapy. We could consider doing diverting colostomy first followed by definitive treatment for carcinoma of the rectum with chemotherapy radiation followed by surgery. We also would need to have port being placed. It might also be worthwhile to have vascular surgeon see him regarding the changes noted on the abdominal aorta. He was hospitalized at [facility] November 5, 2018. In the hospital was seen by cardiothoracic surgeon Dr. Fransisca Harmon and advised him to follow up with him on an outpatient basis. He was also seen by a surgeon Dr. Gissell Klein and did undergo a diverting colostomy no obvious abdominal metastases and/or liver metastases were noted. Prior to discharge from the hospital he also underwent placement of Mediport per Dr. Gissell Klein . Blood work done in the hospital had revealed creatinine of 0.7 albumin of 3.0. Normal liver function studies hemoglobin 10.7 g% ferritin 165 and CEA of 9.5. MRI of the pelvis done on November 21, 2018 revealed 7 x 6.6 x 9.6 cm rectosigmoid mass was difficult to evaluate the muscularis propria given the lack of small field-of-view and rectal contrast but the mass seemed to be extending to the mesorectal fascia and possibly toward the peritoneal reflection finding favoring at least T3 lesion giving its size. Also at least 1.3 cm left perirectal lymph node suspicious for metastatic disease there may be second 0.9 cm presacral lymph node at least was felt to have an N1 disease.     He subsequently was started on 5-fluorouracil  infusion on December 10, 2018 and a few days later was also started on pelvic radiation therapy. He finished receiving radiation therapy treatments on January 23, 2019 and finished receiving chemotherapy treatments also of the week of January 14, 2019. He overall I think tolerated these treatments fairly and over the few months time his general condition actually did improve significantly he was able to eat more and denied any more pelvic pain. When seen in the office on February 26, 2019 he and his brother were overall quite pleased with his progress. MRI of the pelvis done on February 11, 2019 per Dr. Daisy Jaimes had revealed decreasing size of the large circumferential rectal mass still though fairly large mass persists and mass did about peritoneal reflection and mesorectal fascia no discrete pathologically enlarged lymph nodes were noted. He did undergo robotic assisted laparoscopic low anterior colon resection, splenic flexure mobilization, loop ileostomy and rigid proctoscopy per Dr. Daisy Jaimes on March 13, 2019. Pathology revealed residual very rare microscopic foci of adenocarcinoma and mucinous pool at the site of for neoplasm post neoadjuvant therapy effect. 20 mesenteric lymph nodes were negative for metastatic disease. Overall tumor size could not  assessed because of significant treatment effect mesorectal envelope though was quite incomplete with defect overall invasive moderate to high-grade adenocarcinoma per initial a biopsy report tumor cells were noted in the subserosal soft tissue deeper to the muscularis propria at least T3 difficult to  margins lymphovascular perineural invasion is were not noted. Overall pathology [id number]. Visit here on May 7, 2019 I did spend time with him and his brother. We talked about MRI findings prior to surgery had revealed a large local disease as well as the presence of lymph nodes disease.   We are talked about him benefiting significantly with neoadjuvant chemotherapy as well as radiation. We did talk about her considering the original presentation and having had significant benefit from the treatment still would like for him to receive additional adjuvant chemotherapy. We might consider giving him 4 months of treatment with FOLFOX type regimen but may hold off oxaliplatin after the first couple months he and his brother were were comfortable. May 16, 2019 he did undergo a sigmoidoscopy, was noted to have normal anastomosis. Subsequently on May 21, 2019 he did undergo open ileostomy reversal with small bowel resection. Tolerated the procedure well. June 20, 2019 we did talk about starting him back on adjuvant therapy with FOLFOX type regimen and may be stopping oxaliplatin after couple of months of the treatment. He was comfortable with that approach. But when seen in the office for the treatment on July 10 he did not want to spend 3 hours here and actually wanted to hold off any further treatments. He was subsequently advised to see me on July 23. Visit here on July 23 he was doing fairly well. Bowels and bladder were functioning fair appetite was fair no significant abdominal or pelvic pain. He was accompanied to the office by his brother    He finally agreed to have chemotherapy and he received his first cycle of FOLFOX on 7/31/2019. After first treatment, he seems to move to [place] to live with her daughter. He didn't receive any more therapy after that. CT scan of the chest, abdomen and pelvis done on November 16, 2020 showed postsurgical changes from distal rectal resection with a colorectal anastomosis. However, exam is otherwise significantly limited of the pelvis and rectum due to extensive streak artifact related to bilateral hip arthroplasties. Otherwise, stable exam of the chest and abdomen with no new findings of metastatic disease. Bilateral adrenal adenomas. On December 4, 2020, he presented to follow up.  We have been following him for clinical stage IIIB rectal adenocarcinoma and he is status post neoadjuvant chemoradiation therapy, followed by surgery. He received one therapy with FOLFOX after surgery on 7/31/2019. He didn't receive any more therapy after that. He missed follow up with us after 7/31/2019, until 10/26/2020. He had sigmoidoscopy in 5/16/2019 by Dr. Eliot Abreu before colostomy reversal.     On today visit, I reviewed with him findings of CT scan of the chest, abdomen and pelvis done on November 16, 2020. I also reviewed with him findings on laboratory test.  His CEA level on October 26, 2020 was 3.9. There is no apparent sign of recurrent or metastatic disease seen on CT scan. He is quite asymptomatic and I recommend to continue with close observation. I will repeat CBC, CMP and CEA level in 3 months and I will see him again after that. I will consider to have repeat scans in 6 months. He does not have any significant new symptoms at today visit. Review of Systems: \"Per interval history; otherwise 10 point ROS is negative. \"  His energy level is pretty good, appetite and sleep are fine. He doesn't have fever, chills, night sweats, cough, shortness of breath, chest pain, hemoptysis or palpitations. He doesn't have neuropathy and he denies bleeding or clotting issues. He doesn't have any pain on today visit. His bowel and bladder functions are normal. He denies nausea, vomiting, abdominal pain, diarrhea, constipation, dysuria, loss of appetite, or weight loss. No anxiety or depression. The rest of the systems are unremarkable.      Vital Signs:  /66 (Site: Left Upper Arm, Position: Sitting, Cuff Size: Large Adult)   Pulse 96   Resp 18   Ht 5' 11\" (1.803 m)   Wt 134 lb 12.8 oz (61.1 kg)   SpO2 97%   BMI 18.80 kg/m²     Physical Exam:  CONSTITUTIONAL: awake, no apparent distress, alert, cooperative,    EYES: pupils equal, round and reactive to light, sclera clear and conjunctiva normal  ENT: Normocephalic, atraumatic, without obvious abnormality,  NECK: supple, symmetrical, no jugular venous distension and no carotid bruits   HEMATOLOGIC/LYMPHATIC: no cervical, supraclavicular or axillary lymphadenopathy   LUNGS: VBS, no wheezes, no crackles, no rhonchi, no increased work of breathing and clear to auscultation   CARDIOVASCULAR: normal S1 and S2, regular rate and rhythm, no murmur noted  ABDOMEN: soft, non-distended, normal bowel sounds x 4, non-tender, no masses palpated, no hepatosplenomegaly, previous scar noted. MUSCULOSKELETAL: full range of motion noted, tone is normal  NEUROLOGIC: awake, alert, oriented to name, place and time. Motor skills grossly intact. SKIN: Normal skin color, texture, turgor and no jaundice.  appears intact   EXTREMITIES: no LE edema, no clubbing, no leg swelling, no cyanosis,      Labs:  Hematology:  Lab Results   Component Value Date    WBC 5.0 10/26/2020    RBC 4.90 10/26/2020    HGB 13.7 10/26/2020    HCT 39.8 (L) 10/26/2020    MCV 81.2 10/26/2020    MCH 28.0 10/26/2020    MCHC 34.4 10/26/2020    RDW 16.8 (H) 10/26/2020     10/26/2020    MPV 10.0 10/26/2020    SEGSPCT 66.0 10/26/2020    EOSRELPCT 2.4 10/26/2020    BASOPCT 0.4 10/26/2020    LYMPHOPCT 18.5 (L) 10/26/2020    MONOPCT 12.7 (H) 10/26/2020    SEGSABS 3.3 10/26/2020    EOSABS 0.1 10/26/2020    BASOSABS 0.0 10/26/2020    LYMPHSABS 0.9 10/26/2020    MONOSABS 0.6 10/26/2020    DIFFTYPE AUTOMATED DIFFERENTIAL 10/26/2020    POLYCHROM 1+ 02/23/2014    PLTM PLATELETS APPEAR NORMAL 02/23/2014     No results found for: ESR  Chemistry:  Lab Results   Component Value Date     07/30/2019    K 4.0 07/30/2019     07/30/2019    CO2 28 07/30/2019    BUN 13 07/30/2019    CREATININE 0.8 (L) 11/16/2020    GLUCOSE 72 07/30/2019    CALCIUM 9.7 07/30/2019    PROT 7.3 07/30/2019    LABALBU 4.2 07/30/2019    BILITOT 0.3 07/30/2019    ALKPHOS 70 07/30/2019    AST 16 07/30/2019    ALT 14 07/30/2019    LABGLOM >60 11/16/2020    GFRAA >60 11/16/2020    MG 2.1 09/28/2018    POCGLU 127 (H) 07/17/2013     No results found for: MMA, LDH, HOMOCYSTEINE  No components found for: LD  Lab Results   Component Value Date    TSHHS 1.980 09/28/2018    T4FREE 1.07 09/28/2018     Immunology:  Lab Results   Component Value Date    PROT 7.3 07/30/2019     No results found for: Yuliana Bullocks, KLFLCR  No results found for: B2M  Coagulation Panel:  Lab Results   Component Value Date    PROTIME 14.7 (H) 11/03/2018    INR 1.27 11/03/2018    APTT 29.1 09/28/2018     Anemia Panel:  No results found for: Hayley Mirianlich  Tumor Markers:  Lab Results   Component Value Date    CEA 3.9 10/26/2020    PSA 1.28 12/09/2013     Observations:  No data recorded        Assessment & Plan:   He had been having the symptoms of significant lower suprapubic and anorectal area discomfort, since July 2018, with gradual increase in intensity. Lately affecting his day-to-day life significantly. Also significant issue with progressive constipation, lately just having passage of some watery stools along with blood. Colonoscopy per Dr. Alfred Flynn on October 25, 2018 noted to have a large hard polypoid appearing friable obstructing lesion noted in the rectum starting immediately above the anorectal junction moderate to large amount of solid and semisolid stools was noted throughout the colon therefore the mucosa could not be examined entirely. Pathology from the rectal mass revealed invasive adenocarcinoma. Visit here on November 1, 2018 I had long discussion with him and his brother. I also discussed with Dr. Blade Cadena. .  Basically large primary adenocarcinoma of the rectum with near complete obstruction. Quite uncomfortable. Would be difficult to proceed with neoadjuvant chemotherapy and radiation therapy.   We could consider doing diverting colostomy first followed by definitive treatment for carcinoma of the rectum with chemotherapy radiation followed by surgery. We also would need to have port being placed. He was hospitalized at [facility] November 5, 2018. In the hospital was seen by cardiothoracic surgeon Dr. Rajwinder Rodriguez and advised him to follow up with him on an outpatient basis. He was also seen by a surgeon Dr. Kelvin Camp and did undergo a diverting colostomy no obvious abdominal metastases and/or liver metastases were noted. Prior to discharge from the hospital he also underwent placement of Mediport per Dr. Kelvin Camp . Blood work done in the hospital had revealed creatinine of 0.7 albumin of 3.0. Normal liver function studies hemoglobin 10.7 g% ferritin 165 and CEA of 9.5. Visit here on November 13, 2018 I did spend time again with his brother and him. We talked about need for proceeding with possibly neoadjuvant chemotherapy and radiation therapy for large obstructing lesion involving the rectum. At the time of surgery there was no obvious distant disease but for better staging we still may do MRI of the pelvic area. We will go ahead and make arrangements for him to be seen by radiation oncologist and set up visit with nurse navigator regarding discussion for continuous 5-fluorouracil infusion along with radiation therapy    MRI of the pelvis done on November 21, 2018 revealed 7 x 6.6 x 9.6 cm rectosigmoid mass was difficult to evaluate the muscularis propria given the lack of small field-of-view and rectal contrast but the mass seemed to be extending to the mesorectal fascia and possibly toward the peritoneal reflection finding favoring at least T3 lesion giving its size. Also at least 1.3 cm left perirectal lymph node suspicious for metastatic disease there may be second 0.9 cm presacral lymph node at least was felt to have an N1 disease    He subsequently was started on 5-fluorouracil  infusion on December 10, 2018 and a few days later was also started on pelvic radiation therapy.     He finished receiving radiation therapy treatments on January 23, 2019 and finished receiving chemotherapy treatments also of the week of January 14, 2019. He overall I think tolerated these treatments fairly and over the few months time his general condition actually did improve significantly he was able to eat more and denied any more pelvic pain. When seen in the office on February 26, 2019 he and his brother were overall quite pleased with his progress. MRI of the pelvis done on February 11, 2019 per Dr. Kirk Jules had revealed decreasing size of the large circumferential rectal mass still though fairly large mass persists and mass did about peritoneal reflection and mesorectal fascia no discrete pathologically enlarged lymph nodes were noted. He did undergo robotic assisted laparoscopic low anterior colon resection, splenic flexure mobilization, loop ileostomy and rigid proctoscopy per Dr. Kirk Jules on March 13, 2019. Pathology revealed residual very rare microscopic foci of adenocarcinoma and mucinous pool at the site of for neoplasm post neoadjuvant therapy effect. 20 mesenteric lymph nodes were negative for metastatic disease. Overall tumor size could not  assessed because of significant treatment effect mesorectal envelope though was quite incomplete with defect overall invasive moderate to high-grade adenocarcinoma per initial a biopsy report tumor cells were noted in the subserosal soft tissue deeper to the muscularis propria at least T3 difficult to  margins lymphovascular perineural invasion is were not noted. Overall pathology [id number]. Visit here on May 7, 2019 I did spend time with him and his brother. We talked about MRI findings prior to surgery had revealed a large local disease as well as the presence of lymph nodes disease. We are talked about him benefiting significantly with neoadjuvant chemotherapy as well as radiation.   We did talk about her considering the original presentation and having had significant benefit from the treatment still would like for him to receive additional adjuvant chemotherapy. We might consider giving him 4 months of treatment with FOLFOX type regimen but may hold off oxaliplatin after the first couple months he and his brother were were comfortable. May 16, 2019 he did undergo a sigmoidoscopy, was noted to have normal anastomosis. Subsequently on May 21, 2019 he did undergo open ileostomy reversal with small bowel resection. Tolerated the procedure well. June 20, 2019 we did talk about starting him back on adjuvant therapy with FOLFOX type regimen and may be stopping oxaliplatin after couple of months of the treatment. He was comfortable with that approach. But when seen in the office for the treatment on July 10 he did not want to spend 3 hours here and actually wanted to hold off any further treatments. He was subsequently advised to see me on July 23. He finally agreed to have chemotherapy and he received his first cycle of FOLFOX on 7/31/2020. After first treatment, he seems to move to [address] [place] [address] to live with her daughter. He didn't receive any more therapy after that. CT scan of the chest, abdomen and pelvis done on November 16, 2020 showed postsurgical changes from distal rectal resection with a colorectal anastomosis. However, exam is otherwise significantly limited of the pelvis and rectum due to extensive streak artifact related to bilateral hip arthroplasties. Otherwise, stable exam of the chest and abdomen with no new findings of metastatic disease. Bilateral adrenal adenomas. On December 4, 2020, he presented to follow up. We have been following him for clinical stage IIIB rectal adenocarcinoma and he is status post neoadjuvant chemoradiation therapy, followed by surgery. He received one therapy with FOLFOX after surgery on 7/31/2019. He didn't receive any more therapy after that. He missed follow up with us after 7/31/2019, until 10/26/2020.      He had sigmoidoscopy in 5/16/2019 by Dr. Alex Elder before colostomy reversal.     On today visit, I reviewed with him findings of CT scan of the chest, abdomen and pelvis done on November 16, 2020. I also reviewed with him findings on laboratory test.  His CEA level on October 26, 2020 was 3.9. There is no apparent sign of recurrent or metastatic disease seen on CT scan. He is quite asymptomatic and I recommend to continue with close observation. I will repeat CBC, CMP and CEA level in 3 months and I will see him again after that. I will consider to have repeat scans in 6 months. Recent imaging and labs were reviewed and discussed with the patient.

## 2020-12-04 ENCOUNTER — OFFICE VISIT (OUTPATIENT)
Dept: ONCOLOGY | Age: 65
End: 2020-12-04
Payer: COMMERCIAL

## 2020-12-04 ENCOUNTER — HOSPITAL ENCOUNTER (OUTPATIENT)
Dept: INFUSION THERAPY | Age: 65
Discharge: HOME | End: 2020-12-04
Payer: COMMERCIAL

## 2020-12-04 VITALS
RESPIRATION RATE: 18 BRPM | WEIGHT: 134.8 LBS | DIASTOLIC BLOOD PRESSURE: 66 MMHG | HEART RATE: 96 BPM | OXYGEN SATURATION: 97 % | HEIGHT: 71 IN | SYSTOLIC BLOOD PRESSURE: 107 MMHG | BODY MASS INDEX: 18.87 KG/M2

## 2020-12-04 PROCEDURE — G8484 FLU IMMUNIZE NO ADMIN: HCPCS | Performed by: INTERNAL MEDICINE

## 2020-12-04 PROCEDURE — 99213 OFFICE O/P EST LOW 20 MIN: CPT | Performed by: INTERNAL MEDICINE

## 2020-12-04 PROCEDURE — G8427 DOCREV CUR MEDS BY ELIG CLIN: HCPCS | Performed by: INTERNAL MEDICINE

## 2020-12-04 PROCEDURE — G8420 CALC BMI NORM PARAMETERS: HCPCS | Performed by: INTERNAL MEDICINE

## 2020-12-04 PROCEDURE — 99211 OFF/OP EST MAY X REQ PHY/QHP: CPT

## 2020-12-04 PROCEDURE — 1036F TOBACCO NON-USER: CPT | Performed by: INTERNAL MEDICINE

## 2020-12-04 PROCEDURE — 3017F COLORECTAL CA SCREEN DOC REV: CPT | Performed by: INTERNAL MEDICINE

## 2020-12-04 NOTE — PROGRESS NOTES
MA Rooming Questions  Patient: Scarlett Fong  MRN: [id number]    Date: 12/4/2020        1. Do you have any new issues?   no         2. Do you need any refills on medications?    no    3. Have you had any imaging done since your last visit?   no    4. Have you been hospitalized or seen in the emergency room since your last visit here?   no    5. Did the patient have a depression screening completed today?  No    No data recorded     PHQ-9 Given to (if applicable):               PHQ-9 Score (if applicable):                     [] Positive     []  Negative              Does question #9 need addressed (if applicable)                     [] Yes    []  No               Aba Mahoney MA

## 2021-01-21 ENCOUNTER — HOSPITAL ENCOUNTER (EMERGENCY)
Age: 66
Discharge: HOME | End: 2021-01-21
Attending: EMERGENCY MEDICINE
Payer: COMMERCIAL

## 2021-01-21 ENCOUNTER — APPOINTMENT (OUTPATIENT)
Dept: CT IMAGING | Age: 66
End: 2021-01-21
Payer: COMMERCIAL

## 2021-01-21 VITALS
DIASTOLIC BLOOD PRESSURE: 97 MMHG | HEART RATE: 72 BPM | WEIGHT: 134 LBS | HEIGHT: 71 IN | TEMPERATURE: 97.6 F | SYSTOLIC BLOOD PRESSURE: 139 MMHG | BODY MASS INDEX: 18.76 KG/M2 | OXYGEN SATURATION: 100 % | RESPIRATION RATE: 18 BRPM

## 2021-01-21 DIAGNOSIS — R10.32 LEFT LOWER QUADRANT ABDOMINAL PAIN: ICD-10-CM

## 2021-01-21 DIAGNOSIS — R19.7 NAUSEA VOMITING AND DIARRHEA: Primary | ICD-10-CM

## 2021-01-21 DIAGNOSIS — R11.2 NAUSEA VOMITING AND DIARRHEA: Primary | ICD-10-CM

## 2021-01-21 LAB
ALBUMIN SERPL-MCNC: 4 GM/DL (ref 3.4–5)
ALP BLD-CCNC: 75 IU/L (ref 40–129)
ALT SERPL-CCNC: 10 U/L (ref 10–40)
ANION GAP SERPL CALCULATED.3IONS-SCNC: 10 MMOL/L (ref 4–16)
AST SERPL-CCNC: 16 IU/L (ref 15–37)
BASOPHILS ABSOLUTE: 0 K/CU MM
BASOPHILS RELATIVE PERCENT: 1 % (ref 0–1)
BILIRUB SERPL-MCNC: 0.4 MG/DL (ref 0–1)
BUN BLDV-MCNC: 5 MG/DL (ref 6–23)
CALCIUM SERPL-MCNC: 8.8 MG/DL (ref 8.3–10.6)
CHLORIDE BLD-SCNC: 101 MMOL/L (ref 99–110)
CO2: 23 MMOL/L (ref 21–32)
CREAT SERPL-MCNC: 1 MG/DL (ref 0.9–1.3)
DIFFERENTIAL TYPE: ABNORMAL
EOSINOPHILS ABSOLUTE: 0.1 K/CU MM
EOSINOPHILS RELATIVE PERCENT: 3.1 % (ref 0–3)
GFR AFRICAN AMERICAN: >60 ML/MIN/1.73M2
GFR NON-AFRICAN AMERICAN: >60 ML/MIN/1.73M2
GLUCOSE BLD-MCNC: 85 MG/DL (ref 70–99)
HCT VFR BLD CALC: 47.9 % (ref 42–52)
HEMOGLOBIN: 15.3 GM/DL (ref 13.5–18)
IMMATURE NEUTROPHIL %: 0.2 % (ref 0–0.43)
LIPASE: 21 IU/L (ref 13–60)
LYMPHOCYTES ABSOLUTE: 1 K/CU MM
LYMPHOCYTES RELATIVE PERCENT: 23.9 % (ref 24–44)
MCH RBC QN AUTO: 27.5 PG (ref 27–31)
MCHC RBC AUTO-ENTMCNC: 31.9 % (ref 32–36)
MCV RBC AUTO: 86.2 FL (ref 78–100)
MONOCYTES ABSOLUTE: 0.4 K/CU MM
MONOCYTES RELATIVE PERCENT: 10.6 % (ref 0–4)
NUCLEATED RBC %: 0 %
PDW BLD-RTO: 15.4 % (ref 11.7–14.9)
PLATELET # BLD: 240 K/CU MM (ref 140–440)
PMV BLD AUTO: 9.6 FL (ref 7.5–11.1)
POTASSIUM SERPL-SCNC: 4.1 MMOL/L (ref 3.5–5.1)
RBC # BLD: 5.56 M/CU MM (ref 4.6–6.2)
SEGMENTED NEUTROPHILS ABSOLUTE COUNT: 2.5 K/CU MM
SEGMENTED NEUTROPHILS RELATIVE PERCENT: 61.2 % (ref 36–66)
SODIUM BLD-SCNC: 134 MMOL/L (ref 135–145)
TOTAL IMMATURE NEUTOROPHIL: 0.01 K/CU MM
TOTAL NUCLEATED RBC: 0 K/CU MM
TOTAL PROTEIN: 6.5 GM/DL (ref 6.4–8.2)
WBC # BLD: 4.2 K/CU MM (ref 4–10.5)

## 2021-01-21 PROCEDURE — 6360000002 HC RX W HCPCS: Performed by: EMERGENCY MEDICINE

## 2021-01-21 PROCEDURE — 80053 COMPREHEN METABOLIC PANEL: CPT

## 2021-01-21 PROCEDURE — 6360000004 HC RX CONTRAST MEDICATION: Performed by: EMERGENCY MEDICINE

## 2021-01-21 PROCEDURE — 85025 COMPLETE CBC W/AUTO DIFF WBC: CPT

## 2021-01-21 PROCEDURE — 2580000003 HC RX 258: Performed by: EMERGENCY MEDICINE

## 2021-01-21 PROCEDURE — 96374 THER/PROPH/DIAG INJ IV PUSH: CPT

## 2021-01-21 PROCEDURE — 96375 TX/PRO/DX INJ NEW DRUG ADDON: CPT

## 2021-01-21 PROCEDURE — 99284 EMERGENCY DEPT VISIT MOD MDM: CPT

## 2021-01-21 PROCEDURE — 83690 ASSAY OF LIPASE: CPT

## 2021-01-21 PROCEDURE — 74177 CT ABD & PELVIS W/CONTRAST: CPT

## 2021-01-21 RX ORDER — ONDANSETRON 4 MG/1
4 TABLET, ORALLY DISINTEGRATING ORAL EVERY 8 HOURS PRN
Qty: 15 TABLET | Refills: 0 | Status: SHIPPED | OUTPATIENT
Start: 2021-01-21

## 2021-01-21 RX ORDER — SODIUM CHLORIDE 0.9 % (FLUSH) 0.9 %
10 SYRINGE (ML) INJECTION
Status: COMPLETED | OUTPATIENT
Start: 2021-01-21 | End: 2021-01-21

## 2021-01-21 RX ORDER — DICYCLOMINE HYDROCHLORIDE 10 MG/1
10 CAPSULE ORAL 3 TIMES DAILY
Qty: 15 CAPSULE | Refills: 3 | Status: SHIPPED | OUTPATIENT
Start: 2021-01-21

## 2021-01-21 RX ORDER — 0.9 % SODIUM CHLORIDE 0.9 %
1000 INTRAVENOUS SOLUTION INTRAVENOUS ONCE
Status: COMPLETED | OUTPATIENT
Start: 2021-01-21 | End: 2021-01-21

## 2021-01-21 RX ORDER — ONDANSETRON 2 MG/ML
4 INJECTION INTRAMUSCULAR; INTRAVENOUS EVERY 30 MIN PRN
Status: DISCONTINUED | OUTPATIENT
Start: 2021-01-21 | End: 2021-01-21 | Stop reason: HOSPADM

## 2021-01-21 RX ORDER — LOPERAMIDE HYDROCHLORIDE 2 MG/1
2 CAPSULE ORAL 4 TIMES DAILY PRN
Qty: 20 CAPSULE | Refills: 0 | Status: SHIPPED | OUTPATIENT
Start: 2021-01-21 | End: 2021-01-31

## 2021-01-21 RX ORDER — FENTANYL CITRATE 50 UG/ML
50 INJECTION, SOLUTION INTRAMUSCULAR; INTRAVENOUS ONCE
Status: COMPLETED | OUTPATIENT
Start: 2021-01-21 | End: 2021-01-21

## 2021-01-21 RX ADMIN — FENTANYL CITRATE 50 MCG: 50 INJECTION, SOLUTION INTRAMUSCULAR; INTRAVENOUS at 10:32

## 2021-01-21 RX ADMIN — ONDANSETRON 4 MG: 2 INJECTION INTRAMUSCULAR; INTRAVENOUS at 10:32

## 2021-01-21 RX ADMIN — IOPAMIDOL 75 ML: 755 INJECTION, SOLUTION INTRAVENOUS at 12:04

## 2021-01-21 RX ADMIN — SODIUM CHLORIDE, PRESERVATIVE FREE 10 ML: 5 INJECTION INTRAVENOUS at 12:05

## 2021-01-21 RX ADMIN — SODIUM CHLORIDE 1000 ML: 9 INJECTION, SOLUTION INTRAVENOUS at 10:31

## 2021-01-21 NOTE — ED NOTES
Melina Sony, 60year old male that present to the ED with lower abd pain, nausea, vomiting and diarrhea that started three days ago. Patient states he is dehydrated and the pain is getting worse over time/     Vida Bernal  01/21/21 [id number]

## 2021-01-21 NOTE — ED PROVIDER NOTES
As physician-in-triage, I performed a medical screening history and physical exam on this patient. HISTORY OF PRESENT ILLNESS  Tootie Fonseca is a 60 y.o. male presents to the emergency department with nausea, vomiting, diarrhea, left lower quadrant abdominal pain for the last 3 days. States he feels dehydrated. No fevers or chills. No urinary symptoms. .      PHYSICAL EXAM  There were no vitals taken for this visit. On exam, the patient appears in no acute distress. Speech is clear. Breathing is unlabored. Moves all extremities    Comment: Please note this report has been produced using speech recognition software and may contain errors related to that system including errors in grammar, punctuation, and spelling, as well as words and phrases that may be inappropriate. If there are any questions or concerns please feel free to contact the dictating provider for clarification.        Leoncio Welsh MD  01/21/21 [id number]

## 2021-01-21 NOTE — ED PROVIDER NOTES
Emergency Department Encounter    Patient: Devora Schirmer  MRN: [id number]  : 1955  Date of Evaluation: 2021  ED Provider:  Sukh Nayak    Triage Chief Complaint:   Abdominal Pain (vomiting and diarrhea)    [place]:  Devora Schirmer is a 60 y.o. male that presents with concern for lower abdominal pain over the left lower quadrant. Having nausea and vomiting and diarrhea associated with this. This is all been going on for 3 days, pain has been getting worse, 8 out of 10, sharp. Denies relieving and exacerbating factors. No blood in the diarrhea or the vomit. Has a history of rectal cancer status post resection, had a colostomy and a takedown of that about a year and a half ago. This was with Dr. Mis Crocker. He has finished both chemo and radiation in 2019. No urinary symptoms. No CP or SOB. No fevers. ROS - see HPI, below listed is current ROS at time of my eval:  10 systems reviewed and negative except as above.      Past Medical History:   Diagnosis Date    Cancer [facility])     Rectal cancer--treated with both chemo and radiation--finished 2019    Colostomy present ([address]     Profound hearing loss of both ears     no hearing in right ear --very little in left    PVD (peripheral vascular disease) ([address]      Past Surgical History:   Procedure Laterality Date    COLOSTOMY N/A 2018    DIVERTING COLOSTOMY PLACEMENT LAPAROSCOPIC performed by Nunu Blanchard MD at [address] [facility] Bilateral early 2010's    hips    AL INSJ PRPH CTR VAD W/SUBQ PORT AGE 5 YR/> N/A 2018    PORT INSERTION performed by Nunu Blanchard MD at [address] [facility] 2019    [facility] performed by Nunu Blanchard MD at [address] [facility] [address] N/A 3/13/2019    BOWEL RESECTION LOW ANTERIOR LAPAROSCOPIC ROBOTIC WITH DIVERTING LOOP ILEOSTOMY performed by Nunu Blanchard MD at [address] [facility] 2019    COLOSTOMY ILEOSTOMY TAKEDOWN/REVERSAL performed by Ashley Stone MD at [address] [facility] OR     Family History   Problem Relation Age of Onset    Dementia Mother     Early Death Father     High Blood Pressure Father     Early Death Sister     Diabetes Brother      Social History     Socioeconomic History    Marital status: Single     Spouse name: Not on file    Number of children: Not on file    Years of education: Not on file    Highest education level: Not on file   Occupational History    Not on file   Social Needs    Financial resource strain: Not on file    Food insecurity     Worry: Not on file     Inability: Not on file   [employer] needs     Medical: Not on file     Non-medical: Not on file   Tobacco Use    Smoking status: Never Smoker    Smokeless tobacco: Never Used   Substance and Sexual Activity    Alcohol use: No    Drug use: No    Sexual activity: Not on file   Lifestyle    Physical activity     Days per week: Not on file     Minutes per session: Not on file    Stress: Not on file   Relationships    Social connections     Talks on phone: Not on file     Gets together: Not on file     Attends Alevism service: Not on file     Active member of club or organization: Not on file     Attends meetings of clubs or organizations: Not on file     Relationship status: Not on file    Intimate partner violence     Fear of current or ex partner: Not on file     Emotionally abused: Not on file     Physically abused: Not on file     Forced sexual activity: Not on file   Other Topics Concern    Not on file   Social History Narrative    Not on file     Current Facility-Administered Medications   Medication Dose Route Frequency Provider Last Rate Last Admin    ondansetron (ZOFRAN) injection 4 mg  4 mg Intravenous Q30 Min PRN Sima Forman MD   4 mg at 01/21/21 1032     Current Outpatient Medications   Medication Sig Dispense Refill    ondansetron (ZOFRAN ODT) 4 MG disintegrating tablet Take 1 tablet by mouth every 8 hours as needed for Nausea 15 tablet 0    dicyclomine (BENTYL) 10 MG capsule Take 1 capsule by mouth 3 times daily As needed for abdominal pain 15 capsule 3    loperamide (RA ANTI-DIARRHEAL) 2 MG capsule Take 1 capsule by mouth 4 times daily as needed for Diarrhea 20 capsule 0    Nutritional Supplements (NUTRITIONAL SUPPLEMENT PLUS) LIQD Take 237 mLs by mouth 3 times daily (with meals) 90 Can 0     Allergies   Allergen Reactions    Oxycodone Anaphylaxis    Ibuprofen        Nursing Notes Reviewed    Physical Exam:  Triage VS:    ED Triage Vitals [01/21/21 0932]   Enc Vitals Group      BP (!) 150/99      Pulse 72      Resp 18      Temp 97.6 °F (36.4 °C)      Temp Source Oral      SpO2 100 %      Weight 134 lb (60.8 kg)      Height 5' 11\" (1.803 m)      Head Circumference       Peak Flow       Pain Score       Pain Loc       Pain Edu? Excl. in [address]? My pulse ox interpretation is - normal    General appearance:  No acute distress. Skin:  Warm. Dry. Eye:  Extraocular movements intact. Ears, nose, mouth and throat:  Oral mucosa moist   Neck:  Trachea midline. Extremity:  No swelling. Normal ROM     Heart:  Regular rate and rhythm, normal S1 & S2, no extra heart sounds. Perfusion:  intact  Respiratory:  Lungs clear to auscultation bilaterally. Respirations nonlabored. Abdominal:  Normal bowel sounds. Soft. Tender to palpation diffusely over lower abdomen but worse over LLQ than RLQ. Well healed scar over LLQ. Gerldine Prost Non distended.   Neurological:  Alert and oriented              Psychiatric:  Appropriate    I have reviewed and interpreted all of the currently available lab results from this visit (if applicable):  Results for orders placed or performed during the hospital encounter of 01/21/21   CBC with Auto Diff   Result Value Ref Range    WBC 4.2 4.0 - 10.5 K/CU MM    RBC 5.56 4.6 - 6.2 M/CU MM    Hemoglobin 15.3 13.5 - 18.0 GM/DL    Hematocrit 47.9 42 - 52 %    MCV 86.2 78 - 100 FL    MCH 27.5 27 - 31 PG MCHC 31.9 (L) 32.0 - 36.0 %    RDW 15.4 (H) 11.7 - 14.9 %    Platelets 295 739 - 599 K/CU MM    MPV 9.6 7.5 - 11.1 FL    Differential Type AUTOMATED DIFFERENTIAL     Segs Relative 61.2 36 - 66 %    Lymphocytes % 23.9 (L) 24 - 44 %    Monocytes % 10.6 (H) 0 - 4 %    Eosinophils % 3.1 (H) 0 - 3 %    Basophils % 1.0 0 - 1 %    Segs Absolute 2.5 K/CU MM    Lymphocytes Absolute 1.0 K/CU MM    Monocytes Absolute 0.4 K/CU MM    Eosinophils Absolute 0.1 K/CU MM    Basophils Absolute 0.0 K/CU MM    Nucleated RBC % 0.0 %    Total Nucleated RBC 0.0 K/CU MM    Total Immature Neutrophil 0.01 K/CU MM    Immature Neutrophil % 0.2 0 - 0.43 %   CMP   Result Value Ref Range    Sodium 134 (L) 135 - 145 MMOL/L    Potassium 4.1 3.5 - 5.1 MMOL/L    Chloride 101 99 - 110 mMol/L    CO2 23 21 - 32 MMOL/L    BUN 5 (L) 6 - 23 MG/DL    CREATININE 1.0 0.9 - 1.3 MG/DL    Glucose 85 70 - 99 MG/DL    Calcium 8.8 8.3 - 10.6 MG/DL    Alb 4.0 3.4 - 5.0 GM/DL    Total Protein 6.5 6.4 - 8.2 GM/DL    Total Bilirubin 0.4 0.0 - 1.0 MG/DL    ALT 10 10 - 40 U/L    AST 16 15 - 37 IU/L    Alkaline Phosphatase 75 40 - 129 IU/L    GFR Non-African American >60 >60 mL/min/1.73m2    GFR African American >60 >60 mL/min/1.73m2    Anion Gap 10 4 - 16   Lipase   Result Value Ref Range    Lipase 21 13 - 60 IU/L      Radiographs (if obtained):  Radiologist's Report Reviewed:  No results found. EKG (if obtained): (All EKG's are interpreted by myself in the absence of a cardiologist)      MDM:  60-year-old male with history as above presents with concern for lower abdominal pain, nausea, vomiting and diarrhea. He is in no acute distress, mild tenderness on my exam, given his history we will plan for labs, CT and reassess. Labs and CT are unremarkable, he is in no distress, he has had no vomiting here, feeling much better after medications. Plan for discharge home. He is comfortable with plan, given return precautions.   Discharged in stable condition    Clinical Impression:  1. Nausea vomiting and diarrhea    2. Left lower quadrant abdominal pain      Disposition referral (if applicable):  Sena Halsted, MD  [address] [place] [address]  [phone]          Disposition medications (if applicable):  Discharge Medication List as of 1/21/2021  1:44 PM      START taking these medications    Details   ondansetron (ZOFRAN ODT) 4 MG disintegrating tablet Take 1 tablet by mouth every 8 hours as needed for Nausea, Disp-15 tablet, R-0Normal      dicyclomine (BENTYL) 10 MG capsule Take 1 capsule by mouth 3 times daily As needed for abdominal pain, Disp-15 capsule, R-3Normal      loperamide (RA ANTI-DIARRHEAL) 2 MG capsule Take 1 capsule by mouth 4 times daily as needed for Diarrhea, Disp-20 capsule, R-0Normal           ED Provider Disposition Time  DISPOSITION Decision To Discharge 01/21/2021 01:07:33 PM      Comment: Please note this report has been produced using speech recognition software and may contain errors related to that system including errors in grammar, punctuation, and spelling, as well as words and phrases that may be inappropriate. Efforts were made to edit the dictations.         Massimo Diane MD  01/21/21 7014

## 2021-03-04 ENCOUNTER — HOSPITAL ENCOUNTER (OUTPATIENT)
Dept: INFUSION THERAPY | Age: 66
Discharge: HOME | End: 2021-03-04
Payer: COMMERCIAL

## 2021-03-04 DIAGNOSIS — C20 RECTAL CANCER (HCC): ICD-10-CM

## 2021-03-04 LAB
ALBUMIN SERPL-MCNC: 4.1 GM/DL (ref 3.4–5)
ALP BLD-CCNC: 82 IU/L (ref 40–128)
ALT SERPL-CCNC: 11 U/L (ref 10–40)
ANION GAP SERPL CALCULATED.3IONS-SCNC: 11 MMOL/L (ref 4–16)
AST SERPL-CCNC: 15 IU/L (ref 15–37)
BASOPHILS ABSOLUTE: 0 K/CU MM
BASOPHILS RELATIVE PERCENT: 0.6 % (ref 0–1)
BILIRUB SERPL-MCNC: 0.3 MG/DL (ref 0–1)
BUN BLDV-MCNC: 8 MG/DL (ref 6–23)
CALCIUM SERPL-MCNC: 9 MG/DL (ref 8.3–10.6)
CEA: 3.7 NG/ML
CHLORIDE BLD-SCNC: 102 MMOL/L (ref 99–110)
CO2: 26 MMOL/L (ref 21–32)
CREAT SERPL-MCNC: 1.1 MG/DL (ref 0.9–1.3)
DIFFERENTIAL TYPE: ABNORMAL
EOSINOPHILS ABSOLUTE: 0.2 K/CU MM
EOSINOPHILS RELATIVE PERCENT: 3.3 % (ref 0–3)
GFR AFRICAN AMERICAN: >60 ML/MIN/1.73M2
GFR NON-AFRICAN AMERICAN: >60 ML/MIN/1.73M2
GLUCOSE BLD-MCNC: 82 MG/DL (ref 70–99)
HCT VFR BLD CALC: 43.5 % (ref 42–52)
HEMOGLOBIN: 14.7 GM/DL (ref 13.5–18)
LYMPHOCYTES ABSOLUTE: 1.2 K/CU MM
LYMPHOCYTES RELATIVE PERCENT: 25.1 % (ref 24–44)
MCH RBC QN AUTO: 27.2 PG (ref 27–31)
MCHC RBC AUTO-ENTMCNC: 33.8 % (ref 32–36)
MCV RBC AUTO: 80.6 FL (ref 78–100)
MONOCYTES ABSOLUTE: 0.6 K/CU MM
MONOCYTES RELATIVE PERCENT: 12 % (ref 0–4)
PDW BLD-RTO: 16.1 % (ref 11.7–14.9)
PLATELET # BLD: 203 K/CU MM (ref 140–440)
PMV BLD AUTO: 9.4 FL (ref 7.5–11.1)
POTASSIUM SERPL-SCNC: 4.6 MMOL/L (ref 3.5–5.1)
RBC # BLD: 5.4 M/CU MM (ref 4.6–6.2)
SEGMENTED NEUTROPHILS ABSOLUTE COUNT: 2.9 K/CU MM
SEGMENTED NEUTROPHILS RELATIVE PERCENT: 59 % (ref 36–66)
SODIUM BLD-SCNC: 139 MMOL/L (ref 135–145)
TOTAL PROTEIN: 6.8 GM/DL (ref 6.4–8.2)
WBC # BLD: 4.9 K/CU MM (ref 4–10.5)

## 2021-03-04 PROCEDURE — 36415 COLL VENOUS BLD VENIPUNCTURE: CPT

## 2021-03-04 PROCEDURE — 80053 COMPREHEN METABOLIC PANEL: CPT

## 2021-03-04 PROCEDURE — 82378 CARCINOEMBRYONIC ANTIGEN: CPT

## 2021-03-04 PROCEDURE — 85025 COMPLETE CBC W/AUTO DIFF WBC: CPT

## 2021-03-31 ENCOUNTER — HOSPITAL ENCOUNTER (OUTPATIENT)
Dept: INFUSION THERAPY | Age: 66
Discharge: HOME | End: 2021-03-31
Payer: COMMERCIAL

## 2021-03-31 ENCOUNTER — OFFICE VISIT (OUTPATIENT)
Dept: ONCOLOGY | Age: 66
End: 2021-03-31
Payer: COMMERCIAL

## 2021-03-31 VITALS
TEMPERATURE: 97.8 F | WEIGHT: 131.4 LBS | DIASTOLIC BLOOD PRESSURE: 89 MMHG | HEART RATE: 74 BPM | HEIGHT: 71 IN | OXYGEN SATURATION: 99 % | SYSTOLIC BLOOD PRESSURE: 143 MMHG | BODY MASS INDEX: 18.4 KG/M2

## 2021-03-31 DIAGNOSIS — C20 RECTAL CANCER (HCC): Primary | ICD-10-CM

## 2021-03-31 PROCEDURE — G8419 CALC BMI OUT NRM PARAM NOF/U: HCPCS | Performed by: INTERNAL MEDICINE

## 2021-03-31 PROCEDURE — G8427 DOCREV CUR MEDS BY ELIG CLIN: HCPCS | Performed by: INTERNAL MEDICINE

## 2021-03-31 PROCEDURE — G8484 FLU IMMUNIZE NO ADMIN: HCPCS | Performed by: INTERNAL MEDICINE

## 2021-03-31 PROCEDURE — 99211 OFF/OP EST MAY X REQ PHY/QHP: CPT

## 2021-03-31 PROCEDURE — 1036F TOBACCO NON-USER: CPT | Performed by: INTERNAL MEDICINE

## 2021-03-31 PROCEDURE — 3017F COLORECTAL CA SCREEN DOC REV: CPT | Performed by: INTERNAL MEDICINE

## 2021-03-31 PROCEDURE — 99214 OFFICE O/P EST MOD 30 MIN: CPT | Performed by: INTERNAL MEDICINE

## 2021-03-31 NOTE — PROGRESS NOTES
Patient Name: Giulia Peterson  Patient : 1955  Patient MRN: [id number]     Primary Oncologist: Latrice Richardson MD  Referring Provider: Jett Garrison MD     Date of Service: 3/31/2021      Chief Complaint:   Chief Complaint   Patient presents with    Follow-up    Results     Patient Active Problem List:     Rectal cancer     HPI:   Brad AguileraChase Foss is a 73-year-old very pleasant gentleman, with significant problem of not been able to hear, does most of the history obtained from his brother who had accompanied him to the office. He had been having the symptoms of significant lower suprapubic and anorectal area discomfort, since 2018, with gradual increase in intensity. Lately affecting his day-to-day life significantly. Also significant issue with progressive constipation, lately just having passage of some watery stools along with blood. Colonoscopy per Dr. Margarita Cantrell on 2018 noted to have a large hard polypoid appearing friable obstructing lesion noted in the rectum starting immediately above the anorectal junction moderate to large amount of solid and semisolid stools was noted throughout the colon therefore the mucosa could not be examined entirely. Pathology from the rectal mass revealed invasive adenocarcinoma. Earlier Mr. Mr. Yancy Foss was admitted to the hospital in on 2018 with chest and abdominal pain at that time she had undergone CTA of the chest abdomen and pelvis in the chest there was no obvious lesions CTA of the abdomen and pelvis again revealed a large amount of stool no pathologic enlarged lymph nodes were noted there was redemonstration of chronic appearing dissection of the infrarenal abdominal aorta extending to the aortic bifurcation. .    He had also undergone CT scan of the abdomen and pelvis with IV contrast on 2018 was noted to have around 5.6 cm infrarenal abdominal aortic dissection spanning a length of 5.6 cm moderate to severe atherosclerotic plaque changes were noted slightly and enlarged bilateral adrenal glands were noted also described was changes of anasarca. Visit here on November 1, 2018 I had long discussion with him and his brother. I also discussed with Dr. Nicko Delacruz. .  Basically large primary adenocarcinoma of the rectum with near complete obstruction. Quite uncomfortable. Would be difficult to proceed with neoadjuvant chemotherapy and radiation therapy. We could consider doing diverting colostomy first followed by definitive treatment for carcinoma of the rectum with chemotherapy radiation followed by surgery. We also would need to have port being placed. It might also be worthwhile to have vascular surgeon see him regarding the changes noted on the abdominal aorta. He was hospitalized at [facility] November 5, 2018. In the hospital was seen by cardiothoracic surgeon Dr. Elis Ornelas and advised him to follow up with him on an outpatient basis. He was also seen by a surgeon Dr. Ariana Aiken and did undergo a diverting colostomy no obvious abdominal metastases and/or liver metastases were noted. Prior to discharge from the hospital he also underwent placement of Mediport per Dr. Ariana Aiken . Blood work done in the hospital had revealed creatinine of 0.7 albumin of 3.0. Normal liver function studies hemoglobin 10.7 g% ferritin 165 and CEA of 9.5. MRI of the pelvis done on November 21, 2018 revealed 7 x 6.6 x 9.6 cm rectosigmoid mass was difficult to evaluate the muscularis propria given the lack of small field-of-view and rectal contrast but the mass seemed to be extending to the mesorectal fascia and possibly toward the peritoneal reflection finding favoring at least T3 lesion giving its size. Also at least 1.3 cm left perirectal lymph node suspicious for metastatic disease there may be second 0.9 cm presacral lymph node at least was felt to have an N1 disease.     He subsequently was started on 5-fluorouracil infusion on December 10, 2018 and a few days later was also started on pelvic radiation therapy. He finished receiving radiation therapy treatments on January 23, 2019 and finished receiving chemotherapy treatments also of the week of January 14, 2019. He overall I think tolerated these treatments fairly and over the few months time his general condition actually did improve significantly he was able to eat more and denied any more pelvic pain. When seen in the office on February 26, 2019 he and his brother were overall quite pleased with his progress. MRI of the pelvis done on February 11, 2019 per Dr. Hamilton Route had revealed decreasing size of the large circumferential rectal mass still though fairly large mass persists and mass did about peritoneal reflection and mesorectal fascia no discrete pathologically enlarged lymph nodes were noted. He did undergo robotic assisted laparoscopic low anterior colon resection, splenic flexure mobilization, loop ileostomy and rigid proctoscopy per Dr. Joy Dillon on March 13, 2019. Pathology revealed residual very rare microscopic foci of adenocarcinoma and mucinous pool at the site of for neoplasm post neoadjuvant therapy effect. 20 mesenteric lymph nodes were negative for metastatic disease. Overall tumor size could not  assessed because of significant treatment effect mesorectal envelope though was quite incomplete with defect overall invasive moderate to high-grade adenocarcinoma per initial a biopsy report tumor cells were noted in the subserosal soft tissue deeper to the muscularis propria at least T3 difficult to  margins lymphovascular perineural invasion is were not noted. Overall pathology [id number]. Visit here on May 7, 2019 I did spend time with him and his brother. We talked about MRI findings prior to surgery had revealed a large local disease as well as the presence of lymph nodes disease.   We are talked about him benefiting significantly with neoadjuvant chemotherapy as well as radiation. We did talk about her considering the original presentation and having had significant benefit from the treatment still would like for him to receive additional adjuvant chemotherapy. We might consider giving him 4 months of treatment with FOLFOX type regimen but may hold off oxaliplatin after the first couple months he and his brother were were comfortable. May 16, 2019 he did undergo a sigmoidoscopy, was noted to have normal anastomosis. Subsequently on May 21, 2019 he did undergo open ileostomy reversal with small bowel resection. Tolerated the procedure well. June 20, 2019 we did talk about starting him back on adjuvant therapy with FOLFOX type regimen and may be stopping oxaliplatin after couple of months of the treatment. He was comfortable with that approach. But when seen in the office for the treatment on July 10 he did not want to spend 3 hours here and actually wanted to hold off any further treatments. He was subsequently advised to see me on July 23. Visit here on July 23 he was doing fairly well. Bowels and bladder were functioning fair appetite was fair no significant abdominal or pelvic pain. He was accompanied to the office by his brother    He finally agreed to have chemotherapy and he received his first cycle of FOLFOX on 7/31/2019. After first treatment, he seems to move to [place] to live with her daughter. He didn't receive any more therapy after that. CT scan of the chest, abdomen and pelvis done on November 16, 2020 showed postsurgical changes from distal rectal resection with a colorectal anastomosis. However, exam is otherwise significantly limited of the pelvis and rectum due to extensive streak artifact related to bilateral hip arthroplasties. Otherwise, stable exam of the chest and abdomen with no new findings of metastatic disease. Bilateral adrenal adenomas.     CT scan of the abdomen and pelvis done on January 21, 2021 showed benign adrenal adenomas. Postsurgical changes are seen from a previous rectal surgery with the surgical anastomosis noted. No definite acute process or metastatic disease in the pelvis, though evaluation limited secondary to extensive streak artifact. On March 31, 2021, he presented to follow up. I have been following him for clinical stage IIIB rectal adenocarcinoma and he is status post neoadjuvant chemoradiation therapy, followed by surgery on 3/13/2019. He received one therapy with FOLFOX after surgery on 7/31/2019. He didn't receive any more therapy after that. He missed follow up with us after 7/31/2019, until 10/26/2020. He had sigmoidoscopy in 5/16/2019 by Dr. Suresh Wadsworth before colostomy reversal.     On today visit, I reviewed with him findings of CT scan of the abdomen and pelvis done on 1/21/2021. I also reviewed with him findings on laboratory test.  His CEA level was 3.7 on 3/4/21, 3.9 on on October 26, 2020. There is no apparent sign of recurrent or metastatic disease seen on CT scan. He is quite asymptomatic and I recommend to continue with close observation. I will repeat CBC, CMP and CEA level in 4 months and I will see him again after that. I will consider to have repeat scans in 1/2022. I will also consider him to have repeat colonoscopy in 2022. He does not have any significant new symptoms at today visit. Review of Systems: \"Per interval history; otherwise 10 point ROS is negative. \"  His energy level is stable, appetite and sleep are pretty good. He denies fever, chills, night sweats, cough, shortness of breath, chest pain, hemoptysis or palpitations. He denies neuropathy and he doesn't have bleeding or clotting issues. He denies any pain on today visit. His bowel and bladder functions are normal. He doesn't have nausea, vomiting, abdominal pain, diarrhea, constipation, dysuria, loss of appetite, or weight loss. Denies anxiety or depression.  The rest of the systems are unremarkable. Vital Signs:  BP (!) 143/89 (Site: Right Upper Arm, Position: Sitting, Cuff Size: Medium Adult)   Pulse 74   Temp 97.8 °F (36.6 °C) (Temporal)   Ht 5' 11\" (1.803 m)   Wt 131 lb 6.4 oz (59.6 kg)   SpO2 99%   BMI 18.33 kg/m²     Physical Exam:  CONSTITUTIONAL: awake, no apparent distress, alert, cooperative,    EYES: pupils equal, round and reactive to light, sclera clear and conjunctiva normal  ENT: Normocephalic, atraumatic, without obvious abnormality,  NECK: supple, symmetrical, no jugular venous distension and no carotid bruits   HEMATOLOGIC/LYMPHATIC: no cervical, supraclavicular or axillary lymphadenopathy   LUNGS: VBS, no rhonchi, no increased work of breathing, no wheezes, no crackles, clear to auscultation   CARDIOVASCULAR: normal S1 and S2, regular rate and rhythm, no murmur noted  ABDOMEN: soft, non-distended, normal bowel sounds x 4, non-tender, no masses palpated, no hepatosplenomegaly, previous scar noted. MUSCULOSKELETAL: full range of motion noted, tone is normal  NEUROLOGIC: awake, alert, oriented to name, place and time. Motor skills grossly intact. SKIN: Normal skin color, texture, turgor and no jaundice.  appears intact   EXTREMITIES: no clubbing, no leg swelling, no LE edema, no cyanosis,      Labs:  Hematology:  Lab Results   Component Value Date    WBC 4.9 03/04/2021    RBC 5.40 03/04/2021    HGB 14.7 03/04/2021    HCT 43.5 03/04/2021    MCV 80.6 03/04/2021    MCH 27.2 03/04/2021    MCHC 33.8 03/04/2021    RDW 16.1 (H) 03/04/2021     03/04/2021    MPV 9.4 03/04/2021    SEGSPCT 59.0 03/04/2021    EOSRELPCT 3.3 (H) 03/04/2021    BASOPCT 0.6 03/04/2021    LYMPHOPCT 25.1 03/04/2021    MONOPCT 12.0 (H) 03/04/2021    SEGSABS 2.9 03/04/2021    EOSABS 0.2 03/04/2021    BASOSABS 0.0 03/04/2021    LYMPHSABS 1.2 03/04/2021    MONOSABS 0.6 03/04/2021    DIFFTYPE AUTOMATED DIFFERENTIAL 03/04/2021    POLYCHROM 1+ 02/23/2014    PLTM PLATELETS APPEAR NORMAL 02/23/2014     No results found for: ESR  Chemistry:  Lab Results   Component Value Date     03/04/2021    K 4.6 03/04/2021     03/04/2021    CO2 26 03/04/2021    BUN 8 03/04/2021    CREATININE 1.1 03/04/2021    GLUCOSE 82 03/04/2021    CALCIUM 9.0 03/04/2021    PROT 6.8 03/04/2021    LABALBU 4.1 03/04/2021    BILITOT 0.3 03/04/2021    ALKPHOS 82 03/04/2021    AST 15 03/04/2021    ALT 11 03/04/2021    LABGLOM >60 03/04/2021    GFRAA >60 03/04/2021    MG 2.1 09/28/2018    POCGLU 127 (H) 07/17/2013     No results found for: MMA, LDH, HOMOCYSTEINE  No components found for: LD  Lab Results   Component Value Date    TSHHS 1.980 09/28/2018    T4FREE 1.07 09/28/2018     Immunology:  Lab Results   Component Value Date    PROT 6.8 03/04/2021     No results found for: Germaine Holcomba, KLFLCR  No results found for: B2M  Coagulation Panel:  Lab Results   Component Value Date    PROTIME 14.7 (H) 11/03/2018    INR 1.27 11/03/2018    APTT 29.1 09/28/2018     Anemia Panel:  No results found for: TLDHGLRN81, FOLATE  Tumor Markers:  Lab Results   Component Value Date    CEA 3.7 03/04/2021    PSA 1.28 12/09/2013     Observations:  No data recorded     Assessment & Plan:   He had been having the symptoms of significant lower suprapubic and anorectal area discomfort, since July 2018, with gradual increase in intensity. Lately affecting his day-to-day life significantly. Also significant issue with progressive constipation, lately just having passage of some watery stools along with blood. Colonoscopy per Dr. Loyda Kowalski on October 25, 2018 noted to have a large hard polypoid appearing friable obstructing lesion noted in the rectum starting immediately above the anorectal junction moderate to large amount of solid and semisolid stools was noted throughout the colon therefore the mucosa could not be examined entirely. Pathology from the rectal mass revealed invasive adenocarcinoma.     Visit here on November 1, 2018 I had long discussion with him and his brother. I also discussed with Dr. Sebastian Carr. .  Basically large primary adenocarcinoma of the rectum with near complete obstruction. Quite uncomfortable. Would be difficult to proceed with neoadjuvant chemotherapy and radiation therapy. We could consider doing diverting colostomy first followed by definitive treatment for carcinoma of the rectum with chemotherapy radiation followed by surgery. We also would need to have port being placed. He was hospitalized at [facility] November 5, 2018. In the hospital was seen by cardiothoracic surgeon Dr. Maggie Rogel and advised him to follow up with him on an outpatient basis. He was also seen by a surgeon Dr. Stepan Juan and did undergo a diverting colostomy no obvious abdominal metastases and/or liver metastases were noted. Prior to discharge from the hospital he also underwent placement of Mediport per Dr. Stepan Juan . Blood work done in the hospital had revealed creatinine of 0.7 albumin of 3.0. Normal liver function studies hemoglobin 10.7 g% ferritin 165 and CEA of 9.5. Visit here on November 13, 2018 I did spend time again with his brother and him. We talked about need for proceeding with possibly neoadjuvant chemotherapy and radiation therapy for large obstructing lesion involving the rectum. At the time of surgery there was no obvious distant disease but for better staging we still may do MRI of the pelvic area.   We will go ahead and make arrangements for him to be seen by radiation oncologist and set up visit with nurse navigator regarding discussion for continuous 5-fluorouracil infusion along with radiation therapy    MRI of the pelvis done on November 21, 2018 revealed 7 x 6.6 x 9.6 cm rectosigmoid mass was difficult to evaluate the muscularis propria given the lack of small field-of-view and rectal contrast but the mass seemed to be extending to the mesorectal fascia and possibly toward the peritoneal reflection finding favoring at least T3 lesion giving its size. Also at least 1.3 cm left perirectal lymph node suspicious for metastatic disease there may be second 0.9 cm presacral lymph node at least was felt to have an N1 disease    He subsequently was started on 5-fluorouracil  infusion on December 10, 2018 and a few days later was also started on pelvic radiation therapy. He finished receiving radiation therapy treatments on January 23, 2019 and finished receiving chemotherapy treatments also of the week of January 14, 2019. He overall I think tolerated these treatments fairly and over the few months time his general condition actually did improve significantly he was able to eat more and denied any more pelvic pain. When seen in the office on February 26, 2019 he and his brother were overall quite pleased with his progress. MRI of the pelvis done on February 11, 2019 per Dr. Natalia Pham had revealed decreasing size of the large circumferential rectal mass still though fairly large mass persists and mass did about peritoneal reflection and mesorectal fascia no discrete pathologically enlarged lymph nodes were noted. He did undergo robotic assisted laparoscopic low anterior colon resection, splenic flexure mobilization, loop ileostomy and rigid proctoscopy per Dr. Natalia Pham on March 13, 2019. Pathology revealed residual very rare microscopic foci of adenocarcinoma and mucinous pool at the site of for neoplasm post neoadjuvant therapy effect. 20 mesenteric lymph nodes were negative for metastatic disease. Overall tumor size could not  assessed because of significant treatment effect mesorectal envelope though was quite incomplete with defect overall invasive moderate to high-grade adenocarcinoma per initial a biopsy report tumor cells were noted in the subserosal soft tissue deeper to the muscularis propria at least T3 difficult to  margins lymphovascular perineural invasion is were not noted.   Overall pathology [id number]. Visit here on May 7, 2019 I did spend time with him and his brother. We talked about MRI findings prior to surgery had revealed a large local disease as well as the presence of lymph nodes disease. We are talked about him benefiting significantly with neoadjuvant chemotherapy as well as radiation. We did talk about her considering the original presentation and having had significant benefit from the treatment still would like for him to receive additional adjuvant chemotherapy. We might consider giving him 4 months of treatment with FOLFOX type regimen but may hold off oxaliplatin after the first couple months he and his brother were were comfortable. May 16, 2019 he did undergo a sigmoidoscopy, was noted to have normal anastomosis. Subsequently on May 21, 2019 he did undergo open ileostomy reversal with small bowel resection. Tolerated the procedure well. June 20, 2019 we did talk about starting him back on adjuvant therapy with FOLFOX type regimen and may be stopping oxaliplatin after couple of months of the treatment. He was comfortable with that approach. But when seen in the office for the treatment on July 10 he did not want to spend 3 hours here and actually wanted to hold off any further treatments. He was subsequently advised to see me on July 23. He finally agreed to have chemotherapy and he received his first cycle of FOLFOX on 7/31/2020. After first treatment, he seems to move to [facility] to live with her daughter. He didn't receive any more therapy after that. CT scan of the chest, abdomen and pelvis done on November 16, 2020 showed postsurgical changes from distal rectal resection with a colorectal anastomosis. However, exam is otherwise significantly limited of the pelvis and rectum due to extensive streak artifact related to bilateral hip arthroplasties. Otherwise, stable exam of the chest and abdomen with no new findings of metastatic disease. Bilateral adrenal adenomas. CT scan of the abdomen and pelvis done on January 21, 2021 showed benign adrenal adenomas. Postsurgical changes are seen from a previous rectal surgery with the surgical anastomosis noted. No definite acute process or metastatic disease in the pelvis, though evaluation limited secondary to extensive streak artifact. On March 31, 2021, he presented to follow up. I have been following him for clinical stage IIIB rectal adenocarcinoma and he is status post neoadjuvant chemoradiation therapy, followed by surgery on 3/13/2019. He received one therapy with FOLFOX after surgery on 7/31/2019. He didn't receive any more therapy after that. He missed follow up with us after 7/31/2019, until 10/26/2020. He had sigmoidoscopy in 5/16/2019 by Dr. Stepan Juan before colostomy reversal.     On today visit, I reviewed with him findings of CT scan of the abdomen and pelvis done on 1/21/2021. I also reviewed with him findings on laboratory test.  His CEA level was 3.7 on 3/4/21, 3.9 on on October 26, 2020. There is no apparent sign of recurrent or metastatic disease seen on CT scan. He is quite asymptomatic and I recommend to continue with close observation. I will repeat CBC, CMP and CEA level in 4 months and I will see him again after that. I will consider to have repeat scans in 1/2022. I will also consider him to have repeat colonoscopy in 2022. Recent imaging and labs were reviewed and discussed with the patient.

## 2021-03-31 NOTE — PROGRESS NOTES
MA Rooming Questions  Patient: Pedro Ball  MRN: [id number]    Date: 3/31/2021        1. Do you have any new issues? yes - pain in stomach has been going on for 3 weeks. 2. Do you need any refills on medications?    no    3. Have you had any imaging done since your last visit? yes - labs 03/04    4. Have you been hospitalized or seen in the emergency room since your last visit here?   yes - January     5. Did the patient have a depression screening completed today?  No    No data recorded     PHQ-9 Given to (if applicable):               PHQ-9 Score (if applicable):                     [] Positive     []  Negative              Does question #9 need addressed (if applicable)                     [] Yes    []  No               Lisa Castellon MA

## 2021-04-01 ENCOUNTER — OFFICE VISIT (OUTPATIENT)
Dept: SURGERY | Age: 66
End: 2021-04-01
Payer: COMMERCIAL

## 2021-04-01 VITALS
HEIGHT: 71 IN | OXYGEN SATURATION: 96 % | TEMPERATURE: 97.3 F | BODY MASS INDEX: 18.98 KG/M2 | HEART RATE: 86 BPM | WEIGHT: 135.6 LBS | DIASTOLIC BLOOD PRESSURE: 86 MMHG | SYSTOLIC BLOOD PRESSURE: 122 MMHG

## 2021-04-01 DIAGNOSIS — D49.0 RECTAL TUMOR: Primary | ICD-10-CM

## 2021-04-01 PROCEDURE — 3017F COLORECTAL CA SCREEN DOC REV: CPT | Performed by: SURGERY

## 2021-04-01 PROCEDURE — 1036F TOBACCO NON-USER: CPT | Performed by: SURGERY

## 2021-04-01 PROCEDURE — 99213 OFFICE O/P EST LOW 20 MIN: CPT | Performed by: SURGERY

## 2021-04-01 PROCEDURE — G8427 DOCREV CUR MEDS BY ELIG CLIN: HCPCS | Performed by: SURGERY

## 2021-04-01 PROCEDURE — G8420 CALC BMI NORM PARAMETERS: HCPCS | Performed by: SURGERY

## 2021-04-01 RX ORDER — LOPERAMIDE HYDROCHLORIDE 2 MG/1
2 CAPSULE ORAL 2 TIMES DAILY
Qty: 60 CAPSULE | Refills: 1 | Status: SHIPPED | OUTPATIENT
Start: 2021-04-01 | End: 2021-04-11

## 2021-04-01 ASSESSMENT — ENCOUNTER SYMPTOMS
CONSTIPATION: 0
CHOKING: 0
ANAL BLEEDING: 0
COLOR CHANGE: 0
DIARRHEA: 1
PHOTOPHOBIA: 0
STRIDOR: 0
EYE REDNESS: 0
APNEA: 0
EYE ITCHING: 0
RECTAL PAIN: 0
BACK PAIN: 0
SORE THROAT: 0

## 2021-04-01 NOTE — PROGRESS NOTES
Chief Complaint   Patient presents with    Follow-up     F/U PP 2019 abd pain S/P colostomy Ileodtomy/Reversal @ [facility] 5/21/19         SUBJECTIVE:  HPI: Patient is here with complaints of diarrhea. Pt s/p LAR with reversal for rectal cancer. Overall patient states that he is feeling well. He does have occasional abdominal pain on the left side. Patient denies any fever or chills. He was seen in the ER on 21 January for this. CT scan was obtained. Impression   No ileus, obstruction, or other acute bowel process is seen.  Minimal stool   volume noted within the colon. The appendix is not clearly identified, though   no acute appendicitis is seen.       Benign adrenal adenomas.       Postsurgical changes are seen from a previous rectal surgery with a surgical   anastomosis noted.  No definite acute process or metastatic disease in the   pelvis, though evaluation limited secondary to extensive streak artifact.       Postoperative changes are seen involving the distal abdominal aorta,   unchanged from the previous examination.             During his initial intersection in March 2019 the pathology showed  Final Pathologic Diagnosis:   Noman Kitchen, rectosigmoid; resection:   -     Residual very rare microscopic foci of adenocarcinoma   in mucinous pools, noted at   [place]   the site of neoplasm (post neoadjuvant therapy effect).    See comment. -     Twenty mesenteric lymph nodes are negative for   metastatic carcinoma (0/20). I have reviewed the patient's(pertinent information to this visit) medical history, family history(scanned in  the [address] [place] [address] under \"patient questioner\"), social history and review of systems with the patient today in the office.             Past Surgical History:   Procedure Laterality Date    COLOSTOMY N/A 11/2/2018    DIVERTING COLOSTOMY PLACEMENT LAPAROSCOPIC performed by Rodri Roman MD at / [facility] 81 Bilateral early 2010's    hips    AL INSJ PRPH CTR VAD W/SUBQ PORT AGE 5 YR/> N/A 11/6/2018    PORT INSERTION performed by Gill Berumen MD at [address] N/A 5/13/2019    [address] performed by Gill Berumen MD at [address] N/A 3/13/2019    BOWEL RESECTION LOW ANTERIOR LAPAROSCOPIC ROBOTIC WITH DIVERTING LOOP ILEOSTOMY performed by Gill Berumen MD at [address] N/A 5/21/2019    COLOSTOMY ILEOSTOMY TAKEDOWN/REVERSAL performed by Gill Berumen MD at [address], [place]. \[facility].\"" OR     Past Medical History:   Diagnosis Date    Cancer [facility])     Rectal cancer--treated with both chemo and radiation--finished 01/2019    [facility]ostomy present ([facility] [address]     Profound hearing loss of both ears     no hearing in right ear --very little in left    PVD (peripheral vascular disease) ([facility] [address]      Family History   Problem Relation Age of Onset    Dementia Mother     Early Death Father     High Blood Pressure Father     Early Death Sister     Diabetes Brother      Social History     Socioeconomic History    Marital status: Single     Spouse name: Not on file    Number of children: Not on file    Years of education: Not on file    Highest education level: Not on file   Occupational History    Not on file   Social Needs    Financial resource strain: Not on file    Food insecurity     Worry: Not on file     Inability: Not on file    Transportation needs     Medical: Not on file     Non-medical: Not on file   Tobacco Use    Smoking status: Never Smoker    Smokeless tobacco: Never Used   Substance and Sexual Activity    Alcohol use: No    Drug use: No    Sexual activity: Not on file   Lifestyle    Physical activity     Days per week: Not on file     Minutes per session: Not on file    Stress: Not on file   Relationships    Social connections     Talks on phone: Not on file     Gets together: Not on file     Attends Hoahaoism service: Not on file     Active member of club or organization: Not on file     Attends meetings of clubs or organizations: Not on file     Relationship status: Not on file    Intimate partner violence     Fear of current or ex partner: Not on file     Emotionally abused: Not on file     Physically abused: Not on file     Forced sexual activity: Not on file   Other Topics Concern    Not on file   Social History Narrative    Not on file       Current Outpatient Medications   Medication Sig Dispense Refill    loperamide (RA ANTI-DIARRHEAL) 2 MG capsule Take 1 capsule by mouth 2 times daily for 10 days 60 capsule 1    ondansetron (ZOFRAN ODT) 4 MG disintegrating tablet Take 1 tablet by mouth every 8 hours as needed for Nausea 15 tablet 0    dicyclomine (BENTYL) 10 MG capsule Take 1 capsule by mouth 3 times daily As needed for abdominal pain 15 capsule 3    Nutritional Supplements (NUTRITIONAL SUPPLEMENT PLUS) LIQD Take 237 mLs by mouth 3 times daily (with meals) 90 Can 0     No current facility-administered medications for this visit. Allergies   Allergen Reactions    Oxycodone Anaphylaxis    Ibuprofen        Review of Systems:         Review of Systems   Constitutional: Negative for chills and fever. HENT: Negative for ear pain, mouth sores, sore throat and tinnitus. Eyes: Negative for photophobia, redness and itching. Respiratory: Negative for apnea, choking and stridor. Cardiovascular: Negative for chest pain and palpitations. Gastrointestinal: Positive for diarrhea. Negative for anal bleeding, constipation and rectal pain. Endocrine: Negative for polydipsia. Genitourinary: Negative for enuresis, flank pain and hematuria. Musculoskeletal: Negative for back pain, joint swelling and myalgias. Skin: Negative for color change and pallor. Allergic/Immunologic: Negative for environmental allergies. Neurological: Negative for syncope and speech difficulty. Psychiatric/Behavioral: Negative for confusion and hallucinations.          OBJECTIVE:  Physical Exam:    /86   Pulse 86 Temp 97.3 °F (36.3 °C)   Ht 5' 11\" (1.803 m)   Wt 135 lb 9.6 oz (61.5 kg)   SpO2 96%   BMI 18.91 kg/m²      Physical Exam  Constitutional:       Appearance: He is well-developed. HENT:      Head: Normocephalic. Eyes:      Pupils: Pupils are equal, round, and reactive to light. Neck:      Musculoskeletal: Normal range of motion and neck supple. Cardiovascular:      Rate and Rhythm: Normal rate. Pulmonary:      Effort: Pulmonary effort is normal.   Abdominal:      General: There is no distension. Palpations: Abdomen is soft. There is no mass. Tenderness: There is no abdominal tenderness. There is no guarding or rebound. Musculoskeletal: Normal range of motion. Skin:     General: Skin is warm. Neurological:      Mental Status: He is alert and oriented to person, place, and time. ASSESSMENT:  1. Rectal tumor          PLAN:  Treatment: Patient will take Imodium for the diarrhea. He will need colonoscopy soon. Follow-up in 3 months. Patient counseled on risks, benefits, and alternatives of treatment plan at length today. Patient states an understanding and willingness to proceed with plan. No orders of the defined types were placed in this encounter. Orders Placed This Encounter   Medications    loperamide (RA ANTI-DIARRHEAL) 2 MG capsule     Sig: Take 1 capsule by mouth 2 times daily for 10 days     Dispense:  60 capsule     Refill:  1        Follow Up:  No follow-ups on file.       Juanpablo Orozco MD

## 2021-06-04 ENCOUNTER — HOSPITAL ENCOUNTER (EMERGENCY)
Age: 66
Discharge: HOME | End: 2021-06-04
Attending: EMERGENCY MEDICINE
Payer: COMMERCIAL

## 2021-06-04 ENCOUNTER — APPOINTMENT (OUTPATIENT)
Dept: CT IMAGING | Age: 66
End: 2021-06-04
Payer: COMMERCIAL

## 2021-06-04 VITALS
DIASTOLIC BLOOD PRESSURE: 90 MMHG | WEIGHT: 135 LBS | SYSTOLIC BLOOD PRESSURE: 156 MMHG | TEMPERATURE: 98.1 F | OXYGEN SATURATION: 99 % | RESPIRATION RATE: 12 BRPM | BODY MASS INDEX: 18.9 KG/M2 | HEART RATE: 64 BPM | HEIGHT: 71 IN

## 2021-06-04 DIAGNOSIS — F14.90 COCAINE USE: ICD-10-CM

## 2021-06-04 DIAGNOSIS — R40.4 TRANSIENT ALTERATION OF AWARENESS: Primary | ICD-10-CM

## 2021-06-04 DIAGNOSIS — S00.01XA ABRASION OF SCALP, INITIAL ENCOUNTER: ICD-10-CM

## 2021-06-04 DIAGNOSIS — F12.90 MARIJUANA USE: ICD-10-CM

## 2021-06-04 DIAGNOSIS — S09.90XA CLOSED HEAD INJURY, INITIAL ENCOUNTER: ICD-10-CM

## 2021-06-04 LAB
ALBUMIN SERPL-MCNC: 4.3 GM/DL (ref 3.4–5)
ALP BLD-CCNC: 77 IU/L (ref 40–129)
ALT SERPL-CCNC: 16 U/L (ref 10–40)
AMPHETAMINES: NEGATIVE
ANION GAP SERPL CALCULATED.3IONS-SCNC: 7 MMOL/L (ref 4–16)
APTT: 27.2 SECONDS (ref 25.1–37.1)
AST SERPL-CCNC: 20 IU/L (ref 15–37)
BACTERIA: ABNORMAL /HPF
BARBITURATE SCREEN URINE: NEGATIVE
BASOPHILS ABSOLUTE: 0 K/CU MM
BASOPHILS RELATIVE PERCENT: 0.6 % (ref 0–1)
BENZODIAZEPINE SCREEN, URINE: NEGATIVE
BILIRUB SERPL-MCNC: 0.3 MG/DL (ref 0–1)
BILIRUBIN URINE: NEGATIVE MG/DL
BLOOD, URINE: NEGATIVE
BUN BLDV-MCNC: 12 MG/DL (ref 6–23)
CALCIUM SERPL-MCNC: 8.7 MG/DL (ref 8.3–10.6)
CANNABINOID SCREEN URINE: ABNORMAL
CHLORIDE BLD-SCNC: 102 MMOL/L (ref 99–110)
CLARITY: CLEAR
CO2: 27 MMOL/L (ref 21–32)
COCAINE METABOLITE: ABNORMAL
COLOR: YELLOW
CREAT SERPL-MCNC: 1.1 MG/DL (ref 0.9–1.3)
DIFFERENTIAL TYPE: ABNORMAL
EOSINOPHILS ABSOLUTE: 0.1 K/CU MM
EOSINOPHILS RELATIVE PERCENT: 2 % (ref 0–3)
GFR AFRICAN AMERICAN: >60 ML/MIN/1.73M2
GFR NON-AFRICAN AMERICAN: >60 ML/MIN/1.73M2
GLUCOSE BLD-MCNC: 103 MG/DL (ref 70–99)
GLUCOSE BLD-MCNC: 107 MG/DL (ref 70–99)
GLUCOSE, URINE: NEGATIVE MG/DL
GRANULAR CASTS: 3 /LPF
HCT VFR BLD CALC: 44.5 % (ref 42–52)
HEMOGLOBIN: 14.2 GM/DL (ref 13.5–18)
IMMATURE NEUTROPHIL %: 0 % (ref 0–0.43)
INR BLD: 1 INDEX
KETONES, URINE: NEGATIVE MG/DL
LEUKOCYTE ESTERASE, URINE: NEGATIVE
LYMPHOCYTES ABSOLUTE: 1.1 K/CU MM
LYMPHOCYTES RELATIVE PERCENT: 22.4 % (ref 24–44)
MCH RBC QN AUTO: 27.8 PG (ref 27–31)
MCHC RBC AUTO-ENTMCNC: 31.9 % (ref 32–36)
MCV RBC AUTO: 87.3 FL (ref 78–100)
MONOCYTES ABSOLUTE: 0.5 K/CU MM
MONOCYTES RELATIVE PERCENT: 9.3 % (ref 0–4)
MUCUS: ABNORMAL HPF
NITRITE URINE, QUANTITATIVE: NEGATIVE
NUCLEATED RBC %: 0 %
OPIATES, URINE: NEGATIVE
OXYCODONE: NEGATIVE
PDW BLD-RTO: 15.9 % (ref 11.7–14.9)
PH, URINE: 6 (ref 5–8)
PHENCYCLIDINE, URINE: NEGATIVE
PLATELET # BLD: 221 K/CU MM (ref 140–440)
PMV BLD AUTO: 9.5 FL (ref 7.5–11.1)
POTASSIUM SERPL-SCNC: 4.1 MMOL/L (ref 3.5–5.1)
PROTEIN UA: NEGATIVE MG/DL
PROTHROMBIN TIME: 12.1 SECONDS (ref 11.7–14.5)
RBC # BLD: 5.1 M/CU MM (ref 4.6–6.2)
RBC URINE: 1 /HPF (ref 0–3)
SEGMENTED NEUTROPHILS ABSOLUTE COUNT: 3.3 K/CU MM
SEGMENTED NEUTROPHILS RELATIVE PERCENT: 65.7 % (ref 36–66)
SODIUM BLD-SCNC: 136 MMOL/L (ref 135–145)
SPECIFIC GRAVITY UA: 1.01 (ref 1–1.03)
TOTAL IMMATURE NEUTOROPHIL: 0 K/CU MM
TOTAL NUCLEATED RBC: 0 K/CU MM
TOTAL PROTEIN: 6.9 GM/DL (ref 6.4–8.2)
TRICHOMONAS: ABNORMAL /HPF
TROPONIN T: <0.01 NG/ML
UROBILINOGEN, URINE: NEGATIVE MG/DL (ref 0.2–1)
WBC # BLD: 5 K/CU MM (ref 4–10.5)
WBC UA: 2 /HPF (ref 0–2)

## 2021-06-04 PROCEDURE — 85610 PROTHROMBIN TIME: CPT

## 2021-06-04 PROCEDURE — 80053 COMPREHEN METABOLIC PANEL: CPT

## 2021-06-04 PROCEDURE — 80307 DRUG TEST PRSMV CHEM ANLYZR: CPT

## 2021-06-04 PROCEDURE — 96374 THER/PROPH/DIAG INJ IV PUSH: CPT

## 2021-06-04 PROCEDURE — 82962 GLUCOSE BLOOD TEST: CPT

## 2021-06-04 PROCEDURE — 84484 ASSAY OF TROPONIN QUANT: CPT

## 2021-06-04 PROCEDURE — 85025 COMPLETE CBC W/AUTO DIFF WBC: CPT

## 2021-06-04 PROCEDURE — 2580000003 HC RX 258: Performed by: EMERGENCY MEDICINE

## 2021-06-04 PROCEDURE — 93005 ELECTROCARDIOGRAM TRACING: CPT | Performed by: EMERGENCY MEDICINE

## 2021-06-04 PROCEDURE — 72125 CT NECK SPINE W/O DYE: CPT

## 2021-06-04 PROCEDURE — 85730 THROMBOPLASTIN TIME PARTIAL: CPT

## 2021-06-04 PROCEDURE — 99285 EMERGENCY DEPT VISIT HI MDM: CPT

## 2021-06-04 PROCEDURE — 81001 URINALYSIS AUTO W/SCOPE: CPT

## 2021-06-04 PROCEDURE — 6360000002 HC RX W HCPCS

## 2021-06-04 PROCEDURE — 70450 CT HEAD/BRAIN W/O DYE: CPT

## 2021-06-04 RX ORDER — NALOXONE HYDROCHLORIDE 1 MG/ML
2 INJECTION INTRAMUSCULAR; INTRAVENOUS; SUBCUTANEOUS ONCE
Status: COMPLETED | OUTPATIENT
Start: 2021-06-04 | End: 2021-06-04

## 2021-06-04 RX ORDER — NALOXONE HYDROCHLORIDE 1 MG/ML
INJECTION INTRAMUSCULAR; INTRAVENOUS; SUBCUTANEOUS
Status: COMPLETED
Start: 2021-06-04 | End: 2021-06-04

## 2021-06-04 RX ORDER — 0.9 % SODIUM CHLORIDE 0.9 %
1000 INTRAVENOUS SOLUTION INTRAVENOUS ONCE
Status: COMPLETED | OUTPATIENT
Start: 2021-06-04 | End: 2021-06-04

## 2021-06-04 RX ADMIN — NALOXONE HYDROCHLORIDE 2 MG: 1 INJECTION INTRAMUSCULAR; INTRAVENOUS; SUBCUTANEOUS at 17:06

## 2021-06-04 RX ADMIN — SODIUM CHLORIDE 1000 ML: 9 INJECTION, SOLUTION INTRAVENOUS at 17:53

## 2021-06-04 RX ADMIN — NALOXONE HYDROCHLORIDE 2 MG: 1 INJECTION PARENTERAL at 17:06

## 2021-06-04 NOTE — ED NOTES
Patient given Narcan and woke up. We did not use RSI box to intubate at this time.      Win Best RN  06/04/21 [id number]

## 2021-06-04 NOTE — ED PROVIDER NOTES
Emergency Department Encounter    Patient: Jacob Lee  MRN: [id number]  : 1955  Date of Evaluation: 2021  ED Provider:  Ten Mallory MD    Critical Care: There is a high probability of clinically significant and life or limb altering change in the patient's condition that requires my immediate attention and intervention. Total critical care time not including separately reportable procedures is at least 15 minutes. Triage Chief Complaint:   Altered Mental Status      [place]:  Jacob Lee is a 60 y.o. male that presents to the emergency department with loss of consciousness. EMS was called by neighbors who saw the patient unresponsive in his front yard. Patient was found somewhat agitated at the scene, but he was not speaking or following commands. Patient was placed on backboard and inner c-collar. EMS does report abrasion and contusion to the forehead. After return to more normal mentation, patient denies drug use. He has no recollection of passing out, but he does remember mowing his lawn just prior to this episode. He currently denies any chest pain or discomfort, difficulty breathing, though he is generally uncooperative. Patient reports no other particular provocative or alleviating factors. ROS within confines of the patient's level of cooperation-see HPI, below listed is current ROS at time of my eval:  CONSTITUTIONAL: No fevers, chills, or sweats. EYES: No vision change, redness, drainage, or discharge. HENT: No sore throat, runny nose, or earache. No dental pain. No painful swallowing. RESPIRATORY: No shortness of breath, cough, or sputum production. CARDIOVASCULAR: No anginal-type chest pain, orthopnea, or edema. GASTROINTESTINAL: No nausea, vomiting, or abdominal pain. No diarrhea or constipation. No hematochezia, melena, or hematemesis. GENITOURINARY: No frequency, urgency, or dysuria. No hematuria. MUSCULOSKELETAL: No recent injury.   No neck, back, or extremity pain.  NEUROLOGICAL: No focal weakness, numbness, or tingling. SKIN: No rashes or other lesions reported. No yellowing of the skin.     Medical history:  Past Medical History:   Diagnosis Date    Cancer [facility])     Rectal cancer--treated with both chemo and radiation--finished 01/2019    Colostomy present ([facility] [address]     Profound hearing loss of both ears     no hearing in right ear --very little in left    PVD (peripheral vascular disease) (HCC)      Past Surgical History:   Procedure Laterality Date    COLOSTOMY N/A 11/2/2018    DIVERTING COLOSTOMY PLACEMENT LAPAROSCOPIC performed by Jodie Owens MD at [facility] Bilateral early 2010's    hips    VT INSJ PRPH CTR VAD W/SUBQ PORT AGE 5 YR/> N/A 11/6/2018    PORT INSERTION performed by Jodie Owens MD at [address] N/A 5/13/2019    [address] performed by Jodie Owens MD at [address]  [place] N/A 3/13/2019    BOWEL RESECTION LOW ANTERIOR LAPAROSCOPIC ROBOTIC WITH DIVERTING LOOP ILEOSTOMY performed by Jodie Owens MD at [address] N/A 5/21/2019    COLOSTOMY ILEOSTOMY TAKEDOWN/REVERSAL performed by Jodie Owens MD at [facility] OR     Family History   Problem Relation Age of Onset    Dementia Mother     Early Death Father     High Blood Pressure Father     Early Death Sister     Diabetes Brother      Social History     Socioeconomic History    Marital status: Single     Spouse name: Not on file    Number of children: Not on file    Years of education: Not on file    Highest education level: Not on file   Occupational History    Not on file   Tobacco Use    Smoking status: Never Smoker    Smokeless tobacco: Never Used   Vaping Use    Vaping Use: Never used   Substance and Sexual Activity    Alcohol use: No    Drug use: No    Sexual activity: Not on file   Other Topics Concern    Not on file   Social History Narrative    Not on file     Social Loc --       Pain Edu? --       Excl. in [address]? --        My pulse ox interpretation is -99% on nasal cannula    GENERAL: Patient is somnolent and unresponsive. Patient noted to be breathing approximately 6 times a minute. HEENT: Superficial abrasion and contusion noted in the right anterior forehead. No cranial step-off or deformity. No midface, zygomatic, maxillary, or mandibular tenderness. Poor dentition with no dental malocclusion. Pupils symmetric but pinpoint at about 1 mm. Sluggishly reactive. No redness or matting. Bilateral external ears are unremarkable. Bilateral EAC completely occluded with cerumen. No visible hemotympanum. Nasal mucosa is pink without purulence. No septal hematoma. Oral mucosa is moist and pink. There is no significant tonsillar enlargement or exudate. Uvula midline. There is no elevation of the tongue or pooling of secretions. NECK: Supple with normal range of motion. No Kernig's or Brudzinski sign. No significant lymphadenopathy. No JVD. No midline tenderness, crepitus, or deformity. RESPIRATORY: Poor respiratory effort with sonorous respirations. No audible wheezes, rales, or rhonchi. CARDIOVASCULAR: Regular rate and rhythm. No murmurs, rubs, or gallops. No central or peripheral cyanosis. GASTROINTESTINAL: Scaphoid abdomen. Soft, nontender, and nondistended. No McBurney's or Garcia's point tenderness. No other focal tenderness. No involuntary guarding, rebound, or rigidity. No mass or pulsatile mass. NEUROLOGICAL: GCS on initial exam 3. No response to noxious stimuli including stimulation of the hypopharynx, sternal rub, or deep nailbed pressure. Cranial nerves III through XII are grossly intact as tested without facial droop or dermatomal paresthesias. After return to more normal mentation, patient is seen to move all extremities spontaneously and symmetrically.  Patient does not cooperate with more refined muscular or cerebellar testing. MUSCULOSKELETAL: No asymmetric edema, Homans' sign, or cords. No tenderness or limitation range of motion to the bilateral shoulders, elbows, wrists, hips, knees, or ankles. No accompanying long bone tenderness or deformity. SKIN: Normal tone for ethnicity. Normal turgor and brisk capillary refill peripherally. No petechiae, purpura, vesicles, bullae, or other lesions. No icterus. Emergency department course. Patient is brought to bed trauma-1 and assessed and reassessed by me. After initial evaluation, patient presented with GCS of 3 with no response to deep noxious stimuli. When patient was repositioned, he did briefly reach for his head, though he then began to exhibit very poor respiratory status again. Initial steps were made to prepare for rapid sequence induction and endotracheal intubation. While materials were being gathered, he was given naloxone 2 mg IV. Within about 30 seconds, patient had a rapid return to a more normal level of consciousness. Patient is hard of hearing but appears to answer some basic questions appropriately. He does have some memory of mowing the yard, but he does not recall passing out. We have discussed the head injury and that there may be associated intracranial injury. Patient will be kept in the cervical collar and on the backboard at least until imaging is obtained. Additional orders are placed for medical screening studies including urinalysis and urine drug screen. Patient is agreeable to continuing plan. Patient has been assessed and reassessed on several occasions. He has been observed for 6 hours in the emergency department, and there has been no recurrence of obtundation, respiratory distress, or other systemic decompensation. Patient has been sleeping comfortably and awakes easily. Urine drug screen does show presumptive positive for cannabinoids and cocaine, but there were no detectable opiates.   Physical examination does not suggest evolving CVA, TIA, or a meningeal process. We have discussed options for disposition, and there does not appear to be indication for admission to the hospital.  We have discussed that his improvement following naloxone is strongly suggestive of an opiate overdose, and further use and misuse places him at risk for complications up to but not limited to disability and death. We have discussed all available results. Patient is satisfied with evaluation and agreeable to recommendations. Patient has had the opportunity to ask questions, and they have been answered to the best of my ability. Instructions are given to follow-up with primary care provider for reevaluation and further testing. Very strict return and follow-up instructions are provided. Patient seen during [facility], I did don appropriate PPE during my encounters with the patient, including n95 (when appropriate) mask and eye protection as appropriate.     I have reviewed and interpreted all of the currently available lab results from this visit (if applicable):  Results for orders placed or performed during the hospital encounter of 06/04/21   CBC Auto Differential   Result Value Ref Range    WBC 5.0 4.0 - 10.5 K/CU MM    RBC 5.10 4.6 - 6.2 M/CU MM    Hemoglobin 14.2 13.5 - 18.0 GM/DL    Hematocrit 44.5 42 - 52 %    MCV 87.3 78 - 100 FL    MCH 27.8 27 - 31 PG    MCHC 31.9 (L) 32.0 - 36.0 %    RDW 15.9 (H) 11.7 - 14.9 %    Platelets 219 494 - 544 K/CU MM    MPV 9.5 7.5 - 11.1 FL    Differential Type AUTOMATED DIFFERENTIAL     Segs Relative 65.7 36 - 66 %    Lymphocytes % 22.4 (L) 24 - 44 %    Monocytes % 9.3 (H) 0 - 4 %    Eosinophils % 2.0 0 - 3 %    Basophils % 0.6 0 - 1 %    Segs Absolute 3.3 K/CU MM    Lymphocytes Absolute 1.1 K/CU MM    Monocytes Absolute 0.5 K/CU MM    Eosinophils Absolute 0.1 K/CU MM    Basophils Absolute 0.0 K/CU MM    Nucleated RBC % 0.0 %    Total Nucleated RBC 0.0 K/CU MM    Total Immature Neutrophil 0.00 K/CU MM Immature Neutrophil % 0.0 0 - 0.43 %   Comprehensive Metabolic Panel w/ Reflex to MG   Result Value Ref Range    Sodium 136 135 - 145 MMOL/L    Potassium 4.1 3.5 - 5.1 MMOL/L    Chloride 102 99 - 110 mMol/L    CO2 27 21 - 32 MMOL/L    BUN 12 6 - 23 MG/DL    CREATININE 1.1 0.9 - 1.3 MG/DL    Glucose 107 (H) 70 - 99 MG/DL    Calcium 8.7 8.3 - 10.6 MG/DL    Albumin 4.3 3.4 - 5.0 GM/DL    Total Protein 6.9 6.4 - 8.2 GM/DL    Total Bilirubin 0.3 0.0 - 1.0 MG/DL    ALT 16 10 - 40 U/L    AST 20 15 - 37 IU/L    Alkaline Phosphatase 77 40 - 129 IU/L    GFR Non-African American >60 >60 mL/min/1.73m2    GFR African American >60 >60 mL/min/1.73m2    Anion Gap 7 4 - 16   Troponin   Result Value Ref Range    Troponin T <0.010 <0.01 NG/ML   Urinalysis, reflex to microscopic   Result Value Ref Range    Color, UA YELLOW YELLOW    Clarity, UA CLEAR CLEAR    Glucose, Urine NEGATIVE NEGATIVE MG/DL    Bilirubin Urine NEGATIVE NEGATIVE MG/DL    Ketones, Urine NEGATIVE NEGATIVE MG/DL    Specific Gravity, UA 1.013 1.001 - 1.035    Blood, Urine NEGATIVE NEGATIVE    pH, Urine 6.0 5.0 - 8.0    Protein, UA NEGATIVE NEGATIVE MG/DL    Urobilinogen, Urine NEGATIVE 0.2 - 1.0 MG/DL    Nitrite Urine, Quantitative NEGATIVE NEGATIVE    Leukocyte Esterase, Urine NEGATIVE NEGATIVE    RBC, UA 1 0 - 3 /HPF    WBC, UA 2 0 - 2 /HPF    Bacteria, UA RARE (A) NEGATIVE /HPF    Mucus, UA RARE (A) NEGATIVE HPF    Trichomonas, UA NONE SEEN NONE SEEN /HPF    Granular Casts, UA 3 /LPF   Drug screen multi urine   Result Value Ref Range    Cannabinoid Scrn, Ur UNCONFIRMED POSITIVE (A) NEGATIVE    Amphetamines NEGATIVE NEGATIVE    Cocaine Metabolite UNCONFIRMED POSITIVE (A) NEGATIVE    Benzodiazepine Screen, Urine NEGATIVE NEGATIVE    Barbiturate Screen, Ur NEGATIVE NEGATIVE    Opiates, Urine NEGATIVE NEGATIVE    Phencyclidine, Urine NEGATIVE NEGATIVE    Oxycodone NEGATIVE NEGATIVE   Protime-INR   Result Value Ref Range    Protime 12.1 11.7 - 14.5 SECONDS    INR 1.00 INDEX   APTT   Result Value Ref Range    aPTT 27.2 25.1 - 37.1 SECONDS   POCT Glucose   Result Value Ref Range    POC Glucose 103 (H) 70 - 99 MG/DL        Radiographs (if obtained):  Radiologist's Report Reviewed:  CT Head WO Contrast    Result Date: 6/4/2021  EXAMINATION: CT OF THE HEAD WITHOUT CONTRAST  6/4/2021 2:40 pm TECHNIQUE: CT of the head was performed without the administration of intravenous contrast. Dose modulation, iterative reconstruction, and/or weight based adjustment of the mA/kV was utilized to reduce the radiation dose to as low as reasonably achievable. COMPARISON: Brain MRI, 10/02/2015 Head CT, 10/02/2015 HISTORY: ORDERING SYSTEM PROVIDED HISTORY: Loss of consciousness, head injury, concern for intracranial bleeding TECHNOLOGIST PROVIDED HISTORY: Reason for exam:->Loss of consciousness, head injury, concern for intracranial bleeding Has a \"code stroke\" or \"stroke alert\" been called? ->No Decision Support Exception - unselect if not a suspected or confirmed emergency medical condition->Emergency Medical Condition (MA) Reason for Exam: Loss of consciousness, head injury, concern for intracranial bleeding Acuity: Acute Type of Exam: Initial Additional signs and symptoms: no Relevant Medical/Surgical History: none FINDINGS: BRAIN/VENTRICLES: 2 hyperdense masses are again identified adjacent to or within the posterior aspect of the septum pellucidum. The larger measures 10 mm (axial image 31), and has minimally increased in size when compared to the previous exam from 2015 (8 mm at that time). The smaller, more anterior mass has also minimally increased in size, now measuring 6 mm (previously 5 mm). No new lesions are seen. No hydronephrosis is found. No acute loss of the gray-white matter differentiation is identified to suggest acute or subacute infarct.   There is decreased attenuation within the caudal brainstem, which is most likely beam hardening artifact, but it would be difficult to exclude ischemia in that region. No hemorrhages are found within the brain parenchyma. No midline shift. There is moderate periventricular low-attenuation, most compatible with chronic small vessel ischemic disease. Intracranial vasculature is unremarkable in appearance. ORBITS: The visualized portion of the orbits demonstrate no acute abnormality. SINUSES: The visualized paranasal sinuses and mastoid air cells demonstrate no acute abnormality. SOFT TISSUES/SKULL:  No acute abnormality of the visualized skull or soft tissues. No definite acute abnormality identified. Within the caudal aspect of the brainstem, there is hypo attenuation, which is most likely artifactual.  Again it would be difficult to exclude brainstem ischemia. 2 masses associated with the posterior septum pellucidum, which on previous MRI were felt to most likely represents of appended moments. These have minimally increased in size when compared to the 2015 examination. CT CERVICAL SPINE WO CONTRAST    Result Date: 6/4/2021  EXAMINATION: CT OF THE CERVICAL SPINE WITHOUT CONTRAST 6/4/2021 5:56 pm TECHNIQUE: CT of the cervical spine was performed without the administration of intravenous contrast. Multiplanar reformatted images are provided for review. Dose modulation, iterative reconstruction, and/or weight based adjustment of the mA/kV was utilized to reduce the radiation dose to as low as reasonably achievable. COMPARISON: None.  HISTORY: ORDERING SYSTEM PROVIDED HISTORY: Loss of consciousness, head injury concern for spine injury TECHNOLOGIST PROVIDED HISTORY: Reason for exam:->Loss of consciousness, head injury concern for spine injury Decision Support Exception - unselect if not a suspected or confirmed emergency medical condition->Emergency Medical Condition (MA) Reason for Exam: Loss of consciousness, head injury, concern for intracranial bleeding Acuity: Acute Type of Exam: Initial Mechanism of Injury: Loss of consciousness, head injury, Patient has no meningeal findings or behavioral changes to suggest higher risk of meningitis, encephalitis, cerebritis, or subarachnoid hemorrhage. I doubt acute coronary syndrome, myocardial infarction, or decompensating congestive heart failure. Patient is low risk for venous thromboembolic disease including pulmonary embolism. There is no asymmetric calf pain or swelling, sustained tachycardia, hypoxia, or cyanosis. Abdominal exam does not suggest mesenteric ischemia, aortic catastrophe, urogenital, or other surgical emergency. There is no sign of other focal infection such as pneumonia or urinary tract infection including pyelonephritis. Sepsis is clinically unlikely. There is no hemodynamic instability, fever, hypoxia, cyanosis, or respiratory distress. There is no intractable vomiting. Head injury precautions are provided. Prescription for naloxone rescue kit is provided. Procedures: None. Consultations: None. Clinical Impression:  1. Transient alteration of awareness    2. Closed head injury, initial encounter    3. Abrasion of scalp, initial encounter    4. Marijuana use    5. Cocaine use      Disposition referral (if applicable):  Yamel Hoang MD  [address] [facility] [address] [phone]    Schedule an appointment as soon as possible for a visit       [facility] Emergency Department  [address] [place] [address]  [phone]  Go to   As needed, If symptoms worsen    Disposition medications (if applicable):  New Prescriptions    NALOXONE HCL (NALOXONE OPIATE OVERDOSE KIT)    1 each by Nasal route once for 1 dose     ED Provider Disposition Time  DISPOSITION Decision To Discharge 06/04/2021 10:48:30 PM      Comment: Please note this report has been produced using speech recognition software and may contain errors related to that system including errors in grammar, punctuation, and spelling, as well as words and phrases that may be inappropriate. Efforts were made to edit the dictations.         Chloe Forman MD  06/04/21 [id number]

## 2021-06-04 NOTE — ED NOTES
Spoke with patient's brother Nestor Regan [phone] with verbal approval from patient.       Matt Ibrahim RN  06/04/21 9713

## 2021-06-05 LAB
EKG ATRIAL RATE: 82 BPM
EKG DIAGNOSIS: NORMAL
EKG P AXIS: 83 DEGREES
EKG P-R INTERVAL: 156 MS
EKG Q-T INTERVAL: 360 MS
EKG QRS DURATION: 82 MS
EKG QTC CALCULATION (BAZETT): 420 MS
EKG R AXIS: -46 DEGREES
EKG T AXIS: 53 DEGREES
EKG VENTRICULAR RATE: 82 BPM

## 2021-06-05 PROCEDURE — 93010 ELECTROCARDIOGRAM REPORT: CPT | Performed by: INTERNAL MEDICINE

## 2021-06-05 NOTE — ED NOTES
Patient discharge, prescriptions, and follow up reviewed with patient, verbalizes understanding and denies questions. Patient appears in no acute distress; A+Ox4, respirations equal and unlabored, skin warm and dry. Patient with all belongings and in wheelchair from ED to ED waiting area where pt brother is picking him up.       Eitan Baker RN  06/04/21 1810

## 2021-07-23 ENCOUNTER — TELEPHONE (OUTPATIENT)
Dept: ONCOLOGY | Age: 66
End: 2021-07-23

## 2021-07-23 ENCOUNTER — HOSPITAL ENCOUNTER (OUTPATIENT)
Dept: INFUSION THERAPY | Age: 66
Discharge: HOME | End: 2021-07-23
Payer: COMMERCIAL

## 2021-07-23 DIAGNOSIS — C20 RECTAL CANCER (HCC): ICD-10-CM

## 2021-07-23 LAB
ALBUMIN SERPL-MCNC: 4 GM/DL (ref 3.4–5)
ALP BLD-CCNC: 100 IU/L (ref 40–128)
ALT SERPL-CCNC: 22 U/L (ref 10–40)
ANION GAP SERPL CALCULATED.3IONS-SCNC: 11 MMOL/L (ref 4–16)
AST SERPL-CCNC: 32 IU/L (ref 15–37)
BASOPHILS ABSOLUTE: 0 K/CU MM
BASOPHILS RELATIVE PERCENT: 0.5 % (ref 0–1)
BILIRUB SERPL-MCNC: 0.3 MG/DL (ref 0–1)
BUN BLDV-MCNC: 16 MG/DL (ref 6–23)
CALCIUM SERPL-MCNC: 9.3 MG/DL (ref 8.3–10.6)
CEA: 4.3 NG/ML
CHLORIDE BLD-SCNC: 102 MMOL/L (ref 99–110)
CO2: 26 MMOL/L (ref 21–32)
CREAT SERPL-MCNC: 1 MG/DL (ref 0.9–1.3)
DIFFERENTIAL TYPE: ABNORMAL
EOSINOPHILS ABSOLUTE: 0.1 K/CU MM
EOSINOPHILS RELATIVE PERCENT: 1.7 % (ref 0–3)
GFR AFRICAN AMERICAN: >60 ML/MIN/1.73M2
GFR NON-AFRICAN AMERICAN: >60 ML/MIN/1.73M2
GLUCOSE BLD-MCNC: 82 MG/DL (ref 70–99)
HCT VFR BLD CALC: 40.8 % (ref 42–52)
HEMOGLOBIN: 14 GM/DL (ref 13.5–18)
LYMPHOCYTES ABSOLUTE: 1.1 K/CU MM
LYMPHOCYTES RELATIVE PERCENT: 25.9 % (ref 24–44)
MCH RBC QN AUTO: 27.6 PG (ref 27–31)
MCHC RBC AUTO-ENTMCNC: 34.3 % (ref 32–36)
MCV RBC AUTO: 80.3 FL (ref 78–100)
MONOCYTES ABSOLUTE: 0.6 K/CU MM
MONOCYTES RELATIVE PERCENT: 13.7 % (ref 0–4)
PDW BLD-RTO: 16 % (ref 11.7–14.9)
PLATELET # BLD: 183 K/CU MM (ref 140–440)
PMV BLD AUTO: 9.3 FL (ref 7.5–11.1)
POTASSIUM SERPL-SCNC: 3.5 MMOL/L (ref 3.5–5.1)
RBC # BLD: 5.08 M/CU MM (ref 4.6–6.2)
SEGMENTED NEUTROPHILS ABSOLUTE COUNT: 2.5 K/CU MM
SEGMENTED NEUTROPHILS RELATIVE PERCENT: 58.2 % (ref 36–66)
SODIUM BLD-SCNC: 139 MMOL/L (ref 135–145)
TOTAL PROTEIN: 6.7 GM/DL (ref 6.4–8.2)
WBC # BLD: 4.2 K/CU MM (ref 4–10.5)

## 2021-07-23 PROCEDURE — 85025 COMPLETE CBC W/AUTO DIFF WBC: CPT

## 2021-07-23 PROCEDURE — 82378 CARCINOEMBRYONIC ANTIGEN: CPT

## 2021-07-23 PROCEDURE — 80053 COMPREHEN METABOLIC PANEL: CPT

## 2021-07-23 PROCEDURE — 36415 COLL VENOUS BLD VENIPUNCTURE: CPT

## 2021-07-23 NOTE — TELEPHONE ENCOUNTER
Call received from [facility] with [facility]. Pt needs a prescription for pullups but hasn't seen his primary care physician in a long time. They are requesting if we are willing to write that prescription. Pt hasn't been seen here in 4 months but has an apt next week. Will talk with DR Bry Valerio to see if he is willing to write script when patient comes for OV.

## 2021-07-31 ENCOUNTER — APPOINTMENT (OUTPATIENT)
Dept: CT IMAGING | Age: 66
End: 2021-07-31
Payer: COMMERCIAL

## 2021-07-31 ENCOUNTER — HOSPITAL ENCOUNTER (EMERGENCY)
Age: 66
Discharge: HOME | End: 2021-07-31
Attending: EMERGENCY MEDICINE
Payer: COMMERCIAL

## 2021-07-31 VITALS
SYSTOLIC BLOOD PRESSURE: 122 MMHG | WEIGHT: 140 LBS | OXYGEN SATURATION: 100 % | HEIGHT: 71 IN | TEMPERATURE: 97.8 F | HEART RATE: 56 BPM | BODY MASS INDEX: 19.6 KG/M2 | DIASTOLIC BLOOD PRESSURE: 85 MMHG | RESPIRATION RATE: 15 BRPM

## 2021-07-31 DIAGNOSIS — R10.84 GENERALIZED ABDOMINAL PAIN: Primary | ICD-10-CM

## 2021-07-31 DIAGNOSIS — K59.00 CONSTIPATION, UNSPECIFIED CONSTIPATION TYPE: ICD-10-CM

## 2021-07-31 LAB
ALBUMIN SERPL-MCNC: 3.9 GM/DL (ref 3.4–5)
ALP BLD-CCNC: 78 IU/L (ref 40–129)
ALT SERPL-CCNC: 21 U/L (ref 10–40)
ANION GAP SERPL CALCULATED.3IONS-SCNC: 6 MMOL/L (ref 4–16)
AST SERPL-CCNC: 25 IU/L (ref 15–37)
BASOPHILS ABSOLUTE: 0 K/CU MM
BASOPHILS RELATIVE PERCENT: 0.8 % (ref 0–1)
BILIRUB SERPL-MCNC: 0.2 MG/DL (ref 0–1)
BUN BLDV-MCNC: 11 MG/DL (ref 6–23)
CALCIUM SERPL-MCNC: 8.2 MG/DL (ref 8.3–10.6)
CHLORIDE BLD-SCNC: 103 MMOL/L (ref 99–110)
CO2: 28 MMOL/L (ref 21–32)
CREAT SERPL-MCNC: 0.8 MG/DL (ref 0.9–1.3)
DIFFERENTIAL TYPE: ABNORMAL
EOSINOPHILS ABSOLUTE: 0.1 K/CU MM
EOSINOPHILS RELATIVE PERCENT: 2.8 % (ref 0–3)
GFR AFRICAN AMERICAN: >60 ML/MIN/1.73M2
GFR NON-AFRICAN AMERICAN: >60 ML/MIN/1.73M2
GLUCOSE BLD-MCNC: 95 MG/DL (ref 70–99)
HCT VFR BLD CALC: 41.1 % (ref 42–52)
HEMOGLOBIN: 13.4 GM/DL (ref 13.5–18)
IMMATURE NEUTROPHIL %: 0.6 % (ref 0–0.43)
LACTATE: 0.7 MMOL/L (ref 0.4–2)
LIPASE: 196 IU/L (ref 13–60)
LYMPHOCYTES ABSOLUTE: 1.2 K/CU MM
LYMPHOCYTES RELATIVE PERCENT: 25 % (ref 24–44)
MCH RBC QN AUTO: 28.2 PG (ref 27–31)
MCHC RBC AUTO-ENTMCNC: 32.6 % (ref 32–36)
MCV RBC AUTO: 86.3 FL (ref 78–100)
MONOCYTES ABSOLUTE: 0.5 K/CU MM
MONOCYTES RELATIVE PERCENT: 10.6 % (ref 0–4)
NUCLEATED RBC %: 0 %
PDW BLD-RTO: 15.4 % (ref 11.7–14.9)
PLATELET # BLD: 191 K/CU MM (ref 140–440)
PMV BLD AUTO: 9.9 FL (ref 7.5–11.1)
POTASSIUM SERPL-SCNC: 3.9 MMOL/L (ref 3.5–5.1)
RBC # BLD: 4.76 M/CU MM (ref 4.6–6.2)
SEGMENTED NEUTROPHILS ABSOLUTE COUNT: 2.8 K/CU MM
SEGMENTED NEUTROPHILS RELATIVE PERCENT: 60.2 % (ref 36–66)
SODIUM BLD-SCNC: 137 MMOL/L (ref 135–145)
TOTAL IMMATURE NEUTOROPHIL: 0.03 K/CU MM
TOTAL NUCLEATED RBC: 0 K/CU MM
TOTAL PROTEIN: 6.4 GM/DL (ref 6.4–8.2)
WBC # BLD: 4.7 K/CU MM (ref 4–10.5)

## 2021-07-31 PROCEDURE — 2580000003 HC RX 258: Performed by: EMERGENCY MEDICINE

## 2021-07-31 PROCEDURE — 83605 ASSAY OF LACTIC ACID: CPT

## 2021-07-31 PROCEDURE — 99285 EMERGENCY DEPT VISIT HI MDM: CPT

## 2021-07-31 PROCEDURE — 85025 COMPLETE CBC W/AUTO DIFF WBC: CPT

## 2021-07-31 PROCEDURE — 74177 CT ABD & PELVIS W/CONTRAST: CPT

## 2021-07-31 PROCEDURE — 6360000004 HC RX CONTRAST MEDICATION: Performed by: EMERGENCY MEDICINE

## 2021-07-31 PROCEDURE — 96374 THER/PROPH/DIAG INJ IV PUSH: CPT

## 2021-07-31 PROCEDURE — 96361 HYDRATE IV INFUSION ADD-ON: CPT

## 2021-07-31 PROCEDURE — 96375 TX/PRO/DX INJ NEW DRUG ADDON: CPT

## 2021-07-31 PROCEDURE — 80053 COMPREHEN METABOLIC PANEL: CPT

## 2021-07-31 PROCEDURE — 6370000000 HC RX 637 (ALT 250 FOR IP): Performed by: EMERGENCY MEDICINE

## 2021-07-31 PROCEDURE — 6360000002 HC RX W HCPCS: Performed by: EMERGENCY MEDICINE

## 2021-07-31 PROCEDURE — 83690 ASSAY OF LIPASE: CPT

## 2021-07-31 RX ORDER — MINERAL OIL 100 G/100G
1 OIL RECTAL PRN
Status: DISCONTINUED | OUTPATIENT
Start: 2021-07-31 | End: 2021-07-31

## 2021-07-31 RX ORDER — MORPHINE SULFATE 4 MG/ML
4 INJECTION, SOLUTION INTRAMUSCULAR; INTRAVENOUS EVERY 30 MIN PRN
Status: DISCONTINUED | OUTPATIENT
Start: 2021-07-31 | End: 2021-07-31 | Stop reason: HOSPADM

## 2021-07-31 RX ORDER — ONDANSETRON 2 MG/ML
4 INJECTION INTRAMUSCULAR; INTRAVENOUS EVERY 30 MIN PRN
Status: DISCONTINUED | OUTPATIENT
Start: 2021-07-31 | End: 2021-07-31 | Stop reason: HOSPADM

## 2021-07-31 RX ORDER — SODIUM CHLORIDE, SODIUM LACTATE, POTASSIUM CHLORIDE, CALCIUM CHLORIDE 600; 310; 30; 20 MG/100ML; MG/100ML; MG/100ML; MG/100ML
1000 INJECTION, SOLUTION INTRAVENOUS ONCE
Status: COMPLETED | OUTPATIENT
Start: 2021-07-31 | End: 2021-07-31

## 2021-07-31 RX ADMIN — MAGNESIUM CITRATE 296 ML: 1.75 LIQUID ORAL at 09:05

## 2021-07-31 RX ADMIN — MORPHINE SULFATE 4 MG: 4 INJECTION, SOLUTION INTRAMUSCULAR; INTRAVENOUS at 07:10

## 2021-07-31 RX ADMIN — ONDANSETRON 4 MG: 2 INJECTION INTRAMUSCULAR; INTRAVENOUS at 07:09

## 2021-07-31 RX ADMIN — SODIUM CHLORIDE, POTASSIUM CHLORIDE, SODIUM LACTATE AND CALCIUM CHLORIDE 1000 ML: 600; 310; 30; 20 INJECTION, SOLUTION INTRAVENOUS at 07:09

## 2021-07-31 RX ADMIN — IOPAMIDOL 75 ML: 755 INJECTION, SOLUTION INTRAVENOUS at 08:18

## 2021-07-31 ASSESSMENT — PAIN DESCRIPTION - ORIENTATION: ORIENTATION: LOWER

## 2021-07-31 ASSESSMENT — PAIN DESCRIPTION - PROGRESSION: CLINICAL_PROGRESSION: GRADUALLY IMPROVING

## 2021-07-31 ASSESSMENT — PAIN DESCRIPTION - PAIN TYPE: TYPE: ACUTE PAIN

## 2021-07-31 ASSESSMENT — PAIN DESCRIPTION - DESCRIPTORS: DESCRIPTORS: ACHING;SHARP

## 2021-07-31 ASSESSMENT — PAIN DESCRIPTION - ONSET: ONSET: ON-GOING

## 2021-07-31 ASSESSMENT — PAIN DESCRIPTION - FREQUENCY: FREQUENCY: CONTINUOUS

## 2021-07-31 ASSESSMENT — PAIN DESCRIPTION - LOCATION: LOCATION: ABDOMEN

## 2021-07-31 ASSESSMENT — PAIN SCALES - GENERAL: PAINLEVEL_OUTOF10: 9

## 2021-07-31 NOTE — ED PROVIDER NOTES
Emergency Department Encounter    Patient: Mareln Parks  MRN: [id number]  : 1955  Date of Evaluation: 2021  ED Provider:  Nataliya Calderon MD    Triage Chief Complaint:   Abdominal Pain    [place]:  Marlen Parks is a 60 y.o. male that presents with complaint of abdominal pain, started about 3 to 4 days ago. He had been having some diarrhea previously but now has been constipated. He feels \"gurgling\" and noises all through his stomach and he is having more pain on the left side. He does have a history of rectal cancer, previous colostomy and had that reversed, his surgeon was  And him. He has had no fevers. No vomiting. He has had some nausea. Pain is 9 out of 10, mostly over the left side but also over his mid abdomen. No cough or fevers. No shortness of breath. He is extremely hard of hearing. Does sometimes have to read lips. He denies chest pain      ROS - see HPI, below listed is current ROS at time of my eval:  10 systems reviewed negative except as above.       Past Medical History:   Diagnosis Date    Cancer [facility])     Rectal cancer--treated with both chemo and radiation--finished 2019    [facility]     Profound hearing loss of both ears     no hearing in right ear --very little in left    PVD (peripheral vascular disease) ([facility]      Past Surgical History:   Procedure Laterality Date    COLOSTOMY N/A 2018    DIVERTING COLOSTOMY PLACEMENT LAPAROSCOPIC performed by Froilan Mckeon MD at [facility] [facility] Bilateral early 's    hips    [place] INSJ PRPH CTR VAD W/SUBQ PORT AGE 5 YR/> N/A 2018    PORT INSERTION performed by Froilan Mckeon MD at [address] 2019    [address] performed by Froilan Mckeon MD at [address]  [facility] N/A 3/13/2019    BOWEL RESECTION LOW ANTERIOR LAPAROSCOPIC ROBOTIC WITH DIVERTING LOOP ILEOSTOMY performed by Froilan Mckeon MD at [facility] 14 SURGERY N/A 5/21/2019    COLOSTOMY ILEOSTOMY TAKEDOWN/REVERSAL performed by Sebastian Santos MD at [facility] OR     Family History   Problem Relation Age of Onset    Dementia Mother     Early Death Father     High Blood Pressure Father     Early Death Sister     Diabetes Brother      Social History     Socioeconomic History    Marital status: Single     Spouse name: Not on file    Number of children: Not on file    Years of education: Not on file    Highest education level: Not on file   Occupational History    Not on file   Tobacco Use    Smoking status: Never Smoker    Smokeless tobacco: Never Used   Vaping Use    Vaping Use: Never used   Substance and Sexual Activity    Alcohol use: No    Drug use: No    Sexual activity: Not on file   Other Topics Concern    Not on file   Social History Narrative    Not on file     Social Determinants of Health     Financial Resource Strain:     Difficulty of Paying Living Expenses:    Food Insecurity:     Worried About [address] in the Last Year:     [address] in the Last Year:    Transportation Needs:     Lack of Transportation (Medical):      Lack of Transportation (Non-Medical):    Physical Activity:     Days of Exercise per Week:     Minutes of Exercise per Session:    Stress:     Feeling of Stress :    Social Connections:     Frequency of Communication with Friends and Family:     Frequency of Social Gatherings with Friends and Family:     Attends Zoroastrian Services:     Active Member of Clubs or Organizations:     Attends Club or Organization Meetings:     Marital Status:    Intimate Partner Violence:     Fear of Current or Ex-Partner:     Emotionally Abused:     Physically Abused:     Sexually Abused:      Current Facility-Administered Medications   Medication Dose Route Frequency Provider Last Rate Last Admin    morphine sulfate (PF) injection 4 mg  4 mg Intravenous Q30 Min PRN Ramsey Romeo DO   4 mg at 07/31/21 0710    ondansetron (ZOFRAN) injection 4 mg  4 mg Intravenous Q30 Min PRN Ernesto Perera DO   4 mg at 07/31/21 0709     Current Outpatient Medications   Medication Sig Dispense Refill    Incontinence Supplies MISC Use as needed for incontinence 90 each 3    ondansetron (ZOFRAN ODT) 4 MG disintegrating tablet Take 1 tablet by mouth every 8 hours as needed for Nausea 15 tablet 0    dicyclomine (BENTYL) 10 MG capsule Take 1 capsule by mouth 3 times daily As needed for abdominal pain 15 capsule 3    Nutritional Supplements (NUTRITIONAL SUPPLEMENT PLUS) LIQD Take 237 mLs by mouth 3 times daily (with meals) 90 Can 0     Allergies   Allergen Reactions    Oxycodone Anaphylaxis    Ibuprofen        Nursing Notes Reviewed    Physical Exam:  Triage VS:    ED Triage Vitals [07/31/21 0640]   Enc Vitals Group      BP (!) 156/102      Pulse 68      Resp 20      Temp 97.8 °F (36.6 °C)      Temp src       SpO2 96 %      Weight 140 lb (63.5 kg)      Height 5' 11\" (1.803 m)      Head Circumference       Peak Flow       Pain Score       Pain Loc       Pain Edu? Excl. in [address]? My pulse ox interpretation is - normal    General appearance:  No acute distress. Skin:  Warm. Dry. Eye:  Extraocular movements intact. Ears, nose, mouth and throat:  Oral mucosa moist   Neck:  Trachea midline. Extremity:  No swelling. Normal ROM     Heart:  Regular rate and rhythm, normal S1 & S2, no extra heart sounds. Perfusion:  intact  Respiratory:  Lungs clear to auscultation bilaterally. Respirations nonlabored. Abdominal:  Normal bowel sounds. Soft. Non distended. Mild tenderness palpation over abdomen, primarily over the left side of the abdomen, no specific rebound or guarding.   Well-healed scars noted  Neurological:  Alert and oriented            Psychiatric:  Appropriate    I have reviewed and interpreted all of the currently available lab results from this visit (if applicable):  Results for orders placed or performed during the hospital encounter of 07/31/21   CBC Auto Differential   Result Value Ref Range    WBC 4.7 4.0 - 10.5 K/CU MM    RBC 4.76 4.6 - 6.2 M/CU MM    Hemoglobin 13.4 (L) 13.5 - 18.0 GM/DL    Hematocrit 41.1 (L) 42 - 52 %    MCV 86.3 78 - 100 FL    MCH 28.2 27 - 31 PG    MCHC 32.6 32.0 - 36.0 %    RDW 15.4 (H) 11.7 - 14.9 %    Platelets 237 137 - 877 K/CU MM    MPV 9.9 7.5 - 11.1 FL    Differential Type AUTOMATED DIFFERENTIAL     Segs Relative 60.2 36 - 66 %    Lymphocytes % 25.0 24 - 44 %    Monocytes % 10.6 (H) 0 - 4 %    Eosinophils % 2.8 0 - 3 %    Basophils % 0.8 0 - 1 %    Segs Absolute 2.8 K/CU MM    Lymphocytes Absolute 1.2 K/CU MM    Monocytes Absolute 0.5 K/CU MM    Eosinophils Absolute 0.1 K/CU MM    Basophils Absolute 0.0 K/CU MM    Nucleated RBC % 0.0 %    Total Nucleated RBC 0.0 K/CU MM    Total Immature Neutrophil 0.03 K/CU MM    Immature Neutrophil % 0.6 (H) 0 - 0.43 %   Comprehensive Metabolic Panel w/ Reflex to MG   Result Value Ref Range    Sodium 137 135 - 145 MMOL/L    Potassium 3.9 3.5 - 5.1 MMOL/L    Chloride 103 99 - 110 mMol/L    CO2 28 21 - 32 MMOL/L    BUN 11 6 - 23 MG/DL    CREATININE 0.8 (L) 0.9 - 1.3 MG/DL    Glucose 95 70 - 99 MG/DL    Calcium 8.2 (L) 8.3 - 10.6 MG/DL    Albumin 3.9 3.4 - 5.0 GM/DL    Total Protein 6.4 6.4 - 8.2 GM/DL    Total Bilirubin 0.2 0.0 - 1.0 MG/DL    ALT 21 10 - 40 U/L    AST 25 15 - 37 IU/L    Alkaline Phosphatase 78 40 - 129 IU/L    GFR Non-African American >60 >60 mL/min/1.73m2    GFR African American >60 >60 mL/min/1.73m2    Anion Gap 6 4 - 16   Lipase   Result Value Ref Range    Lipase 196 (H) 13 - 60 IU/L   Lactic Acid, Plasma   Result Value Ref Range    Lactate 0.7 0.4 - 2.0 mMOL/L      Radiographs (if obtained):  Radiologist's Report Reviewed:  No results found.     EKG (if obtained): (All EKG's are interpreted by myself in the absence of a cardiologist)      MDM:  78-year-old male with history as above presents with concern for abdominal pain, had had diarrhea previously but now has not had a bowel movement for the last 3 days. Some nausea, no vomiting. He does have a history of rectal cancer with colostomy and reversal.  He is in no acute distress with normal vitals, labs were ordered as well as CT abdomen pelvis. He did have large stool burden, he has been constipated for the last 3 days. I gone to speak to him about doing a rectal exam to evaluate for impaction, there was question if could have a distal obstruction that was not seen well due to his hip replacement, I discussed this with him and possible enema if impaction and he was agreeable. However he then went to the bathroom after I had ordered that, and was able to poop twice and we had large harder stools. Does not appear that he is obstructed, is having bowel movements, his pain is improving, we will plan for a mag citrate dose and discharge home with close monitoring of his stools, if has any worsening of symptoms then needs to return to the ER immediately, but appears to have already started passing stools which is reassuring. He will be discharged in stable condition, encouraged follow-up with his PCP and surgeon as needed. He is comfortable with plan    Clinical Impression:  1. Generalized abdominal pain    2. Constipation, unspecified constipation type      Disposition referral (if applicable):  Bonny Gilliam MD  [address] [place] [address]   [address] [place] [address],[address]  [phone]      If symptoms worsen    [facility]  [facility] [phone]    If symptoms worsen    Disposition medications (if applicable):  Discharge Medication List as of 7/31/2021  9:15 AM        ED Provider Disposition Time  DISPOSITION Decision To Discharge 07/31/2021 09:08:26 AM      Comment: Please note this report has been produced using speech recognition software and may contain errors related to that system

## 2021-09-23 ENCOUNTER — OFFICE VISIT (OUTPATIENT)
Dept: SURGERY | Age: 66
End: 2021-09-23
Payer: COMMERCIAL

## 2021-09-23 VITALS
OXYGEN SATURATION: 98 % | HEIGHT: 71 IN | SYSTOLIC BLOOD PRESSURE: 118 MMHG | BODY MASS INDEX: 19.6 KG/M2 | DIASTOLIC BLOOD PRESSURE: 78 MMHG | HEART RATE: 62 BPM | WEIGHT: 140 LBS

## 2021-09-23 DIAGNOSIS — C20 RECTAL CANCER (HCC): Primary | ICD-10-CM

## 2021-09-23 PROCEDURE — 3017F COLORECTAL CA SCREEN DOC REV: CPT | Performed by: SURGERY

## 2021-09-23 PROCEDURE — G8420 CALC BMI NORM PARAMETERS: HCPCS | Performed by: SURGERY

## 2021-09-23 PROCEDURE — G8428 CUR MEDS NOT DOCUMENT: HCPCS | Performed by: SURGERY

## 2021-09-23 PROCEDURE — 99214 OFFICE O/P EST MOD 30 MIN: CPT | Performed by: SURGERY

## 2021-09-23 PROCEDURE — 1036F TOBACCO NON-USER: CPT | Performed by: SURGERY

## 2021-09-23 RX ORDER — SODIUM, POTASSIUM,MAG SULFATES 17.5-3.13G
2 SOLUTION, RECONSTITUTED, ORAL ORAL ONCE
Qty: 1 EACH | Refills: 0 | Status: SHIPPED | OUTPATIENT
Start: 2021-09-23 | End: 2021-12-06 | Stop reason: SDUPTHER

## 2021-09-23 RX ORDER — POLYETHYLENE GLYCOL 3350 17 G/17G
17 POWDER, FOR SOLUTION ORAL 2 TIMES DAILY
Qty: 510 G | Refills: 0 | Status: SHIPPED | OUTPATIENT
Start: 2021-09-23 | End: 2021-10-23

## 2021-09-23 NOTE — PROGRESS NOTES
Chief Complaint   Patient presents with    Follow-up     discuss cscope 4/1/21. Patient reports severe abdominal pain, as well as spitting up and diarrhea. Patient wants to be in the hospital.          SUBJECTIVE:  HPI:  Patientcomplains of abdominal pain. Pain is located in the LLQ with no radiation . The pain is described as colicky and cramping. Onset was a few days ago. Symptoms have been unchanged since. Aggravating factors: activity. Associated symptoms: diarrhea. The patient denies chills and fever. Patient is known to me for history of rectal cancer status post low anterior resection. I have reviewed the patient's(pertinent information to this visit) medical history, family history(scanned in  the Media tab under \"patient questioner\"), social history and reviewof systems with the patient today in the office.        Past Surgical History:   Procedure Laterality Date    COLOSTOMY N/A 11/2/2018    DIVERTING COLOSTOMY PLACEMENT LAPAROSCOPIC performed by Tirso Montenegro MD at [facility] Bilateral early 2010's    hips    [place] INSJ PRPH CTR VAD W/SUBQ PORT AGE 5 YR/> N/A 11/6/2018    PORT INSERTION performed by Tirso Montenegro MD at [address] 5/13/2019    [facility] performed by Tirso Montenegro MD at [address] N/A 3/13/2019    BOWEL RESECTION LOW ANTERIOR LAPAROSCOPIC ROBOTIC WITH DIVERTING LOOP ILEOSTOMY performed by Tirso Montenegro MD at [facility] N/A 5/21/2019    COLOSTOMY ILEOSTOMY TAKEDOWN/REVERSAL performed by Tirso Montenegro MD at [facility] OR     Past Medical History:   Diagnosis Date    Cancer [facility])     Rectal cancer--treated with both chemo and radiation--finished 01/2019    Colostomy present ([address]     Profound hearing loss of both ears     no hearing in right ear --very little in left    PVD (peripheral vascular disease) ([address]      Family History   Problem Relation Age of Onset    Dementia Mother  Early Death Father     High Blood Pressure Father     Early Death Sister     Diabetes Brother      Social History     Socioeconomic History    Marital status: Single     Spouse name: Not on file    Number of children: Not on file    Years of education: Not on file    Highest education level: Not on file   Occupational History    Not on file   Tobacco Use    Smoking status: Never Smoker    Smokeless tobacco: Never Used   Vaping Use    Vaping Use: Never used   Substance and Sexual Activity    Alcohol use: No    Drug use: No    Sexual activity: Not on file   Other Topics Concern    Not on file   Social History Narrative    Not on file     Social Determinants of Health     Financial Resource Strain:     Difficulty of Paying Living Expenses:    Food Insecurity:     Worried About Running Out of Food in the Last Year:     [address] in the Last Year:    Transportation Needs:     Lack of Transportation (Medical):      Lack of Transportation (Non-Medical):    Physical Activity:     Days of Exercise per Week:     Minutes of Exercise per Session:    Stress:     Feeling of Stress :    Social Connections:     Frequency of Communication with Friends and Family:     Frequency of Social Gatherings with Friends and Family:     Attends Worship Services:     Active Member of Clubs or Organizations:     Attends Club or Organization Meetings:     Marital Status:    Intimate Partner Violence:     Fear of Current or Ex-Partner:     Emotionally Abused:     Physically Abused:     Sexually Abused:         Current Outpatient Medications   Medication Sig Dispense Refill    Incontinence Supplies MISC Use as needed for incontinence 90 each 3    ondansetron (ZOFRAN ODT) 4 MG disintegrating tablet Take 1 tablet by mouth every 8 hours as needed for Nausea 15 tablet 0    dicyclomine (BENTYL) 10 MG capsule Take 1 capsule by mouth 3 times daily As needed for abdominal pain 15 capsule 3    Nutritional Supplements (NUTRITIONAL SUPPLEMENT PLUS) LIQD Take 237 mLs by mouth 3 times daily (with meals) 90 Can 0     No current facility-administered medications for this visit. Allergies   Allergen Reactions    Oxycodone Anaphylaxis    Ibuprofen          Review of Systems:       Review of Systems   Constitutional: Negative for chills and fever. HENT: Negative for ear pain, mouth sores, sore throat and tinnitus. Eyes: Negative for photophobia, redness and itching. Respiratory: Negative for apnea, choking and stridor. Cardiovascular: Negative for chest pain and palpitations. Gastrointestinal: Positive for abdominal pain. Negative for anal bleeding, constipation and rectal pain. Endocrine: Negative for polydipsia. Genitourinary: Negative for enuresis, flank pain and hematuria. Musculoskeletal: Negative for back pain, joint swelling and myalgias. Skin: Negative for color change and pallor. Allergic/Immunologic: Negative for environmental allergies. Neurological: Negative for syncope and speech difficulty. Psychiatric/Behavioral: Negative for confusion and hallucinations. OBJECTIVE:  Physical Exam:    /78   Pulse 62   Ht 5' 11\" (1.803 m)   Wt 140 lb (63.5 kg)   SpO2 98%   BMI 19.53 kg/m²      Physical Exam  Constitutional:       Appearance: He is well-developed. HENT:      Head: Normocephalic. Eyes:      Pupils: Pupils are equal, round, and reactive to light. Cardiovascular:      Rate and Rhythm: Normal rate. Pulmonary:      Effort: Pulmonary effort is normal.   Abdominal:      General: There is no distension. Palpations: Abdomen is soft. There is no mass. Tenderness: There is abdominal tenderness. There is no guarding or rebound. Musculoskeletal:         General: Normal range of motion. Cervical back: Normal range of motion and neck supple. Skin:     General: Skin is warm.    Neurological:      Mental Status: He is alert and oriented to person, place, and time. ASSESSMENT:  1. Rectal cancer ([facility]        PLAN:  Treatment: We will proceed with colonoscopy. Prep instructions given. Patient counseled on risks, benefits, and alternatives of treatment plan at length today. Patient states an understanding and willingness to proceed with plan. No orders of the defined types were placed in this encounter. Orders Placed This Encounter   Medications    polyethylene glycol (GLYCOLAX) 17 GM/SCOOP powder     Sig: Take 17 g by mouth 2 times daily     Dispense:  510 g     Refill:  0    Na Sulfate-K Sulfate-Mg Sulf (SUPREP BOWEL PREP KIT) 17.5-3.13-1.6 GM/177ML SOLN     Sig: Take 2 Bottles by mouth once for 1 dose Per instructions given. Dispense:  1 each     Refill:  0        Follow Up:  No follow-ups on file.       Kingston Babinski, MD

## 2021-10-19 ENCOUNTER — TELEPHONE (OUTPATIENT)
Dept: SURGERY | Age: 66
End: 2021-10-19

## 2021-10-19 NOTE — TELEPHONE ENCOUNTER
SPOKE TO [address] [place] [address] Avenue ([address] [facility]) SCHEDULED @ [facility].  NOTIFIED OF DATES, TIMES AND LOCATION    PHONE ASSESSMENT   COVID - 11/2/21 @ 815  SURGERY - 11/5/21 @ 1230  P/O - 11/11/21 @ 1015      CLEAR LIQUIDS START ON 11/4/21  MIRALAX PREP START 4PM  NPO AFTER MIDNIGHT  HOLD BLOOD THINNERS - NOT ON BLOOD THINNERS

## 2021-10-24 ASSESSMENT — ENCOUNTER SYMPTOMS
SORE THROAT: 0
APNEA: 0
STRIDOR: 0
COLOR CHANGE: 0
ABDOMINAL PAIN: 1
CHOKING: 0
EYE REDNESS: 0
BACK PAIN: 0
RECTAL PAIN: 0
EYE ITCHING: 0
ANAL BLEEDING: 0
PHOTOPHOBIA: 0
CONSTIPATION: 0

## 2021-10-26 NOTE — PROGRESS NOTES
Patient's friend Mariella Valle, states patient does use crack cocaine. She also states\"that patient cannot hear at all. \"I will explain to patient when he arrives at  [facility] that it is very important that he not use cocaine before his procedure.

## 2021-10-26 NOTE — PROGRESS NOTES
Patient will arrive at 21 [phone] on 11/5/2021 for his procedure at 1230.             1. Do not eat or drink anything after midnight - unless instructed by your doctor prior to surgery. This includes                   no water, chewing gum or mints. 2. Follow your directions as prescribed by the doctor for your procedure and medications. 3. Check with your Doctor regarding stopping vitamins, supplements, blood thinners (Plavix, Coumadin, Lovenox, Effient, Pradaxa, Xarelto, Fragmin or                   other blood thinners) and follow their instructions. 4. Do not smoke, and do not drink any alcoholic beverages 24 hours prior to surgery. This includes NA Beer. 5. You may brush your teeth and gargle the morning of surgery. DO NOT SWALLOW WATER   6. You MUST make arrangements for a responsible adult to take you home after your surgery and be able to check on you every couple                   hours for the day. You will not be allowed to leave alone or drive yourself home. It is strongly suggested someone stay with you the first 24                   hrs. Your surgery will be cancelled if you do not have a ride home. 7. Please wear simple, loose fitting clothing to the hospital.  [place] [facility] not bring valuables (money, credit cards, checkbooks, etc.) Do not wear any                   makeup (including no eye makeup) or nail polish on your fingers or toes. 8. DO NOT wear any jewelry or piercings on day of surgery. All body piercing jewelry must be removed. 9. If you have dentures, they will be removed before going to the OR; we will provide you a container. If you wear contact lenses or glasses,                  they will be removed; please bring a case for them. 10. If you  have a Living Will and Durable Power of  for Healthcare, please bring in a copy.            11. Please bring picture ID,  insurance card, paperwork from the doctors office    (H & P, Consent, & card for implantable devices). 12. Take a shower the night before or morning of your procedure, do not apply any lotion, oil or powder. 13. Wear a mask covering your nose & mouth when entering the hospital. Have your covid-19 test performed within 2-7 days of your                  surgery. Quarantine yourself after the test until after your surgery.

## 2021-10-28 NOTE — PROGRESS NOTES
Patient's daughter will make arrangements for patient to get to 's office for covid test on 11/2/2021 at 0815.

## 2021-10-28 NOTE — PROGRESS NOTES
Spoke with patient's daughter Zulema Rodrigo will be bringing patient for his procedure. Patient's friend Juan Alberto Joshi was given instructions on prep.

## 2021-10-29 DIAGNOSIS — Z01.818 PRE-OP TESTING: Primary | ICD-10-CM

## 2021-11-02 ENCOUNTER — NURSE ONLY (OUTPATIENT)
Dept: SURGERY | Age: 66
End: 2021-11-02
Payer: COMMERCIAL

## 2021-11-02 ENCOUNTER — HOSPITAL ENCOUNTER (OUTPATIENT)
Age: 66
Setting detail: SPECIMEN
Discharge: HOME | End: 2021-11-02
Payer: COMMERCIAL

## 2021-11-02 VITALS
HEART RATE: 88 BPM | DIASTOLIC BLOOD PRESSURE: 80 MMHG | OXYGEN SATURATION: 97 % | TEMPERATURE: 97 F | SYSTOLIC BLOOD PRESSURE: 126 MMHG

## 2021-11-02 DIAGNOSIS — Z01.818 PRE-OP TESTING: Primary | ICD-10-CM

## 2021-11-02 PROCEDURE — U0003 INFECTIOUS AGENT DETECTION BY NUCLEIC ACID (DNA OR RNA); SEVERE ACUTE RESPIRATORY SYNDROME CORONAVIRUS 2 (SARS-COV-2) (CORONAVIRUS DISEASE [COVID-19]), AMPLIFIED PROBE TECHNIQUE, MAKING USE OF HIGH THROUGHPUT TECHNOLOGIES AS DESCRIBED BY CMS-2020-01-R: HCPCS

## 2021-11-02 PROCEDURE — 99211 OFF/OP EST MAY X REQ PHY/QHP: CPT | Performed by: SURGERY

## 2021-11-02 PROCEDURE — U0005 INFEC AGEN DETEC AMPLI PROBE: HCPCS

## 2021-11-02 NOTE — PATIENT INSTRUCTIONS
Pre-Procedure COVID-19 Self Testing  Quarantine Instructions  Day of Surgery Instructions         What to do before my surgery:    All patients scheduled for elective surgery must test for COVID19 72-96 hours prior to the surgery date.  Pre-Procedure COVID-19 Self-Test will be scheduled for you by your provider.  You can receive your Pre-Procedure COVID-19 Self-Test at:  [facility]. [address] [facility] [facility]   If you do not have the COVID-19 test we will cancel or reschedule your procedure   Once you test you must quarantine at home until after your procedure with only your immediate family members or whoever lives with you.  If you must work during your quarantine period, we ask that you continue to practice social distancing, wear a mask that covers your mouth and nose and perform all hand hygiene as recommended by the CDC.  If you must go to the grocery, etc. and cannot get someone to do this for you please wear a mask that covers your mouth and nose and perform all hand hygiene as recommended by the CDC.  Your surgeon's office will notify you with any concerns about your test result. What can I expect on the day of surgery?  Arrive at the time the office or hospital staff tell you on the day of your procedure.  Wear a mask when entering the hospital.     A member of the hospital staff will take your temperature and ask you a few questions as you enter the building.  In abundance of caution for the safety of all our patients and staff, please follow all hospital visitor guidelines in place at the time of your procedure. The staff caring for you will stay in close communication with your loved one and keep them updated on progress.  Please provide a phone number for us to use when communicating with your family or ride home.    When you are ready to discharge, we will notify your family/person with you to bring the car to the front entrance. We will take you to them after you receive all of your discharge instructions.

## 2021-11-02 NOTE — PROGRESS NOTES
Patient collected pre-op COVID-19 screening instruction and collection supplies given to patient accordingly. Patient denies fever/cough/sob or recent travels. Patient voiced understanding of collection/quarantine instructions. COVID screening lab ordered, collection completed without difficulty, identifiers placed on specimen. AVS given at discharge. Results will be given via mychart or telephone call [facility] will manage any positive test results, the procedure will be rescheduled at a later date).

## 2021-11-04 ENCOUNTER — ANESTHESIA EVENT (OUTPATIENT)
Dept: ENDOSCOPY | Age: 66
End: 2021-11-04
Payer: COMMERCIAL

## 2021-11-04 LAB
SARS-COV-2: NOT DETECTED
SOURCE: NORMAL

## 2021-11-04 ASSESSMENT — LIFESTYLE VARIABLES: SMOKING_STATUS: 1

## 2021-11-04 NOTE — ANESTHESIA PRE PROCEDURE
Department of Anesthesiology  Preprocedure Note       Name:  rTu Crain   Age:  73 y.o.  :  1955                                          MRN:  [id number]         Date:  2021      Surgeon: Nicole Canales):  Vesta Walsh MD    Procedure: Procedure(s):  COLONOSCOPY DIAGNOSTIC    Medications prior to admission:   Prior to Admission medications    Medication Sig Start Date End Date Taking? Authorizing Provider   Incontinence Supplies MISC Use as needed for incontinence 21   Karen Conley MD   ondansetron (ZOFRAN ODT) 4 MG disintegrating tablet Take 1 tablet by mouth every 8 hours as needed for Nausea 21   Coretta Lazar MD   dicyclomine (BENTYL) 10 MG capsule Take 1 capsule by mouth 3 times daily As needed for abdominal pain 21   Coretta Lazar MD   Nutritional Supplements (NUTRITIONAL SUPPLEMENT PLUS) LIQD Take 237 mLs by mouth 3 times daily (with meals) 4/22/19 10/26/20  Verito Lemus PA-C       Current medications:    No current facility-administered medications for this encounter. Current Outpatient Medications   Medication Sig Dispense Refill    Incontinence Supplies MISC Use as needed for incontinence 90 each 3    ondansetron (ZOFRAN ODT) 4 MG disintegrating tablet Take 1 tablet by mouth every 8 hours as needed for Nausea 15 tablet 0    dicyclomine (BENTYL) 10 MG capsule Take 1 capsule by mouth 3 times daily As needed for abdominal pain 15 capsule 3    Nutritional Supplements (NUTRITIONAL SUPPLEMENT PLUS) LIQD Take 237 mLs by mouth 3 times daily (with meals) 90 Can 0       Allergies:     Allergies   Allergen Reactions    Oxycodone Anaphylaxis    Ibuprofen        Problem List:    Patient Active Problem List   Diagnosis Code    PVD (peripheral vascular disease) ([address] I73.9    Chest pain R07.9    Rectal tumor D49.0    Essential hypertension I10    Colostomy present ([address] Z93.3    Rectal cancer ([address] C20    Severe malnutrition ([address] E43    Attention to ileostomy ([facility] Z43.2 Past Medical History:        Diagnosis Date    Cancer [facility])     Rectal cancer--treated with both chemo and radiation--finished 01/2019    Chest pain     Colostomy present ([address]     Hypertension     Malnutrition ([facility]     Profound hearing loss of both ears     no hearing in right ear --very little in left    PVD (peripheral vascular disease) ([facility]        Past Surgical History:        Procedure Laterality Date    COLONOSCOPY      COLOSTOMY N/A 11/2/2018    DIVERTING COLOSTOMY PLACEMENT LAPAROSCOPIC performed by Tone Bay MD at [address] [facility] Bilateral early 2010's    hips    AL INSJ PRPH CTR VAD W/SUBQ PORT AGE 5 YR/> N/A 11/6/2018    PORT INSERTION performed by Tone Bay MD at [address] [facility] 5/13/2019    [facility] performed by Tone Bay MD at [address] [place] N/A 3/13/2019    BOWEL RESECTION LOW ANTERIOR LAPAROSCOPIC ROBOTIC WITH DIVERTING LOOP ILEOSTOMY performed by Tone Bay MD at [address] [facility] N/A 5/21/2019    COLOSTOMY ILEOSTOMY TAKEDOWN/REVERSAL performed by Tone Bay MD at [facility] OR       Social History:    Social History     Tobacco Use    Smoking status: Current Some Day Smoker    Smokeless tobacco: Never Used   Substance Use Topics    Alcohol use: No                                Ready to quit: Not Answered  Counseling given: Not Answered      Vital Signs (Current):   Vitals:    10/28/21 1153   Weight: 140 lb (63.5 kg)   Height: 5' 11\" (1.803 m)                                              BP Readings from Last 3 Encounters:   11/02/21 126/80   09/23/21 118/78   07/31/21 122/85       NPO Status:                                                                                 BMI:   Wt Readings from Last 3 Encounters:   09/23/21 140 lb (63.5 kg)   07/31/21 140 lb (63.5 kg)   06/04/21 135 lb (61.2 kg)     Body mass index is 19.53 kg/m².     CBC:   Lab Results   Component Value Date    WBC 4.7 07/31/2021    RBC 4.76 07/31/2021    HGB 13.4 07/31/2021    HCT 41.1 07/31/2021    MCV 86.3 07/31/2021    RDW 15.4 07/31/2021     07/31/2021       CMP:   Lab Results   Component Value Date     07/31/2021    K 3.9 07/31/2021     07/31/2021    CO2 28 07/31/2021    BUN 11 07/31/2021    CREATININE 0.8 07/31/2021    GFRAA >60 07/31/2021    LABGLOM >60 07/31/2021    GLUCOSE 95 07/31/2021    PROT 6.4 07/31/2021    CALCIUM 8.2 07/31/2021    BILITOT 0.2 07/31/2021    ALKPHOS 78 07/31/2021    AST 25 07/31/2021    ALT 21 07/31/2021       POC Tests: No results for input(s): POCGLU, POCNA, POCK, POCCL, POCBUN, POCHEMO, POCHCT in the last 72 hours. Coags:   Lab Results   Component Value Date    PROTIME 12.1 06/04/2021    INR 1.00 06/04/2021    APTT 27.2 06/04/2021       HCG (If Applicable): No results found for: PREGTESTUR, PREGSERUM, HCG, HCGQUANT     ABGs: No results found for: PHART, PO2ART, EGI3KAT, LIB6GUA, BEART, M3NKDDCO     Type & Screen (If Applicable):  No results found for: LABABO, LABRH    Drug/Infectious Status (If Applicable):  No results found for: HIV, HEPCAB    COVID-19 Screening (If Applicable): No results found for: COVID19        Anesthesia Evaluation    Airway:         Dental:          Pulmonary:   (+) current smoker                           Cardiovascular:    (+) hypertension:,                   Neuro/Psych:               GI/Hepatic/Renal:             Endo/Other:    (+) malignancy/cancer. Abdominal:             Vascular:   + PVD, aortic or cerebral, .        Other Findings:             Anesthesia Plan      MAC     ASA 3                                 [address] [facility], APRN - CRNA   11/4/2021

## 2021-11-05 ENCOUNTER — HOSPITAL ENCOUNTER (OUTPATIENT)
Age: 66
Setting detail: OUTPATIENT SURGERY
Discharge: HOME | End: 2021-11-05
Attending: SURGERY | Admitting: SURGERY
Payer: COMMERCIAL

## 2021-11-05 ENCOUNTER — ANESTHESIA (OUTPATIENT)
Dept: ENDOSCOPY | Age: 66
End: 2021-11-05
Payer: COMMERCIAL

## 2021-11-05 VITALS
DIASTOLIC BLOOD PRESSURE: 90 MMHG | TEMPERATURE: 97 F | SYSTOLIC BLOOD PRESSURE: 179 MMHG | HEART RATE: 66 BPM | OXYGEN SATURATION: 100 % | WEIGHT: 140 LBS | HEIGHT: 71 IN | BODY MASS INDEX: 19.6 KG/M2 | RESPIRATION RATE: 20 BRPM

## 2021-11-05 LAB
AMPHETAMINES: NEGATIVE
BARBITURATE SCREEN URINE: NEGATIVE
BENZODIAZEPINE SCREEN, URINE: NEGATIVE
CANNABINOID SCREEN URINE: ABNORMAL
COCAINE METABOLITE: ABNORMAL
OPIATES, URINE: NEGATIVE
OXYCODONE: NEGATIVE
PHENCYCLIDINE, URINE: NEGATIVE

## 2021-11-05 PROCEDURE — 80307 DRUG TEST PRSMV CHEM ANLYZR: CPT

## 2021-11-05 PROCEDURE — 2580000003 HC RX 258: Performed by: ANESTHESIOLOGY

## 2021-11-05 RX ORDER — SODIUM CHLORIDE, SODIUM LACTATE, POTASSIUM CHLORIDE, CALCIUM CHLORIDE 600; 310; 30; 20 MG/100ML; MG/100ML; MG/100ML; MG/100ML
INJECTION, SOLUTION INTRAVENOUS CONTINUOUS
Status: DISCONTINUED | OUTPATIENT
Start: 2021-11-05 | End: 2021-11-05 | Stop reason: HOSPADM

## 2021-11-05 ASSESSMENT — PAIN DESCRIPTION - DESCRIPTORS: DESCRIPTORS: ACHING

## 2021-11-05 ASSESSMENT — PAIN DESCRIPTION - ORIENTATION: ORIENTATION: LOWER

## 2021-11-05 ASSESSMENT — PAIN SCALES - GENERAL: PAINLEVEL_OUTOF10: 7

## 2021-11-05 ASSESSMENT — PAIN DESCRIPTION - PAIN TYPE: TYPE: CHRONIC PAIN

## 2021-11-05 ASSESSMENT — PAIN DESCRIPTION - LOCATION: LOCATION: BACK

## 2021-12-01 ENCOUNTER — TELEPHONE (OUTPATIENT)
Dept: SURGERY | Age: 66
End: 2021-12-01

## 2021-12-06 ENCOUNTER — OFFICE VISIT (OUTPATIENT)
Dept: SURGERY | Age: 66
End: 2021-12-06
Payer: COMMERCIAL

## 2021-12-06 VITALS
SYSTOLIC BLOOD PRESSURE: 136 MMHG | WEIGHT: 129.6 LBS | HEIGHT: 71 IN | OXYGEN SATURATION: 99 % | BODY MASS INDEX: 18.15 KG/M2 | DIASTOLIC BLOOD PRESSURE: 90 MMHG | HEART RATE: 85 BPM

## 2021-12-06 DIAGNOSIS — C20 RECTAL CANCER (HCC): Primary | ICD-10-CM

## 2021-12-06 PROCEDURE — G8484 FLU IMMUNIZE NO ADMIN: HCPCS | Performed by: SURGERY

## 2021-12-06 PROCEDURE — G8427 DOCREV CUR MEDS BY ELIG CLIN: HCPCS | Performed by: SURGERY

## 2021-12-06 PROCEDURE — 1036F TOBACCO NON-USER: CPT | Performed by: SURGERY

## 2021-12-06 PROCEDURE — 99213 OFFICE O/P EST LOW 20 MIN: CPT | Performed by: SURGERY

## 2021-12-06 PROCEDURE — 4040F PNEUMOC VAC/ADMIN/RCVD: CPT | Performed by: SURGERY

## 2021-12-06 PROCEDURE — G8419 CALC BMI OUT NRM PARAM NOF/U: HCPCS | Performed by: SURGERY

## 2021-12-06 PROCEDURE — 1123F ACP DISCUSS/DSCN MKR DOCD: CPT | Performed by: SURGERY

## 2021-12-06 PROCEDURE — 3017F COLORECTAL CA SCREEN DOC REV: CPT | Performed by: SURGERY

## 2021-12-06 RX ORDER — SODIUM, POTASSIUM,MAG SULFATES 17.5-3.13G
2 SOLUTION, RECONSTITUTED, ORAL ORAL ONCE
Qty: 1 EACH | Refills: 0 | Status: SHIPPED | OUTPATIENT
Start: 2021-12-06 | End: 2021-12-29 | Stop reason: SDUPTHER

## 2021-12-06 RX ORDER — SODIUM, POTASSIUM,MAG SULFATES 17.5-3.13G
2 SOLUTION, RECONSTITUTED, ORAL ORAL ONCE
Qty: 1 EACH | Refills: 0 | Status: SHIPPED | OUTPATIENT
Start: 2021-12-06 | End: 2021-12-06

## 2021-12-06 ASSESSMENT — PATIENT HEALTH QUESTIONNAIRE - PHQ9
2. FEELING DOWN, DEPRESSED OR HOPELESS: 0
SUM OF ALL RESPONSES TO PHQ QUESTIONS 1-9: 0
1. LITTLE INTEREST OR PLEASURE IN DOING THINGS: 0
SUM OF ALL RESPONSES TO PHQ9 QUESTIONS 1 & 2: 0
SUM OF ALL RESPONSES TO PHQ QUESTIONS 1-9: 0
SUM OF ALL RESPONSES TO PHQ QUESTIONS 1-9: 0

## 2021-12-06 ASSESSMENT — ENCOUNTER SYMPTOMS
RECTAL PAIN: 0
STRIDOR: 0
PHOTOPHOBIA: 0
COLOR CHANGE: 0
CONSTIPATION: 0
SORE THROAT: 0
EYE REDNESS: 0
CHOKING: 0
EYE ITCHING: 0
ABDOMINAL PAIN: 1
ANAL BLEEDING: 0
BACK PAIN: 0
APNEA: 0

## 2021-12-07 NOTE — PROGRESS NOTES
Chief Complaint   Patient presents with    Follow-up     Discuss rescheduling cscope, CX on 11/05/21         SUBJECTIVE:  HPI:  Patientcomplains of abdominal pain. Pain is located in the LLQ with no radiation . The pain is described as colicky and cramping. Onset was a few days ago. Symptoms have been unchanged since. Aggravating factors: activity. Associated symptoms: diarrhea. The patient denies chills and fever. Patient is known to me for history of rectal cancer status post low anterior resection. Colonoscopy was canceled last time due to cocaine positive. I have reviewed the patient's(pertinent information to this visit) medical history, family history(scanned in  the Media tab under \"patient questioner\"), social history and reviewof systems with the patient today in the office.        Past Surgical History:   Procedure Laterality Date    COLONOSCOPY      COLOSTOMY N/A 11/2/2018    DIVERTING COLOSTOMY PLACEMENT LAPAROSCOPIC performed by Charmaine Kidd MD at [address] [facility] Bilateral early 2010's    hips    PA INSJ PRPH CTR VAD W/SUBQ PORT AGE 5 YR/> N/A 11/6/2018    PORT INSERTION performed by Charmaine Kidd MD at [address] [facility] 5/13/2019    [address] performed by Charmaine Kidd MD at [address]   [place] N/A 3/13/2019    BOWEL RESECTION LOW ANTERIOR LAPAROSCOPIC ROBOTIC WITH DIVERTING LOOP ILEOSTOMY performed by Charmaine Kidd MD at [address] [facility] N/A 5/21/2019    COLOSTOMY ILEOSTOMY TAKEDOWN/REVERSAL performed by Charmaine Kidd MD at [facility] OR     Past Medical History:   Diagnosis Date    Cancer [facility])     Rectal cancer--treated with both chemo and radiation--finished 01/2019    Chest pain     Colostomy present ([address]     Hypertension     Malnutrition ([address]     Profound hearing loss of both ears     no hearing in right ear --very little in left    PVD (peripheral vascular disease) ([address]      Family History Problem Relation Age of Onset    Dementia Mother     Early Death Father     High Blood Pressure Father     Early Death Sister     Diabetes Brother      Social History     Socioeconomic History    Marital status: Single     Spouse name: Not on file    Number of children: Not on file    Years of education: Not on file    Highest education level: Not on file   Occupational History    Not on file   Tobacco Use    Smoking status: Former Smoker    Smokeless tobacco: Never Used   Vaping Use    Vaping Use: Never used   Substance and Sexual Activity    Alcohol use: No    Drug use: Not Currently     Types: Cocaine     Comment: Per patient's friend Naomi Favors. Patient uses crack cocaine    Sexual activity: Not on file   Other Topics Concern    Not on file   Social History Narrative    Not on file     Social Determinants of Health     Financial Resource Strain:     Difficulty of Paying Living Expenses: Not on file   Food Insecurity:     Worried About Running Out of Food in the Last Year: Not on file    Uvaldo of Food in the Last Year: Not on file   Transportation Needs:     Lack of Transportation (Medical): Not on file    Lack of Transportation (Non-Medical):  Not on file   Physical Activity:     Days of Exercise per Week: Not on file    Minutes of Exercise per Session: Not on file   Stress:     Feeling of Stress : Not on file   Social Connections:     Frequency of Communication with Friends and Family: Not on file    Frequency of Social Gatherings with Friends and Family: Not on file    Attends Rastafarian Services: Not on file    Active Member of Clubs or Organizations: Not on file    Attends Club or Organization Meetings: Not on file    Marital Status: Not on file   Intimate Partner Violence:     Fear of Current or Ex-Partner: Not on file    Emotionally Abused: Not on file    Physically Abused: Not on file    Sexually Abused: Not on file   Housing Stability:     Unable to Pay for Housing in the Last Year: Not on file    Number of Places Lived in the Last Year: Not on file    Unstable Housing in the Last Year: Not on file        Current Outpatient Medications   Medication Sig Dispense Refill    Na Sulfate-K Sulfate-Mg Sulf (SUPREP BOWEL PREP KIT) 17.5-3.13-1.6 GM/177ML SOLN Take 2 Bottles by mouth once for 1 dose Per instructions given. 1 each 0    Naproxen Sodium (ALEVE PO) Take by mouth      Incontinence Supplies MISC Use as needed for incontinence 90 each 3    ondansetron (ZOFRAN ODT) 4 MG disintegrating tablet Take 1 tablet by mouth every 8 hours as needed for Nausea 15 tablet 0    dicyclomine (BENTYL) 10 MG capsule Take 1 capsule by mouth 3 times daily As needed for abdominal pain 15 capsule 3    Nutritional Supplements (NUTRITIONAL SUPPLEMENT PLUS) LIQD Take 237 mLs by mouth 3 times daily (with meals) 90 Can 0     No current facility-administered medications for this visit. Allergies   Allergen Reactions    Oxycodone Anaphylaxis    Ibuprofen          Review of Systems:       Review of Systems   Constitutional: Negative for chills and fever. HENT: Negative for ear pain, mouth sores, sore throat and tinnitus. Eyes: Negative for photophobia, redness and itching. Respiratory: Negative for apnea, choking and stridor. Cardiovascular: Negative for chest pain and palpitations. Gastrointestinal: Positive for abdominal pain. Negative for anal bleeding, constipation and rectal pain. Endocrine: Negative for polydipsia. Genitourinary: Negative for enuresis, flank pain and hematuria. Musculoskeletal: Negative for back pain, joint swelling and myalgias. Skin: Negative for color change and pallor. Allergic/Immunologic: Negative for environmental allergies. Neurological: Negative for syncope and speech difficulty. Psychiatric/Behavioral: Negative for confusion and hallucinations.        OBJECTIVE:  Physical Exam:    BP (!) 136/90 (Site: Right Upper Arm, Position: Sitting, Cuff Size: Medium Adult)   Pulse 85   Ht 5' 11\" (1.803 m)   Wt 129 lb 9.6 oz (58.8 kg)   SpO2 99%   BMI 18.08 kg/m²      Physical Exam  Constitutional:       Appearance: He is well-developed. HENT:      Head: Normocephalic. Eyes:      Pupils: Pupils are equal, round, and reactive to light. Cardiovascular:      Rate and Rhythm: Normal rate. Pulmonary:      Effort: Pulmonary effort is normal.   Abdominal:      General: There is no distension. Palpations: Abdomen is soft. There is no mass. Tenderness: There is abdominal tenderness. There is no guarding or rebound. Musculoskeletal:         General: Normal range of motion. Cervical back: Normal range of motion and neck supple. Skin:     General: Skin is warm. Neurological:      Mental Status: He is alert and oriented to person, place, and time. ASSESSMENT:  No diagnosis found. PLAN:  Treatment: We will proceed with colonoscopy. Patient advised to stop using cocaine. We will send bowel prep to the pharmacy. prep instructions given. Patient counseled on risks, benefits, and alternatives of treatment plan at length today. Patient states an understanding and willingness to proceed with plan. No orders of the defined types were placed in this encounter. No orders of the defined types were placed in this encounter. Follow Up:  No follow-ups on file.       Michelle Ramirez MD

## 2021-12-29 ENCOUNTER — TELEPHONE (OUTPATIENT)
Dept: SURGERY | Age: 66
End: 2021-12-29

## 2021-12-29 RX ORDER — SODIUM, POTASSIUM,MAG SULFATES 17.5-3.13G
2 SOLUTION, RECONSTITUTED, ORAL ORAL ONCE
Qty: 1 EACH | Refills: 0 | Status: SHIPPED | OUTPATIENT
Start: 2021-12-29 | End: 2021-12-29

## 2021-12-29 NOTE — TELEPHONE ENCOUNTER
LEFT MESSAGE FOR [address] ([address]) SCHEDULED @ [facility].  NOTIFIED OF DATES, TIMES AND LOCATION    PHONE ASSESSMENT   COVID - 1/14/22 @ 800  SURGERY - 1/19/22 @ 1030  P/O - 1/27/22 @ 100    NPO AFTER MIDNIGHT    Liza Robles [address] 1/18/22  1ST BOTTLE 4PM 1/18/22  2ND BOTTLE 5AM 1/19/22    HOLD BLOOD THINNERS

## 2022-01-11 DIAGNOSIS — Z01.818 PRE-OP TESTING: Primary | ICD-10-CM

## 2022-01-17 ENCOUNTER — TELEPHONE (OUTPATIENT)
Dept: SURGERY | Age: 67
End: 2022-01-17

## 2022-01-17 NOTE — TELEPHONE ENCOUNTER
Left message with Felipe Fleischer for Frances Ndiaye. Surgery has been canceled. Pt did not have covid test done.

## 2022-02-01 ENCOUNTER — TELEPHONE (OUTPATIENT)
Dept: SURGERY | Age: 67
End: 2022-02-01

## 2022-02-01 NOTE — TELEPHONE ENCOUNTER
CALLED (NO VM SET UP) Franklin Stahl REGARDING RESCHEDULED SURGERY ([address]) SCHEDULED @ [facility].  NOTIFIED OF DATES, TIMES AND LOCATION    PHONE ASSESSMENT   COVID - 2/8/22 @ 815  SURGERY - 2/11/22 @ 1100  P/O - 2/17/22 @ 100    NPO AFTER MIDNIGHT  Liza Robles [address] 1/18/22  1ST BOTTLE 4PM 1/18/22  2ND BOTTLE 6AM 1/19/22    HOLD BLOOD THINNERS

## 2022-02-07 DIAGNOSIS — Z01.818 PRE-OP TESTING: Primary | ICD-10-CM

## 2022-02-21 ENCOUNTER — TELEPHONE (OUTPATIENT)
Dept: SURGERY | Age: 67
End: 2022-02-21

## 2022-02-21 NOTE — TELEPHONE ENCOUNTER
Nerissa called back, to inform is ready to reschedule Best's Colonoscopy. Will need a reorder of Suprep. Informed surgery scheduler.

## 2022-02-24 ENCOUNTER — TELEPHONE (OUTPATIENT)
Dept: SURGERY | Age: 67
End: 2022-02-24

## 2022-03-30 ENCOUNTER — ANESTHESIA EVENT (OUTPATIENT)
Dept: ENDOSCOPY | Age: 67
End: 2022-03-30

## 2022-03-31 ENCOUNTER — TELEPHONE (OUTPATIENT)
Dept: SURGERY | Age: 67
End: 2022-03-31

## 2022-03-31 ENCOUNTER — ANESTHESIA (OUTPATIENT)
Dept: ENDOSCOPY | Age: 67
End: 2022-03-31

## 2022-03-31 NOTE — TELEPHONE ENCOUNTER
Patient's mother called stating colonoscopy was aborted this morning and patient sent home. They want to know next steps.  Please advise

## 2022-04-01 NOTE — TELEPHONE ENCOUNTER
Was able to Secure Sutab sample from GI. Left  for Rica Funk to inform Sutab sample is available for pickup. Upon call back need to setup a time for patient and support to come in for prep instructions.

## 2022-04-04 NOTE — PLAN OF CARE
Problem: Discharge Planning:  Goal: Discharged to appropriate level of care  Discharged to appropriate level of care   Outcome: Ongoing      Problem: Cardiac Output - Decreased:  Goal: Hemodynamic stability will improve  Hemodynamic stability will improve   Outcome: Ongoing      Problem: Pain:  Goal: Pain level will decrease  Pain level will decrease   Outcome: Ongoing    Goal: Control of acute pain  Control of acute pain   Outcome: Ongoing      Problem: Tissue Perfusion - Cardiopulmonary, Altered:  Goal: Absence of angina  Absence of angina   Outcome: Ongoing      Problem: Falls - Risk of:  Goal: Will remain free from falls  Will remain free from falls   Outcome: Ongoing    Goal: Absence of physical injury  Absence of physical injury   Outcome: Ongoing      Problem: Nutrition Deficit:  Goal: Ability to achieve adequate nutritional intake will improve  Ability to achieve adequate nutritional intake will improve   Outcome: Ongoing
Problem: Falls - Risk of:  Goal: Will remain free from falls  Will remain free from falls   Outcome: Ongoing
Problem: Pain:  Goal: Control of acute pain  Control of acute pain   Outcome: Ongoing
70.3

## 2022-04-08 ENCOUNTER — TELEPHONE (OUTPATIENT)
Dept: SURGERY | Age: 67
End: 2022-04-08

## 2022-04-08 NOTE — TELEPHONE ENCOUNTER
SPOKE TO Bruce 46 REGARDING SURGERY Colonoscopy SCHEDULED @ [facility] NOTIFIED OF DATES, TIMES AND LOCATION    PHONE ASSESSMENT   SURGERY - 5/4/22 at 9:30  P/O - 5/12/22 at 10:15    NPO AFTER MIDNIGHT  Gabino Gave instructions   SENT  PT did not know of MA visit spoke on the phone regarding dates and prep with Mo Florez.

## 2022-04-27 NOTE — PROGRESS NOTES
4/27/22 - . LM on contact's phone Gutsavo Monte) concerning Best's surgery @ [facility] on  5/4/22. Please call the PAT Nurse for a phone assessment and surgery instructions.

## 2022-04-28 NOTE — PROGRESS NOTES
Patient will arrive at 0730 at [facility] on 5/4/2022 for his procedure at 0930.               1. Do not eat or drink anything after midnight - unless instructed by your doctor prior to surgery. This includes                   no water, chewing gum or mints. 2. Follow your directions as prescribed by the doctor for your procedure and medications. 3. Check with your Doctor regarding stopping vitamins, supplements, blood thinners (Plavix, Coumadin, Lovenox, Effient, Pradaxa, Xarelto, Fragmin or                   other blood thinners) and follow their instructions. 4. Do not smoke, and do not drink any alcoholic beverages 24 hours prior to surgery. This includes NA Beer. 5. You may brush your teeth and gargle the morning of surgery. DO NOT SWALLOW WATER   6. You MUST make arrangements for a responsible adult to take you home after your surgery and be able to check on you every couple                   hours for the day. You will not be allowed to leave alone or drive yourself home. It is strongly suggested someone stay with you the first 24                   hrs. Your surgery will be cancelled if you do not have a ride home. 7. Please wear simple, loose fitting clothing to the hospital.  Eddie Alvarado not bring valuables (money, credit cards, checkbooks, etc.) Do not wear any                   makeup (including no eye makeup) or nail polish on your fingers or toes. 8. DO NOT wear any jewelry or piercings on day of surgery. All body piercing jewelry must be removed. 9. If you have dentures, they will be removed before going to the OR; we will provide you a container. If you wear contact lenses or glasses,                  they will be removed; please bring a case for them. 10. If you  have a Living Will and Durable Power of  for Healthcare, please bring in a copy.            11. Please bring picture ID,  insurance card, paperwork from the doctors office    (H & P, Consent, & card for implantable devices). 12. Take a shower the morning of your procedure with Hibiclens or an anti-bacterial soap. Do not apply any deodorant, lotion, oil or powder. 13.  Enter thru the main entrance wearing a mask on the day of surgery.

## 2022-05-02 ENCOUNTER — ANESTHESIA EVENT (OUTPATIENT)
Dept: ENDOSCOPY | Age: 67
End: 2022-05-02
Payer: COMMERCIAL

## 2022-05-02 NOTE — ANESTHESIA PRE PROCEDURE
Department of Anesthesiology  Preprocedure Note       Name:  Richard Cm   Age:  73 y.o.  :  1955                                          MRN:  [id number]         Date:  2022      Surgeon: Carlos Chirinos):  Albert Arreola MD    Procedure: Procedure(s):  COLONOSCOPY DIAGNOSTIC    Medications prior to admission:   Prior to Admission medications    Medication Sig Start Date End Date Taking? Authorizing Provider   Naproxen Sodium (ALEVE PO) Take by mouth    Historical Provider, MD   Incontinence Supplies MISC Use as needed for incontinence 21   Libra Leslie MD   ondansetron (ZOFRAN ODT) 4 MG disintegrating tablet Take 1 tablet by mouth every 8 hours as needed for Nausea 21   Rishi Hu MD   dicyclomine (BENTYL) 10 MG capsule Take 1 capsule by mouth 3 times daily As needed for abdominal pain 21   Rishi Hu MD   Nutritional Supplements (NUTRITIONAL SUPPLEMENT PLUS) LIQD Take 237 mLs by mouth 3 times daily (with meals) 4/22/19 10/26/20  Georges Crockett PA-C       Current medications:    No current facility-administered medications for this encounter. Current Outpatient Medications   Medication Sig Dispense Refill    Naproxen Sodium (ALEVE PO) Take by mouth      Incontinence Supplies MISC Use as needed for incontinence 90 each 3    ondansetron (ZOFRAN ODT) 4 MG disintegrating tablet Take 1 tablet by mouth every 8 hours as needed for Nausea 15 tablet 0    dicyclomine (BENTYL) 10 MG capsule Take 1 capsule by mouth 3 times daily As needed for abdominal pain 15 capsule 3    Nutritional Supplements (NUTRITIONAL SUPPLEMENT PLUS) LIQD Take 237 mLs by mouth 3 times daily (with meals) 90 Can 0       Allergies:     Allergies   Allergen Reactions    Oxycodone Anaphylaxis    Ibuprofen        Problem List:    Patient Active Problem List   Diagnosis Code    PVD (peripheral vascular disease) ([facility] I73.9    Chest pain R07.9    Rectal tumor D49.0    Essential hypertension I10    Colostomy present ([address] Z93.3    Rectal cancer ([address] C20    Severe malnutrition ([address] E43    Attention to ileostomy ([address] Z43.2       Past Medical History:        Diagnosis Date    Cancer [facility])     Rectal cancer--treated with both chemo and radiation--finished 01/2019    Chest pain     Cocaine abuse ([address]     Positive UDS 11/2021    Colostomy present ([address]     Hypertension     Malnutrition ([address]     Profound hearing loss of both ears     no hearing in right ear --very little in left    PVD (peripheral vascular disease) ([address]        Past Surgical History:        Procedure Laterality Date    COLONOSCOPY      COLOSTOMY N/A 11/2/2018    DIVERTING COLOSTOMY PLACEMENT LAPAROSCOPIC performed by Wilberto You MD at [facility] Bilateral early 2010's    hips    [place] INSJ PRPH CTR VAD W/SUBQ PORT AGE 5 YR/> N/A 11/6/2018    PORT INSERTION performed by Wilberto You MD at [address] [facility] 5/13/2019    [facility] performed by Wilberto You MD at [address] [facility] N/A 3/13/2019    BOWEL RESECTION LOW ANTERIOR LAPAROSCOPIC ROBOTIC WITH DIVERTING LOOP ILEOSTOMY performed by Wilberto You MD at [address] [facility] N/A 5/21/2019    COLOSTOMY ILEOSTOMY TAKEDOWN/REVERSAL performed by Wilberto You MD at [facility] OR       Social History:    Social History     Tobacco Use    Smoking status: Former Smoker    Smokeless tobacco: Never Used   Substance Use Topics    Alcohol use:  No                                Counseling given: Not Answered      Vital Signs (Current):   Vitals:    04/28/22 0759   Height: 5' 11\" (1.803 m)                                              BP Readings from Last 3 Encounters:   03/31/22 (!) 171/109   12/06/21 (!) 136/90   11/05/21 (!) 179/90       NPO Status:                                                                                 BMI:   Wt Readings from Last 3 Encounters:   03/31/22 134 lb (60.8 kg)   12/06/21 129 lb 9.6 oz (58.8 kg)   10/28/21 140 lb (63.5 kg)     Body mass index is 18.69 kg/m². CBC:   Lab Results   Component Value Date    WBC 4.7 07/31/2021    RBC 4.76 07/31/2021    HGB 13.4 07/31/2021    HCT 41.1 07/31/2021    MCV 86.3 07/31/2021    RDW 15.4 07/31/2021     07/31/2021       CMP:   Lab Results   Component Value Date     07/31/2021    K 3.9 07/31/2021     07/31/2021    CO2 28 07/31/2021    BUN 11 07/31/2021    CREATININE 0.8 07/31/2021    GFRAA >60 07/31/2021    LABGLOM >60 07/31/2021    GLUCOSE 95 07/31/2021    PROT 6.4 07/31/2021    CALCIUM 8.2 07/31/2021    BILITOT 0.2 07/31/2021    ALKPHOS 78 07/31/2021    AST 25 07/31/2021    ALT 21 07/31/2021       POC Tests: No results for input(s): POCGLU, POCNA, POCK, POCCL, POCBUN, POCHEMO, POCHCT in the last 72 hours. Coags:   Lab Results   Component Value Date    PROTIME 12.1 06/04/2021    INR 1.00 06/04/2021    APTT 27.2 06/04/2021       HCG (If Applicable): No results found for: PREGTESTUR, PREGSERUM, HCG, HCGQUANT     ABGs: No results found for: PHART, PO2ART, RMZ6CXB, IHW5AZG, BEART, N3YEOTED     Type & Screen (If Applicable):  No results found for: LABABO, LABRH    Drug/Infectious Status (If Applicable):  No results found for: HIV, HEPCAB    COVID-19 Screening (If Applicable):   Lab Results   Component Value Date    COVID19 NOT DETECTED 11/02/2021           Anesthesia Evaluation  Patient summary reviewed  Airway: Mallampati: II  TM distance: >3 FB   Neck ROM: full  Mouth opening: > = 3 FB Dental:      Comment: Poor dentition    Pulmonary:normal exam                               Cardiovascular:    (+) hypertension:,          Beta Blocker:  Not on Beta Blocker         Neuro/Psych:               GI/Hepatic/Renal:   (+) bowel prep,           Endo/Other:    (+) malignancy/cancer.                   ROS comment: Cocaine abuse     Profound hearing loss of both ears no hearing in right ear --very little in left  Abdominal: Vascular:   + PVD, aortic or cerebral, . Other Findings:           Anesthesia Plan      MAC and general     ASA 3       Induction: intravenous. Anesthetic plan and risks discussed with patient. Plan discussed with attending. RUBEN Whittington - CRNA   5/2/2022      Pre Anesthesia Evaluation complete. Anesthesia plan, risks, benefits, alternatives, and personnel discussed with patient and/or legal guardian. Patient and/or legal guardian verbalized an understanding and agreed to proceed. Anesthesia plan discussed with care team members and agreed upon.   RUBEN Gallo - CRNA  5/4/2022

## 2022-05-04 ENCOUNTER — HOSPITAL ENCOUNTER (OUTPATIENT)
Age: 67
Setting detail: OUTPATIENT SURGERY
Discharge: HOME | End: 2022-05-04
Attending: SURGERY | Admitting: SURGERY
Payer: COMMERCIAL

## 2022-05-04 ENCOUNTER — ANESTHESIA (OUTPATIENT)
Dept: ENDOSCOPY | Age: 67
End: 2022-05-04
Payer: COMMERCIAL

## 2022-05-04 VITALS
RESPIRATION RATE: 18 BRPM | BODY MASS INDEX: 18.69 KG/M2 | OXYGEN SATURATION: 97 % | DIASTOLIC BLOOD PRESSURE: 84 MMHG | HEIGHT: 71 IN | TEMPERATURE: 97.6 F | HEART RATE: 66 BPM | SYSTOLIC BLOOD PRESSURE: 166 MMHG

## 2022-05-04 VITALS
DIASTOLIC BLOOD PRESSURE: 81 MMHG | SYSTOLIC BLOOD PRESSURE: 126 MMHG | RESPIRATION RATE: 17 BRPM | OXYGEN SATURATION: 100 %

## 2022-05-04 PROCEDURE — 45330 DIAGNOSTIC SIGMOIDOSCOPY: CPT | Performed by: SURGERY

## 2022-05-04 PROCEDURE — 2709999900 HC NON-CHARGEABLE SUPPLY: Performed by: SURGERY

## 2022-05-04 PROCEDURE — 3609008400 HC SIGMOIDOSCOPY DIAGNOSTIC: Performed by: SURGERY

## 2022-05-04 PROCEDURE — 6360000002 HC RX W HCPCS: Performed by: NURSE ANESTHETIST, CERTIFIED REGISTERED

## 2022-05-04 PROCEDURE — 2500000003 HC RX 250 WO HCPCS: Performed by: NURSE ANESTHETIST, CERTIFIED REGISTERED

## 2022-05-04 PROCEDURE — 7100000011 HC PHASE II RECOVERY - ADDTL 15 MIN: Performed by: SURGERY

## 2022-05-04 PROCEDURE — 7100000010 HC PHASE II RECOVERY - FIRST 15 MIN: Performed by: SURGERY

## 2022-05-04 PROCEDURE — 2580000003 HC RX 258: Performed by: ANESTHESIOLOGY

## 2022-05-04 PROCEDURE — 3700000000 HC ANESTHESIA ATTENDED CARE: Performed by: SURGERY

## 2022-05-04 PROCEDURE — 3700000001 HC ADD 15 MINUTES (ANESTHESIA): Performed by: SURGERY

## 2022-05-04 RX ORDER — PROPOFOL 10 MG/ML
INJECTION, EMULSION INTRAVENOUS PRN
Status: DISCONTINUED | OUTPATIENT
Start: 2022-05-04 | End: 2022-05-04 | Stop reason: SDUPTHER

## 2022-05-04 RX ORDER — SODIUM CHLORIDE 9 MG/ML
INJECTION, SOLUTION INTRAVENOUS CONTINUOUS
Status: DISCONTINUED | OUTPATIENT
Start: 2022-05-04 | End: 2022-05-04 | Stop reason: HOSPADM

## 2022-05-04 RX ORDER — LIDOCAINE HYDROCHLORIDE 20 MG/ML
INJECTION, SOLUTION EPIDURAL; INFILTRATION; INTRACAUDAL; PERINEURAL PRN
Status: DISCONTINUED | OUTPATIENT
Start: 2022-05-04 | End: 2022-05-04 | Stop reason: SDUPTHER

## 2022-05-04 RX ORDER — SODIUM CHLORIDE, SODIUM LACTATE, POTASSIUM CHLORIDE, CALCIUM CHLORIDE 600; 310; 30; 20 MG/100ML; MG/100ML; MG/100ML; MG/100ML
INJECTION, SOLUTION INTRAVENOUS CONTINUOUS
Status: DISCONTINUED | OUTPATIENT
Start: 2022-05-04 | End: 2022-05-04 | Stop reason: HOSPADM

## 2022-05-04 RX ADMIN — PROPOFOL 50 MG: 10 INJECTION, EMULSION INTRAVENOUS at 11:06

## 2022-05-04 RX ADMIN — SODIUM CHLORIDE, POTASSIUM CHLORIDE, SODIUM LACTATE AND CALCIUM CHLORIDE: 600; 310; 30; 20 INJECTION, SOLUTION INTRAVENOUS at 08:43

## 2022-05-04 RX ADMIN — PROPOFOL 50 MG: 10 INJECTION, EMULSION INTRAVENOUS at 11:07

## 2022-05-04 RX ADMIN — LIDOCAINE HYDROCHLORIDE 100 MG: 20 INJECTION, SOLUTION EPIDURAL; INFILTRATION; INTRACAUDAL; PERINEURAL at 11:06

## 2022-05-04 RX ADMIN — PROPOFOL 20 MG: 10 INJECTION, EMULSION INTRAVENOUS at 11:12

## 2022-05-04 RX ADMIN — PROPOFOL 30 MG: 10 INJECTION, EMULSION INTRAVENOUS at 11:09

## 2022-05-04 ASSESSMENT — PULMONARY FUNCTION TESTS
PIF_VALUE: 0

## 2022-05-04 ASSESSMENT — PAIN SCALES - GENERAL: PAINLEVEL_OUTOF10: 0

## 2022-05-04 NOTE — PROGRESS NOTES
Patient is back to baseline. Report handoff given to [facility] ([facility]).

## 2022-05-04 NOTE — H&P
Chief Complaint         History of rectal cancer                 SUBJECTIVE:  HPI:  Patientcomplains of abdominal pain. Pain is located in the LLQ with no radiation . The pain is described as colicky and cramping. Onset was a few days ago. Symptoms have been unchanged since. Aggravating factors: activity. Associated symptoms: diarrhea. The patient denies chills and fever. Patient is known to me for history of rectal cancer status post low anterior resection.   Colonoscopy was canceled last time due to cocaine positive.     I have reviewed the patient's(pertinent information to this visit) medical history, family history(scanned in  the Media tab under \"patient questioner\"), social history and reviewof systems with the patient today in the office.        Past Surgical History         Past Surgical History:   Procedure Laterality Date    COLONOSCOPY        COLOSTOMY N/A 11/2/2018     DIVERTING COLOSTOMY PLACEMENT LAPAROSCOPIC [facility] Riki Hernandez MD at [facility] Bilateral early 2010's     hips    [place] INSJ PRPH CTR VAD W/SUBQ PORT AGE 5 YR/> N/A 11/6/2018     PORT INSERTION performed by Riki Hernandez MD at [address] 5/13/2019     [address] performed by Riki Hernandez MD at [address]  [place] N/A 3/13/2019     BOWEL RESECTION LOW ANTERIOR LAPAROSCOPIC ROBOTIC WITH DIVERTING LOOP ILEOSTOMY performed by Riki Hernandez MD at [address] N/A 5/21/2019     COLOSTOMY ILEOSTOMY TAKEDOWN/REVERSAL performed by Riki Hernandez MD at [facility] [place]         Past Medical History        Past Medical History:   Diagnosis Date    Cancer [facility])       Rectal cancer--treated with both chemo and radiation--finished 01/2019    Chest pain      Colostomy present ([address]      Hypertension      Malnutrition ([address]      Profound hearing loss of both ears       no hearing in right ear --very little in left    PVD (peripheral vascular disease) ([facility]           Family History         Family History   Problem Relation Age of Onset    Dementia Mother      Early Death Father      High Blood Pressure Father      Early Death Sister      Diabetes Brother           Social History               Socioeconomic History    Marital status: Single       Spouse name: Not on file    Number of children: Not on file    Years of education: Not on file    Highest education level: Not on file   Occupational History    Not on file   Tobacco Use    Smoking status: Former Smoker    Smokeless tobacco: Never Used   Vaping Use    Vaping Use: Never used   Substance and Sexual Activity    Alcohol use: No    Drug use: Not Currently       Types: Cocaine       Comment: Per patient's friend Bob Hernandez. Patient uses crack cocaine    Sexual activity: Not on file   Other Topics Concern    Not on file   Social History Narrative    Not on file      Social Determinants of Health          Financial Resource Strain:     Difficulty of Paying Living Expenses: Not on file   Food Insecurity:     Worried About Running Out of Food in the Last Year: Not on file    Uvaldo of Food in the Last Year: Not on file   Transportation Needs:     Lack of Transportation (Medical): Not on file    Lack of Transportation (Non-Medical):  Not on file   Physical Activity:     Days of Exercise per Week: Not on file    Minutes of Exercise per Session: Not on file   Stress:     Feeling of Stress : Not on file   Social Connections:     Frequency of Communication with Friends and Family: Not on file    Frequency of Social Gatherings with Friends and Family: Not on file    Attends Cheondoism Services: Not on file    Active Member of Clubs or Organizations: Not on file    Attends Club or Organization Meetings: Not on file    Marital Status: Not on file   Intimate Partner Violence:     Fear of Current or Ex-Partner: Not on file    Emotionally Abused: Not on file    Physically Abused: Not on file    Sexually Abused: Not on file   Housing Stability:     Unable to Pay for Housing in the Last Year: Not on file    Number of Places Lived in the Last Year: Not on file    Unstable Housing in the Last Year: Not on file            Current Facility-Administered Medications          Current Outpatient Medications   Medication Sig Dispense Refill    Na Sulfate-K Sulfate-Mg Sulf (SUPREP BOWEL PREP KIT) 17.5-3.13-1.6 GM/177ML SOLN Take 2 Bottles by mouth once for 1 dose Per instructions given. 1 each 0    Naproxen Sodium (ALEVE PO) Take by mouth        Incontinence Supplies MISC Use as needed for incontinence 90 each 3    ondansetron (ZOFRAN ODT) 4 MG disintegrating tablet Take 1 tablet by mouth every 8 hours as needed for Nausea 15 tablet 0    dicyclomine (BENTYL) 10 MG capsule Take 1 capsule by mouth 3 times daily As needed for abdominal pain 15 capsule 3    Nutritional Supplements (NUTRITIONAL SUPPLEMENT PLUS) LIQD Take 237 mLs by mouth 3 times daily (with meals) 90 Can 0      No current facility-administered medications for this visit.              Allergies   Allergen Reactions    Oxycodone Anaphylaxis    Ibuprofen              Review of Systems:       Review of Systems   Constitutional: Negative for chills and fever. HENT: Negative for ear pain, mouth sores, sore throat and tinnitus. Eyes: Negative for photophobia, redness and itching. Respiratory: Negative for apnea, choking and stridor. Cardiovascular: Negative for chest pain and palpitations. Gastrointestinal: Positive for abdominal pain. Negative for anal bleeding, constipation and rectal pain. Endocrine: Negative for polydipsia. Genitourinary: Negative for enuresis, flank pain and hematuria. Musculoskeletal: Negative for back pain, joint swelling and myalgias. Skin: Negative for color change and pallor. Allergic/Immunologic: Negative for environmental allergies. Neurological: Negative for syncope and speech difficulty. Psychiatric/Behavioral: Negative for confusion and hallucinations.         OBJECTIVE:  Physical Exam:    BP (!) 136/90 (Site: Right Upper Arm, Position: Sitting, Cuff Size: Medium Adult)   Pulse 85   Ht 5' 11\" (1.803 m)   Wt 129 lb 9.6 oz (58.8 kg)   SpO2 99%   BMI 18.08 kg/m²       Physical Exam  Constitutional:       Appearance: He is well-developed. HENT:      Head: Normocephalic. Eyes:      Pupils: Pupils are equal, round, and reactive to light. Cardiovascular:      Rate and Rhythm: Normal rate. Pulmonary:      Effort: Pulmonary effort is normal.   Abdominal:      General: There is no distension. Palpations: Abdomen is soft. There is no mass. Tenderness: There is abdominal tenderness. There is no guarding or rebound. Musculoskeletal:         General: Normal range of motion. Cervical back: Normal range of motion and neck supple. Skin:     General: Skin is warm. Neurological:      Mental Status: He is alert and oriented to person, place, and time.          ASSESSMENT:  No diagnosis found.     PLAN:  Treatment:  We will proceed with colonoscopy             Lydia Vallejo MD

## 2022-05-04 NOTE — ANESTHESIA POSTPROCEDURE EVALUATION
Department of Anesthesiology  Postprocedure Note    Patient: Steph Kennedy  MRN: [id number]  YOB: 1955  Date of evaluation: 5/4/2022  Time:  11:30 AM     Procedure Summary     Date: 05/04/22 Room / Location: [address] [place] [address]    Anesthesia Start: 1995 Anesthesia Stop: 5084    Procedure: [address] [facility] (N/A ) Diagnosis:       Abdominal pain, unspecified abdominal location      (Abdominal pain, unspecified abdominal location [R10.9])    Surgeons: Justen Pastrana MD Responsible Provider: Janna Garland MD    Anesthesia Type: MAC, general ASA Status: 3          Anesthesia Type: MAC, general    Tiera Phase I:      Tiera Phase II:      Last vitals: Reviewed and per EMR flowsheets.        Anesthesia Post Evaluation    Patient location during evaluation: bedside  Patient participation: complete - patient participated  Level of consciousness: sleepy but conscious  Pain score: 0  Airway patency: patent  Complications: no  Cardiovascular status: blood pressure returned to baseline and hemodynamically stable  Respiratory status: acceptable, spontaneous ventilation and room air

## 2022-05-04 NOTE — PROGRESS NOTES
1139- Patient returned to [facility], A&O able to make needs known. Report given to this nurse from [facility], [facility].. No c/o pain at this time beverage of choice offered to patient, bed in lowest position call light in reach. 1157- Call to Patients aunt Angel Luis Carpio to give discharge instructions understanding verbalized. 1202- Call to transport to  patient. 1224- IV removed discharge instructions given to patient, returned patients belonging to patient patient sitting on the side of bed getting dressed. [place] informed this nurse that she heard patient fall to floor this Nurse went to assess patient, patient stated he did not hit his head when asked by this nurse, this nurse asked patient if anything on his body did hurt he stated No I asked patient if he would like to go to ER patient refused, patient stated he tripped on his shoes he is okay and he wants to go home, vitals within baseline. 1240- patient escorted to car via w/c transportation at front doors to transport home.

## 2022-05-12 ENCOUNTER — OFFICE VISIT (OUTPATIENT)
Dept: SURGERY | Age: 67
End: 2022-05-12
Payer: COMMERCIAL

## 2022-05-12 VITALS
BODY MASS INDEX: 17.79 KG/M2 | DIASTOLIC BLOOD PRESSURE: 82 MMHG | WEIGHT: 127.1 LBS | OXYGEN SATURATION: 97 % | HEIGHT: 71 IN | SYSTOLIC BLOOD PRESSURE: 146 MMHG | HEART RATE: 55 BPM

## 2022-05-12 DIAGNOSIS — C20 RECTAL CANCER (HCC): Primary | ICD-10-CM

## 2022-05-12 PROCEDURE — G8427 DOCREV CUR MEDS BY ELIG CLIN: HCPCS | Performed by: SURGERY

## 2022-05-12 PROCEDURE — 4004F PT TOBACCO SCREEN RCVD TLK: CPT | Performed by: SURGERY

## 2022-05-12 PROCEDURE — 1123F ACP DISCUSS/DSCN MKR DOCD: CPT | Performed by: SURGERY

## 2022-05-12 PROCEDURE — 99214 OFFICE O/P EST MOD 30 MIN: CPT | Performed by: SURGERY

## 2022-05-12 PROCEDURE — G8419 CALC BMI OUT NRM PARAM NOF/U: HCPCS | Performed by: SURGERY

## 2022-05-12 PROCEDURE — 3017F COLORECTAL CA SCREEN DOC REV: CPT | Performed by: SURGERY

## 2022-05-12 ASSESSMENT — PATIENT HEALTH QUESTIONNAIRE - PHQ9
SUM OF ALL RESPONSES TO PHQ QUESTIONS 1-9: 0
SUM OF ALL RESPONSES TO PHQ9 QUESTIONS 1 & 2: 0
SUM OF ALL RESPONSES TO PHQ QUESTIONS 1-9: 0
2. FEELING DOWN, DEPRESSED OR HOPELESS: 0
1. LITTLE INTEREST OR PLEASURE IN DOING THINGS: 0

## 2022-05-18 ENCOUNTER — TELEPHONE (OUTPATIENT)
Dept: SURGERY | Age: 67
End: 2022-05-18

## 2022-06-01 ENCOUNTER — HOSPITAL ENCOUNTER (OUTPATIENT)
Age: 67
Discharge: HOME | End: 2022-06-01
Payer: COMMERCIAL

## 2022-06-01 DIAGNOSIS — Z01.818 PRE-OP TESTING: ICD-10-CM

## 2022-06-01 LAB
ANION GAP SERPL CALCULATED.3IONS-SCNC: 10 MMOL/L (ref 4–16)
BASOPHILS ABSOLUTE: 0 K/CU MM
BASOPHILS RELATIVE PERCENT: 0.7 % (ref 0–1)
BUN BLDV-MCNC: 10 MG/DL (ref 6–23)
CALCIUM SERPL-MCNC: 9.1 MG/DL (ref 8.3–10.6)
CHLORIDE BLD-SCNC: 104 MMOL/L (ref 99–110)
CO2: 28 MMOL/L (ref 21–32)
CREAT SERPL-MCNC: 0.8 MG/DL (ref 0.9–1.3)
DIFFERENTIAL TYPE: ABNORMAL
EOSINOPHILS ABSOLUTE: 0.1 K/CU MM
EOSINOPHILS RELATIVE PERCENT: 3.1 % (ref 0–3)
GFR AFRICAN AMERICAN: >60 ML/MIN/1.73M2
GFR NON-AFRICAN AMERICAN: >60 ML/MIN/1.73M2
GLUCOSE BLD-MCNC: 134 MG/DL (ref 70–99)
HCT VFR BLD CALC: 41.9 % (ref 42–52)
HEMOGLOBIN: 13.4 GM/DL (ref 13.5–18)
IMMATURE NEUTROPHIL %: 0 % (ref 0–0.43)
LYMPHOCYTES ABSOLUTE: 1.1 K/CU MM
LYMPHOCYTES RELATIVE PERCENT: 26.3 % (ref 24–44)
MCH RBC QN AUTO: 27.2 PG (ref 27–31)
MCHC RBC AUTO-ENTMCNC: 32 % (ref 32–36)
MCV RBC AUTO: 85 FL (ref 78–100)
MONOCYTES ABSOLUTE: 0.4 K/CU MM
MONOCYTES RELATIVE PERCENT: 10.1 % (ref 0–4)
NUCLEATED RBC %: 0 %
PDW BLD-RTO: 15.2 % (ref 11.7–14.9)
PLATELET # BLD: 189 K/CU MM (ref 140–440)
PMV BLD AUTO: 9.8 FL (ref 7.5–11.1)
POTASSIUM SERPL-SCNC: 3.1 MMOL/L (ref 3.5–5.1)
RBC # BLD: 4.93 M/CU MM (ref 4.6–6.2)
SEGMENTED NEUTROPHILS ABSOLUTE COUNT: 2.6 K/CU MM
SEGMENTED NEUTROPHILS RELATIVE PERCENT: 59.8 % (ref 36–66)
SODIUM BLD-SCNC: 142 MMOL/L (ref 135–145)
TOTAL IMMATURE NEUTOROPHIL: 0 K/CU MM
TOTAL NUCLEATED RBC: 0 K/CU MM
WBC # BLD: 4.3 K/CU MM (ref 4–10.5)

## 2022-06-01 PROCEDURE — 80048 BASIC METABOLIC PNL TOTAL CA: CPT

## 2022-06-01 PROCEDURE — 36415 COLL VENOUS BLD VENIPUNCTURE: CPT

## 2022-06-01 PROCEDURE — 93005 ELECTROCARDIOGRAM TRACING: CPT | Performed by: ANESTHESIOLOGY

## 2022-06-01 PROCEDURE — 85025 COMPLETE CBC W/AUTO DIFF WBC: CPT

## 2022-06-01 ASSESSMENT — ENCOUNTER SYMPTOMS
SORE THROAT: 0
CONSTIPATION: 0
EYE REDNESS: 0
ANAL BLEEDING: 0
CHOKING: 0
PHOTOPHOBIA: 0
APNEA: 0
STRIDOR: 0
DIARRHEA: 1
RECTAL PAIN: 0
COLOR CHANGE: 0
BACK PAIN: 0
EYE ITCHING: 0

## 2022-06-02 LAB
EKG ATRIAL RATE: 69 BPM
EKG DIAGNOSIS: NORMAL
EKG P AXIS: 73 DEGREES
EKG P-R INTERVAL: 164 MS
EKG Q-T INTERVAL: 434 MS
EKG QRS DURATION: 90 MS
EKG QTC CALCULATION (BAZETT): 465 MS
EKG R AXIS: -19 DEGREES
EKG T AXIS: -33 DEGREES
EKG VENTRICULAR RATE: 69 BPM

## 2022-06-02 PROCEDURE — 93010 ELECTROCARDIOGRAM REPORT: CPT | Performed by: INTERNAL MEDICINE

## 2022-06-02 NOTE — PROGRESS NOTES
Chief Complaint   Patient presents with    Follow Up After Procedure     1st cscope 5/4/22          SUBJECTIVE:  HPI: Patient is here with complaints of status post attempted colonoscopy. Patient has a stricture at the rectal anastomosis. This would likely be recurrent malignancy. I was unable to pass the scope. Patient is extremely hard of hearing. He has a history of rectal cancer status posttreatment. Been having trouble having regular bowel movements. He has diarrhea with difficult to control. I have reviewed the patient's(pertinent information to this visit) medical history, family history(scanned in  the [address] [place] [address] under \"patient questioner\"), social history and review of systems with the patient today in the office.             Past Surgical History:   Procedure Laterality Date    COLONOSCOPY      COLOSTOMY N/A 11/2/2018    DIVERTING COLOSTOMY PLACEMENT LAPAROSCOPIC performed by Mike Dunham MD at [address] Bilateral early 2010's    hips    [place] INSJ PRPH CTR VAD W/SUBQ PORT AGE 5 YR/> N/A 11/6/2018    PORT INSERTION performed by Mike Dunham MD at [address] 5/13/2019    [facility] performed by Mike Dunham MD at [facility] performed by Mike Dunham MD at [address] [facility] N/A 3/13/2019    BOWEL RESECTION LOW ANTERIOR LAPAROSCOPIC ROBOTIC WITH DIVERTING LOOP ILEOSTOMY performed by Mike Dunham MD at [address] [facility] N/A 5/21/2019    COLOSTOMY ILEOSTOMY TAKEDOWN/REVERSAL performed by Mike Dunham MD at [facility] OR     Past Medical History:   Diagnosis Date    Cancer [facility])     Rectal cancer--treated with both chemo and radiation--finished 01/2019    Chest pain     Cocaine abuse ([address]     Positive UDS 11/2021    Colostomy present ([address]     Hypertension     Malnutrition ([address]     Profound hearing loss of both ears     no hearing in right ear --very little in left    PVD (peripheral vascular disease) ([facility]      Family History   Problem Relation Age of Onset    Dementia Mother     Early Death Father     High Blood Pressure Father     Early Death Sister     Diabetes Brother      Social History     Socioeconomic History    Marital status: Single     Spouse name: Not on file    Number of children: Not on file    Years of education: Not on file    Highest education level: Not on file   Occupational History    Not on file   Tobacco Use    Smoking status: Current Some Day Smoker     Types: Cigarettes    Smokeless tobacco: Never Used    Tobacco comment: per friend \"smokes cigarettes now & then\"   Vaping Use    Vaping Use: Never used   Substance and Sexual Activity    Alcohol use: No    Drug use: Yes     Types: Cocaine, Marijuana (Weed)     Comment: last used one month ago per pt    Sexual activity: Not on file   Other Topics Concern    Not on file   Social History Narrative    Not on file     Social Determinants of Health     Financial Resource Strain:     Difficulty of Paying Living Expenses: Not on file   Food Insecurity:     Worried About Running Out of Food in the Last Year: Not on file    Uvaldo of Food in the Last Year: Not on file   Transportation Needs:     Lack of Transportation (Medical): Not on file    Lack of Transportation (Non-Medical):  Not on file   Physical Activity:     Days of Exercise per Week: Not on file    Minutes of Exercise per Session: Not on file   Stress:     Feeling of Stress : Not on file   Social Connections:     Frequency of Communication with Friends and Family: Not on file    Frequency of Social Gatherings with Friends and Family: Not on file    Attends Spiritism Services: Not on file    Active Member of Clubs or Organizations: Not on file    Attends Club or Organization Meetings: Not on file    Marital Status: Not on file   Intimate Partner Violence:     Fear of Current or Ex-Partner: Not on file    Emotionally Abused: Not on file    Physically Abused: Not on file    Sexually Abused: Not on file   Housing Stability:     Unable to Pay for Housing in the Last Year: Not on file    Number of [place] in the Last Year: Not on file    Unstable Housing in the Last Year: Not on file       Current Outpatient Medications   Medication Sig Dispense Refill    Naproxen Sodium (ALEVE PO) Take by mouth (Patient not taking: Reported on 5/12/2022)      Incontinence Supplies MISC Use as needed for incontinence (Patient not taking: Reported on 5/12/2022) 90 each 3    ondansetron (ZOFRAN ODT) 4 MG disintegrating tablet Take 1 tablet by mouth every 8 hours as needed for Nausea (Patient not taking: Reported on 5/12/2022) 15 tablet 0    dicyclomine (BENTYL) 10 MG capsule Take 1 capsule by mouth 3 times daily As needed for abdominal pain (Patient not taking: Reported on 5/12/2022) 15 capsule 3    Nutritional Supplements (NUTRITIONAL SUPPLEMENT PLUS) LIQD Take 237 mLs by mouth 3 times daily (with meals) 90 Can 0     No current facility-administered medications for this visit. Allergies   Allergen Reactions    Ibuprofen Nausea And Vomiting    Oxycodone Nausea And Vomiting       Review of Systems:       Review of Systems   Constitutional: Negative for chills and fever. HENT: Positive for hearing loss. Negative for ear pain, mouth sores, sore throat and tinnitus. Eyes: Negative for photophobia, redness and itching. Respiratory: Negative for apnea, choking and stridor. Cardiovascular: Negative for chest pain and palpitations. Gastrointestinal: Positive for diarrhea. Negative for anal bleeding, constipation and rectal pain. Endocrine: Negative for polydipsia. Genitourinary: Negative for enuresis, flank pain and hematuria. Musculoskeletal: Negative for back pain, joint swelling and myalgias. Skin: Negative for color change and pallor.    Allergic/Immunologic: Negative for environmental allergies. Neurological: Negative for syncope and speech difficulty. Psychiatric/Behavioral: Negative for confusion and hallucinations. OBJECTIVE:  Physical Exam:    BP (!) 146/82   Pulse 55   Ht 5' 11\" (1.803 m)   Wt 127 lb 1.6 oz (57.7 kg)   SpO2 97%   BMI 17.73 kg/m²      Physical Exam  Constitutional:       Appearance: He is well-developed. HENT:      Head: Normocephalic. Eyes:      Pupils: Pupils are equal, round, and reactive to light. Cardiovascular:      Rate and Rhythm: Normal rate. Pulmonary:      Effort: Pulmonary effort is normal.   Abdominal:      General: There is no distension. Palpations: Abdomen is soft. There is no mass. Tenderness: There is no abdominal tenderness. There is no guarding or rebound. Musculoskeletal:         General: Normal range of motion. Cervical back: Normal range of motion and neck supple. Skin:     General: Skin is warm. Neurological:      Mental Status: He is alert and oriented to person, place, and time. ASSESSMENT:  1. Rectal cancer [facility])          PLAN:  Treatment: Patient will need diverting colostomy with possible abdominal perineal resection. Will obtain MRI of the pelvis. Patient counseled on risks, benefits, and alternatives of treatment plan at length today. Patient states an understanding and willingness to proceed with plan. Orders Placed This Encounter   Procedures    MRI PELVIS W WO CONTRAST        No orders of the defined types were placed in this encounter. Follow Up:  No follow-ups on file.       Ria Knowles MD

## 2022-06-03 ENCOUNTER — HOSPITAL ENCOUNTER (OUTPATIENT)
Dept: MRI IMAGING | Age: 67
Discharge: HOME | End: 2022-06-03

## 2022-06-03 DIAGNOSIS — R52 PAIN: ICD-10-CM

## 2022-06-03 DIAGNOSIS — R52 PAIN: Primary | ICD-10-CM

## 2022-06-06 ENCOUNTER — TELEPHONE (OUTPATIENT)
Dept: SURGERY | Age: 67
End: 2022-06-06

## 2022-06-06 NOTE — TELEPHONE ENCOUNTER
PROCEDURE WAS CANCELED 6/6/22, PER DR KLEIN. PT WAS NOTIFIED BY JULIO ARMSTRONG AND WAS SCHEDULED FOR MRI ON 6/3/22. PT LEFT BEFORE HAVING MRI  COMPLETED.

## 2022-06-22 ENCOUNTER — TELEPHONE (OUTPATIENT)
Dept: SURGERY | Age: 67
End: 2022-06-22

## 2022-06-22 NOTE — TELEPHONE ENCOUNTER
Per Dr Nico Alex, need MRI prior to Surg. He is ok with no Rectal Contrast so he would need no Enema. Called and left a VM with Neelam Lobo for a return call on when we could reschedule MRI.

## 2022-06-27 NOTE — TELEPHONE ENCOUNTER
Gian Garcia returned call and information was given. Gian Garcia Is calling to have MRI rescheduled.

## 2022-11-29 NOTE — TELEPHONE ENCOUNTER
Radiology called about Barium enema test. Patient was unable to get tipped in order to do the test. Radiologist suggests to do a oral contrast CT which shows similar results. They do not have the equipment at this time to do the barium enema through the colostomy bag.  Sent perfectserve to Dr. Erica Christie
Detail Level: Detailed

## 2024-02-07 ENCOUNTER — APPOINTMENT (OUTPATIENT)
Dept: GENERAL RADIOLOGY | Age: 69
DRG: 853 | End: 2024-02-07
Payer: MEDICARE

## 2024-02-07 ENCOUNTER — HOSPITAL ENCOUNTER (INPATIENT)
Age: 69
LOS: 11 days | Discharge: NURSING, REHAB OR LONG-TERM CARE | DRG: 853 | End: 2024-02-19
Attending: EMERGENCY MEDICINE | Admitting: HOSPITALIST
Payer: MEDICARE

## 2024-02-07 ENCOUNTER — APPOINTMENT (OUTPATIENT)
Dept: CT IMAGING | Age: 69
DRG: 853 | End: 2024-02-07
Payer: MEDICARE

## 2024-02-07 DIAGNOSIS — R79.89 ELEVATED BRAIN NATRIURETIC PEPTIDE (BNP) LEVEL: ICD-10-CM

## 2024-02-07 DIAGNOSIS — E87.1 HYPONATREMIA: ICD-10-CM

## 2024-02-07 DIAGNOSIS — I10 PRIMARY HYPERTENSION: ICD-10-CM

## 2024-02-07 DIAGNOSIS — D72.819 LEUKOPENIA, UNSPECIFIED TYPE: ICD-10-CM

## 2024-02-07 DIAGNOSIS — R10.9 ABDOMINAL PAIN, UNSPECIFIED ABDOMINAL LOCATION: ICD-10-CM

## 2024-02-07 DIAGNOSIS — R79.89 ELEVATED TROPONIN: ICD-10-CM

## 2024-02-07 DIAGNOSIS — J18.9 MULTIFOCAL PNEUMONIA: Primary | ICD-10-CM

## 2024-02-07 DIAGNOSIS — N17.9 AKI (ACUTE KIDNEY INJURY) (HCC): ICD-10-CM

## 2024-02-07 DIAGNOSIS — R53.1 GENERAL WEAKNESS: ICD-10-CM

## 2024-02-07 LAB
ALBUMIN SERPL-MCNC: 3.5 GM/DL (ref 3.4–5)
ALCOHOL SCREEN SERUM: <0.01 %WT/VOL
ALP BLD-CCNC: 76 IU/L (ref 40–129)
ALT SERPL-CCNC: 14 U/L (ref 10–40)
ANION GAP SERPL CALCULATED.3IONS-SCNC: 14 MMOL/L (ref 7–16)
AST SERPL-CCNC: 42 IU/L (ref 15–37)
BASOPHILS ABSOLUTE: 0 K/CU MM
BASOPHILS RELATIVE PERCENT: 0.3 % (ref 0–1)
BILIRUB SERPL-MCNC: 0.3 MG/DL (ref 0–1)
BUN SERPL-MCNC: 35 MG/DL (ref 6–23)
CALCIUM SERPL-MCNC: 8.2 MG/DL (ref 8.3–10.6)
CHLORIDE BLD-SCNC: 94 MMOL/L (ref 99–110)
CO2: 22 MMOL/L (ref 21–32)
CREAT SERPL-MCNC: 1.6 MG/DL (ref 0.9–1.3)
DIFFERENTIAL TYPE: ABNORMAL
EOSINOPHILS ABSOLUTE: 0 K/CU MM
EOSINOPHILS RELATIVE PERCENT: 0 % (ref 0–3)
GFR SERPL CREATININE-BSD FRML MDRD: 47 ML/MIN/1.73M2
GLUCOSE SERPL-MCNC: 95 MG/DL (ref 70–99)
HCT VFR BLD CALC: 42.9 % (ref 42–52)
HEMOGLOBIN: 14.1 GM/DL (ref 13.5–18)
IMMATURE NEUTROPHIL %: 0.3 % (ref 0–0.43)
LACTATE: 1.7 MMOL/L (ref 0.5–1.9)
LYMPHOCYTES ABSOLUTE: 0.7 K/CU MM
LYMPHOCYTES RELATIVE PERCENT: 21.2 % (ref 24–44)
MAGNESIUM: 2.3 MG/DL (ref 1.8–2.4)
MCH RBC QN AUTO: 28 PG (ref 27–31)
MCHC RBC AUTO-ENTMCNC: 32.9 % (ref 32–36)
MCV RBC AUTO: 85.1 FL (ref 78–100)
MONOCYTES ABSOLUTE: 0.3 K/CU MM
MONOCYTES RELATIVE PERCENT: 7.8 % (ref 0–4)
NUCLEATED RBC %: 0 %
PDW BLD-RTO: 15.1 % (ref 11.7–14.9)
PLATELET # BLD: 135 K/CU MM (ref 140–440)
PMV BLD AUTO: 9.9 FL (ref 7.5–11.1)
POTASSIUM SERPL-SCNC: 4.2 MMOL/L (ref 3.5–5.1)
PRO-BNP: 138.5 PG/ML
RBC # BLD: 5.04 M/CU MM (ref 4.6–6.2)
SEGMENTED NEUTROPHILS ABSOLUTE COUNT: 2.4 K/CU MM
SEGMENTED NEUTROPHILS RELATIVE PERCENT: 70.4 % (ref 36–66)
SODIUM BLD-SCNC: 130 MMOL/L (ref 135–145)
TOTAL CK: 436 IU/L (ref 38–174)
TOTAL IMMATURE NEUTOROPHIL: 0.01 K/CU MM
TOTAL NUCLEATED RBC: 0 K/CU MM
TOTAL PROTEIN: 6.7 GM/DL (ref 6.4–8.2)
TROPONIN, HIGH SENSITIVITY: 33 NG/L (ref 0–22)
TROPONIN, HIGH SENSITIVITY: 37 NG/L (ref 0–22)
WBC # BLD: 3.4 K/CU MM (ref 4–10.5)

## 2024-02-07 PROCEDURE — G0480 DRUG TEST DEF 1-7 CLASSES: HCPCS

## 2024-02-07 PROCEDURE — 83605 ASSAY OF LACTIC ACID: CPT

## 2024-02-07 PROCEDURE — 96366 THER/PROPH/DIAG IV INF ADDON: CPT

## 2024-02-07 PROCEDURE — 71045 X-RAY EXAM CHEST 1 VIEW: CPT

## 2024-02-07 PROCEDURE — 83880 ASSAY OF NATRIURETIC PEPTIDE: CPT

## 2024-02-07 PROCEDURE — 96365 THER/PROPH/DIAG IV INF INIT: CPT

## 2024-02-07 PROCEDURE — 70450 CT HEAD/BRAIN W/O DYE: CPT

## 2024-02-07 PROCEDURE — 80053 COMPREHEN METABOLIC PANEL: CPT

## 2024-02-07 PROCEDURE — 84484 ASSAY OF TROPONIN QUANT: CPT

## 2024-02-07 PROCEDURE — 71250 CT THORAX DX C-: CPT

## 2024-02-07 PROCEDURE — 83735 ASSAY OF MAGNESIUM: CPT

## 2024-02-07 PROCEDURE — 82550 ASSAY OF CK (CPK): CPT

## 2024-02-07 PROCEDURE — 93005 ELECTROCARDIOGRAM TRACING: CPT

## 2024-02-07 PROCEDURE — 82805 BLOOD GASES W/O2 SATURATION: CPT

## 2024-02-07 PROCEDURE — 2580000003 HC RX 258

## 2024-02-07 PROCEDURE — 99285 EMERGENCY DEPT VISIT HI MDM: CPT

## 2024-02-07 PROCEDURE — 85025 COMPLETE CBC W/AUTO DIFF WBC: CPT

## 2024-02-07 PROCEDURE — 96368 THER/DIAG CONCURRENT INF: CPT

## 2024-02-07 RX ORDER — 0.9 % SODIUM CHLORIDE 0.9 %
1000 INTRAVENOUS SOLUTION INTRAVENOUS ONCE
Status: COMPLETED | OUTPATIENT
Start: 2024-02-07 | End: 2024-02-08

## 2024-02-07 RX ORDER — ACETAMINOPHEN 325 MG/1
650 TABLET ORAL ONCE
Status: DISCONTINUED | OUTPATIENT
Start: 2024-02-07 | End: 2024-02-07

## 2024-02-07 RX ORDER — LIDOCAINE 4 G/G
1 PATCH TOPICAL DAILY
Qty: 30 PATCH | Refills: 0 | Status: SHIPPED | OUTPATIENT
Start: 2024-02-07 | End: 2024-02-07 | Stop reason: CLARIF

## 2024-02-07 RX ORDER — METHOCARBAMOL 500 MG/1
750 TABLET, FILM COATED ORAL ONCE
Status: DISCONTINUED | OUTPATIENT
Start: 2024-02-07 | End: 2024-02-07

## 2024-02-07 RX ORDER — METHOCARBAMOL 750 MG/1
750 TABLET, FILM COATED ORAL 4 TIMES DAILY
Qty: 40 TABLET | Refills: 0 | Status: SHIPPED | OUTPATIENT
Start: 2024-02-07 | End: 2024-02-07 | Stop reason: CLARIF

## 2024-02-07 RX ADMIN — SODIUM CHLORIDE 1000 ML: 9 INJECTION, SOLUTION INTRAVENOUS at 22:01

## 2024-02-07 ASSESSMENT — LIFESTYLE VARIABLES
HOW OFTEN DO YOU HAVE A DRINK CONTAINING ALCOHOL: NEVER
HOW MANY STANDARD DRINKS CONTAINING ALCOHOL DO YOU HAVE ON A TYPICAL DAY: PATIENT DOES NOT DRINK

## 2024-02-07 ASSESSMENT — PAIN DESCRIPTION - DESCRIPTORS: DESCRIPTORS: ACHING

## 2024-02-07 ASSESSMENT — PAIN DESCRIPTION - PAIN TYPE: TYPE: CHRONIC PAIN

## 2024-02-07 ASSESSMENT — PAIN SCALES - GENERAL: PAINLEVEL_OUTOF10: 4

## 2024-02-07 ASSESSMENT — PAIN DESCRIPTION - LOCATION: LOCATION: LEG

## 2024-02-07 ASSESSMENT — HEART SCORE: ECG: 0

## 2024-02-07 NOTE — ED TRIAGE NOTES
Pt arrived to ED from home with c/o bilateral leg pain and numbness that started about a week ago.Pt is unsteady and claims they have fallen multiple times. Pt denies hitting head or losing consciousness. Pt denies taking any medication. Pt is a/o but hard of hearing.

## 2024-02-08 PROBLEM — J18.9 MULTIFOCAL PNEUMONIA: Status: ACTIVE | Noted: 2024-02-08

## 2024-02-08 PROBLEM — R53.1 GENERAL WEAKNESS: Status: ACTIVE | Noted: 2024-02-08

## 2024-02-08 PROBLEM — N17.9 AKI (ACUTE KIDNEY INJURY) (HCC): Status: ACTIVE | Noted: 2024-02-08

## 2024-02-08 LAB
AMPHETAMINES: NEGATIVE
BACTERIA: NEGATIVE /HPF
BARBITURATE SCREEN URINE: NEGATIVE
BASE EXCESS: 4 (ref 0–3)
BENZODIAZEPINE SCREEN, URINE: NEGATIVE
BILIRUBIN URINE: NEGATIVE MG/DL
BLOOD, URINE: ABNORMAL
CANNABINOID SCREEN URINE: ABNORMAL
CLARITY: CLEAR
COCAINE METABOLITE: ABNORMAL
COLOR: YELLOW
EKG ATRIAL RATE: 88 BPM
EKG DIAGNOSIS: NORMAL
EKG P AXIS: 48 DEGREES
EKG P-R INTERVAL: 144 MS
EKG Q-T INTERVAL: 368 MS
EKG QRS DURATION: 92 MS
EKG QTC CALCULATION (BAZETT): 445 MS
EKG R AXIS: -14 DEGREES
EKG T AXIS: 50 DEGREES
EKG VENTRICULAR RATE: 88 BPM
FENTANYL URINE: NEGATIVE
GLUCOSE, URINE: NEGATIVE MG/DL
HCO3 VENOUS: 22.7 MMOL/L (ref 22–29)
HYALINE CASTS: 5 /LPF
INFLUENZA A ANTIGEN: NOT DETECTED
INFLUENZA B ANTIGEN: NOT DETECTED
KETONES, URINE: NEGATIVE MG/DL
LEUKOCYTE ESTERASE, URINE: NEGATIVE
MUCUS: ABNORMAL HPF
NITRITE URINE, QUANTITATIVE: NEGATIVE
O2 SAT, VEN: 84.9 % (ref 50–70)
OPIATES, URINE: NEGATIVE
OXYCODONE: NEGATIVE
PCO2, VEN: 45 MMHG (ref 41–51)
PH VENOUS: 7.31 (ref 7.32–7.43)
PH, URINE: 6 (ref 5–8)
PO2, VEN: 59 MMHG (ref 28–48)
PROTEIN UA: ABNORMAL MG/DL
RBC URINE: <1 /HPF (ref 0–3)
SARS-COV-2 RDRP RESP QL NAA+PROBE: NOT DETECTED
SOURCE: NORMAL
SPECIFIC GRAVITY UA: >1.03 (ref 1–1.03)
SQUAMOUS EPITHELIAL: <1 /HPF
TRICHOMONAS: ABNORMAL /HPF
UROBILINOGEN, URINE: 0.2 MG/DL (ref 0.2–1)
WBC UA: 1 /HPF (ref 0–2)

## 2024-02-08 PROCEDURE — 87641 MR-STAPH DNA AMP PROBE: CPT

## 2024-02-08 PROCEDURE — 2580000003 HC RX 258: Performed by: NURSE PRACTITIONER

## 2024-02-08 PROCEDURE — 87040 BLOOD CULTURE FOR BACTERIA: CPT

## 2024-02-08 PROCEDURE — 6370000000 HC RX 637 (ALT 250 FOR IP): Performed by: NURSE PRACTITIONER

## 2024-02-08 PROCEDURE — 87449 NOS EACH ORGANISM AG IA: CPT

## 2024-02-08 PROCEDURE — 87502 INFLUENZA DNA AMP PROBE: CPT

## 2024-02-08 PROCEDURE — 1200000000 HC SEMI PRIVATE

## 2024-02-08 PROCEDURE — 87081 CULTURE SCREEN ONLY: CPT

## 2024-02-08 PROCEDURE — 94640 AIRWAY INHALATION TREATMENT: CPT

## 2024-02-08 PROCEDURE — 87086 URINE CULTURE/COLONY COUNT: CPT

## 2024-02-08 PROCEDURE — 87899 AGENT NOS ASSAY W/OPTIC: CPT

## 2024-02-08 PROCEDURE — 87088 URINE BACTERIA CULTURE: CPT

## 2024-02-08 PROCEDURE — 80307 DRUG TEST PRSMV CHEM ANLYZR: CPT

## 2024-02-08 PROCEDURE — 94761 N-INVAS EAR/PLS OXIMETRY MLT: CPT

## 2024-02-08 PROCEDURE — 2580000003 HC RX 258: Performed by: EMERGENCY MEDICINE

## 2024-02-08 PROCEDURE — 81001 URINALYSIS AUTO W/SCOPE: CPT

## 2024-02-08 PROCEDURE — 87186 SC STD MICRODIL/AGAR DIL: CPT

## 2024-02-08 PROCEDURE — 6360000002 HC RX W HCPCS: Performed by: EMERGENCY MEDICINE

## 2024-02-08 PROCEDURE — 87635 SARS-COV-2 COVID-19 AMP PRB: CPT

## 2024-02-08 PROCEDURE — 93010 ELECTROCARDIOGRAM REPORT: CPT | Performed by: INTERNAL MEDICINE

## 2024-02-08 PROCEDURE — 6360000002 HC RX W HCPCS: Performed by: NURSE PRACTITIONER

## 2024-02-08 RX ORDER — ENOXAPARIN SODIUM 100 MG/ML
40 INJECTION SUBCUTANEOUS DAILY
Status: DISCONTINUED | OUTPATIENT
Start: 2024-02-08 | End: 2024-02-19 | Stop reason: HOSPADM

## 2024-02-08 RX ORDER — ONDANSETRON 2 MG/ML
4 INJECTION INTRAMUSCULAR; INTRAVENOUS EVERY 6 HOURS PRN
Status: DISCONTINUED | OUTPATIENT
Start: 2024-02-08 | End: 2024-02-19 | Stop reason: HOSPADM

## 2024-02-08 RX ORDER — AZITHROMYCIN 250 MG/1
500 TABLET, FILM COATED ORAL EVERY 24 HOURS
Status: COMPLETED | OUTPATIENT
Start: 2024-02-09 | End: 2024-02-10

## 2024-02-08 RX ORDER — GUAIFENESIN 600 MG/1
600 TABLET, EXTENDED RELEASE ORAL 2 TIMES DAILY
Status: DISCONTINUED | OUTPATIENT
Start: 2024-02-08 | End: 2024-02-11

## 2024-02-08 RX ORDER — SODIUM CHLORIDE, SODIUM LACTATE, POTASSIUM CHLORIDE, CALCIUM CHLORIDE 600; 310; 30; 20 MG/100ML; MG/100ML; MG/100ML; MG/100ML
INJECTION, SOLUTION INTRAVENOUS CONTINUOUS
Status: DISPENSED | OUTPATIENT
Start: 2024-02-08 | End: 2024-02-09

## 2024-02-08 RX ORDER — POLYETHYLENE GLYCOL 3350 17 G/17G
17 POWDER, FOR SOLUTION ORAL DAILY PRN
Status: DISCONTINUED | OUTPATIENT
Start: 2024-02-08 | End: 2024-02-10

## 2024-02-08 RX ORDER — SODIUM CHLORIDE 0.9 % (FLUSH) 0.9 %
5-40 SYRINGE (ML) INJECTION EVERY 12 HOURS SCHEDULED
Status: DISCONTINUED | OUTPATIENT
Start: 2024-02-08 | End: 2024-02-19 | Stop reason: HOSPADM

## 2024-02-08 RX ORDER — DICYCLOMINE HYDROCHLORIDE 10 MG/1
10 CAPSULE ORAL 3 TIMES DAILY PRN
Status: DISCONTINUED | OUTPATIENT
Start: 2024-02-08 | End: 2024-02-11 | Stop reason: CLARIF

## 2024-02-08 RX ORDER — FAMOTIDINE 20 MG/1
20 TABLET, FILM COATED ORAL 2 TIMES DAILY
Status: DISCONTINUED | OUTPATIENT
Start: 2024-02-08 | End: 2024-02-08

## 2024-02-08 RX ORDER — ONDANSETRON 4 MG/1
4 TABLET, ORALLY DISINTEGRATING ORAL EVERY 8 HOURS PRN
Status: DISCONTINUED | OUTPATIENT
Start: 2024-02-08 | End: 2024-02-19 | Stop reason: HOSPADM

## 2024-02-08 RX ORDER — ACETAMINOPHEN 325 MG/1
650 TABLET ORAL EVERY 6 HOURS PRN
Status: DISCONTINUED | OUTPATIENT
Start: 2024-02-08 | End: 2024-02-19 | Stop reason: HOSPADM

## 2024-02-08 RX ORDER — SODIUM CHLORIDE 9 MG/ML
INJECTION, SOLUTION INTRAVENOUS PRN
Status: DISCONTINUED | OUTPATIENT
Start: 2024-02-08 | End: 2024-02-19 | Stop reason: HOSPADM

## 2024-02-08 RX ORDER — IPRATROPIUM BROMIDE AND ALBUTEROL SULFATE 2.5; .5 MG/3ML; MG/3ML
1 SOLUTION RESPIRATORY (INHALATION)
Status: DISCONTINUED | OUTPATIENT
Start: 2024-02-08 | End: 2024-02-08

## 2024-02-08 RX ORDER — SODIUM CHLORIDE 0.9 % (FLUSH) 0.9 %
5-40 SYRINGE (ML) INJECTION PRN
Status: DISCONTINUED | OUTPATIENT
Start: 2024-02-08 | End: 2024-02-19 | Stop reason: HOSPADM

## 2024-02-08 RX ORDER — ACETAMINOPHEN 650 MG/1
650 SUPPOSITORY RECTAL EVERY 6 HOURS PRN
Status: DISCONTINUED | OUTPATIENT
Start: 2024-02-08 | End: 2024-02-19 | Stop reason: HOSPADM

## 2024-02-08 RX ORDER — IPRATROPIUM BROMIDE AND ALBUTEROL SULFATE 2.5; .5 MG/3ML; MG/3ML
1 SOLUTION RESPIRATORY (INHALATION)
Status: DISCONTINUED | OUTPATIENT
Start: 2024-02-08 | End: 2024-02-19 | Stop reason: HOSPADM

## 2024-02-08 RX ORDER — FAMOTIDINE 20 MG/1
20 TABLET, FILM COATED ORAL DAILY
Status: DISCONTINUED | OUTPATIENT
Start: 2024-02-09 | End: 2024-02-17

## 2024-02-08 RX ORDER — IPRATROPIUM BROMIDE AND ALBUTEROL SULFATE 2.5; .5 MG/3ML; MG/3ML
1 SOLUTION RESPIRATORY (INHALATION) EVERY 4 HOURS PRN
Status: DISCONTINUED | OUTPATIENT
Start: 2024-02-08 | End: 2024-02-19 | Stop reason: HOSPADM

## 2024-02-08 RX ADMIN — GUAIFENESIN 600 MG: 600 TABLET, EXTENDED RELEASE ORAL at 12:44

## 2024-02-08 RX ADMIN — CEFTRIAXONE 1000 MG: 1 INJECTION, POWDER, FOR SOLUTION INTRAMUSCULAR; INTRAVENOUS at 05:09

## 2024-02-08 RX ADMIN — SODIUM CHLORIDE, POTASSIUM CHLORIDE, SODIUM LACTATE AND CALCIUM CHLORIDE: 600; 310; 30; 20 INJECTION, SOLUTION INTRAVENOUS at 10:56

## 2024-02-08 RX ADMIN — IPRATROPIUM BROMIDE AND ALBUTEROL SULFATE 1 DOSE: 2.5; .5 SOLUTION RESPIRATORY (INHALATION) at 12:07

## 2024-02-08 RX ADMIN — GUAIFENESIN 600 MG: 600 TABLET, EXTENDED RELEASE ORAL at 22:43

## 2024-02-08 RX ADMIN — AZITHROMYCIN MONOHYDRATE 500 MG: 500 INJECTION, POWDER, LYOPHILIZED, FOR SOLUTION INTRAVENOUS at 05:14

## 2024-02-08 RX ADMIN — SODIUM CHLORIDE, POTASSIUM CHLORIDE, SODIUM LACTATE AND CALCIUM CHLORIDE: 600; 310; 30; 20 INJECTION, SOLUTION INTRAVENOUS at 22:45

## 2024-02-08 RX ADMIN — ENOXAPARIN SODIUM 40 MG: 100 INJECTION SUBCUTANEOUS at 12:45

## 2024-02-08 ASSESSMENT — PAIN DESCRIPTION - PAIN TYPE: TYPE: CHRONIC PAIN

## 2024-02-08 ASSESSMENT — PAIN DESCRIPTION - LOCATION: LOCATION: LEG

## 2024-02-08 ASSESSMENT — PAIN DESCRIPTION - DESCRIPTORS: DESCRIPTORS: ACHING

## 2024-02-08 ASSESSMENT — PAIN SCALES - GENERAL
PAINLEVEL_OUTOF10: 0
PAINLEVEL_OUTOF10: 0

## 2024-02-08 NOTE — RT PROTOCOL NOTE
RT Inhaler-Nebulizer Bronchodilator Protocol Note    There is a bronchodilator order in the chart from a provider indicating to follow the RT Bronchodilator Protocol and there is an “Initiate RT Inhaler-Nebulizer Bronchodilator Protocol” order as well (see protocol at bottom of note).    CXR Findings:  XR CHEST PORTABLE    Result Date: 2/7/2024  1.  Bibasilar heterogeneous pulmonary opacities may represent atelectasis, infection or edema.  Short-term follow-up PA and lateral chest radiographs with improved inspiration may be helpful for further evaluation. 2.  Gaseous distention/dilatation of bowel in the visualized upper abdomen, not well evaluated on this study.  If there is high clinical concern for intra-pathology then follow-up CT may be helpful for further evaluation. 3.  Left subclavian port with tip directed laterally in the region of the proximal SVC.       The findings from the last RT Protocol Assessment were as follows:   History Pulmonary Disease: Smoker 15 pack years or more  Respiratory Pattern: Regular pattern and RR 12-20 bpm  Breath Sounds: Slightly diminished and/or crackles  Cough: Strong, productive  Indication for Bronchodilator Therapy:    Bronchodilator Assessment Score: 4    Aerosolized bronchodilator medication orders have been revised according to the RT Inhaler-Nebulizer Bronchodilator Protocol below.    Respiratory Therapist to perform RT Therapy Protocol Assessment initially then follow the protocol.  Repeat RT Therapy Protocol Assessment PRN for score 0-3 or on second treatment, BID, and PRN for scores above 3.    No Indications - adjust the frequency to every 6 hours PRN wheezing or bronchospasm, if no treatments needed after 48 hours then discontinue using Per Protocol order mode.     If indication present, adjust the RT bronchodilator orders based on the Bronchodilator Assessment Score as indicated below.  Use Inhaler orders unless patient has one or more of the following: on home

## 2024-02-08 NOTE — ED NOTES
Care given to Marissa SAAVEDRA  
[facility] [phone] Chip Rodriguez  
Pt requested Spenser Raymond [phone] to be notified when discharged to pick pt up.   
Pt to Ct.  
1907    Electronically signed by: Electronically signed by Ruchi Andrews RN on 2/8/2024 at 12:00 PM

## 2024-02-08 NOTE — ED PROVIDER NOTES
[facility] EMERGENCY DEPARTMENT  EMERGENCY DEPARTMENT ENCOUNTER        Pt Name: Best Rodriguez  MRN: [id number]  Birthdate 1955  Date of evaluation: 2/7/2024  Provider: RUBEN Britton - CNP  PCP: Bobby Rousseau MD  Note Started: 8:18 PM EST 2/7/24      GAVIN. I have evaluated this patient.        CHIEF COMPLAINT       Chief Complaint   Patient presents with    Leg Pain     Leg pain and weakness that started about a week ago        HISTORY OF PRESENT ILLNESS: 1 or more Elements     History From: Patient    Limitations to history : Hearing problems Hearing impaired no hearing aids available    Social Determinants Significantly Affecting Health : None    Chief Complaint: Stomach pain weakness    Best Rodriguez is a 68 y.o. male with a history of peripheral vascular disease rectal tumor hypertension who presents to ED stating he just does not feel good, he states he feels weak his legs are weak.  He states he is having pain in his stomach that shoots all the way down to his legs.  States he is having diarrhea.  Difficult to obtain a history because he cannot hear what I am asking him.  Denies any recent fevers.    Nursing Notes were all reviewed and agreed with or any disagreements were addressed in the HPI.    REVIEW OF SYSTEMS :      Review of Systems    Positives and Pertinent negatives as per HPI.     SURGICAL HISTORY     Past Surgical History:   Procedure Laterality Date    COLONOSCOPY      COLOSTOMY N/A 11/2/2018    DIVERTING COLOSTOMY PLACEMENT LAPAROSCOPIC performed by Cary Phillip MD at [facility] OR    JOINT REPLACEMENT Bilateral early 2010's    hips    FL INSJ PRPH CTR VAD W/SUBQ PORT AGE 5 YR/> N/A 11/6/2018    PORT INSERTION performed by Cary Phillip MD at [facility] OR    SIGMOIDOSCOPY N/A 5/13/2019    SIGMOIDOSCOPY BIOPSY FLEXIBLE performed by Cary Phillip MD at [facility] ENDOSCOPY    SIGMOIDOSCOPY N/A 5/4/2022    SIGMOIDOSCOPY DIAGNOSTIC FLEXIBLE performed

## 2024-02-08 NOTE — ED PROVIDER NOTES
I Dr. Ermias Alejandro D.O. am the primary physician of record. I personally saw the patient and made/approved the management plan and take responsibility for the patient management. I independently examined and evaluated Best Rodriguez.    In brief their history revealed  a 68 y.o. male with a history of peripheral vascular disease rectal tumor hypertension who presents to ED stating he just does not feel good, he states he feels weak his legs are weak.  He states he is having pain in his stomach that shoots all the way down to his legs.  States he is having diarrhea.  Difficult to obtain a history because he cannot hear what I am asking him.  Denies any recent fevers.     Their focused exam revealed alert and oriented male resting in bed no distress normocephalic atraumatic sclera clear lungs coarse, abdomen soft nontender very hard of hearing cranial nerves intact moving all extremities does have diffuse muscle skeletal pain no obvious sign of trauma no injury.  Heart regular rate and rhythm 2+ pulses throughout.    ED course/MDM: Patient seen with NP please see her note.  Patient here with complaint of malaise fatigue myalgias history of cancer, abdominal pain.  Patient is not confused she is very hard of hearing does respond to texting.  Workup performed he did have some bowel distention but no obvious obstruction he does have multifocal pneumonia on x-ray he was given Rocephin azithromycin will check flu and COVID get cultures patient was hoping to stay in the hospital given pneumonia he has had a before usually he stays in the hospital EKG is negative has chronic elevation troponin BNP troponin is downtrending on repeat high-sensitivity.  Patient also given IV fluids.  Imaging performed of head and no acute abnormality again he is not confused just extremely hard of hearing I did have case management see him as well patient is hard of hearing he is not confused.  Workup performed he does appear to have

## 2024-02-08 NOTE — DISCHARGE INSTRUCTIONS
1. Call to schedule follow up hospital follow up appointment:   [facility]  [address]  [address]  [place], [place] [address]  Tel: [phone]    [facility]  [address]  [place], [place] [address]  [phone]     [facility]   [address], [place]   [phone]     2. Establish care with a primary care provider  Physician Finder [phone]     [facility]  [address], [address], [place] [address]  [phone]  RUBEN Metzger,CNP    [facility]  [address] [place] [address]  [phone]  MD Araceli Phillips MD Deanna N. Smith, APRN, CNP  Sandy Goyal, RUBEN Kunz, CNP     [facility]  [address], [address], [place] [address]  [phone]  Raven De Leon, DO Ramon Hirshc, PAMD Clyde Mckenzie, PAEDILSON    [facility]  [address] [place] [address]  [phone]  Elvia Mcdermott MD    [facility]  [address] [place] [address] [phone]  MD Ermias Fulton MD    [facility]  [address] [place] [address]  [phone]  RUBEN Hicks, CNP  RUBEN Reyes, CNP   MD Dacia Best, PARefugioC   Elizabeth Powell MD    [facility] (*Active Waiting List*)   [address] [place], [place]  [phone]    Dr. Roseanna Velazco MD  [address] [place] [address]  [phone]    [facility]  [address], [place] [address] [phone]    [facility]  [address]  , [place] [address] [phone]  DO Almas Lazo

## 2024-02-08 NOTE — CARE COORDINATION
CM - Room # ED-20 --Pt is still undergoing test and evaluation . He has requested to call Spenser Raymond for a ride if discharged at [phone] , if admitted CM to follow at that point . He has multiple complaints to be addressed . He has not had recent visits to ER . Results pending.

## 2024-02-08 NOTE — H&P
V2.0  History and Physical      Name:  Best Rodriguez /Age/Sex: 1955  (68 y.o. male)   MRN & CSN:  [id number] & [id number] Encounter Date/Time: 2024 9:47 AM EST   [facility]  [facility] PCP: Bobby Rousseau MD       Hospital Day: 2    Assessment and Plan:   Best Rodriguez is a 68 y.o. male with a pmh of Cocaine Abuse, Profound Hearing Loss, Severe Malnutrition, PVD, Rectal CA, HTN,  who presents with Multifocal pneumonia    Hospital Problems             Last Modified POA    * (Principal) Multifocal pneumonia 2024 Yes    General weakness 2024 Yes    IZA (acute kidney injury) (HCC) 2024 Yes     Fall   Sepsis   Multifocal Pneumonia   -Admit Inpatient on Tele  -WBC 3.4, Hgb 14.1, Plts 135, VBG: pH 7.31, pCO2 45, pO2 59, O2 Sat 84.9%  -Trop 37 then 33, trending down, no chest pain  CXR: bibasilar lung opacities, left subclavian port with tip directed in proximal SVC  -CT Head is unremarkable  -CT Chest: Multifocal opacities are present in the lungs with greater distribution in the lower lobes than within the upper lobes.  Cardiac chambers are borderline in size but the distribution suggests pneumonia rather than edema. Sequela of old           granulomatous disease as well.  -cont IV Rocephin and Azithromycin, DuoNebs, Mucinex bid and Pepcid BID   -trend CBC, Lactate, UA and MRSA pending     Generalized Weakness  Severe Protein Calorie Malnutrition   -Dietary Consulted for very low BMI 16.76    IZA  Elevated CK   ? Rhabdomyolysis   -Cr 1.6, GFR 47 ,   -cont IVF  ml/hr  -trend renal function and CK QD     Drug Abuse  -UDS + Cocaine and Cannabinoids     Rectal CA - Stage IIIB  -s/p Chemo/XRT and surgical resection   -CT: Dilated gas-filled colon is again noted from the cecum through the distal sigmoid.  Ascending colon measures up to 9 cm in the descending colon measures up to 7 cm.  Postsurgical changes and calcifications at the expected lower portion of the rectum.  Limited

## 2024-02-09 LAB
ALBUMIN SERPL-MCNC: 3.2 GM/DL (ref 3.4–5)
ALP BLD-CCNC: 70 IU/L (ref 40–128)
ALT SERPL-CCNC: 10 U/L (ref 10–40)
ANION GAP SERPL CALCULATED.3IONS-SCNC: 8 MMOL/L (ref 7–16)
AST SERPL-CCNC: 38 IU/L (ref 15–37)
BASOPHILS ABSOLUTE: 0 K/CU MM
BASOPHILS RELATIVE PERCENT: 0.3 % (ref 0–1)
BILIRUB SERPL-MCNC: 0.2 MG/DL (ref 0–1)
BUN SERPL-MCNC: 22 MG/DL (ref 6–23)
CALCIUM SERPL-MCNC: 7.4 MG/DL (ref 8.3–10.6)
CHLORIDE BLD-SCNC: 99 MMOL/L (ref 99–110)
CO2: 24 MMOL/L (ref 21–32)
CREAT SERPL-MCNC: 0.9 MG/DL (ref 0.9–1.3)
DIFFERENTIAL TYPE: ABNORMAL
EOSINOPHILS ABSOLUTE: 0 K/CU MM
EOSINOPHILS RELATIVE PERCENT: 0 % (ref 0–3)
GFR SERPL CREATININE-BSD FRML MDRD: >60 ML/MIN/1.73M2
GLUCOSE SERPL-MCNC: 96 MG/DL (ref 70–99)
HCT VFR BLD CALC: 39.2 % (ref 42–52)
HEMOGLOBIN: 12.8 GM/DL (ref 13.5–18)
IMMATURE NEUTROPHIL %: 0.6 % (ref 0–0.43)
L PNEUMO AG UR QL IA: NEGATIVE
LACTATE: 1.1 MMOL/L (ref 0.5–1.9)
LYMPHOCYTES ABSOLUTE: 0.9 K/CU MM
LYMPHOCYTES RELATIVE PERCENT: 29.5 % (ref 24–44)
MCH RBC QN AUTO: 27.8 PG (ref 27–31)
MCHC RBC AUTO-ENTMCNC: 32.7 % (ref 32–36)
MCV RBC AUTO: 85.2 FL (ref 78–100)
MONOCYTES ABSOLUTE: 0.2 K/CU MM
MONOCYTES RELATIVE PERCENT: 7.4 % (ref 0–4)
MRSA, DNA, NASAL: NEGATIVE
NUCLEATED RBC %: 0 %
PDW BLD-RTO: 15.4 % (ref 11.7–14.9)
PLATELET # BLD: 136 K/CU MM (ref 140–440)
PMV BLD AUTO: 10.3 FL (ref 7.5–11.1)
POTASSIUM SERPL-SCNC: 4.3 MMOL/L (ref 3.5–5.1)
PROCALCITONIN SERPL-MCNC: 0.35 NG/ML
RBC # BLD: 4.6 M/CU MM (ref 4.6–6.2)
S PNEUM AG CSF QL: NORMAL
SEGMENTED NEUTROPHILS ABSOLUTE COUNT: 1.9 K/CU MM
SEGMENTED NEUTROPHILS RELATIVE PERCENT: 62.2 % (ref 36–66)
SODIUM BLD-SCNC: 131 MMOL/L (ref 135–145)
SPECIMEN DESCRIPTION: NORMAL
TOTAL CK: 312 IU/L (ref 38–174)
TOTAL IMMATURE NEUTOROPHIL: 0.02 K/CU MM
TOTAL NUCLEATED RBC: 0 K/CU MM
TOTAL PROTEIN: 5.3 GM/DL (ref 6.4–8.2)
TROPONIN, HIGH SENSITIVITY: 49 NG/L (ref 0–22)
WBC # BLD: 3.1 K/CU MM (ref 4–10.5)

## 2024-02-09 PROCEDURE — 1200000000 HC SEMI PRIVATE

## 2024-02-09 PROCEDURE — 6360000002 HC RX W HCPCS: Performed by: NURSE PRACTITIONER

## 2024-02-09 PROCEDURE — 36415 COLL VENOUS BLD VENIPUNCTURE: CPT

## 2024-02-09 PROCEDURE — 84484 ASSAY OF TROPONIN QUANT: CPT

## 2024-02-09 PROCEDURE — 94761 N-INVAS EAR/PLS OXIMETRY MLT: CPT

## 2024-02-09 PROCEDURE — 2700000000 HC OXYGEN THERAPY PER DAY

## 2024-02-09 PROCEDURE — 83605 ASSAY OF LACTIC ACID: CPT

## 2024-02-09 PROCEDURE — 6370000000 HC RX 637 (ALT 250 FOR IP): Performed by: NURSE PRACTITIONER

## 2024-02-09 PROCEDURE — 80053 COMPREHEN METABOLIC PANEL: CPT

## 2024-02-09 PROCEDURE — 6370000000 HC RX 637 (ALT 250 FOR IP): Performed by: HOSPITALIST

## 2024-02-09 PROCEDURE — 6370000000 HC RX 637 (ALT 250 FOR IP): Performed by: FAMILY MEDICINE

## 2024-02-09 PROCEDURE — 84145 PROCALCITONIN (PCT): CPT

## 2024-02-09 PROCEDURE — 2580000003 HC RX 258: Performed by: NURSE PRACTITIONER

## 2024-02-09 PROCEDURE — 85025 COMPLETE CBC W/AUTO DIFF WBC: CPT

## 2024-02-09 PROCEDURE — 82550 ASSAY OF CK (CPK): CPT

## 2024-02-09 PROCEDURE — 94640 AIRWAY INHALATION TREATMENT: CPT

## 2024-02-09 RX ADMIN — GUAIFENESIN 600 MG: 600 TABLET, EXTENDED RELEASE ORAL at 10:24

## 2024-02-09 RX ADMIN — IPRATROPIUM BROMIDE AND ALBUTEROL SULFATE 1 DOSE: 2.5; .5 SOLUTION RESPIRATORY (INHALATION) at 07:32

## 2024-02-09 RX ADMIN — IPRATROPIUM BROMIDE AND ALBUTEROL SULFATE 1 DOSE: 2.5; .5 SOLUTION RESPIRATORY (INHALATION) at 21:30

## 2024-02-09 RX ADMIN — SALINE NASAL SPRAY 1 SPRAY: 1.5 SOLUTION NASAL at 23:27

## 2024-02-09 RX ADMIN — AZITHROMYCIN DIHYDRATE 500 MG: 250 TABLET ORAL at 05:35

## 2024-02-09 RX ADMIN — SODIUM CHLORIDE, PRESERVATIVE FREE 10 ML: 5 INJECTION INTRAVENOUS at 10:24

## 2024-02-09 RX ADMIN — CEFTRIAXONE 1000 MG: 1 INJECTION, POWDER, FOR SOLUTION INTRAMUSCULAR; INTRAVENOUS at 05:30

## 2024-02-09 RX ADMIN — FAMOTIDINE 20 MG: 20 TABLET ORAL at 10:24

## 2024-02-09 RX ADMIN — ENOXAPARIN SODIUM 40 MG: 100 INJECTION SUBCUTANEOUS at 10:24

## 2024-02-09 RX ADMIN — SODIUM CHLORIDE, PRESERVATIVE FREE 10 ML: 5 INJECTION INTRAVENOUS at 21:56

## 2024-02-09 RX ADMIN — GUAIFENESIN 600 MG: 600 TABLET, EXTENDED RELEASE ORAL at 21:56

## 2024-02-09 NOTE — CARE COORDINATION
02/09/24 1252   Service Assessment   Patient Orientation Alert and Oriented   Cognition Alert   History Provided By Patient;Other (see comment)  (brother Cal)   Support Systems Family Members;Friends/Neighbors   Patient's Healthcare Decision Maker is: Named in Scanned ACP Document   PCP Verified by CM No  (PCP list added to discharge instructions)   Prior Functional Level Assistance with the following:;Mobility   Current Functional Level Assistance with the following:;Mobility   Can patient return to prior living arrangement Yes   Ability to make needs known: Good   Family able to assist with home care needs: No   Would you like for me to discuss the discharge plan with any other family members/significant others, and if so, who? Yes  (brother Cal)   Financial Resources Medicare;Medicaid   Community Resources None   CM/SW Referral Other (see comment)  (for consideration of rehab)     CM in to see Pt to initiate discharge planning.  Pt from home alone.  Pt brother Cal present.     Pt with case management consult for consideration of rehab.  Pt denies the need for inpatient or outpatient resources at this time.     Pt does not have a PCP, list provided in discharge instructions.  Pt would like home health care if possible.  Referral made to [facility] program requested.     Pt and Cal deny any needs at this time.  CM following

## 2024-02-09 NOTE — CARE COORDINATION
Pt not appropriate for [facility] program but will initiate hhc post dc. For which it will take a minimum of 3 days to set up pcp visit with [facility].

## 2024-02-09 NOTE — PLAN OF CARE
Problem: Discharge Planning  Goal: Discharge to home or other facility with appropriate resources  Outcome: Progressing     Problem: Pain  Goal: Verbalizes/displays adequate comfort level or baseline comfort level  Outcome: Progressing     Problem: Safety - Adult  Goal: Free from fall injury  Outcome: Progressing     Problem: Nutrition Deficit:  Goal: Optimize nutritional status  2/9/2024 1639 by Jodi Watts RN  Outcome: Progressing  2/9/2024 1121 by Kerry Jones, RD, LD  Flowsheets (Taken 2/9/2024 1121)  Nutrient intake appropriate for improving, restoring, or maintaining nutritional needs:   Assess nutritional status and recommend course of action   Monitor oral intake, labs, and treatment plans   Recommend appropriate diets, oral nutritional supplements, and vitamin/mineral supplements

## 2024-02-10 ENCOUNTER — APPOINTMENT (OUTPATIENT)
Dept: GENERAL RADIOLOGY | Age: 69
DRG: 853 | End: 2024-02-10
Payer: MEDICARE

## 2024-02-10 ENCOUNTER — APPOINTMENT (OUTPATIENT)
Dept: CT IMAGING | Age: 69
DRG: 853 | End: 2024-02-10
Payer: MEDICARE

## 2024-02-10 PROBLEM — C80.1 CANCER (HCC): Status: ACTIVE | Noted: 2024-02-10

## 2024-02-10 PROBLEM — E46 MALNUTRITION (HCC): Status: ACTIVE | Noted: 2024-02-10

## 2024-02-10 PROBLEM — I71.02 DISSECTING AAA (ABDOMINAL AORTIC ANEURYSM) (HCC): Status: ACTIVE | Noted: 2024-02-10

## 2024-02-10 PROBLEM — H91.93 PROFOUND HEARING LOSS OF BOTH EARS: Status: ACTIVE | Noted: 2024-02-10

## 2024-02-10 LAB
ALBUMIN SERPL-MCNC: 3.3 GM/DL (ref 3.4–5)
ALP BLD-CCNC: 77 IU/L (ref 40–128)
ALT SERPL-CCNC: 11 U/L (ref 10–40)
ANION GAP SERPL CALCULATED.3IONS-SCNC: 11 MMOL/L (ref 7–16)
AST SERPL-CCNC: 36 IU/L (ref 15–37)
BILIRUB SERPL-MCNC: 0.2 MG/DL (ref 0–1)
BUN SERPL-MCNC: 14 MG/DL (ref 6–23)
CALCIUM SERPL-MCNC: 7.7 MG/DL (ref 8.3–10.6)
CHLORIDE BLD-SCNC: 98 MMOL/L (ref 99–110)
CO2: 25 MMOL/L (ref 21–32)
CREAT SERPL-MCNC: 0.8 MG/DL (ref 0.9–1.3)
CULTURE: ABNORMAL
CULTURE: ABNORMAL
DIFFERENTIAL TYPE: ABNORMAL
GFR SERPL CREATININE-BSD FRML MDRD: >60 ML/MIN/1.73M2
GLUCOSE SERPL-MCNC: 115 MG/DL (ref 70–99)
HCT VFR BLD CALC: 38.3 % (ref 42–52)
HEMOGLOBIN: 12.5 GM/DL (ref 13.5–18)
LYMPHOCYTES ABSOLUTE: 0.9 K/CU MM
LYMPHOCYTES RELATIVE PERCENT: 25 % (ref 24–44)
Lab: ABNORMAL
MAGNESIUM: 1.8 MG/DL (ref 1.8–2.4)
MCH RBC QN AUTO: 27.5 PG (ref 27–31)
MCHC RBC AUTO-ENTMCNC: 32.6 % (ref 32–36)
MCV RBC AUTO: 84.4 FL (ref 78–100)
MONOCYTES ABSOLUTE: 0.3 K/CU MM
MONOCYTES RELATIVE PERCENT: 8 % (ref 0–4)
PDW BLD-RTO: 15.1 % (ref 11.7–14.9)
PLATELET # BLD: 125 K/CU MM (ref 140–440)
PMV BLD AUTO: 10.9 FL (ref 7.5–11.1)
POTASSIUM SERPL-SCNC: 3.5 MMOL/L (ref 3.5–5.1)
PSA ULTRASENSITIVE: 13.85 NG/ML (ref 0–4)
RBC # BLD: 4.54 M/CU MM (ref 4.6–6.2)
SEGMENTED NEUTROPHILS ABSOLUTE COUNT: 2.2 K/CU MM
SEGMENTED NEUTROPHILS RELATIVE PERCENT: 67 % (ref 36–66)
SODIUM BLD-SCNC: 134 MMOL/L (ref 135–145)
SPECIMEN: ABNORMAL
TOTAL CK: 309 IU/L (ref 38–174)
TOTAL PROTEIN: 5.4 GM/DL (ref 6.4–8.2)
WBC # BLD: 3.4 K/CU MM (ref 4–10.5)

## 2024-02-10 PROCEDURE — 1200000000 HC SEMI PRIVATE

## 2024-02-10 PROCEDURE — 85007 BL SMEAR W/DIFF WBC COUNT: CPT

## 2024-02-10 PROCEDURE — 83735 ASSAY OF MAGNESIUM: CPT

## 2024-02-10 PROCEDURE — 74018 RADEX ABDOMEN 1 VIEW: CPT

## 2024-02-10 PROCEDURE — 82550 ASSAY OF CK (CPK): CPT

## 2024-02-10 PROCEDURE — 80053 COMPREHEN METABOLIC PANEL: CPT

## 2024-02-10 PROCEDURE — 84153 ASSAY OF PSA TOTAL: CPT

## 2024-02-10 PROCEDURE — 6370000000 HC RX 637 (ALT 250 FOR IP): Performed by: NURSE PRACTITIONER

## 2024-02-10 PROCEDURE — 6360000004 HC RX CONTRAST MEDICATION: Performed by: NURSE PRACTITIONER

## 2024-02-10 PROCEDURE — 75635 CT ANGIO ABDOMINAL ARTERIES: CPT

## 2024-02-10 PROCEDURE — 85027 COMPLETE CBC AUTOMATED: CPT

## 2024-02-10 PROCEDURE — 6370000000 HC RX 637 (ALT 250 FOR IP): Performed by: HOSPITALIST

## 2024-02-10 PROCEDURE — 2580000003 HC RX 258: Performed by: NURSE PRACTITIONER

## 2024-02-10 PROCEDURE — 94761 N-INVAS EAR/PLS OXIMETRY MLT: CPT

## 2024-02-10 PROCEDURE — 6360000002 HC RX W HCPCS: Performed by: NURSE PRACTITIONER

## 2024-02-10 PROCEDURE — 94640 AIRWAY INHALATION TREATMENT: CPT

## 2024-02-10 PROCEDURE — 2700000000 HC OXYGEN THERAPY PER DAY

## 2024-02-10 PROCEDURE — 36415 COLL VENOUS BLD VENIPUNCTURE: CPT

## 2024-02-10 RX ORDER — GABAPENTIN 300 MG/1
300 CAPSULE ORAL 3 TIMES DAILY
Status: DISCONTINUED | OUTPATIENT
Start: 2024-02-10 | End: 2024-02-19 | Stop reason: HOSPADM

## 2024-02-10 RX ORDER — POLYETHYLENE GLYCOL 3350 17 G/17G
17 POWDER, FOR SOLUTION ORAL 2 TIMES DAILY
Status: DISCONTINUED | OUTPATIENT
Start: 2024-02-10 | End: 2024-02-13

## 2024-02-10 RX ORDER — ENEMA 19; 7 G/133ML; G/133ML
1 ENEMA RECTAL 2 TIMES DAILY PRN
Status: DISCONTINUED | OUTPATIENT
Start: 2024-02-10 | End: 2024-02-19 | Stop reason: HOSPADM

## 2024-02-10 RX ORDER — LORAZEPAM 0.5 MG/1
0.5 TABLET ORAL
Status: COMPLETED | OUTPATIENT
Start: 2024-02-10 | End: 2024-02-11

## 2024-02-10 RX ORDER — ENEMA 19; 7 G/133ML; G/133ML
1 ENEMA RECTAL 2 TIMES DAILY
Status: DISCONTINUED | OUTPATIENT
Start: 2024-02-10 | End: 2024-02-10

## 2024-02-10 RX ADMIN — IPRATROPIUM BROMIDE AND ALBUTEROL SULFATE 1 DOSE: 2.5; .5 SOLUTION RESPIRATORY (INHALATION) at 19:46

## 2024-02-10 RX ADMIN — ENOXAPARIN SODIUM 40 MG: 100 INJECTION SUBCUTANEOUS at 09:27

## 2024-02-10 RX ADMIN — SODIUM CHLORIDE, PRESERVATIVE FREE 5 ML: 5 INJECTION INTRAVENOUS at 10:01

## 2024-02-10 RX ADMIN — ACETAMINOPHEN 650 MG: 325 TABLET ORAL at 10:13

## 2024-02-10 RX ADMIN — IPRATROPIUM BROMIDE AND ALBUTEROL SULFATE 1 DOSE: 2.5; .5 SOLUTION RESPIRATORY (INHALATION) at 08:06

## 2024-02-10 RX ADMIN — GUAIFENESIN 600 MG: 600 TABLET, EXTENDED RELEASE ORAL at 09:27

## 2024-02-10 RX ADMIN — IOPAMIDOL 100 ML: 755 INJECTION, SOLUTION INTRAVENOUS at 11:02

## 2024-02-10 RX ADMIN — AZITHROMYCIN DIHYDRATE 500 MG: 250 TABLET ORAL at 05:17

## 2024-02-10 RX ADMIN — FAMOTIDINE 20 MG: 20 TABLET ORAL at 09:27

## 2024-02-10 RX ADMIN — GABAPENTIN 300 MG: 300 CAPSULE ORAL at 18:15

## 2024-02-10 RX ADMIN — CEFTRIAXONE 1000 MG: 1 INJECTION, POWDER, FOR SOLUTION INTRAMUSCULAR; INTRAVENOUS at 05:17

## 2024-02-10 RX ADMIN — POLYETHYLENE GLYCOL (3350) 17 G: 17 POWDER, FOR SOLUTION ORAL at 20:12

## 2024-02-10 RX ADMIN — GUAIFENESIN 600 MG: 600 TABLET, EXTENDED RELEASE ORAL at 20:12

## 2024-02-10 RX ADMIN — GABAPENTIN 300 MG: 300 CAPSULE ORAL at 20:12

## 2024-02-10 RX ADMIN — SODIUM CHLORIDE, PRESERVATIVE FREE 10 ML: 5 INJECTION INTRAVENOUS at 20:12

## 2024-02-10 ASSESSMENT — PAIN DESCRIPTION - ORIENTATION
ORIENTATION: LEFT;RIGHT
ORIENTATION: LEFT;RIGHT

## 2024-02-10 ASSESSMENT — PAIN - FUNCTIONAL ASSESSMENT
PAIN_FUNCTIONAL_ASSESSMENT: PREVENTS OR INTERFERES SOME ACTIVE ACTIVITIES AND ADLS
PAIN_FUNCTIONAL_ASSESSMENT: PREVENTS OR INTERFERES SOME ACTIVE ACTIVITIES AND ADLS

## 2024-02-10 ASSESSMENT — PAIN DESCRIPTION - ONSET
ONSET: PROGRESSIVE
ONSET: GRADUAL

## 2024-02-10 ASSESSMENT — PAIN SCALES - GENERAL
PAINLEVEL_OUTOF10: 4
PAINLEVEL_OUTOF10: 6

## 2024-02-10 ASSESSMENT — PAIN DESCRIPTION - DESCRIPTORS
DESCRIPTORS: ACHING
DESCRIPTORS: ACHING

## 2024-02-10 ASSESSMENT — PAIN SCALES - WONG BAKER: WONGBAKER_NUMERICALRESPONSE: 4

## 2024-02-10 ASSESSMENT — PAIN DESCRIPTION - PAIN TYPE
TYPE: CHRONIC PAIN
TYPE: CHRONIC PAIN;ACUTE PAIN

## 2024-02-10 ASSESSMENT — PAIN DESCRIPTION - LOCATION
LOCATION: FOOT;ABDOMEN
LOCATION: LEG

## 2024-02-10 ASSESSMENT — PAIN DESCRIPTION - FREQUENCY
FREQUENCY: INTERMITTENT
FREQUENCY: INTERMITTENT

## 2024-02-10 NOTE — PLAN OF CARE
Problem: Discharge Planning  Goal: Discharge to home or other facility with appropriate resources  Outcome: Progressing     Problem: Pain  Goal: Verbalizes/displays adequate comfort level or baseline comfort level  Outcome: Progressing  Flowsheets (Taken 2/10/2024 1001)  Verbalizes/displays adequate comfort level or baseline comfort level:   Encourage patient to monitor pain and request assistance   Assess pain using appropriate pain scale     Problem: Safety - Adult  Goal: Free from fall injury  Outcome: Progressing     Problem: Nutrition Deficit:  Goal: Optimize nutritional status  Outcome: Progressing

## 2024-02-11 ENCOUNTER — APPOINTMENT (OUTPATIENT)
Dept: MRI IMAGING | Age: 69
DRG: 853 | End: 2024-02-11
Payer: MEDICARE

## 2024-02-11 LAB
ALBUMIN SERPL-MCNC: 3.4 GM/DL (ref 3.4–5)
ALP BLD-CCNC: 86 IU/L (ref 40–128)
ALT SERPL-CCNC: 12 U/L (ref 10–40)
ANION GAP SERPL CALCULATED.3IONS-SCNC: 10 MMOL/L (ref 7–16)
AST SERPL-CCNC: 34 IU/L (ref 15–37)
BASOPHILS ABSOLUTE: 0 K/CU MM
BASOPHILS RELATIVE PERCENT: 0.3 % (ref 0–1)
BILIRUB SERPL-MCNC: 0.2 MG/DL (ref 0–1)
BUN SERPL-MCNC: 9 MG/DL (ref 6–23)
CALCIUM SERPL-MCNC: 8 MG/DL (ref 8.3–10.6)
CEA: 5 NG/ML (ref 0–5)
CHLORIDE BLD-SCNC: 100 MMOL/L (ref 99–110)
CO2: 29 MMOL/L (ref 21–32)
CREAT SERPL-MCNC: 0.9 MG/DL (ref 0.9–1.3)
DIFFERENTIAL TYPE: ABNORMAL
EOSINOPHILS ABSOLUTE: 0 K/CU MM
EOSINOPHILS RELATIVE PERCENT: 0.3 % (ref 0–3)
GFR SERPL CREATININE-BSD FRML MDRD: >60 ML/MIN/1.73M2
GLUCOSE SERPL-MCNC: 107 MG/DL (ref 70–99)
HCT VFR BLD CALC: 39.2 % (ref 42–52)
HEMOGLOBIN: 12.5 GM/DL (ref 13.5–18)
IMMATURE NEUTROPHIL %: 0.6 % (ref 0–0.43)
LYMPHOCYTES ABSOLUTE: 1 K/CU MM
LYMPHOCYTES RELATIVE PERCENT: 29.7 % (ref 24–44)
MAGNESIUM: 1.9 MG/DL (ref 1.8–2.4)
MCH RBC QN AUTO: 27.8 PG (ref 27–31)
MCHC RBC AUTO-ENTMCNC: 31.9 % (ref 32–36)
MCV RBC AUTO: 87.1 FL (ref 78–100)
MONOCYTES ABSOLUTE: 0.3 K/CU MM
MONOCYTES RELATIVE PERCENT: 9.1 % (ref 0–4)
NUCLEATED RBC %: 0 %
PDW BLD-RTO: 15.4 % (ref 11.7–14.9)
PLATELET # BLD: 131 K/CU MM (ref 140–440)
PMV BLD AUTO: 10.5 FL (ref 7.5–11.1)
POTASSIUM SERPL-SCNC: 3.4 MMOL/L (ref 3.5–5.1)
RBC # BLD: 4.5 M/CU MM (ref 4.6–6.2)
SEGMENTED NEUTROPHILS ABSOLUTE COUNT: 2 K/CU MM
SEGMENTED NEUTROPHILS RELATIVE PERCENT: 60 % (ref 36–66)
SODIUM BLD-SCNC: 139 MMOL/L (ref 135–145)
TOTAL CK: 259 IU/L (ref 38–174)
TOTAL IMMATURE NEUTOROPHIL: 0.02 K/CU MM
TOTAL NUCLEATED RBC: 0 K/CU MM
TOTAL PROTEIN: 5.6 GM/DL (ref 6.4–8.2)
WBC # BLD: 3.4 K/CU MM (ref 4–10.5)

## 2024-02-11 PROCEDURE — 36415 COLL VENOUS BLD VENIPUNCTURE: CPT

## 2024-02-11 PROCEDURE — 97530 THERAPEUTIC ACTIVITIES: CPT

## 2024-02-11 PROCEDURE — 97116 GAIT TRAINING THERAPY: CPT

## 2024-02-11 PROCEDURE — 94640 AIRWAY INHALATION TREATMENT: CPT

## 2024-02-11 PROCEDURE — 72148 MRI LUMBAR SPINE W/O DYE: CPT

## 2024-02-11 PROCEDURE — 6370000000 HC RX 637 (ALT 250 FOR IP): Performed by: HOSPITALIST

## 2024-02-11 PROCEDURE — 97163 PT EVAL HIGH COMPLEX 45 MIN: CPT

## 2024-02-11 PROCEDURE — 85025 COMPLETE CBC W/AUTO DIFF WBC: CPT

## 2024-02-11 PROCEDURE — 80053 COMPREHEN METABOLIC PANEL: CPT

## 2024-02-11 PROCEDURE — 83735 ASSAY OF MAGNESIUM: CPT

## 2024-02-11 PROCEDURE — 2580000003 HC RX 258: Performed by: NURSE PRACTITIONER

## 2024-02-11 PROCEDURE — 82550 ASSAY OF CK (CPK): CPT

## 2024-02-11 PROCEDURE — 6370000000 HC RX 637 (ALT 250 FOR IP): Performed by: NURSE PRACTITIONER

## 2024-02-11 PROCEDURE — 99223 1ST HOSP IP/OBS HIGH 75: CPT | Performed by: INTERNAL MEDICINE

## 2024-02-11 PROCEDURE — 82378 CARCINOEMBRYONIC ANTIGEN: CPT

## 2024-02-11 PROCEDURE — 1200000000 HC SEMI PRIVATE

## 2024-02-11 PROCEDURE — 6360000002 HC RX W HCPCS: Performed by: NURSE PRACTITIONER

## 2024-02-11 PROCEDURE — 99222 1ST HOSP IP/OBS MODERATE 55: CPT | Performed by: SURGERY

## 2024-02-11 PROCEDURE — 97166 OT EVAL MOD COMPLEX 45 MIN: CPT

## 2024-02-11 RX ORDER — DICYCLOMINE HCL 20 MG
10 TABLET ORAL 3 TIMES DAILY PRN
Status: DISCONTINUED | OUTPATIENT
Start: 2024-02-11 | End: 2024-02-19 | Stop reason: HOSPADM

## 2024-02-11 RX ORDER — GUAIFENESIN/DEXTROMETHORPHAN 100-10MG/5
5 SYRUP ORAL EVERY 4 HOURS PRN
Status: DISCONTINUED | OUTPATIENT
Start: 2024-02-11 | End: 2024-02-19 | Stop reason: HOSPADM

## 2024-02-11 RX ORDER — POTASSIUM CHLORIDE 20 MEQ/1
40 TABLET, EXTENDED RELEASE ORAL ONCE
Status: COMPLETED | OUTPATIENT
Start: 2024-02-11 | End: 2024-02-11

## 2024-02-11 RX ADMIN — POLYETHYLENE GLYCOL (3350) 17 G: 17 POWDER, FOR SOLUTION ORAL at 21:14

## 2024-02-11 RX ADMIN — CEFTRIAXONE 1000 MG: 1 INJECTION, POWDER, FOR SOLUTION INTRAMUSCULAR; INTRAVENOUS at 05:30

## 2024-02-11 RX ADMIN — GUAIFENESIN SYRUP AND DEXTROMETHORPHAN 5 ML: 100; 10 SYRUP ORAL at 18:39

## 2024-02-11 RX ADMIN — GABAPENTIN 300 MG: 300 CAPSULE ORAL at 18:39

## 2024-02-11 RX ADMIN — GABAPENTIN 300 MG: 300 CAPSULE ORAL at 09:13

## 2024-02-11 RX ADMIN — POLYETHYLENE GLYCOL (3350) 17 G: 17 POWDER, FOR SOLUTION ORAL at 09:13

## 2024-02-11 RX ADMIN — IPRATROPIUM BROMIDE AND ALBUTEROL SULFATE 1 DOSE: 2.5; .5 SOLUTION RESPIRATORY (INHALATION) at 20:48

## 2024-02-11 RX ADMIN — ENOXAPARIN SODIUM 40 MG: 100 INJECTION SUBCUTANEOUS at 09:13

## 2024-02-11 RX ADMIN — LORAZEPAM 0.5 MG: 0.5 TABLET ORAL at 13:31

## 2024-02-11 RX ADMIN — FAMOTIDINE 20 MG: 20 TABLET ORAL at 09:13

## 2024-02-11 RX ADMIN — POTASSIUM CHLORIDE 40 MEQ: 1500 TABLET, EXTENDED RELEASE ORAL at 09:13

## 2024-02-11 RX ADMIN — SODIUM CHLORIDE: 9 INJECTION, SOLUTION INTRAVENOUS at 05:25

## 2024-02-11 RX ADMIN — GABAPENTIN 300 MG: 300 CAPSULE ORAL at 21:14

## 2024-02-11 RX ADMIN — SODIUM CHLORIDE, PRESERVATIVE FREE 10 ML: 5 INJECTION INTRAVENOUS at 21:16

## 2024-02-11 RX ADMIN — SODIUM CHLORIDE, PRESERVATIVE FREE 5 ML: 5 INJECTION INTRAVENOUS at 09:13

## 2024-02-11 RX ADMIN — GUAIFENESIN SYRUP AND DEXTROMETHORPHAN 5 ML: 100; 10 SYRUP ORAL at 11:05

## 2024-02-11 RX ADMIN — ACETAMINOPHEN 650 MG: 325 TABLET ORAL at 09:13

## 2024-02-11 ASSESSMENT — PAIN DESCRIPTION - ORIENTATION: ORIENTATION: RIGHT;LEFT;LOWER

## 2024-02-11 ASSESSMENT — PAIN DESCRIPTION - PAIN TYPE: TYPE: CHRONIC PAIN;ACUTE PAIN

## 2024-02-11 ASSESSMENT — PAIN SCALES - GENERAL
PAINLEVEL_OUTOF10: 3
PAINLEVEL_OUTOF10: 3

## 2024-02-11 ASSESSMENT — PAIN DESCRIPTION - DESCRIPTORS: DESCRIPTORS: ACHING

## 2024-02-11 ASSESSMENT — PAIN DESCRIPTION - LOCATION
LOCATION: ABDOMEN;LEG
LOCATION: ABDOMEN;LEG

## 2024-02-11 ASSESSMENT — PAIN DESCRIPTION - ONSET: ONSET: ON-GOING

## 2024-02-11 ASSESSMENT — PAIN - FUNCTIONAL ASSESSMENT: PAIN_FUNCTIONAL_ASSESSMENT: PREVENTS OR INTERFERES SOME ACTIVE ACTIVITIES AND ADLS

## 2024-02-11 ASSESSMENT — PAIN DESCRIPTION - FREQUENCY: FREQUENCY: INTERMITTENT

## 2024-02-11 NOTE — PLAN OF CARE
Problem: Discharge Planning  Goal: Discharge to home or other facility with appropriate resources  Outcome: Progressing     Problem: Pain  Goal: Verbalizes/displays adequate comfort level or baseline comfort level  Outcome: Progressing  Flowsheets (Taken 2/11/2024 0901)  Verbalizes/displays adequate comfort level or baseline comfort level:   Assess pain using appropriate pain scale   Encourage patient to monitor pain and request assistance     Problem: Safety - Adult  Goal: Free from fall injury  Outcome: Progressing     Problem: Nutrition Deficit:  Goal: Optimize nutritional status  Outcome: Progressing     Problem: Skin/Tissue Integrity  Goal: Absence of new skin breakdown  Description: 1.  Monitor for areas of redness and/or skin breakdown  2.  Assess vascular access sites hourly  3.  Every 4-6 hours minimum:  Change oxygen saturation probe site  4.  Every 4-6 hours:  If on nasal continuous positive airway pressure, respiratory therapy assess nares and determine need for appliance change or resting period.  Outcome: Progressing

## 2024-02-11 NOTE — CONSULTS
Department of General Surgery   Surgical Service Dr. Cody   Consult Note    Date of Consult: 2/11/24    Reason for Consult:  rectal stricture  Requesting Physician:  Teresita Dietz CNP    CHIEF COMPLAINT:  respiratory symptoms, general fatigue    History Obtained From:  patient    HISTORY OF PRESENT ILLNESS:    The patient is a 68 y.o. male with a history of rectal cancer treated by chemo/radiation and subsequent resection.  He had diverting loop ileostomy reversed in 2019 after undergoing sigmoidoscopy preop.  He was last seen by Dr. Phillip in May of 2022 and was known to have stricture at his rectal anastomosis that was unable to be traversed by colonoscope.  MRI was ordered at that time but not completed.  Best reports abdominal pain on admission that has improved. He is passing flatus. He has been having diarrhea at home.  No nausea currently.  Denies other complaints.          Past Medical History:    Past Medical History:   Diagnosis Date    Cancer (HCC)     Rectal cancer--treated with both chemo and radiation--finished 01/2019    Chest pain     Cocaine abuse (HCC)     Positive UDS 11/2021    Colostomy present (HCC)     Dissecting AAA (abdominal aortic aneurysm) (HCC) 02/10/2024    on CTA - Chronic    Hypertension     Malnutrition (HCC)     Profound hearing loss of both ears     no hearing in right ear --very little in left    PVD (peripheral vascular disease) (HCC)        Past Surgical History:    Past Surgical History:   Procedure Laterality Date    COLONOSCOPY      COLOSTOMY N/A 11/2/2018    DIVERTING COLOSTOMY PLACEMENT LAPAROSCOPIC performed by Cary Phillip MD at [facility] OR    JOINT REPLACEMENT Bilateral early 2010's    hips    ME INSJ PRPH CTR VAD W/SUBQ PORT AGE 5 YR/> N/A 11/6/2018    PORT INSERTION performed by Cary Phillip MD at [facility] OR    SIGMOIDOSCOPY N/A 5/13/2019    SIGMOIDOSCOPY BIOPSY FLEXIBLE performed by Cary Phillip MD at [facility] ENDOSCOPY    SIGMOIDOSCOPY N/A 5/4/2022    SIGMOIDOSCOPY

## 2024-02-11 NOTE — PLAN OF CARE
Problem: Discharge Planning  Goal: Discharge to home or other facility with appropriate resources  2/10/2024 2240 by Mary Mcdaniel RN  Outcome: Progressing     Problem: Pain  Goal: Verbalizes/displays adequate comfort level or baseline comfort level  2/10/2024 2240 by Mary Mcdaniel RN  Outcome: Progressing     Problem: Safety - Adult  Goal: Free from fall injury  2/10/2024 2240 by Mary Mcdaniel RN  Outcome: Progressing     Problem: Nutrition Deficit:  Goal: Optimize nutritional status  2/10/2024 2240 by Mary Mcdaniel RN  Outcome: Progressing

## 2024-02-11 NOTE — PLAN OF CARE
Patient known to Dr. Patel, will defer consult to him.     Hailey Armstrong PA-C,  [facility]   2/11/2024  9:52 AM

## 2024-02-11 NOTE — CONSULTS
[address] CENTER [place] [id number]                                  CONSULTATION    PATIENT NAME: HOMERO SALMERON                     :        1955  MED REC NO:   [id number]                          ROOM:       3011  ACCOUNT NO:   [id number]                           ADMIT DATE: 2024  PROVIDER:     Akash Patel MD    CONSULT DATE:  2024    CHIEF COMPLAINT:  Abnormal CT scan, rule out rectal anastomotic  stricture.    HISTORY OF PRESENT ILLNESS:  The patient is a 68-year-old   American gentleman.  The patient is known to me with past medical  history significant for obstructing rectal carcinoma diagnosed by me in  2018 with diverting loop colostomy done on 2018 followed by  placement of MediPort on 2018 and then finally on 2019.  The  patient underwent a robotic-assisted laparoscopy with low anterior colon  resection with loop ileostomy, which was reversed on 2019.  The  patient also received radiation and chemotherapy along with the surgery.  The patient has been followed up by Dr. Phillip since, who performed a  flexible sigmoidoscopy on 2022, but was unable to pass the scope  through the rectal stricture proximally.  There was suggestion of  recurrence at the anastomotic site and the plan was to do an  abdominoperineal resection, but the patient was subsequently lost to  followup.    The patient presented to the emergency room at 2024 and was  admitted for multifocal pneumonia along with acute kidney injury.    The patient does complain of generalized abdominal discomfort after  eating with no nausea, vomiting, or hematemesis.  The patient, however,  does have liquid BMs with no gross blood per rectum.  The patient during  the present hospitalization had a CTA done on 02/10/2024 and was noted  to have marked distention of the majority of the colon leading up to the  rectum,

## 2024-02-12 ENCOUNTER — ANESTHESIA (OUTPATIENT)
Dept: ENDOSCOPY | Age: 69
End: 2024-02-12
Payer: MEDICARE

## 2024-02-12 ENCOUNTER — APPOINTMENT (OUTPATIENT)
Dept: GENERAL RADIOLOGY | Age: 69
DRG: 853 | End: 2024-02-12
Payer: MEDICARE

## 2024-02-12 ENCOUNTER — APPOINTMENT (OUTPATIENT)
Dept: MRI IMAGING | Age: 69
DRG: 853 | End: 2024-02-12
Payer: MEDICARE

## 2024-02-12 ENCOUNTER — ANESTHESIA EVENT (OUTPATIENT)
Dept: ENDOSCOPY | Age: 69
End: 2024-02-12
Payer: MEDICARE

## 2024-02-12 LAB
ALBUMIN SERPL-MCNC: 3.6 GM/DL (ref 3.4–5)
ALP BLD-CCNC: 71 IU/L (ref 40–129)
ALT SERPL-CCNC: 14 U/L (ref 10–40)
AMYLASE: 143 U/L (ref 25–115)
ANION GAP SERPL CALCULATED.3IONS-SCNC: 12 MMOL/L (ref 7–16)
APTT: 28.6 SECONDS (ref 25.1–37.1)
AST SERPL-CCNC: 33 IU/L (ref 15–37)
BANDED NEUTROPHILS ABSOLUTE COUNT: 0.15 K/CU MM
BANDED NEUTROPHILS RELATIVE PERCENT: 4 % (ref 5–11)
BASOPHILS ABSOLUTE: 0 K/CU MM
BASOPHILS RELATIVE PERCENT: 1 % (ref 0–1)
BILIRUB SERPL-MCNC: 0.2 MG/DL (ref 0–1)
BUN SERPL-MCNC: 6 MG/DL (ref 6–23)
CALCIUM SERPL-MCNC: 8 MG/DL (ref 8.3–10.6)
CHLORIDE BLD-SCNC: 104 MMOL/L (ref 99–110)
CO2: 26 MMOL/L (ref 21–32)
CREAT SERPL-MCNC: 0.7 MG/DL (ref 0.9–1.3)
DIFFERENTIAL TYPE: ABNORMAL
EOSINOPHILS ABSOLUTE: 0 K/CU MM
EOSINOPHILS RELATIVE PERCENT: 1 % (ref 0–3)
GFR SERPL CREATININE-BSD FRML MDRD: >60 ML/MIN/1.73M2
GLUCOSE SERPL-MCNC: 102 MG/DL (ref 70–99)
HCT VFR BLD CALC: 39.5 % (ref 42–52)
HEMOGLOBIN: 12.7 GM/DL (ref 13.5–18)
IMMATURE NEUTROPHIL %: 0 % (ref 0–0.43)
INR BLD: 0.8 INDEX
LIPASE: 37 IU/L (ref 13–60)
LYMPHOCYTES ABSOLUTE: 1 K/CU MM
LYMPHOCYTES RELATIVE PERCENT: 25 % (ref 24–44)
MCH RBC QN AUTO: 27.9 PG (ref 27–31)
MCHC RBC AUTO-ENTMCNC: 32.2 % (ref 32–36)
MCV RBC AUTO: 86.8 FL (ref 78–100)
MONOCYTES ABSOLUTE: 0.3 K/CU MM
MONOCYTES RELATIVE PERCENT: 8 % (ref 0–4)
PDW BLD-RTO: 15.4 % (ref 11.7–14.9)
PLATELET # BLD: 180 K/CU MM (ref 140–440)
PMV BLD AUTO: 10.2 FL (ref 7.5–11.1)
POLYCHROMASIA: ABNORMAL
POTASSIUM SERPL-SCNC: 3.9 MMOL/L (ref 3.5–5.1)
PROTHROMBIN TIME: 11.1 SECONDS (ref 11.7–14.5)
RBC # BLD: 4.55 M/CU MM (ref 4.6–6.2)
SEGMENTED NEUTROPHILS ABSOLUTE COUNT: 2.3 K/CU MM
SEGMENTED NEUTROPHILS RELATIVE PERCENT: 61 % (ref 36–66)
SODIUM BLD-SCNC: 142 MMOL/L (ref 135–145)
TOTAL IMMATURE NEUTOROPHIL: 0 K/CU MM
TOTAL PROTEIN: 6.1 GM/DL (ref 6.4–8.2)
WBC # BLD: 3.8 K/CU MM (ref 4–10.5)

## 2024-02-12 PROCEDURE — 3609010300 HC COLONOSCOPY W/BIOPSY SINGLE/MULTIPLE: Performed by: SPECIALIST

## 2024-02-12 PROCEDURE — 6360000002 HC RX W HCPCS: Performed by: NURSE ANESTHETIST, CERTIFIED REGISTERED

## 2024-02-12 PROCEDURE — 6360000004 HC RX CONTRAST MEDICATION: Performed by: INTERNAL MEDICINE

## 2024-02-12 PROCEDURE — 2580000003 HC RX 258: Performed by: NURSE PRACTITIONER

## 2024-02-12 PROCEDURE — 74018 RADEX ABDOMEN 1 VIEW: CPT

## 2024-02-12 PROCEDURE — 2580000003 HC RX 258: Performed by: SPECIALIST

## 2024-02-12 PROCEDURE — 1200000000 HC SEMI PRIVATE

## 2024-02-12 PROCEDURE — 85025 COMPLETE CBC W/AUTO DIFF WBC: CPT

## 2024-02-12 PROCEDURE — 85610 PROTHROMBIN TIME: CPT

## 2024-02-12 PROCEDURE — 0DBK8ZX EXCISION OF ASCENDING COLON, VIA NATURAL OR ARTIFICIAL OPENING ENDOSCOPIC, DIAGNOSTIC: ICD-10-PCS | Performed by: SPECIALIST

## 2024-02-12 PROCEDURE — 99223 1ST HOSP IP/OBS HIGH 75: CPT | Performed by: INTERNAL MEDICINE

## 2024-02-12 PROCEDURE — 6370000000 HC RX 637 (ALT 250 FOR IP): Performed by: NURSE PRACTITIONER

## 2024-02-12 PROCEDURE — A9579 GAD-BASE MR CONTRAST NOS,1ML: HCPCS | Performed by: INTERNAL MEDICINE

## 2024-02-12 PROCEDURE — 36415 COLL VENOUS BLD VENIPUNCTURE: CPT

## 2024-02-12 PROCEDURE — 6370000000 HC RX 637 (ALT 250 FOR IP): Performed by: SPECIALIST

## 2024-02-12 PROCEDURE — 72158 MRI LUMBAR SPINE W/O & W/DYE: CPT

## 2024-02-12 PROCEDURE — 2500000003 HC RX 250 WO HCPCS: Performed by: NURSE ANESTHETIST, CERTIFIED REGISTERED

## 2024-02-12 PROCEDURE — APPNB60 APP NON BILLABLE TIME 46-60 MINS: Performed by: PHYSICIAN ASSISTANT

## 2024-02-12 PROCEDURE — 83690 ASSAY OF LIPASE: CPT

## 2024-02-12 PROCEDURE — 6360000002 HC RX W HCPCS: Performed by: NURSE PRACTITIONER

## 2024-02-12 PROCEDURE — 6370000000 HC RX 637 (ALT 250 FOR IP): Performed by: FAMILY MEDICINE

## 2024-02-12 PROCEDURE — 94761 N-INVAS EAR/PLS OXIMETRY MLT: CPT

## 2024-02-12 PROCEDURE — 94640 AIRWAY INHALATION TREATMENT: CPT

## 2024-02-12 PROCEDURE — 6370000000 HC RX 637 (ALT 250 FOR IP): Performed by: HOSPITALIST

## 2024-02-12 PROCEDURE — 88305 TISSUE EXAM BY PATHOLOGIST: CPT | Performed by: PATHOLOGY

## 2024-02-12 PROCEDURE — 2709999900 HC NON-CHARGEABLE SUPPLY: Performed by: SPECIALIST

## 2024-02-12 PROCEDURE — 80053 COMPREHEN METABOLIC PANEL: CPT

## 2024-02-12 PROCEDURE — 99233 SBSQ HOSP IP/OBS HIGH 50: CPT | Performed by: INTERNAL MEDICINE

## 2024-02-12 PROCEDURE — 85730 THROMBOPLASTIN TIME PARTIAL: CPT

## 2024-02-12 PROCEDURE — 99231 SBSQ HOSP IP/OBS SF/LOW 25: CPT | Performed by: PHYSICIAN ASSISTANT

## 2024-02-12 PROCEDURE — 82150 ASSAY OF AMYLASE: CPT

## 2024-02-12 RX ORDER — LIDOCAINE HYDROCHLORIDE 20 MG/ML
INJECTION, SOLUTION INFILTRATION; PERINEURAL PRN
Status: DISCONTINUED | OUTPATIENT
Start: 2024-02-12 | End: 2024-02-15 | Stop reason: SDUPTHER

## 2024-02-12 RX ORDER — SODIUM CHLORIDE, SODIUM LACTATE, POTASSIUM CHLORIDE, CALCIUM CHLORIDE 600; 310; 30; 20 MG/100ML; MG/100ML; MG/100ML; MG/100ML
INJECTION, SOLUTION INTRAVENOUS CONTINUOUS
Status: DISPENSED | OUTPATIENT
Start: 2024-02-12 | End: 2024-02-13

## 2024-02-12 RX ORDER — PROPOFOL 10 MG/ML
INJECTION, EMULSION INTRAVENOUS PRN
Status: DISCONTINUED | OUTPATIENT
Start: 2024-02-12 | End: 2024-02-15 | Stop reason: SDUPTHER

## 2024-02-12 RX ADMIN — IPRATROPIUM BROMIDE AND ALBUTEROL SULFATE 1 DOSE: 2.5; .5 SOLUTION RESPIRATORY (INHALATION) at 07:59

## 2024-02-12 RX ADMIN — Medication 1 LOZENGE: at 22:19

## 2024-02-12 RX ADMIN — IPRATROPIUM BROMIDE AND ALBUTEROL SULFATE 1 DOSE: 2.5; .5 SOLUTION RESPIRATORY (INHALATION) at 23:01

## 2024-02-12 RX ADMIN — GABAPENTIN 300 MG: 300 CAPSULE ORAL at 20:10

## 2024-02-12 RX ADMIN — GABAPENTIN 300 MG: 300 CAPSULE ORAL at 10:39

## 2024-02-12 RX ADMIN — SODIUM CHLORIDE, PRESERVATIVE FREE 10 ML: 5 INJECTION INTRAVENOUS at 10:40

## 2024-02-12 RX ADMIN — FAMOTIDINE 20 MG: 20 TABLET ORAL at 10:40

## 2024-02-12 RX ADMIN — LIDOCAINE HYDROCHLORIDE 50 MG: 20 INJECTION, SOLUTION INFILTRATION; PERINEURAL at 15:57

## 2024-02-12 RX ADMIN — PROPOFOL 15 MG: 10 INJECTION, EMULSION INTRAVENOUS at 16:00

## 2024-02-12 RX ADMIN — POLYETHYLENE GLYCOL (3350) 17 G: 17 POWDER, FOR SOLUTION ORAL at 10:40

## 2024-02-12 RX ADMIN — SODIUM CHLORIDE, PRESERVATIVE FREE 10 ML: 5 INJECTION INTRAVENOUS at 20:11

## 2024-02-12 RX ADMIN — GADOTERIDOL 15 ML: 279.3 INJECTION, SOLUTION INTRAVENOUS at 12:44

## 2024-02-12 RX ADMIN — CEFTRIAXONE 1000 MG: 1 INJECTION, POWDER, FOR SOLUTION INTRAMUSCULAR; INTRAVENOUS at 05:39

## 2024-02-12 RX ADMIN — POLYETHYLENE GLYCOL (3350) 17 G: 17 POWDER, FOR SOLUTION ORAL at 20:10

## 2024-02-12 RX ADMIN — ENOXAPARIN SODIUM 40 MG: 100 INJECTION SUBCUTANEOUS at 10:39

## 2024-02-12 RX ADMIN — SODIUM CHLORIDE, POTASSIUM CHLORIDE, SODIUM LACTATE AND CALCIUM CHLORIDE: 600; 310; 30; 20 INJECTION, SOLUTION INTRAVENOUS at 15:57

## 2024-02-12 RX ADMIN — SODIUM CHLORIDE: 9 INJECTION, SOLUTION INTRAVENOUS at 05:38

## 2024-02-12 ASSESSMENT — PAIN SCALES - GENERAL: PAINLEVEL_OUTOF10: 0

## 2024-02-12 NOTE — PLAN OF CARE
Problem: Discharge Planning  Goal: Discharge to home or other facility with appropriate resources  2/12/2024 0048 by Mary Mcdaniel RN  Outcome: Progressing     Problem: Discharge Planning  Goal: Discharge to home or other facility with appropriate resources  2/12/2024 0048 by Mary Mcdaniel RN  Outcome: Progressing     Problem: Pain  Goal: Verbalizes/displays adequate comfort level or baseline comfort level  2/12/2024 0048 by Mary Mcdaniel RN  Outcome: Progressing     Problem: Safety - Adult  Goal: Free from fall injury  2/12/2024 0048 by Mary Mcdaniel RN  Outcome: Progressing     Problem: Nutrition Deficit:  Goal: Optimize nutritional status  2/12/2024 0048 by Mary Mcdaniel RN  Outcome: Progressing     Problem: Skin/Tissue Integrity  Goal: Absence of new skin breakdown  Description: 1.  Monitor for areas of redness and/or skin breakdown  2.  Assess vascular access sites hourly  3.  Every 4-6 hours minimum:  Change oxygen saturation probe site  4.  Every 4-6 hours:  If on nasal continuous positive airway pressure, respiratory therapy assess nares and determine need for appliance change or resting period.  2/12/2024 0048 by Mary Mcdaniel RN  Outcome: Progressing

## 2024-02-12 NOTE — CONSULTS
Hematology Oncology Inpatient Consult    Patient Name:  Best Rodriguez  Patient :  1955  Patient MRN:  [id number]     Primary Oncologist: Thomas Rey MD  Referring Provider: No primary care provider on file.     Date of Service: 2024      Reason for Consult: Hx of rectal Ca, concern for LMD on imaging     Chief Complaint:    Chief Complaint   Patient presents with    Leg Pain     Leg pain and weakness that started about a week ago      HPI:   Best Rodriguez is a 68 y.o. male with a history of rectal adenocarcinoma diagnosed in 2018 status post diverting colostomy followed by neoadjuvant chemoradiation.  He underwent low anterior colon resection with Dr. Phillip in 2018.  He did have residual foci of adenocarcinoma on resection with treatment effect, mesenteric lymph nodes 20 out of 20 negative metastatic disease.  He had open ileostomy reversal with small bowel resection May 2019.  He was hesitant to do adjuvant treatment and received 1 dose of FOLFOX in 2019 after which he did not return to clinic.  He was last seen in clinic in 2021 at which time there is no apparent recurrent or metastatic disease on CT performed in 2021.  CEA level was 3.7.  He was seen in May 2022 by Dr. Phillip which noted stricture at his rectal anastomosis which cannot be  traversed by colonoscope.  MRI was ordered at that time not completed.  He presented this admission complaining of generalized malaise and weakness in bilateral legs with bilateral leg pain.  He is being treated for multifocal pneumonia.  CTA of the aorta was runoff was significant for right occluded anterior tibial artery, chronic long segment occlusion of right SFA, and left occluded anterior tibial artery.  Imaging incidentally showed marked distention of the large bowel.  Cardiology is following and plans for outpatient echo and peripheral angiogram as well as adding Pletal once surgical plan with GI/general surgery is complete.  General

## 2024-02-12 NOTE — BRIEF OP NOTE
Brief Postoperative Note      Best Rodriguez is a 68 y.o. male     Pre-operative Diagnosis: ANASTOMOTIC STRICTURE / COLON DISTENTION    Post-operative Diagnosis: TIGHT ANASTOMOTIC STRICTURE AT 5 CM UNABLE TO PASS PEDI COLONOSCOPE -HOWEVER ABLE TO PASS GASTROSCOPE UPTO ASCENDING COLON-DILATED COLON FULL OF STOOL AND GAS --S/P DECOMPRESSION / NO MALIGNANCY  NOTED AT ANASTOMOTIC SITE-BX OBTAINED / RADIATION PROCTITIS NOTED / HDS     Procedure: COLONOSCOPY-INCOMPLETE    Anesthesia: MAC    Surgeons/Assistants:Akash Patel MD     Estimated Blood Loss: NIL    Complications: None    Specimens: were obtained  REC--CPM / F/U AXR / F/U BX / D/W DR KLEIN- WILL NEED DIVERTING COLOSTOMY / LOW FIBER DIET    Akash Patel MD   2/12/2024   4:22 PM

## 2024-02-12 NOTE — ANESTHESIA PRE PROCEDURE
Never   • Tobacco comments:     per friend \"smokes cigarettes now & then\"   Substance Use Topics   • Alcohol use: No                                Ready to quit: Not Answered  Counseling given: Not Answered  Tobacco comments: per friend \"smokes cigarettes now & then\"      Vital Signs (Current):   Vitals:    02/12/24 0530 02/12/24 0803 02/12/24 1015 02/12/24 1520   BP: (!) 136/95  (!) 141/96 138/85   Pulse: 77  90    Resp: 24 18 18   Temp: 36.4 °C (97.6 °F)  36.5 °C (97.7 °F) 37.4 °C (99.4 °F)   TempSrc: Oral  Oral Temporal   SpO2:  92% 95% 95%   Weight:       Height:                                                  BP Readings from Last 3 Encounters:   02/12/24 138/85   05/12/22 (!) 146/82   05/04/22 126/81       NPO Status: Time of last liquid consumption: 1040                        Time of last solid consumption: 0800                        Date of last liquid consumption: 02/19/24                        Date of last solid food consumption: 02/11/24    BMI:   Wt Readings from Last 3 Encounters:   02/11/24 56.3 kg (124 lb 1.9 oz)   05/12/22 57.7 kg (127 lb 1.6 oz)   03/31/22 60.8 kg (134 lb)     Body mass index is 17.31 kg/m².    CBC:   Lab Results   Component Value Date/Time    WBC 3.8 02/12/2024 03:16 AM    RBC 4.55 02/12/2024 03:16 AM    HGB 12.7 02/12/2024 03:16 AM    HCT 39.5 02/12/2024 03:16 AM    MCV 86.8 02/12/2024 03:16 AM    RDW 15.4 02/12/2024 03:16 AM     02/12/2024 03:16 AM       CMP:   Lab Results   Component Value Date/Time     02/12/2024 03:16 AM    K 3.9 02/12/2024 03:16 AM     02/12/2024 03:16 AM    CO2 26 02/12/2024 03:16 AM    BUN 6 02/12/2024 03:16 AM    CREATININE 0.7 02/12/2024 03:16 AM    GFRAA >60 06/01/2022 11:26 AM    LABGLOM >60 02/12/2024 03:16 AM    GLUCOSE 102 02/12/2024 03:16 AM    PROT 6.1 02/12/2024 03:16 AM    CALCIUM 8.0 02/12/2024 03:16 AM    BILITOT 0.2 02/12/2024 03:16 AM    ALKPHOS 71 02/12/2024 03:16 AM    AST 33 02/12/2024 03:16 AM    ALT 14 02/12/2024

## 2024-02-12 NOTE — ANESTHESIA POSTPROCEDURE EVALUATION
Department of Anesthesiology  Postprocedure Note    Patient: Best Rodriguez  MRN: [id number]  YOB: 1955  Date of evaluation: 2/12/2024    Procedure Summary       Date: 02/12/24 Room / Location: [place] [id number] / [facility]    Anesthesia Start: 1554 Anesthesia Stop:     Procedure: COLONOSCOPY WITH BIOPSY up to ascending colon Diagnosis:       Abdominal pain, unspecified abdominal location      (Abdominal pain, unspecified abdominal location [R10.9])    Surgeons: Akash Patel MD Responsible Provider: Chip Wu MD    Anesthesia Type: MAC ASA Status: 3            Anesthesia Type: No value filed.    Tiera Phase I: Tiera Score: 10    Tiera Phase II:      Anesthesia Post Evaluation    Patient location during evaluation: bedside  Patient participation: complete - patient participated  Level of consciousness: awake  Pain score: 0  Airway patency: patent  Nausea & Vomiting: no nausea and no vomiting  Cardiovascular status: hemodynamically stable  Hydration status: euvolemic    No notable events documented.

## 2024-02-13 ENCOUNTER — APPOINTMENT (OUTPATIENT)
Dept: NON INVASIVE DIAGNOSTICS | Age: 69
DRG: 853 | End: 2024-02-13
Payer: MEDICARE

## 2024-02-13 ENCOUNTER — ANESTHESIA EVENT (OUTPATIENT)
Dept: OPERATING ROOM | Age: 69
DRG: 853 | End: 2024-02-13
Payer: MEDICARE

## 2024-02-13 ENCOUNTER — ANESTHESIA (OUTPATIENT)
Dept: OPERATING ROOM | Age: 69
DRG: 853 | End: 2024-02-13
Payer: MEDICARE

## 2024-02-13 LAB
ANION GAP SERPL CALCULATED.3IONS-SCNC: 7 MMOL/L (ref 7–16)
BANDED NEUTROPHILS ABSOLUTE COUNT: 0.44 K/CU MM
BANDED NEUTROPHILS RELATIVE PERCENT: 8 % (ref 5–11)
BUN SERPL-MCNC: 9 MG/DL (ref 6–23)
CALCIUM SERPL-MCNC: 8.1 MG/DL (ref 8.3–10.6)
CHLORIDE BLD-SCNC: 100 MMOL/L (ref 99–110)
CO2: 32 MMOL/L (ref 21–32)
CREAT SERPL-MCNC: 0.8 MG/DL (ref 0.9–1.3)
CULTURE: NORMAL
CULTURE: NORMAL
DIFFERENTIAL TYPE: ABNORMAL
ECHO AO ROOT DIAM: 2.7 CM
ECHO AO ROOT INDEX: 1.56 CM/M2
ECHO AV AREA PEAK VELOCITY: 1.3 CM2
ECHO AV AREA VTI: 1.4 CM2
ECHO AV AREA/BSA PEAK VELOCITY: 0.8 CM2/M2
ECHO AV AREA/BSA VTI: 0.8 CM2/M2
ECHO AV MEAN GRADIENT: 4 MMHG
ECHO AV MEAN VELOCITY: 0.9 M/S
ECHO AV PEAK GRADIENT: 7 MMHG
ECHO AV PEAK VELOCITY: 1.3 M/S
ECHO AV VELOCITY RATIO: 0.46
ECHO AV VTI: 24.8 CM
ECHO BSA: 1.65 M2
ECHO EST RA PRESSURE: 3 MMHG
ECHO LA AREA 4C: 12 CM2
ECHO LA DIAMETER INDEX: 1.56 CM/M2
ECHO LA DIAMETER: 2.7 CM
ECHO LA MAJOR AXIS: 4.1 CM
ECHO LA TO AORTIC ROOT RATIO: 1
ECHO LA VOL MOD A4C: 23 ML (ref 18–58)
ECHO LA VOLUME INDEX MOD A4C: 13 ML/M2 (ref 16–34)
ECHO LV E' LATERAL VELOCITY: 10 CM/S
ECHO LV E' SEPTAL VELOCITY: 9 CM/S
ECHO LV EDV A4C: 108 ML
ECHO LV EDV INDEX A4C: 62 ML/M2
ECHO LV EJECTION FRACTION A4C: 41 %
ECHO LV ESV A4C: 64 ML
ECHO LV ESV INDEX A4C: 37 ML/M2
ECHO LV FRACTIONAL SHORTENING: 16 % (ref 28–44)
ECHO LV INTERNAL DIMENSION DIASTOLE INDEX: 2.83 CM/M2
ECHO LV INTERNAL DIMENSION DIASTOLIC: 4.9 CM (ref 4.2–5.9)
ECHO LV INTERNAL DIMENSION SYSTOLIC INDEX: 2.37 CM/M2
ECHO LV INTERNAL DIMENSION SYSTOLIC: 4.1 CM
ECHO LV IVSD: 0.7 CM (ref 0.6–1)
ECHO LV MASS 2D: 131.2 G (ref 88–224)
ECHO LV MASS INDEX 2D: 75.8 G/M2 (ref 49–115)
ECHO LV POSTERIOR WALL DIASTOLIC: 0.9 CM (ref 0.6–1)
ECHO LV RELATIVE WALL THICKNESS RATIO: 0.37
ECHO LVOT AREA: 2.8 CM2
ECHO LVOT AV VTI INDEX: 0.49
ECHO LVOT DIAM: 1.9 CM
ECHO LVOT MEAN GRADIENT: 1 MMHG
ECHO LVOT PEAK GRADIENT: 1 MMHG
ECHO LVOT PEAK VELOCITY: 0.6 M/S
ECHO LVOT STROKE VOLUME INDEX: 20 ML/M2
ECHO LVOT SV: 34.6 ML
ECHO LVOT VTI: 12.2 CM
ECHO MV A VELOCITY: 0.78 M/S
ECHO MV E VELOCITY: 0.77 M/S
ECHO MV E/A RATIO: 0.99
ECHO MV E/E' LATERAL: 7.7
ECHO MV E/E' RATIO (AVERAGED): 8.13
ECHO RIGHT VENTRICULAR SYSTOLIC PRESSURE (RVSP): 19 MMHG
ECHO RV MID DIMENSION: 2.5 CM
ECHO TV REGURGITANT MAX VELOCITY: 2.01 M/S
ECHO TV REGURGITANT PEAK GRADIENT: 16 MMHG
EOSINOPHILS ABSOLUTE: 0.1 K/CU MM
EOSINOPHILS RELATIVE PERCENT: 1 % (ref 0–3)
GFR SERPL CREATININE-BSD FRML MDRD: >60 ML/MIN/1.73M2
GLUCOSE SERPL-MCNC: 127 MG/DL (ref 70–99)
HCT VFR BLD CALC: 41.3 % (ref 42–52)
HEMOGLOBIN: 13 GM/DL (ref 13.5–18)
LYMPHOCYTES ABSOLUTE: 1 K/CU MM
LYMPHOCYTES RELATIVE PERCENT: 19 % (ref 24–44)
Lab: NORMAL
Lab: NORMAL
MCH RBC QN AUTO: 27 PG (ref 27–31)
MCHC RBC AUTO-ENTMCNC: 31.5 % (ref 32–36)
MCV RBC AUTO: 85.9 FL (ref 78–100)
MONOCYTES ABSOLUTE: 0.4 K/CU MM
MONOCYTES RELATIVE PERCENT: 7 % (ref 0–4)
PDW BLD-RTO: 15.1 % (ref 11.7–14.9)
PLATELET # BLD: 260 K/CU MM (ref 140–440)
PMV BLD AUTO: 9.6 FL (ref 7.5–11.1)
POTASSIUM SERPL-SCNC: 4.1 MMOL/L (ref 3.5–5.1)
RBC # BLD: 4.81 M/CU MM (ref 4.6–6.2)
SEGMENTED NEUTROPHILS ABSOLUTE COUNT: 3.6 K/CU MM
SEGMENTED NEUTROPHILS RELATIVE PERCENT: 65 % (ref 36–66)
SODIUM BLD-SCNC: 139 MMOL/L (ref 135–145)
SPECIMEN: NORMAL
SPECIMEN: NORMAL
TOTAL CK: 161 IU/L (ref 38–174)
WBC # BLD: 5.5 K/CU MM (ref 4–10.5)
WBC # BLD: ABNORMAL 10*3/UL

## 2024-02-13 PROCEDURE — 2580000003 HC RX 258: Performed by: PHYSICIAN ASSISTANT

## 2024-02-13 PROCEDURE — 6360000002 HC RX W HCPCS: Performed by: NURSE ANESTHETIST, CERTIFIED REGISTERED

## 2024-02-13 PROCEDURE — 85027 COMPLETE CBC AUTOMATED: CPT

## 2024-02-13 PROCEDURE — 36415 COLL VENOUS BLD VENIPUNCTURE: CPT

## 2024-02-13 PROCEDURE — 2580000003 HC RX 258: Performed by: NURSE PRACTITIONER

## 2024-02-13 PROCEDURE — 94761 N-INVAS EAR/PLS OXIMETRY MLT: CPT

## 2024-02-13 PROCEDURE — 2720000010 HC SURG SUPPLY STERILE: Performed by: SURGERY

## 2024-02-13 PROCEDURE — 93306 TTE W/DOPPLER COMPLETE: CPT

## 2024-02-13 PROCEDURE — 2500000003 HC RX 250 WO HCPCS: Performed by: NURSE ANESTHETIST, CERTIFIED REGISTERED

## 2024-02-13 PROCEDURE — 85007 BL SMEAR W/DIFF WBC COUNT: CPT

## 2024-02-13 PROCEDURE — 6360000002 HC RX W HCPCS: Performed by: NURSE PRACTITIONER

## 2024-02-13 PROCEDURE — 7100000000 HC PACU RECOVERY - FIRST 15 MIN: Performed by: SURGERY

## 2024-02-13 PROCEDURE — 2580000003 HC RX 258: Performed by: NURSE ANESTHETIST, CERTIFIED REGISTERED

## 2024-02-13 PROCEDURE — 80048 BASIC METABOLIC PNL TOTAL CA: CPT

## 2024-02-13 PROCEDURE — 7100000001 HC PACU RECOVERY - ADDTL 15 MIN: Performed by: SURGERY

## 2024-02-13 PROCEDURE — 6370000000 HC RX 637 (ALT 250 FOR IP): Performed by: FAMILY MEDICINE

## 2024-02-13 PROCEDURE — 3600000014 HC SURGERY LEVEL 4 ADDTL 15MIN: Performed by: SURGERY

## 2024-02-13 PROCEDURE — 6370000000 HC RX 637 (ALT 250 FOR IP): Performed by: PHYSICIAN ASSISTANT

## 2024-02-13 PROCEDURE — 82550 ASSAY OF CK (CPK): CPT

## 2024-02-13 PROCEDURE — 1200000000 HC SEMI PRIVATE

## 2024-02-13 PROCEDURE — 2709999900 HC NON-CHARGEABLE SUPPLY: Performed by: SURGERY

## 2024-02-13 PROCEDURE — 3700000001 HC ADD 15 MINUTES (ANESTHESIA): Performed by: SURGERY

## 2024-02-13 PROCEDURE — 3600000004 HC SURGERY LEVEL 4 BASE: Performed by: SURGERY

## 2024-02-13 PROCEDURE — 93306 TTE W/DOPPLER COMPLETE: CPT | Performed by: INTERNAL MEDICINE

## 2024-02-13 PROCEDURE — 44320 COLOSTOMY: CPT | Performed by: SURGERY

## 2024-02-13 PROCEDURE — 0D1E4Z4 BYPASS LARGE INTESTINE TO CUTANEOUS, PERCUTANEOUS ENDOSCOPIC APPROACH: ICD-10-PCS | Performed by: SURGERY

## 2024-02-13 PROCEDURE — 3700000000 HC ANESTHESIA ATTENDED CARE: Performed by: SURGERY

## 2024-02-13 PROCEDURE — 6370000000 HC RX 637 (ALT 250 FOR IP): Performed by: NURSE PRACTITIONER

## 2024-02-13 PROCEDURE — APPNB180 APP NON BILLABLE TIME > 60 MINS: Performed by: PHYSICIAN ASSISTANT

## 2024-02-13 PROCEDURE — 6360000002 HC RX W HCPCS: Performed by: SURGERY

## 2024-02-13 RX ORDER — DEXAMETHASONE SODIUM PHOSPHATE 4 MG/ML
INJECTION, SOLUTION INTRA-ARTICULAR; INTRALESIONAL; INTRAMUSCULAR; INTRAVENOUS; SOFT TISSUE PRN
Status: DISCONTINUED | OUTPATIENT
Start: 2024-02-13 | End: 2024-02-13 | Stop reason: SDUPTHER

## 2024-02-13 RX ORDER — SODIUM CHLORIDE 9 MG/ML
INJECTION, SOLUTION INTRAVENOUS PRN
Status: DISCONTINUED | OUTPATIENT
Start: 2024-02-13 | End: 2024-02-13 | Stop reason: HOSPADM

## 2024-02-13 RX ORDER — HYDROCODONE BITARTRATE AND ACETAMINOPHEN 5; 325 MG/1; MG/1
1 TABLET ORAL EVERY 6 HOURS PRN
Status: DISCONTINUED | OUTPATIENT
Start: 2024-02-13 | End: 2024-02-19 | Stop reason: HOSPADM

## 2024-02-13 RX ORDER — ROCURONIUM BROMIDE 10 MG/ML
INJECTION, SOLUTION INTRAVENOUS PRN
Status: DISCONTINUED | OUTPATIENT
Start: 2024-02-13 | End: 2024-02-13 | Stop reason: SDUPTHER

## 2024-02-13 RX ORDER — MORPHINE SULFATE 2 MG/ML
2 INJECTION, SOLUTION INTRAMUSCULAR; INTRAVENOUS EVERY 4 HOURS PRN
Status: DISCONTINUED | OUTPATIENT
Start: 2024-02-13 | End: 2024-02-19 | Stop reason: HOSPADM

## 2024-02-13 RX ORDER — ONDANSETRON 2 MG/ML
INJECTION INTRAMUSCULAR; INTRAVENOUS PRN
Status: DISCONTINUED | OUTPATIENT
Start: 2024-02-13 | End: 2024-02-13 | Stop reason: SDUPTHER

## 2024-02-13 RX ORDER — LABETALOL HYDROCHLORIDE 5 MG/ML
10 INJECTION, SOLUTION INTRAVENOUS
Status: DISCONTINUED | OUTPATIENT
Start: 2024-02-13 | End: 2024-02-13 | Stop reason: HOSPADM

## 2024-02-13 RX ORDER — MIDAZOLAM HYDROCHLORIDE 1 MG/ML
INJECTION INTRAMUSCULAR; INTRAVENOUS PRN
Status: DISCONTINUED | OUTPATIENT
Start: 2024-02-13 | End: 2024-02-13 | Stop reason: SDUPTHER

## 2024-02-13 RX ORDER — METOPROLOL TARTRATE 1 MG/ML
INJECTION, SOLUTION INTRAVENOUS PRN
Status: DISCONTINUED | OUTPATIENT
Start: 2024-02-13 | End: 2024-02-13 | Stop reason: SDUPTHER

## 2024-02-13 RX ORDER — LABETALOL HYDROCHLORIDE 5 MG/ML
INJECTION, SOLUTION INTRAVENOUS PRN
Status: DISCONTINUED | OUTPATIENT
Start: 2024-02-13 | End: 2024-02-13 | Stop reason: SDUPTHER

## 2024-02-13 RX ORDER — HYDRALAZINE HYDROCHLORIDE 20 MG/ML
INJECTION INTRAMUSCULAR; INTRAVENOUS PRN
Status: DISCONTINUED | OUTPATIENT
Start: 2024-02-13 | End: 2024-02-13 | Stop reason: SDUPTHER

## 2024-02-13 RX ORDER — SODIUM CHLORIDE 0.9 % (FLUSH) 0.9 %
5-40 SYRINGE (ML) INJECTION PRN
Status: DISCONTINUED | OUTPATIENT
Start: 2024-02-13 | End: 2024-02-13 | Stop reason: HOSPADM

## 2024-02-13 RX ORDER — SODIUM CHLORIDE 0.9 % (FLUSH) 0.9 %
5-40 SYRINGE (ML) INJECTION EVERY 12 HOURS SCHEDULED
Status: DISCONTINUED | OUTPATIENT
Start: 2024-02-13 | End: 2024-02-13 | Stop reason: HOSPADM

## 2024-02-13 RX ORDER — BUPIVACAINE HYDROCHLORIDE 5 MG/ML
INJECTION, SOLUTION EPIDURAL; INTRACAUDAL
Status: COMPLETED | OUTPATIENT
Start: 2024-02-13 | End: 2024-02-13

## 2024-02-13 RX ORDER — PROPOFOL 10 MG/ML
INJECTION, EMULSION INTRAVENOUS PRN
Status: DISCONTINUED | OUTPATIENT
Start: 2024-02-13 | End: 2024-02-13 | Stop reason: SDUPTHER

## 2024-02-13 RX ORDER — FENTANYL CITRATE 50 UG/ML
INJECTION, SOLUTION INTRAMUSCULAR; INTRAVENOUS PRN
Status: DISCONTINUED | OUTPATIENT
Start: 2024-02-13 | End: 2024-02-13 | Stop reason: SDUPTHER

## 2024-02-13 RX ADMIN — HYDRALAZINE HYDROCHLORIDE 6 MG: 20 INJECTION, SOLUTION INTRAMUSCULAR; INTRAVENOUS at 10:25

## 2024-02-13 RX ADMIN — SODIUM CHLORIDE, PRESERVATIVE FREE 10 ML: 5 INJECTION INTRAVENOUS at 22:32

## 2024-02-13 RX ADMIN — PHENYLEPHRINE HYDROCHLORIDE 100 MCG: 10 INJECTION INTRAVENOUS at 10:03

## 2024-02-13 RX ADMIN — CEFTRIAXONE 1000 MG: 1 INJECTION, POWDER, FOR SOLUTION INTRAMUSCULAR; INTRAVENOUS at 05:27

## 2024-02-13 RX ADMIN — Medication 1 LOZENGE: at 22:32

## 2024-02-13 RX ADMIN — LABETALOL HYDROCHLORIDE 10 MG: 5 INJECTION, SOLUTION INTRAVENOUS at 10:28

## 2024-02-13 RX ADMIN — SODIUM CHLORIDE: 9 INJECTION, SOLUTION INTRAVENOUS at 05:26

## 2024-02-13 RX ADMIN — SODIUM CHLORIDE: 9 INJECTION, SOLUTION INTRAVENOUS at 08:46

## 2024-02-13 RX ADMIN — HYDRALAZINE HYDROCHLORIDE 4 MG: 20 INJECTION, SOLUTION INTRAMUSCULAR; INTRAVENOUS at 09:46

## 2024-02-13 RX ADMIN — HYDROMORPHONE HYDROCHLORIDE 0.5 MG: 1 INJECTION, SOLUTION INTRAMUSCULAR; INTRAVENOUS; SUBCUTANEOUS at 10:47

## 2024-02-13 RX ADMIN — PROPOFOL 50 MG: 10 INJECTION, EMULSION INTRAVENOUS at 09:44

## 2024-02-13 RX ADMIN — DEXAMETHASONE SODIUM PHOSPHATE 8 MG: 4 INJECTION, SOLUTION INTRAMUSCULAR; INTRAVENOUS at 09:19

## 2024-02-13 RX ADMIN — MIDAZOLAM 2 MG: 1 INJECTION INTRAMUSCULAR; INTRAVENOUS at 09:19

## 2024-02-13 RX ADMIN — Medication 1 LOZENGE: at 05:27

## 2024-02-13 RX ADMIN — METOPROLOL TARTRATE 1 MG: 5 INJECTION INTRAVENOUS at 09:42

## 2024-02-13 RX ADMIN — Medication 1 LOZENGE: at 02:47

## 2024-02-13 RX ADMIN — GABAPENTIN 300 MG: 300 CAPSULE ORAL at 14:23

## 2024-02-13 RX ADMIN — METOPROLOL TARTRATE 2 MG: 5 INJECTION INTRAVENOUS at 09:28

## 2024-02-13 RX ADMIN — ROCURONIUM BROMIDE 70 MG: 10 INJECTION, SOLUTION INTRAVENOUS at 09:19

## 2024-02-13 RX ADMIN — LABETALOL HYDROCHLORIDE 10 MG: 5 INJECTION, SOLUTION INTRAVENOUS at 09:40

## 2024-02-13 RX ADMIN — CEFAZOLIN 2000 MG: 2 INJECTION, POWDER, FOR SOLUTION INTRAMUSCULAR; INTRAVENOUS at 09:16

## 2024-02-13 RX ADMIN — HYDROMORPHONE HYDROCHLORIDE 0.5 MG: 1 INJECTION, SOLUTION INTRAMUSCULAR; INTRAVENOUS; SUBCUTANEOUS at 10:59

## 2024-02-13 RX ADMIN — LABETALOL HYDROCHLORIDE 10 MG: 5 INJECTION, SOLUTION INTRAVENOUS at 09:49

## 2024-02-13 RX ADMIN — HYDROCODONE BITARTRATE AND ACETAMINOPHEN 1 TABLET: 5; 325 TABLET ORAL at 14:23

## 2024-02-13 RX ADMIN — SODIUM CHLORIDE: 9 INJECTION, SOLUTION INTRAVENOUS at 10:15

## 2024-02-13 RX ADMIN — GABAPENTIN 300 MG: 300 CAPSULE ORAL at 22:32

## 2024-02-13 RX ADMIN — GUAIFENESIN SYRUP AND DEXTROMETHORPHAN 5 ML: 100; 10 SYRUP ORAL at 04:13

## 2024-02-13 RX ADMIN — FENTANYL CITRATE 100 MCG: 50 INJECTION, SOLUTION INTRAMUSCULAR; INTRAVENOUS at 09:19

## 2024-02-13 RX ADMIN — PROPOFOL 100 MG: 10 INJECTION, EMULSION INTRAVENOUS at 09:19

## 2024-02-13 RX ADMIN — HYDROCODONE BITARTRATE AND ACETAMINOPHEN 1 TABLET: 5; 325 TABLET ORAL at 22:32

## 2024-02-13 RX ADMIN — METOPROLOL TARTRATE 2 MG: 5 INJECTION INTRAVENOUS at 09:34

## 2024-02-13 RX ADMIN — ONDANSETRON 4 MG: 2 INJECTION INTRAMUSCULAR; INTRAVENOUS at 09:19

## 2024-02-13 RX ADMIN — HYDROMORPHONE HYDROCHLORIDE 0.25 MG: 1 INJECTION, SOLUTION INTRAMUSCULAR; INTRAVENOUS; SUBCUTANEOUS at 11:30

## 2024-02-13 ASSESSMENT — PAIN SCALES - GENERAL
PAINLEVEL_OUTOF10: 7
PAINLEVEL_OUTOF10: 9
PAINLEVEL_OUTOF10: 7
PAINLEVEL_OUTOF10: 6
PAINLEVEL_OUTOF10: 0
PAINLEVEL_OUTOF10: 6
PAINLEVEL_OUTOF10: 5

## 2024-02-13 ASSESSMENT — PAIN DESCRIPTION - DESCRIPTORS
DESCRIPTORS: BURNING
DESCRIPTORS: BURNING
DESCRIPTORS: ACHING
DESCRIPTORS: BURNING
DESCRIPTORS: BURNING
DESCRIPTORS: ACHING

## 2024-02-13 ASSESSMENT — PAIN DESCRIPTION - ORIENTATION
ORIENTATION: RIGHT;LEFT
ORIENTATION: LOWER
ORIENTATION: RIGHT;LEFT
ORIENTATION: LOWER;LEFT
ORIENTATION: RIGHT
ORIENTATION: RIGHT

## 2024-02-13 ASSESSMENT — PAIN DESCRIPTION - ONSET
ONSET: ON-GOING

## 2024-02-13 ASSESSMENT — PAIN - FUNCTIONAL ASSESSMENT
PAIN_FUNCTIONAL_ASSESSMENT: PREVENTS OR INTERFERES SOME ACTIVE ACTIVITIES AND ADLS
PAIN_FUNCTIONAL_ASSESSMENT: PREVENTS OR INTERFERES SOME ACTIVE ACTIVITIES AND ADLS
PAIN_FUNCTIONAL_ASSESSMENT: 0-10
PAIN_FUNCTIONAL_ASSESSMENT: PREVENTS OR INTERFERES SOME ACTIVE ACTIVITIES AND ADLS
PAIN_FUNCTIONAL_ASSESSMENT: ACTIVITIES ARE NOT PREVENTED
PAIN_FUNCTIONAL_ASSESSMENT: PREVENTS OR INTERFERES SOME ACTIVE ACTIVITIES AND ADLS

## 2024-02-13 ASSESSMENT — PAIN DESCRIPTION - PAIN TYPE
TYPE: SURGICAL PAIN

## 2024-02-13 ASSESSMENT — PAIN DESCRIPTION - FREQUENCY
FREQUENCY: CONTINUOUS

## 2024-02-13 ASSESSMENT — PAIN DESCRIPTION - LOCATION
LOCATION: ABDOMEN

## 2024-02-13 ASSESSMENT — PAIN SCALES - WONG BAKER: WONGBAKER_NUMERICALRESPONSE: 0

## 2024-02-13 NOTE — BRIEF OP NOTE
Brief Postoperative Note      Patient: Best Rodriguez  YOB: 1955  MRN: [id number]    Date of Procedure: 2/13/2024    Pre-Op Diagnosis Codes:     * Bowel dysfunction [K59.9]    Post-Op Diagnosis: Same       Procedure(s):  COLOSTOMY LAPAROSCOPIC converted to open    Surgeon(s):  Cary Phillip MD    Assistant:  Physician Assistant: Norma Monge PA-C    Anesthesia: General    Estimated Blood Loss (mL): Minimal    Complications: None    Specimens:   * No specimens in log *    Implants:  * No implants in log *      Drains:   Colostomy LLQ (Active)       Findings: Significant colonic distention as expected.   This procedure was not performed to treat colon cancer through resection      Electronically signed by Norma Monge PA-C on 2/13/2024 at 10:28 AM

## 2024-02-13 NOTE — CARE COORDINATION
LSW attempted to talk with pt about possible SNF.  Pt was in too much pain to talk with LSW.  LSW called pt brother Chip (chart states he is the POA)  Pt brother Cal informed this LSW that he is not the POA that his dght Thea is and he never agreed to be pt POA.  LSW looked in chart and the POA paperwork stated that Cal is the POA and pt dght is the first alternate.  Cal asked for LSW to call Thea.  LSW called Thea and left message.

## 2024-02-13 NOTE — PLAN OF CARE
Problem: Discharge Planning  Goal: Discharge to home or other facility with appropriate resources  2/13/2024 0827 by Eula Yao LPN  Outcome: Progressing  2/12/2024 2052 by Ana Cherry RN  Outcome: Progressing     Problem: Pain  Goal: Verbalizes/displays adequate comfort level or baseline comfort level  2/12/2024 2052 by Ana Cherry, RN  Outcome: Progressing     Problem: Safety - Adult  Goal: Free from fall injury  2/12/2024 2052 by Ana Cherry, RN  Outcome: Progressing     Problem: Nutrition Deficit:  Goal: Optimize nutritional status  2/12/2024 2052 by Ana Cherry RN  Outcome: Progressing     Problem: Skin/Tissue Integrity  Goal: Absence of new skin breakdown  Description: 1.  Monitor for areas of redness and/or skin breakdown  2.  Assess vascular access sites hourly  3.  Every 4-6 hours minimum:  Change oxygen saturation probe site  4.  Every 4-6 hours:  If on nasal continuous positive airway pressure, respiratory therapy assess nares and determine need for appliance change or resting period.  2/12/2024 2052 by Ana Cherry RN  Outcome: Progressing     Problem: Musculoskeletal - Adult  Goal: Return mobility to safest level of function  2/12/2024 2052 by Ana Cherry RN  Outcome: Progressing     Problem: Gastrointestinal - Adult  Goal: Maintains or returns to baseline bowel function  2/12/2024 2052 by Ana Cherry RN  Outcome: Progressing  Goal: Maintains adequate nutritional intake  2/12/2024 2052 by Ana Cherry RN  Outcome: Progressing     Problem: Infection - Adult  Goal: Absence of infection at discharge  2/12/2024 2052 by Ana Cherry RN  Outcome: Progressing  Goal: Absence of infection during hospitalization  2/12/2024 2052 by Ana Cherry RN  Outcome: Progressing     Problem: Metabolic/Fluid and Electrolytes - Adult  Goal: Electrolytes maintained within normal limits  2/12/2024 2052 by Dilip

## 2024-02-13 NOTE — ANESTHESIA PRE PROCEDURE
Department of Anesthesiology  Preprocedure Note       Name:  Best Rodriguez   Age:  68 y.o.  :  1955                                          MRN:  [id number]         Date:  2024      Surgeon: Surgeon(s):  Cary Phillip MD    Procedure: Procedure(s):  COLOSTOMY LAPAROSCOPIC    Medications prior to admission:   Prior to Admission medications    Medication Sig Start Date End Date Taking? Authorizing Provider   Naproxen Sodium (ALEVE PO) Take by mouth  Patient not taking: Reported on 2022    Judy Wynn MD   Incontinence Supplies MISC Use as needed for incontinence  Patient not taking: Reported on 2022   Thomas Rey MD   ondansetron (ZOFRAN ODT) 4 MG disintegrating tablet Take 1 tablet by mouth every 8 hours as needed for Nausea  Patient not taking: Reported on 2022   Ailin Lange MD   dicyclomine (BENTYL) 10 MG capsule Take 1 capsule by mouth 3 times daily As needed for abdominal pain  Patient not taking: Reported on 2022   Ailin Lange MD   Nutritional Supplements (NUTRITIONAL SUPPLEMENT PLUS) LIQD Take 237 mLs by mouth 3 times daily (with meals) 4/22/19 10/26/20  Norma Monge PA-C       Current medications:    Current Facility-Administered Medications   Medication Dose Route Frequency Provider Last Rate Last Admin   • Benzocaine-Menthol (CEPACOL) 1 lozenge  1 lozenge Oral Q2H PRN Naegele, Andrea, APRN - CNP   1 lozenge at 24   • guaiFENesin-dextromethorphan (ROBITUSSIN DM) 100-10 MG/5ML syrup 5 mL  5 mL Oral Q4H PRN Teresita Dietz APRN - CNP   5 mL at 24 0413   • dicyclomine (BENTYL) tablet 10 mg  10 mg Oral TID PRN Teresita Dietz APRN - CNP       • polyethylene glycol (GLYCOLAX) packet 17 g  17 g Oral BID Teresita Dietz APRN - CNP   17 g at 24   • sodium phosphate (FLEET) rectal enema 1 enema  1 enema Rectal BID PRN Teresita Dietz APRN - CNP       • gabapentin (NEURONTIN) capsule 300 mg  300 mg

## 2024-02-14 LAB
ANION GAP SERPL CALCULATED.3IONS-SCNC: 11 MMOL/L (ref 7–16)
BASOPHILS ABSOLUTE: 0 K/CU MM
BASOPHILS RELATIVE PERCENT: 0.1 % (ref 0–1)
BUN SERPL-MCNC: 9 MG/DL (ref 6–23)
CALCIUM SERPL-MCNC: 8.5 MG/DL (ref 8.3–10.6)
CHLORIDE BLD-SCNC: 101 MMOL/L (ref 99–110)
CO2: 25 MMOL/L (ref 21–32)
CREAT SERPL-MCNC: 0.8 MG/DL (ref 0.9–1.3)
DIFFERENTIAL TYPE: ABNORMAL
EOSINOPHILS ABSOLUTE: 0 K/CU MM
EOSINOPHILS RELATIVE PERCENT: 0 % (ref 0–3)
GFR SERPL CREATININE-BSD FRML MDRD: >60 ML/MIN/1.73M2
GLUCOSE SERPL-MCNC: 133 MG/DL (ref 70–99)
HCT VFR BLD CALC: 40.8 % (ref 42–52)
HEMOGLOBIN: 13.3 GM/DL (ref 13.5–18)
IMMATURE NEUTROPHIL %: 0.8 % (ref 0–0.43)
LYMPHOCYTES ABSOLUTE: 0.7 K/CU MM
LYMPHOCYTES RELATIVE PERCENT: 6.5 % (ref 24–44)
MCH RBC QN AUTO: 27.4 PG (ref 27–31)
MCHC RBC AUTO-ENTMCNC: 32.6 % (ref 32–36)
MCV RBC AUTO: 84 FL (ref 78–100)
MONOCYTES ABSOLUTE: 0.9 K/CU MM
MONOCYTES RELATIVE PERCENT: 8.3 % (ref 0–4)
NUCLEATED RBC %: 0 %
PDW BLD-RTO: 15.2 % (ref 11.7–14.9)
PLATELET # BLD: 288 K/CU MM (ref 140–440)
PMV BLD AUTO: 9.6 FL (ref 7.5–11.1)
POTASSIUM SERPL-SCNC: 4.9 MMOL/L (ref 3.5–5.1)
RBC # BLD: 4.86 M/CU MM (ref 4.6–6.2)
SEGMENTED NEUTROPHILS ABSOLUTE COUNT: 8.9 K/CU MM
SEGMENTED NEUTROPHILS RELATIVE PERCENT: 84.3 % (ref 36–66)
SODIUM BLD-SCNC: 137 MMOL/L (ref 135–145)
TOTAL CK: 195 IU/L (ref 38–174)
TOTAL IMMATURE NEUTOROPHIL: 0.08 K/CU MM
TOTAL NUCLEATED RBC: 0 K/CU MM
WBC # BLD: 10.6 K/CU MM (ref 4–10.5)

## 2024-02-14 PROCEDURE — 6370000000 HC RX 637 (ALT 250 FOR IP): Performed by: PHYSICIAN ASSISTANT

## 2024-02-14 PROCEDURE — 97530 THERAPEUTIC ACTIVITIES: CPT

## 2024-02-14 PROCEDURE — 85025 COMPLETE CBC W/AUTO DIFF WBC: CPT

## 2024-02-14 PROCEDURE — 1200000000 HC SEMI PRIVATE

## 2024-02-14 PROCEDURE — 97116 GAIT TRAINING THERAPY: CPT

## 2024-02-14 PROCEDURE — 2580000003 HC RX 258: Performed by: PHYSICIAN ASSISTANT

## 2024-02-14 PROCEDURE — 99222 1ST HOSP IP/OBS MODERATE 55: CPT | Performed by: PHYSICIAN ASSISTANT

## 2024-02-14 PROCEDURE — 6360000002 HC RX W HCPCS: Performed by: PHYSICIAN ASSISTANT

## 2024-02-14 PROCEDURE — 99232 SBSQ HOSP IP/OBS MODERATE 35: CPT | Performed by: INTERNAL MEDICINE

## 2024-02-14 PROCEDURE — 82550 ASSAY OF CK (CPK): CPT

## 2024-02-14 PROCEDURE — 80048 BASIC METABOLIC PNL TOTAL CA: CPT

## 2024-02-14 PROCEDURE — 94640 AIRWAY INHALATION TREATMENT: CPT

## 2024-02-14 PROCEDURE — 36415 COLL VENOUS BLD VENIPUNCTURE: CPT

## 2024-02-14 RX ADMIN — GABAPENTIN 300 MG: 300 CAPSULE ORAL at 12:59

## 2024-02-14 RX ADMIN — GABAPENTIN 300 MG: 300 CAPSULE ORAL at 09:17

## 2024-02-14 RX ADMIN — HYDROCODONE BITARTRATE AND ACETAMINOPHEN 1 TABLET: 5; 325 TABLET ORAL at 22:22

## 2024-02-14 RX ADMIN — SODIUM CHLORIDE, PRESERVATIVE FREE 10 ML: 5 INJECTION INTRAVENOUS at 09:17

## 2024-02-14 RX ADMIN — HYDROCODONE BITARTRATE AND ACETAMINOPHEN 1 TABLET: 5; 325 TABLET ORAL at 16:06

## 2024-02-14 RX ADMIN — FAMOTIDINE 20 MG: 20 TABLET ORAL at 09:17

## 2024-02-14 RX ADMIN — IPRATROPIUM BROMIDE AND ALBUTEROL SULFATE 1 DOSE: 2.5; .5 SOLUTION RESPIRATORY (INHALATION) at 20:25

## 2024-02-14 RX ADMIN — ENOXAPARIN SODIUM 40 MG: 100 INJECTION SUBCUTANEOUS at 09:17

## 2024-02-14 RX ADMIN — GABAPENTIN 300 MG: 300 CAPSULE ORAL at 22:21

## 2024-02-14 RX ADMIN — HYDROCODONE BITARTRATE AND ACETAMINOPHEN 1 TABLET: 5; 325 TABLET ORAL at 09:17

## 2024-02-14 RX ADMIN — SODIUM CHLORIDE, PRESERVATIVE FREE 10 ML: 5 INJECTION INTRAVENOUS at 22:22

## 2024-02-14 ASSESSMENT — PAIN DESCRIPTION - LOCATION
LOCATION: ABDOMEN;BACK
LOCATION: BACK;ABDOMEN

## 2024-02-14 ASSESSMENT — PAIN DESCRIPTION - DESCRIPTORS
DESCRIPTORS: ACHING
DESCRIPTORS: ACHING

## 2024-02-14 ASSESSMENT — PAIN DESCRIPTION - PAIN TYPE: TYPE: SURGICAL PAIN

## 2024-02-14 ASSESSMENT — PAIN SCALES - GENERAL
PAINLEVEL_OUTOF10: 6
PAINLEVEL_OUTOF10: 6

## 2024-02-14 ASSESSMENT — PAIN - FUNCTIONAL ASSESSMENT
PAIN_FUNCTIONAL_ASSESSMENT: ACTIVITIES ARE NOT PREVENTED
PAIN_FUNCTIONAL_ASSESSMENT: ACTIVITIES ARE NOT PREVENTED

## 2024-02-14 ASSESSMENT — PAIN DESCRIPTION - FREQUENCY: FREQUENCY: CONTINUOUS

## 2024-02-14 ASSESSMENT — PAIN DESCRIPTION - ONSET: ONSET: ON-GOING

## 2024-02-14 ASSESSMENT — PAIN DESCRIPTION - ORIENTATION
ORIENTATION: MID
ORIENTATION: LEFT;LOWER

## 2024-02-14 NOTE — CARE COORDINATION
SW Student attempted to talk to pt about SNF choice. Pt asleep and did not rouse. Attempted to call pt [facility] Thea [phone], no VM left because mailbox was full. Will try again later.

## 2024-02-14 NOTE — CONSULTS
Ostomy Referral Progress Note      NAME:  Best Rodriguez  MEDICAL RECORD NUMBER:  [id number]  AGE: 68 y.o.   GENDER:  male  :  1955  TODAY'S DATE:  2024    Subjective     Best Rodriguez is a 68 y.o. male referred by:   [x] Physician  [] Nursing  [] Other:     New    Surgeon Dr. Phillip    PAST MEDICAL HISTORY:        Diagnosis Date    Cancer (HCC)     Rectal cancer--treated with both chemo and radiation--finished 2019    Chest pain     Cocaine abuse (HCC)     Positive UDS 2021    Colostomy present (HCC)     Dissecting AAA (abdominal aortic aneurysm) (HCC) 02/10/2024    on CTA - Chronic    Hypertension     Malnutrition (HCC)     Profound hearing loss of both ears     no hearing in right ear --very little in left    PVD (peripheral vascular disease) (HCC)        MEDICATIONS:    No current facility-administered medications on file prior to encounter.     Current Outpatient Medications on File Prior to Encounter   Medication Sig Dispense Refill    Naproxen Sodium (ALEVE PO) Take by mouth (Patient not taking: Reported on 2022)      Incontinence Supplies MISC Use as needed for incontinence (Patient not taking: Reported on 2022) 90 each 3    ondansetron (ZOFRAN ODT) 4 MG disintegrating tablet Take 1 tablet by mouth every 8 hours as needed for Nausea (Patient not taking: Reported on 2022) 15 tablet 0    dicyclomine (BENTYL) 10 MG capsule Take 1 capsule by mouth 3 times daily As needed for abdominal pain (Patient not taking: Reported on 2022) 15 capsule 3    Nutritional Supplements (NUTRITIONAL SUPPLEMENT PLUS) LIQD Take 237 mLs by mouth 3 times daily (with meals) 90 Can 0       ALLERGIES:    Allergies   Allergen Reactions    Ibuprofen Nausea And Vomiting    Oxycodone Nausea And Vomiting       PAST SURGICAL HISTORY:    Past Surgical History:   Procedure Laterality Date    COLONOSCOPY      COLONOSCOPY N/A 2024    COLONOSCOPY WITH BIOPSY

## 2024-02-14 NOTE — PLAN OF CARE
Problem: Discharge Planning  Goal: Discharge to home or other facility with appropriate resources  2/14/2024 1546 by Rosario Velazco RN  Outcome: Progressing  2/14/2024 0510 by Kristina Antonio RN  Outcome: Progressing     Problem: Pain  Goal: Verbalizes/displays adequate comfort level or baseline comfort level  2/14/2024 1546 by Rosario Velazco RN  Outcome: Progressing  2/14/2024 0510 by Kristina Antonio RN  Outcome: Progressing     Problem: Safety - Adult  Goal: Free from fall injury  2/14/2024 1546 by Rosario Velazco RN  Outcome: Progressing  2/14/2024 0510 by Kristina Antonio RN  Outcome: Progressing     Problem: Nutrition Deficit:  Goal: Optimize nutritional status  2/14/2024 1546 by Rosario Velazco RN  Outcome: Progressing  2/14/2024 0510 by Kristina Antoino RN  Outcome: Progressing     Problem: Skin/Tissue Integrity  Goal: Absence of new skin breakdown  Description: 1.  Monitor for areas of redness and/or skin breakdown  2.  Assess vascular access sites hourly  3.  Every 4-6 hours minimum:  Change oxygen saturation probe site  4.  Every 4-6 hours:  If on nasal continuous positive airway pressure, respiratory therapy assess nares and determine need for appliance change or resting period.  2/14/2024 1546 by Rosario Velazco RN  Outcome: Progressing  2/14/2024 0510 by Kristina Antonio RN  Outcome: Progressing     Problem: Musculoskeletal - Adult  Goal: Return mobility to safest level of function  2/14/2024 1546 by Rosario Velazco RN  Outcome: Progressing  2/14/2024 0510 by Kristina Antonio RN  Outcome: Progressing     Problem: Gastrointestinal - Adult  Goal: Maintains or returns to baseline bowel function  2/14/2024 1546 by Rosario Velazco RN  Outcome: Progressing  2/14/2024 0510 by Kristina Antonio RN  Outcome: Progressing  Goal: Maintains adequate nutritional intake  2/14/2024 1546 by Rosario Velazco RN  Outcome: Progressing  2/14/2024 0510 by Kristina Antonio RN  Outcome: Progressing     Problem:

## 2024-02-14 NOTE — CARE COORDINATION
SW Student called pt dgtr Thea and was able to discuss discharge plans. Thea states that she would be agreeable for pt to go to SNF. SW Student provided her Medicare website for SNF ratings. Pt dgtr will call back with choice.    SW Student spoke with pt to talk about SNF placement. Pt is agreeable and his first choice is [facility], followed by [facility]. Pt states that he and his dgtr will talk more about it.

## 2024-02-14 NOTE — CONSULTS
Neurosurgery   Consult Note      Reason for Consult: cauda equina   Consulting Physician: Mani Powell MD  Attending Physician: Mani Powell MD    Date of Admission: 2/7/2024  Subjective:   CHIEF COMPLAINT: Low back pain, constipation    HPI:  68 y.o. 1955  Who presented to the ED 2/7/24 with concerns of generalized bodyaches and cough.  He was found to have multifocal pneumonia.  He has a history of rectal carcinoma. On CT he did have concerns for dilated and gas-filled colon. General surgery saw him for a rectal stricture.  Patient did ultimately undergo a diverting colostomy with Dr. Phillip on 2/13/2024.  Patient had a colonoscopy the day prior, no large masses seen during colonoscopy, anastomotic stricture during colectomy was sent for pathology and returned as noncancerous.  Oncology is following the patient.  He did have an MRIOf the lumbar with and without contrast.  MRI without contrast showed concerns for cauda equina nerve clumping.  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  The MRI with contrast did not show any abnormal enhancement.  Patient does have multilevel degenerative changes in the lumbar spine, no significant stenosis noted. Patient is being seen on 3 E.  He is very hard of hearing.  Is very pleasant.  He states that he has pain in his back.  Denies any saddle paresthesias and can control his urine and his bowels.  He does complain of numbness and tingling from his knees into his feet as well as in his bilateral hands.  He is able to ambulate with a walker with therapy.  Patient is not on any antiplatelets or anticoagulants. PMHx positive for cancer, cocaine abuse, colostomy, malnutrition, peripheral vascular disease, hearing loss, rectal cancer.  Past surgical history positive for colonoscopy, colostomy, small intestine resection.                      Past Medical and Surgical History:       Diagnosis Date    Cancer (HCC)     Rectal cancer--treated with both chemo and

## 2024-02-14 NOTE — OP NOTE
[address] [place] [address]                                OPERATIVE REPORT    PATIENT NAME: HOMERO SALMERON                     :        1955  MED REC NO:   [id number]                          ROOM:  ACCOUNT NO:   [id number]                           ADMIT DATE: 2024  PROVIDER:     Akash Patel MD    DATE OF PROCEDURE:  2024    CHIEF COMPLAINT:  History of rectal carcinoma, status post surgery with  radiation and chemotherapy; the patient presents with tight anastomotic  stricture and colonic distention (the patient needs the biopsies from  the anastomotic stricture to rule out malignancy and colonic  decompression).    OPERATION PERFORMED:  Incomplete colonoscopy.    SURGEON:  Akash Patel MD    PREMEDICATION:  Please refer to the anesthesiologist's notes.    PROCEDURE:  The patient was placed in the left lateral decubitus  position and the rectal examination was performed.    RECTAL EXAMINATION:  Rectal examination revealed a rectal mucosa which  was felt to be normal.  There was evidence of external hemorrhoids but  no fissures or fistulas were seen and a tight anastomotic stricture was  noted around 5 cm from the anal verge.    The pediatric video Olympus colonoscope was subsequently introduced into  the rectum but could not be advanced through the stricture into the  proximal colon.  At this point, the PD colonoscope was taken out and was  replaced with the gastroscope, which was advanced without much  difficulty through the anastomotic stricture into the proximal colon.   The mucosa of the distal rectum was moderately inflamed, i.e., the  mucosa was ulcerated and friable from prior radiation therapy; however,  no obvious mass lesions were noted at the anastomotic site.  Multiple  biopsies were obtained and sent for histopathology.    The gastroscope was subsequently advanced through the colon all the 
was removed from the left lower quadrant to prepare the area for colostomy.  The fascia was opened and the previously located colostomy site and the fascial opening was about 2 fingerbreadths.  The descending colon was brought out through this area for colostomy and the wound had placed several sutures using Vicryl 302 attached to the colonic seromuscular layer to the fascial layer.  The colostomy site was not opened until the end of the procedure.  There were some adhesions of the small bowel in the pelvis and this were lysed and freed with good hemostasis.  At this point the fascia was closed using #1 PDS followed by staples for the skin.  Then the colostomy was matured with 3-0 Vicryl with normal-appearing mucosa.  And the colostomy appliance was applied.  The laparoscopic port sites were also closed using staples.  The patient tolerated the procedure well and subsequently transferred to recovery room in stable condition.    Electronically signed by CHRISSY KLEIN MD on 2/14/2024 at 11:15 AM

## 2024-02-15 LAB
ANION GAP SERPL CALCULATED.3IONS-SCNC: 9 MMOL/L (ref 7–16)
BASOPHILS ABSOLUTE: 0 K/CU MM
BASOPHILS RELATIVE PERCENT: 0.1 % (ref 0–1)
BUN SERPL-MCNC: 8 MG/DL (ref 6–23)
CALCIUM SERPL-MCNC: 8.4 MG/DL (ref 8.3–10.6)
CHLORIDE BLD-SCNC: 100 MMOL/L (ref 99–110)
CO2: 26 MMOL/L (ref 21–32)
CREAT SERPL-MCNC: 0.6 MG/DL (ref 0.9–1.3)
DIFFERENTIAL TYPE: ABNORMAL
EOSINOPHILS ABSOLUTE: 0 K/CU MM
EOSINOPHILS RELATIVE PERCENT: 0.4 % (ref 0–3)
GFR SERPL CREATININE-BSD FRML MDRD: >60 ML/MIN/1.73M2
GLUCOSE SERPL-MCNC: 111 MG/DL (ref 70–99)
HCT VFR BLD CALC: 38.4 % (ref 42–52)
HEMOGLOBIN: 12.5 GM/DL (ref 13.5–18)
IMMATURE NEUTROPHIL %: 0.4 % (ref 0–0.43)
LYMPHOCYTES ABSOLUTE: 0.8 K/CU MM
LYMPHOCYTES RELATIVE PERCENT: 10.5 % (ref 24–44)
MCH RBC QN AUTO: 27.3 PG (ref 27–31)
MCHC RBC AUTO-ENTMCNC: 32.6 % (ref 32–36)
MCV RBC AUTO: 83.8 FL (ref 78–100)
MONOCYTES ABSOLUTE: 0.8 K/CU MM
MONOCYTES RELATIVE PERCENT: 11.2 % (ref 0–4)
NUCLEATED RBC %: 0 %
PDW BLD-RTO: 15 % (ref 11.7–14.9)
PLATELET # BLD: 316 K/CU MM (ref 140–440)
PMV BLD AUTO: 9.2 FL (ref 7.5–11.1)
POTASSIUM SERPL-SCNC: 4.7 MMOL/L (ref 3.5–5.1)
RBC # BLD: 4.58 M/CU MM (ref 4.6–6.2)
SEGMENTED NEUTROPHILS ABSOLUTE COUNT: 5.5 K/CU MM
SEGMENTED NEUTROPHILS RELATIVE PERCENT: 77.4 % (ref 36–66)
SODIUM BLD-SCNC: 135 MMOL/L (ref 135–145)
TOTAL CK: 142 IU/L (ref 38–174)
TOTAL IMMATURE NEUTOROPHIL: 0.03 K/CU MM
TOTAL NUCLEATED RBC: 0 K/CU MM
WBC # BLD: 7.1 K/CU MM (ref 4–10.5)

## 2024-02-15 PROCEDURE — 1200000000 HC SEMI PRIVATE

## 2024-02-15 PROCEDURE — 2580000003 HC RX 258: Performed by: PHYSICIAN ASSISTANT

## 2024-02-15 PROCEDURE — 94761 N-INVAS EAR/PLS OXIMETRY MLT: CPT

## 2024-02-15 PROCEDURE — 6370000000 HC RX 637 (ALT 250 FOR IP): Performed by: PHYSICIAN ASSISTANT

## 2024-02-15 PROCEDURE — 99232 SBSQ HOSP IP/OBS MODERATE 35: CPT | Performed by: PHYSICIAN ASSISTANT

## 2024-02-15 PROCEDURE — 6360000002 HC RX W HCPCS: Performed by: PHYSICIAN ASSISTANT

## 2024-02-15 PROCEDURE — 36415 COLL VENOUS BLD VENIPUNCTURE: CPT

## 2024-02-15 PROCEDURE — 85025 COMPLETE CBC W/AUTO DIFF WBC: CPT

## 2024-02-15 PROCEDURE — 82550 ASSAY OF CK (CPK): CPT

## 2024-02-15 PROCEDURE — 94640 AIRWAY INHALATION TREATMENT: CPT

## 2024-02-15 PROCEDURE — 80048 BASIC METABOLIC PNL TOTAL CA: CPT

## 2024-02-15 RX ADMIN — IPRATROPIUM BROMIDE AND ALBUTEROL SULFATE 1 DOSE: 2.5; .5 SOLUTION RESPIRATORY (INHALATION) at 08:14

## 2024-02-15 RX ADMIN — MORPHINE SULFATE 2 MG: 2 INJECTION, SOLUTION INTRAMUSCULAR; INTRAVENOUS at 20:17

## 2024-02-15 RX ADMIN — MORPHINE SULFATE 2 MG: 2 INJECTION, SOLUTION INTRAMUSCULAR; INTRAVENOUS at 15:40

## 2024-02-15 RX ADMIN — ONDANSETRON 4 MG: 2 INJECTION INTRAMUSCULAR; INTRAVENOUS at 18:53

## 2024-02-15 RX ADMIN — SODIUM CHLORIDE, PRESERVATIVE FREE 10 ML: 5 INJECTION INTRAVENOUS at 20:20

## 2024-02-15 RX ADMIN — SODIUM CHLORIDE, PRESERVATIVE FREE 10 ML: 5 INJECTION INTRAVENOUS at 09:37

## 2024-02-15 RX ADMIN — GABAPENTIN 300 MG: 300 CAPSULE ORAL at 13:02

## 2024-02-15 RX ADMIN — FAMOTIDINE 20 MG: 20 TABLET ORAL at 09:37

## 2024-02-15 RX ADMIN — GABAPENTIN 300 MG: 300 CAPSULE ORAL at 09:37

## 2024-02-15 RX ADMIN — GABAPENTIN 300 MG: 300 CAPSULE ORAL at 20:17

## 2024-02-15 RX ADMIN — ENOXAPARIN SODIUM 40 MG: 100 INJECTION SUBCUTANEOUS at 09:37

## 2024-02-15 ASSESSMENT — PAIN DESCRIPTION - DESCRIPTORS
DESCRIPTORS: CRUSHING;DISCOMFORT
DESCRIPTORS: ACHING
DESCRIPTORS: ACHING

## 2024-02-15 ASSESSMENT — PAIN SCALES - GENERAL
PAINLEVEL_OUTOF10: 8
PAINLEVEL_OUTOF10: 9
PAINLEVEL_OUTOF10: 9

## 2024-02-15 ASSESSMENT — PAIN SCALES - WONG BAKER: WONGBAKER_NUMERICALRESPONSE: 8

## 2024-02-15 ASSESSMENT — PAIN DESCRIPTION - ORIENTATION: ORIENTATION: MID

## 2024-02-15 ASSESSMENT — PAIN DESCRIPTION - LOCATION: LOCATION: BACK

## 2024-02-15 NOTE — CARE COORDINATION
LSW spoke with Lori with [facility] and they will not take pt due to pt being + for Cocaine at admission.  SW student stated in her note that the 2nd choice is [facility].  [facility] is now called [facility].  LSW called Naila with [facility] and gave referral.

## 2024-02-15 NOTE — CONSULTS
Ostomy Referral Progress Note      NAME:  Best Rodriguez  MEDICAL RECORD NUMBER:  [id number]  AGE: 68 y.o.   GENDER:  male  :  1955  TODAY'S DATE:  2/15/2024    Subjective     Best Rodriguez is a 68 y.o. male referred by:   [x] Physician  [] Nursing  [] Other:     New    Surgeon Dr. Phillip    PAST MEDICAL HISTORY:        Diagnosis Date    Cancer (HCC)     Rectal cancer--treated with both chemo and radiation--finished 2019    Chest pain     Cocaine abuse (HCC)     Positive UDS 2021    Colostomy present (HCC)     Dissecting AAA (abdominal aortic aneurysm) (HCC) 02/10/2024    on CTA - Chronic    Hypertension     Malnutrition (HCC)     Profound hearing loss of both ears     no hearing in right ear --very little in left    PVD (peripheral vascular disease) (HCC)        MEDICATIONS:    No current facility-administered medications on file prior to encounter.     Current Outpatient Medications on File Prior to Encounter   Medication Sig Dispense Refill    Naproxen Sodium (ALEVE PO) Take by mouth (Patient not taking: Reported on 2022)      Incontinence Supplies MISC Use as needed for incontinence (Patient not taking: Reported on 2022) 90 each 3    ondansetron (ZOFRAN ODT) 4 MG disintegrating tablet Take 1 tablet by mouth every 8 hours as needed for Nausea (Patient not taking: Reported on 2022) 15 tablet 0    dicyclomine (BENTYL) 10 MG capsule Take 1 capsule by mouth 3 times daily As needed for abdominal pain (Patient not taking: Reported on 2022) 15 capsule 3    Nutritional Supplements (NUTRITIONAL SUPPLEMENT PLUS) LIQD Take 237 mLs by mouth 3 times daily (with meals) 90 Can 0       ALLERGIES:    Allergies   Allergen Reactions    Ibuprofen Nausea And Vomiting    Oxycodone Nausea And Vomiting       PAST SURGICAL HISTORY:    Past Surgical History:   Procedure Laterality Date    COLONOSCOPY      COLONOSCOPY N/A 2024    COLONOSCOPY WITH BIOPSY

## 2024-02-15 NOTE — PLAN OF CARE
Problem: Discharge Planning  Goal: Discharge to home or other facility with appropriate resources  2/15/2024 1040 by Rosario Ruby RN  Outcome: Progressing  2/15/2024 0406 by Kristina Antonio RN  Outcome: Progressing     Problem: Pain  Goal: Verbalizes/displays adequate comfort level or baseline comfort level  2/15/2024 1040 by Rosario Ruby RN  Outcome: Progressing  2/15/2024 0406 by Kristina Antonio RN  Outcome: Progressing     Problem: Safety - Adult  Goal: Free from fall injury  2/15/2024 1040 by Rosario Ruby RN  Outcome: Progressing  2/15/2024 0406 by Kristina Antonio RN  Outcome: Progressing     Problem: Nutrition Deficit:  Goal: Optimize nutritional status  2/15/2024 1040 by Rosario Ruby RN  Outcome: Progressing  2/15/2024 0406 by Kristina Antonio RN  Outcome: Progressing     Problem: Skin/Tissue Integrity  Goal: Absence of new skin breakdown  Description: 1.  Monitor for areas of redness and/or skin breakdown  2.  Assess vascular access sites hourly  3.  Every 4-6 hours minimum:  Change oxygen saturation probe site  4.  Every 4-6 hours:  If on nasal continuous positive airway pressure, respiratory therapy assess nares and determine need for appliance change or resting period.  2/15/2024 1040 by Rosario Ruby RN  Outcome: Progressing  2/15/2024 0406 by Kristina Antonio RN  Outcome: Progressing     Problem: Musculoskeletal - Adult  Goal: Return mobility to safest level of function  2/15/2024 1040 by Rosario Ruby RN  Outcome: Progressing  2/15/2024 0406 by Kristina Antonio RN  Outcome: Progressing     Problem: Gastrointestinal - Adult  Goal: Maintains or returns to baseline bowel function  2/15/2024 1040 by Rosario Ruby RN  Outcome: Progressing  2/15/2024 0406 by Kristina Antonio RN  Outcome: Progressing  Goal: Maintains adequate nutritional intake  2/15/2024 1040 by Rosario Ruby RN  Outcome: Progressing  2/15/2024 0406 by Kristina Antonio RN  Outcome: Progressing

## 2024-02-16 PROCEDURE — 99232 SBSQ HOSP IP/OBS MODERATE 35: CPT | Performed by: INTERNAL MEDICINE

## 2024-02-16 PROCEDURE — 6360000002 HC RX W HCPCS: Performed by: PHYSICIAN ASSISTANT

## 2024-02-16 PROCEDURE — 6370000000 HC RX 637 (ALT 250 FOR IP): Performed by: PHYSICIAN ASSISTANT

## 2024-02-16 PROCEDURE — 94761 N-INVAS EAR/PLS OXIMETRY MLT: CPT

## 2024-02-16 PROCEDURE — 1200000000 HC SEMI PRIVATE

## 2024-02-16 PROCEDURE — 2580000003 HC RX 258: Performed by: PHYSICIAN ASSISTANT

## 2024-02-16 PROCEDURE — 97530 THERAPEUTIC ACTIVITIES: CPT

## 2024-02-16 PROCEDURE — 97116 GAIT TRAINING THERAPY: CPT

## 2024-02-16 RX ADMIN — GABAPENTIN 300 MG: 300 CAPSULE ORAL at 14:54

## 2024-02-16 RX ADMIN — GABAPENTIN 300 MG: 300 CAPSULE ORAL at 20:18

## 2024-02-16 RX ADMIN — HYDROCODONE BITARTRATE AND ACETAMINOPHEN 1 TABLET: 5; 325 TABLET ORAL at 12:45

## 2024-02-16 RX ADMIN — MORPHINE SULFATE 2 MG: 2 INJECTION, SOLUTION INTRAMUSCULAR; INTRAVENOUS at 00:13

## 2024-02-16 RX ADMIN — HYDROCODONE BITARTRATE AND ACETAMINOPHEN 1 TABLET: 5; 325 TABLET ORAL at 05:30

## 2024-02-16 RX ADMIN — SODIUM CHLORIDE, PRESERVATIVE FREE 10 ML: 5 INJECTION INTRAVENOUS at 00:14

## 2024-02-16 RX ADMIN — ENOXAPARIN SODIUM 40 MG: 100 INJECTION SUBCUTANEOUS at 09:19

## 2024-02-16 RX ADMIN — FAMOTIDINE 20 MG: 20 TABLET ORAL at 09:20

## 2024-02-16 RX ADMIN — SODIUM CHLORIDE, PRESERVATIVE FREE 10 ML: 5 INJECTION INTRAVENOUS at 09:20

## 2024-02-16 RX ADMIN — GABAPENTIN 300 MG: 300 CAPSULE ORAL at 09:19

## 2024-02-16 RX ADMIN — HYDROCODONE BITARTRATE AND ACETAMINOPHEN 1 TABLET: 5; 325 TABLET ORAL at 20:18

## 2024-02-16 ASSESSMENT — PAIN SCALES - GENERAL
PAINLEVEL_OUTOF10: 0
PAINLEVEL_OUTOF10: 0
PAINLEVEL_OUTOF10: 9
PAINLEVEL_OUTOF10: 7
PAINLEVEL_OUTOF10: 6
PAINLEVEL_OUTOF10: 9
PAINLEVEL_OUTOF10: 5

## 2024-02-16 ASSESSMENT — PAIN DESCRIPTION - DESCRIPTORS
DESCRIPTORS: ACHING

## 2024-02-16 ASSESSMENT — PAIN SCALES - WONG BAKER: WONGBAKER_NUMERICALRESPONSE: 0

## 2024-02-16 ASSESSMENT — PAIN DESCRIPTION - LOCATION
LOCATION: ABDOMEN
LOCATION: ABDOMEN
LOCATION: GENERALIZED
LOCATION: ABDOMEN

## 2024-02-16 ASSESSMENT — PAIN - FUNCTIONAL ASSESSMENT: PAIN_FUNCTIONAL_ASSESSMENT: PREVENTS OR INTERFERES SOME ACTIVE ACTIVITIES AND ADLS

## 2024-02-16 NOTE — CARE COORDINATION
SHAN Student called Naila at [facility] to f/u with referral. Naila states that pt cannot come under his [facility] Medicare as [facility] does not have a contract with them. However, pt can come under his [employer] Medicaid. SHAN Student will ask pt if that is what he wants to do.    11:48AM Attempted to speak with pt, pt asleep and did not rouse. Will try again later.

## 2024-02-16 NOTE — CARE COORDINATION
SW Student spoke to pt and pt brother Chip about [facility] and Medicaid vs another facility. Pt states he is agreeable to this.    However, SW Student spoke with LSW Spenser who stated Medicaid would mean long term care and less therapy. Spenser in to speak to pt about placement.    1:08PM Spenser informed SW Student that pt would prefer short term care and would like [facility]. Per pt request, referral called to [facility]. However,  is not in network with [facility] Medicare. Referral called to [facility].

## 2024-02-16 NOTE — CARE COORDINATION
Precert initiated via [employer]: Pending at this time  [employer] Auth ID [id number]    Electronic PAS completed      Precert remains pending at this time 5:02 PM   CM will cont to follow and will notify weekend CM once received.

## 2024-02-16 NOTE — DISCHARGE INSTR - COC
Continuity of Care Form    Patient Name: Best Rodriguez   :  1955  MRN:  [id number]    Admit date:  2024  Discharge date:  ***    Code Status Order: Full Code   Advance Directives:   Advance Care Flowsheet Documentation       Date/Time Healthcare Directive Type of Healthcare Directive Copy in Chart Healthcare Agent Appointed Healthcare Agent's Name Healthcare Agent's Phone Number    24 0825 No, patient does not have an advance directive for healthcare treatment -- -- -- -- --    24 1302 No, patient does not have an advance directive for healthcare treatment -- -- -- -- --            Admitting Physician:  Wendy Powell MD  PCP: No primary care provider on file.    Discharging Nurse: ***  Discharging Hospital Unit/Room#: 3011/3011-A  Discharging Unit Phone Number: ***    Emergency Contact:   Extended Emergency Contact Information  Primary Emergency Contact: Mac Rodrigueze MONSE   [facility]  Home Phone: [phone]  Mobile Phone: [phone]  Relation: Brother/Sister  Secondary Emergency Contact: Abi Goodwin  Home Phone: [phone]  Mobile Phone: [phone]  Relation: Other    Past Surgical History:  Past Surgical History:   Procedure Laterality Date    COLONOSCOPY      COLONOSCOPY N/A 2024    COLONOSCOPY WITH BIOPSY up to ascending colon performed by Akash Patel MD at [facility] ENDOSCOPY    COLOSTOMY N/A 2018    DIVERTING COLOSTOMY PLACEMENT LAPAROSCOPIC performed by Cary Phillip MD at [facility] OR    COLOSTOMY N/A 2024    COLOSTOMY LAPAROSCOPIC converted to open performed by Cary Phillip MD at [facility] OR    JOINT REPLACEMENT Bilateral early 's    hips    [place] INSJ PRPH CTR VAD W/SUBQ PORT AGE 6 YR/> N/A 2018    PORT INSERTION performed by Cary Phillip MD at [facility] OR    SIGMOIDOSCOPY N/A 2019    SIGMOIDOSCOPY BIOPSY FLEXIBLE performed by Cary Phillip MD at [facility] ENDOSCOPY    SIGMOIDOSCOPY N/A 2022    SIGMOIDOSCOPY DIAGNOSTIC FLEXIBLE performed by Cary

## 2024-02-16 NOTE — CARE COORDINATION
Received call back from [facility]. Екатерина states that she cannot approve pt herself due to pt current drug use and is waiting on approval from her supervisor. Екатерина is onsite and plans to come speak with pt.    3:30 PM Екатерина from  informed [facility] that [facility] can take pt once medically stable. OSMIN Tobin starting precert, packet started.

## 2024-02-17 LAB
ANION GAP SERPL CALCULATED.3IONS-SCNC: 9 MMOL/L (ref 7–16)
BUN SERPL-MCNC: 13 MG/DL (ref 6–23)
CALCIUM SERPL-MCNC: 8.6 MG/DL (ref 8.3–10.6)
CHLORIDE BLD-SCNC: 97 MMOL/L (ref 99–110)
CO2: 27 MMOL/L (ref 21–32)
CREAT SERPL-MCNC: 0.7 MG/DL (ref 0.9–1.3)
GFR SERPL CREATININE-BSD FRML MDRD: >60 ML/MIN/1.73M2
GLUCOSE SERPL-MCNC: 114 MG/DL (ref 70–99)
HCT VFR BLD CALC: 39.5 % (ref 42–52)
HEMOGLOBIN: 12.8 GM/DL (ref 13.5–18)
MAGNESIUM: 2 MG/DL (ref 1.8–2.4)
MCH RBC QN AUTO: 27.3 PG (ref 27–31)
MCHC RBC AUTO-ENTMCNC: 32.4 % (ref 32–36)
MCV RBC AUTO: 84.2 FL (ref 78–100)
PDW BLD-RTO: 15.1 % (ref 11.7–14.9)
PHOSPHORUS: 3.2 MG/DL (ref 2.5–4.9)
PLATELET # BLD: 433 K/CU MM (ref 140–440)
PMV BLD AUTO: 8.9 FL (ref 7.5–11.1)
POTASSIUM SERPL-SCNC: 4.9 MMOL/L (ref 3.5–5.1)
RBC # BLD: 4.69 M/CU MM (ref 4.6–6.2)
SODIUM BLD-SCNC: 133 MMOL/L (ref 135–145)
WBC # BLD: 7.6 K/CU MM (ref 4–10.5)

## 2024-02-17 PROCEDURE — 6370000000 HC RX 637 (ALT 250 FOR IP): Performed by: PHYSICIAN ASSISTANT

## 2024-02-17 PROCEDURE — 80048 BASIC METABOLIC PNL TOTAL CA: CPT

## 2024-02-17 PROCEDURE — 1200000000 HC SEMI PRIVATE

## 2024-02-17 PROCEDURE — 85027 COMPLETE CBC AUTOMATED: CPT

## 2024-02-17 PROCEDURE — 94761 N-INVAS EAR/PLS OXIMETRY MLT: CPT

## 2024-02-17 PROCEDURE — 36415 COLL VENOUS BLD VENIPUNCTURE: CPT

## 2024-02-17 PROCEDURE — 84100 ASSAY OF PHOSPHORUS: CPT

## 2024-02-17 PROCEDURE — 83735 ASSAY OF MAGNESIUM: CPT

## 2024-02-17 PROCEDURE — 94640 AIRWAY INHALATION TREATMENT: CPT

## 2024-02-17 PROCEDURE — APPNB30 APP NON BILLABLE TIME 0-30 MINS: Performed by: PHYSICIAN ASSISTANT

## 2024-02-17 PROCEDURE — 6360000002 HC RX W HCPCS: Performed by: PHYSICIAN ASSISTANT

## 2024-02-17 RX ORDER — PANTOPRAZOLE SODIUM 40 MG/1
40 TABLET, DELAYED RELEASE ORAL
Status: DISCONTINUED | OUTPATIENT
Start: 2024-02-18 | End: 2024-02-19 | Stop reason: HOSPADM

## 2024-02-17 RX ADMIN — GABAPENTIN 300 MG: 300 CAPSULE ORAL at 14:27

## 2024-02-17 RX ADMIN — GABAPENTIN 300 MG: 300 CAPSULE ORAL at 08:59

## 2024-02-17 RX ADMIN — ENOXAPARIN SODIUM 40 MG: 100 INJECTION SUBCUTANEOUS at 08:59

## 2024-02-17 RX ADMIN — GABAPENTIN 300 MG: 300 CAPSULE ORAL at 20:40

## 2024-02-17 RX ADMIN — HYDROCODONE BITARTRATE AND ACETAMINOPHEN 1 TABLET: 5; 325 TABLET ORAL at 08:58

## 2024-02-17 RX ADMIN — HYDROCODONE BITARTRATE AND ACETAMINOPHEN 1 TABLET: 5; 325 TABLET ORAL at 23:27

## 2024-02-17 RX ADMIN — DICYCLOMINE HYDROCHLORIDE 10 MG: 20 TABLET ORAL at 20:45

## 2024-02-17 RX ADMIN — FAMOTIDINE 20 MG: 20 TABLET ORAL at 08:59

## 2024-02-17 RX ADMIN — IPRATROPIUM BROMIDE AND ALBUTEROL SULFATE 1 DOSE: 2.5; .5 SOLUTION RESPIRATORY (INHALATION) at 10:59

## 2024-02-17 ASSESSMENT — PAIN SCALES - WONG BAKER
WONGBAKER_NUMERICALRESPONSE: 0
WONGBAKER_NUMERICALRESPONSE: 0

## 2024-02-17 ASSESSMENT — PAIN SCALES - GENERAL
PAINLEVEL_OUTOF10: 2
PAINLEVEL_OUTOF10: 8
PAINLEVEL_OUTOF10: 4
PAINLEVEL_OUTOF10: 6

## 2024-02-17 ASSESSMENT — PAIN - FUNCTIONAL ASSESSMENT: PAIN_FUNCTIONAL_ASSESSMENT: ACTIVITIES ARE NOT PREVENTED

## 2024-02-17 ASSESSMENT — PAIN DESCRIPTION - DESCRIPTORS: DESCRIPTORS: ACHING;DISCOMFORT

## 2024-02-17 ASSESSMENT — PAIN DESCRIPTION - LOCATION
LOCATION: ABDOMEN

## 2024-02-17 NOTE — CARE COORDINATION
Discussed pt in IDR.  Pre-cert is still pending for [facility] per MY[facility]/Crista.  She will notify CM when pre-cert received.  DO NOT DISCHARGE PT UNTIL THE PRE-CERT IS RECEIVED.  TE

## 2024-02-18 LAB
ANION GAP SERPL CALCULATED.3IONS-SCNC: 9 MMOL/L (ref 7–16)
BUN SERPL-MCNC: 16 MG/DL (ref 6–23)
CALCIUM SERPL-MCNC: 9.1 MG/DL (ref 8.3–10.6)
CHLORIDE BLD-SCNC: 95 MMOL/L (ref 99–110)
CO2: 28 MMOL/L (ref 21–32)
CREAT SERPL-MCNC: 0.7 MG/DL (ref 0.9–1.3)
GFR SERPL CREATININE-BSD FRML MDRD: >60 ML/MIN/1.73M2
GLUCOSE SERPL-MCNC: 111 MG/DL (ref 70–99)
HCT VFR BLD CALC: 42.9 % (ref 42–52)
HEMOGLOBIN: 14.2 GM/DL (ref 13.5–18)
MAGNESIUM: 2.2 MG/DL (ref 1.8–2.4)
MCH RBC QN AUTO: 27.6 PG (ref 27–31)
MCHC RBC AUTO-ENTMCNC: 33.1 % (ref 32–36)
MCV RBC AUTO: 83.3 FL (ref 78–100)
PDW BLD-RTO: 15 % (ref 11.7–14.9)
PHOSPHORUS: 3.8 MG/DL (ref 2.5–4.9)
PLATELET # BLD: 521 K/CU MM (ref 140–440)
PMV BLD AUTO: 9 FL (ref 7.5–11.1)
POTASSIUM SERPL-SCNC: 4.9 MMOL/L (ref 3.5–5.1)
RBC # BLD: 5.15 M/CU MM (ref 4.6–6.2)
SODIUM BLD-SCNC: 132 MMOL/L (ref 135–145)
WBC # BLD: 7.5 K/CU MM (ref 4–10.5)

## 2024-02-18 PROCEDURE — 94761 N-INVAS EAR/PLS OXIMETRY MLT: CPT

## 2024-02-18 PROCEDURE — 6360000002 HC RX W HCPCS: Performed by: PHYSICIAN ASSISTANT

## 2024-02-18 PROCEDURE — 83735 ASSAY OF MAGNESIUM: CPT

## 2024-02-18 PROCEDURE — 80048 BASIC METABOLIC PNL TOTAL CA: CPT

## 2024-02-18 PROCEDURE — 6370000000 HC RX 637 (ALT 250 FOR IP): Performed by: PHYSICIAN ASSISTANT

## 2024-02-18 PROCEDURE — 76937 US GUIDE VASCULAR ACCESS: CPT

## 2024-02-18 PROCEDURE — 1200000000 HC SEMI PRIVATE

## 2024-02-18 PROCEDURE — APPNB30 APP NON BILLABLE TIME 0-30 MINS: Performed by: PHYSICIAN ASSISTANT

## 2024-02-18 PROCEDURE — 2580000003 HC RX 258: Performed by: PHYSICIAN ASSISTANT

## 2024-02-18 PROCEDURE — 85027 COMPLETE CBC AUTOMATED: CPT

## 2024-02-18 PROCEDURE — 36415 COLL VENOUS BLD VENIPUNCTURE: CPT

## 2024-02-18 PROCEDURE — 84100 ASSAY OF PHOSPHORUS: CPT

## 2024-02-18 RX ADMIN — SODIUM CHLORIDE, PRESERVATIVE FREE 10 ML: 5 INJECTION INTRAVENOUS at 20:19

## 2024-02-18 RX ADMIN — GABAPENTIN 300 MG: 300 CAPSULE ORAL at 10:41

## 2024-02-18 RX ADMIN — ONDANSETRON 4 MG: 2 INJECTION INTRAMUSCULAR; INTRAVENOUS at 20:19

## 2024-02-18 RX ADMIN — ENOXAPARIN SODIUM 40 MG: 100 INJECTION SUBCUTANEOUS at 10:41

## 2024-02-18 RX ADMIN — GABAPENTIN 300 MG: 300 CAPSULE ORAL at 14:30

## 2024-02-18 RX ADMIN — SODIUM CHLORIDE, PRESERVATIVE FREE 10 ML: 5 INJECTION INTRAVENOUS at 10:41

## 2024-02-18 RX ADMIN — PANTOPRAZOLE SODIUM 40 MG: 40 TABLET, DELAYED RELEASE ORAL at 05:39

## 2024-02-18 RX ADMIN — MORPHINE SULFATE 2 MG: 2 INJECTION, SOLUTION INTRAMUSCULAR; INTRAVENOUS at 10:41

## 2024-02-18 RX ADMIN — GABAPENTIN 300 MG: 300 CAPSULE ORAL at 20:19

## 2024-02-18 RX ADMIN — MAGNESIUM HYDROXIDE 30 ML: 400 SUSPENSION ORAL at 12:41

## 2024-02-18 ASSESSMENT — PAIN DESCRIPTION - ORIENTATION
ORIENTATION: MID
ORIENTATION: MID

## 2024-02-18 ASSESSMENT — PAIN DESCRIPTION - DESCRIPTORS
DESCRIPTORS: ACHING;DISCOMFORT
DESCRIPTORS: ACHING;DISCOMFORT

## 2024-02-18 ASSESSMENT — PAIN DESCRIPTION - PAIN TYPE
TYPE: SURGICAL PAIN
TYPE: SURGICAL PAIN

## 2024-02-18 ASSESSMENT — PAIN DESCRIPTION - LOCATION: LOCATION: ABDOMEN

## 2024-02-18 ASSESSMENT — PAIN DESCRIPTION - ONSET: ONSET: ON-GOING

## 2024-02-18 ASSESSMENT — PAIN SCALES - GENERAL
PAINLEVEL_OUTOF10: 0
PAINLEVEL_OUTOF10: 7

## 2024-02-18 ASSESSMENT — PAIN DESCRIPTION - FREQUENCY: FREQUENCY: INTERMITTENT

## 2024-02-18 ASSESSMENT — PAIN SCALES - WONG BAKER
WONGBAKER_NUMERICALRESPONSE: 0
WONGBAKER_NUMERICALRESPONSE: 0

## 2024-02-18 ASSESSMENT — PAIN - FUNCTIONAL ASSESSMENT: PAIN_FUNCTIONAL_ASSESSMENT: ACTIVITIES ARE NOT PREVENTED

## 2024-02-18 NOTE — CONSULTS
Consult completed. Nexiva 20g 1.75\" peripheral IV initiated into right forearm x 1 attempt using ultrasound guided technique. Brisk blood return, flushes without resistance. Patient tolerated well. Primary nurse notified.     Consult the Vascular Access Team for questions, concerns, or change in patient's condition.

## 2024-02-18 NOTE — PLAN OF CARE
Problem: Discharge Planning  Goal: Discharge to home or other facility with appropriate resources  Outcome: Progressing     Problem: Pain  Goal: Verbalizes/displays adequate comfort level or baseline comfort level  Outcome: Progressing     Problem: Safety - Adult  Goal: Free from fall injury  Outcome: Progressing     Problem: Nutrition Deficit:  Goal: Optimize nutritional status  Outcome: Progressing     Problem: Skin/Tissue Integrity  Goal: Absence of new skin breakdown  Description: 1.  Monitor for areas of redness and/or skin breakdown  2.  Assess vascular access sites hourly  3.  Every 4-6 hours minimum:  Change oxygen saturation probe site  4.  Every 4-6 hours:  If on nasal continuous positive airway pressure, respiratory therapy assess nares and determine need for appliance change or resting period.  Outcome: Progressing     Problem: Musculoskeletal - Adult  Goal: Return mobility to safest level of function  Outcome: Progressing     Problem: Gastrointestinal - Adult  Goal: Maintains or returns to baseline bowel function  Outcome: Progressing  Goal: Maintains adequate nutritional intake  Outcome: Progressing     Problem: Infection - Adult  Goal: Absence of infection at discharge  Outcome: Progressing  Goal: Absence of infection during hospitalization  Outcome: Progressing     Problem: Metabolic/Fluid and Electrolytes - Adult  Goal: Electrolytes maintained within normal limits  Outcome: Progressing  Goal: Hemodynamic stability and optimal renal function maintained  Outcome: Progressing

## 2024-02-19 VITALS
SYSTOLIC BLOOD PRESSURE: 122 MMHG | BODY MASS INDEX: 17.28 KG/M2 | HEIGHT: 71 IN | OXYGEN SATURATION: 90 % | RESPIRATION RATE: 15 BRPM | HEART RATE: 91 BPM | WEIGHT: 123.46 LBS | TEMPERATURE: 98.5 F | DIASTOLIC BLOOD PRESSURE: 73 MMHG

## 2024-02-19 LAB
ANION GAP SERPL CALCULATED.3IONS-SCNC: 11 MMOL/L (ref 7–16)
BUN SERPL-MCNC: 22 MG/DL (ref 6–23)
CALCIUM SERPL-MCNC: 8.5 MG/DL (ref 8.3–10.6)
CHLORIDE BLD-SCNC: 93 MMOL/L (ref 99–110)
CO2: 27 MMOL/L (ref 21–32)
CREAT SERPL-MCNC: 0.7 MG/DL (ref 0.9–1.3)
GFR SERPL CREATININE-BSD FRML MDRD: >60 ML/MIN/1.73M2
GLUCOSE SERPL-MCNC: 110 MG/DL (ref 70–99)
HCT VFR BLD CALC: 42.7 % (ref 42–52)
HEMOGLOBIN: 14.1 GM/DL (ref 13.5–18)
MAGNESIUM: 2.3 MG/DL (ref 1.8–2.4)
MCH RBC QN AUTO: 27.6 PG (ref 27–31)
MCHC RBC AUTO-ENTMCNC: 33 % (ref 32–36)
MCV RBC AUTO: 83.7 FL (ref 78–100)
PDW BLD-RTO: 14.9 % (ref 11.7–14.9)
PHOSPHORUS: 3.7 MG/DL (ref 2.5–4.9)
PLATELET # BLD: 506 K/CU MM (ref 140–440)
PMV BLD AUTO: 9.1 FL (ref 7.5–11.1)
POTASSIUM SERPL-SCNC: 4.5 MMOL/L (ref 3.5–5.1)
RBC # BLD: 5.1 M/CU MM (ref 4.6–6.2)
SODIUM BLD-SCNC: 131 MMOL/L (ref 135–145)
WBC # BLD: 7.6 K/CU MM (ref 4–10.5)

## 2024-02-19 PROCEDURE — 94761 N-INVAS EAR/PLS OXIMETRY MLT: CPT

## 2024-02-19 PROCEDURE — 80048 BASIC METABOLIC PNL TOTAL CA: CPT

## 2024-02-19 PROCEDURE — 94640 AIRWAY INHALATION TREATMENT: CPT

## 2024-02-19 PROCEDURE — 6370000000 HC RX 637 (ALT 250 FOR IP): Performed by: PHYSICIAN ASSISTANT

## 2024-02-19 PROCEDURE — 99232 SBSQ HOSP IP/OBS MODERATE 35: CPT | Performed by: INTERNAL MEDICINE

## 2024-02-19 PROCEDURE — 36415 COLL VENOUS BLD VENIPUNCTURE: CPT

## 2024-02-19 PROCEDURE — 85027 COMPLETE CBC AUTOMATED: CPT

## 2024-02-19 PROCEDURE — 83735 ASSAY OF MAGNESIUM: CPT

## 2024-02-19 PROCEDURE — 84100 ASSAY OF PHOSPHORUS: CPT

## 2024-02-19 PROCEDURE — 2580000003 HC RX 258: Performed by: PHYSICIAN ASSISTANT

## 2024-02-19 PROCEDURE — 6360000002 HC RX W HCPCS: Performed by: PHYSICIAN ASSISTANT

## 2024-02-19 RX ADMIN — PANTOPRAZOLE SODIUM 40 MG: 40 TABLET, DELAYED RELEASE ORAL at 06:26

## 2024-02-19 RX ADMIN — ENOXAPARIN SODIUM 40 MG: 100 INJECTION SUBCUTANEOUS at 09:30

## 2024-02-19 RX ADMIN — SODIUM CHLORIDE, PRESERVATIVE FREE 10 ML: 5 INJECTION INTRAVENOUS at 09:30

## 2024-02-19 RX ADMIN — GABAPENTIN 300 MG: 300 CAPSULE ORAL at 09:30

## 2024-02-19 RX ADMIN — GABAPENTIN 300 MG: 300 CAPSULE ORAL at 13:26

## 2024-02-19 RX ADMIN — IPRATROPIUM BROMIDE AND ALBUTEROL SULFATE 1 DOSE: 2.5; .5 SOLUTION RESPIRATORY (INHALATION) at 07:51

## 2024-02-19 NOTE — CARE COORDINATION
Rec'd update from [facility] [place] that patient has been approved. Voicemail left for [facility] admissions.     3:27 PM Rec'd update from Екатерина that patient is able to admit today. Perfect serve sent to Dr. Powell with update.    3:57 PM Discharge order noted. Transport arranged with [facility] for 5:30p. CM updated [facility], and primary RN Eula.     Please call report to John Meraz @ [phone]. Fax AVS, MICHELLE, discharge summary and prescriptions to [phone]. Please notify family of discharge.

## 2024-02-19 NOTE — PROGRESS NOTES
Cardiology Progress Note     Admit Date:  2/7/2024    Consult reason/ Seen today for :       Subjective and  Overnight Events :  getting Gi work up has leg numbness but no pain      Chief complain on admission : 68 y.o.year old who is admitted for  Chief Complaint   Patient presents with    Leg Pain     Leg pain and weakness that started about a week ago       Assessment / Plan:  Chronic bilateral lower extremity peripheral vascular disease with no open ulcers .   at this point conservative approach is appropriate will start Pletal 50 twice daily once surgical plan is finalized  Follow up in office   HTN: stable, continue present medications   Abdominal discomfort as per primary  DVT prophylaxis if no contraindication  6.   Dyslipidemia: continue statins    will see as needed    Past medical history:    has a past medical history of Cancer (HCC), Chest pain, Cocaine abuse (HCC), Colostomy present (HCC), Dissecting AAA (abdominal aortic aneurysm) (HCC), Hypertension, Malnutrition (HCC), Profound hearing loss of both ears, and PVD (peripheral vascular disease) (HCC).  Past surgical history:   has a past surgical history that includes colostomy (N/A, 11/2/2018); pr insj prph ctr vad w/subq port age 6 yr/> (N/A, 11/6/2018); joint replacement (Bilateral, early 2010's); Small intestine surgery (N/A, 3/13/2019); sigmoidoscopy (N/A, 5/13/2019); Small intestine surgery (N/A, 5/21/2019); Colonoscopy; and sigmoidoscopy (N/A, 5/4/2022).  Social History:   reports that he has been smoking cigarettes. He has never used smokeless tobacco. He reports current drug use. Drugs: Cocaine and Marijuana (Weed). He reports that he does not drink alcohol.  Family history:  family history includes Dementia in his mother; Diabetes in his brother; Early Death in his father and sister; High Blood Pressure in his father.    Allergies   Allergen Reactions    Ibuprofen 
    [place]2.0  [facility] [place]      Name:  Best Rodriguez /Age/Sex: 1955  (68 y.o. male)   MRN & CSN:  [id number] & [id number] Encounter Date/Time: 2024 8:01 AM EST    [facility] PCP: No primary care provider on file.       Hospital Day: 5    Assessment and Plan:   Best Rodriguez is a 68 y.o. male with pmh of Cocaine Abuse, Profound Hearing Loss, Severe Malnutrition, PVD, Rectal CA, HTN,  who presents with Multifocal pneumonia      Plan:     PAD- Bilateral LE Ant Tibial Artery Occlusions  Bilateral Leg Pain and Numbness  Ambulatory Dysfunction  -does complain of abdominal pain radiating to bilateral legs, extremities are warm and dry with +2 pedal pulses, mild bruising   -monitor, leg pain with numbness below the knees getting worse   -PT/OT Evals   -CTA Aorta with bilateral leg runoff: Chronic dissection of AA, right occluded anterior tibial artery, chronic long segment occlusion of right SFA, left occluded anterior tibial artery, market colon distension of Large bowel but poor views due to bilateral hip arthroplasties  -KUB Abdomen today, increase miralax to bid with fleets prn, states his abd pain is better and had a BM this morning   -Consult Cardiology: Dr. Green, plan for outpatient ECHO and peripheral angiogram, will add Pletal 05 mg BID once surgical plan with GI/Gen Surgery is completed   -MRI Lumbar Spine: pending     Abd Pain  Colonic Distension  Rectal CA - Stage IIIB  Stricture of Rectal Anastomosis  -s/p Chemo/XRT  and surgical resection   -CT: Dilated gas-filled colon is again noted from the cecum through the distal sigmoid.  Ascending colon measures up to 9 cm in the descending colon measures up to 7 cm.  Postsurgical changes and calcifications at the expected lower portion of the rectum. Limited detail for residual mass, stenosis or inflammation.  -KUB: Marked distention of entire colon measuring up to 10 cm, likely stricture at the rectal anastomosis  -GI 
    V2.0  [facility] [place]      Name:  Best Rodriguez /Age/Sex: 1955  (68 y.o. male)   MRN & CSN:  [id number] & [id number] Encounter Date/Time: 2/15/2024 8:01 AM EST    Location:  [address] [place] [address]-A PCP: No primary care provider on file.       Hospital Day: 9    Assessment and Plan:   Best Rodriguez is a 68 y.o. male with pmh of Cocaine Abuse, Profound Hearing Loss, Severe Malnutrition, PVD, Rectal CA, HTN,  who presents with Multifocal pneumonia      Plan:     **VERY [place]- he cannot hear you- need to write on paper for him to understand what is going on**     PAD/PVD  Bilateral Leg Pain and Numbness 2/2 Cauda Equina and Leptomeningeal Spread (Metastatic Rectal CA)    Ambulatory Dysfunction   -does complain of abdominal pain radiating to bilateral legs, extremities are warm and dry with +2 pedal pulses, mild bruising   -monitor, leg pain with numbness below the knees getting worse   -PT/OT Evals   -CTA Aorta with bilateral leg runoff: Chronic dissection of AA, right occluded anterior tibial artery, chronic long segment occlusion of right SFA, left occluded anterior tibial artery, market colon distension of Large bowel but poor views due to bilateral hip arthroplasties  -KUB Abdomen today, increase miralax to bid with fleets prn, states his abd pain is better and had a BM this morning   -Consult Cardiology: Dr. Green, plan for outpatient ECHO and peripheral angiogram, will add Pletal 05 mg BID once surgical plan with GI/Gen Surgery is completed   -MRI Lumbar Spine: Cauda Equina Clumpting, likely due to adhesive arachnoiditis, leptomeningeal carcinomatosis with hx of Rectal CA  -Consulted Oncology: Dr. Rey, appreciate recs   -As per request of oncology, neurosurgery consult placed for assessment of arachinoiditis or CAUDA EQUINA.   Neurosurgery has no intervention plans  Pending placement to SNF    Abd Pain  Colonic Distension  Rectal CA - now Metastatic    Stricture of Rectal Anastomosis  -s/p Chemo/XRT 
    V2.[place]  [facility] [place]      Name:  Best Rodriguez /Age/Sex: 1955  (68 y.o. male)   MRN & CSN:  [id number] & [id number] Encounter Date/Time: 2024 8:01 AM EST    [facility]  [facility] [place] PCP: No primary care provider on file.       Hospital Day: 3    Assessment and Plan:   Best Rodriguez is a 68 y.o. male with pmh of Cocaine Abuse, Profound Hearing Loss, Severe Malnutrition, PVD, Rectal CA, HTN,  who presents with Multifocal pneumonia      Plan:    Fall   Sepsis   + E Coli UTI  Multifocal Pneumonia   -Admit Inpatient on Tele  -WBC 3.4, Hgb 14.1, Plts 135, VBG: pH 7.31, pCO2 45, pO2 59, O2 Sat 84.9%  -Trop 37 then 33, trending down, no chest pain  CXR: bibasilar lung opacities, left subclavian port with tip directed in proximal SVC  -CT Head is unremarkable  -CT Chest: Multifocal opacities are present in the lungs with greater distribution in the lower lobes than within the upper lobes.  Cardiac chambers are borderline in size but the distribution suggests pneumonia rather than edema. Sequela of old           granulomatous disease as well.  -cont IV Rocephin and Azithromycin, DuoNebs, Mucinex bid and Pepcid BID   -Lactate 1.7, 1.1, UA NEG and MRSA NEG     Generalized Weakness  Severe Protein Calorie Malnutrition   -Dietary Consulted for very low BMI 16.76     IZA- resolved   Elevated CK   ? Rhabdomyolysis   -Cr 1.6 now 0.9,  GFR 47 now >60,  now 312  -cont IVF  ml/hr  -trend renal function and CK QD     Hyponatremia, Mild   -Na+ 130 on admit, now 131, monitor      Drug Abuse- cocaine, thc  Tobaccoism  -UDS + Cocaine and Cannabinoids   -down to less than 1/4 PPD, does not want nicotine patch      Rectal CA - Stage IIIB  -s/p Chemo/XRT and surgical resection   -CT: Dilated gas-filled colon is again noted from the cecum through the distal sigmoid.  Ascending colon measures up to 9 cm in the descending colon measures up to 7 cm.  Postsurgical changes and calcifications at the 
    V2.0  [facility] [place]      Name:  Best Rodriguez /Age/Sex: 1955  (68 y.o. male)   MRN & CSN:  [id number] & [id number] Encounter Date/Time: 2024 8:01 AM EST    Location:  [address] [place] [address]-A PCP: No primary care provider on file.       Hospital Day: 8    Assessment and Plan:   Best Rodriguez is a 68 y.o. male with pmh of Cocaine Abuse, Profound Hearing Loss, Severe Malnutrition, PVD, Rectal CA, HTN,  who presents with Multifocal pneumonia      Plan:     **VERY [place]- he cannot hear you- need to write on paper for him to understand what is going on**     PAD/PVD  Bilateral Leg Pain and Numbness 2/2 Cauda Equina and Leptomeningeal Spread (Metastatic Rectal CA)    Ambulatory Dysfunction   -does complain of abdominal pain radiating to bilateral legs, extremities are warm and dry with +2 pedal pulses, mild bruising   -monitor, leg pain with numbness below the knees getting worse   -PT/OT Evals   -CTA Aorta with bilateral leg runoff: Chronic dissection of AA, right occluded anterior tibial artery, chronic long segment occlusion of right SFA, left occluded anterior tibial artery, market colon distension of Large bowel but poor views due to bilateral hip arthroplasties  -KUB Abdomen today, increase miralax to bid with fleets prn, states his abd pain is better and had a BM this morning   -Consult Cardiology: Dr. Green, plan for outpatient ECHO and peripheral angiogram, will add Pletal 05 mg BID once surgical plan with GI/Gen Surgery is completed   -MRI Lumbar Spine: Cauda Equina Clumpting, likely due to adhesive arachnoiditis, leptomeningeal carcinomatosis with hx of Rectal CA  -Consulted Oncology: Dr. Rey, appreciate recs   -As per request of oncology, neurosurgery consult placed for assessment of arachinoiditis or CAUDA EQUINA.  AWAITING FINAL RECOMMENDATIONS    Abd Pain  Colonic Distension  Rectal CA - now Metastatic    Stricture of Rectal Anastomosis  -s/p Chemo/XRT  and surgical resection   -CT: 
    V2.0  [facility] [place] Hospital[place]      Name:  Best Rodriguez /Age/Sex: 1955  (68 y.o. male)   MRN & CSN:  [id number] & [id number] Encounter Date/Time: 2024 8:01 AM EST    Location:  [address] [place] [address]-A PCP: No primary care provider on file.       Hospital Day: 13    Assessment and Plan:   Best Rodriguez is a 68 y.o. male with pmh of Cocaine Abuse, Profound Hearing Loss, Severe Malnutrition, PVD, Rectal CA, HTN,  who presents with Multifocal pneumonia      Plan:     **VERY [place]- he cannot hear you- need to write on paper for him to understand what is going on**     PAD/PVD  Bilateral Leg Pain and Numbness 2/2 Cauda Equina and Leptomeningeal Spread (Metastatic Rectal CA)    Ambulatory Dysfunction   -does complain of abdominal pain radiating to bilateral legs, extremities are warm and dry with +2 pedal pulses, mild bruising   -monitor, leg pain with numbness below the knees getting worse   -PT/OT Evals   -CTA Aorta with bilateral leg runoff: Chronic dissection of AA, right occluded anterior tibial artery, chronic long segment occlusion of right SFA, left occluded anterior tibial artery, market colon distension of Large bowel but poor views due to bilateral hip arthroplasties  -KUB Abdomen today, increase miralax to bid with fleets prn, states his abd pain is better and had a BM this morning   -Consult Cardiology: Dr. Green, plan for outpatient ECHO and peripheral angiogram, will add Pletal 05 mg BID once surgical plan with GI/Gen Surgery is completed   -MRI Lumbar Spine: Cauda Equina Clumpting, likely due to adhesive arachnoiditis, leptomeningeal carcinomatosis with hx of Rectal CA  -Consulted Oncology: Dr. Rey, appreciate recs   -As per request of oncology, neurosurgery consult placed for assessment of arachinoiditis or CAUDA EQUINA.   Neurosurgery has no intervention plans  Pending placement to SNF    Abd Pain  Colonic Distension  Rectal CA - now Metastatic    Stricture of Rectal Anastomosis  -s/p 
   02/12/24 1102   Encounter Summary   Encounter Overview/Reason  Initial Encounter   Service Provided For: Patient and family together   Referral/Consult From: [facility] [place]   Support System Family members   Last Encounter  02/12/24  (PT hard of hearing but could understand a little, his brother was present in room, PT was concerned about upcoming procedure, maybe dealing with cancer, he wanted prayer. He felt peace and calm.)   Complexity of Encounter Low   Begin Time 1050   End Time  1104   Total Time Calculated 14 min   Spiritual/Emotional needs   Type Spiritual Support;Emotional Distress   Grief, Loss, and Adjustments   Type Adjustment to illness   Assessment/Intervention/Outcome   Assessment Anxious;Calm;Concerns with suffering;Coping;Hopeful;Powerlessness;Impaired social interaction;Tearful;Stress overload   Intervention Active listening;Discussed illness injury and it’s impact;Discussed meaning/purpose;Discussed relationship with God;Explored/Affirmed feelings, thoughts, concerns;Explored Coping Skills/Resources;Nurtured Hope;Prayer (assurance of)/[place];Sustaining Presence/Ministry of presence   Outcome Comfort;Connection/Belonging;Coping;Encouraged;Expressed feelings, needs, and concerns;Expressed Gratitude;Less anxious, Less agitated   Plan and Referrals   Plan/Referrals Continue Support (comment)       
  Physical Therapy Treatment Note  Name: Best Rodriguez MRN: [id number] :   1955   Date:  2024   Admission Date: 2024 Room:  [address] [place], [place] [address]A     Restrictions/Precautions:  Restrictions/Precautions  Restrictions/Precautions: General Precautions, Fall Risk       colostomy    Communication with other providers:  RN, OT    Subjective:  Patient states:  \"Everybody here has been so nice to me\"  Pain:   Location, Type, Intensity (0/10 to 10/10):  states abdominal pain at sx site but does not numerically rate    Objective:    Observation:  Pt up in chair upon entry and agreeable to session    Treatment, including education/measures:    Transfers: pt completed STS to/from chair CGA x2 trials for cues with sequencing    Gait: pt ambulated 80' + 40' + 40' with RW CGA with decreased agustin, forward flexed posture, and maintained walker too far forward. Cues provided for pathway and walker management    Assessment / Impression:    Pt up in chair at end of session with needs in reach and alarm on    Patient's tolerance of treatment:  fair   Adverse Reaction: n/a  Significant change in status and impact:  n/a  Barriers to improvement:  decreased endurance, increased pain    Plan for Next Session:    Continue to address transfer training and gait training in future sessions    Time in:  1049  Time out:  1104  Timed treatment minutes:  10  Total treatment time:  15    Previously filed items:  Social/Functional History  Lives With: Alone  Type of Home: House  Home Layout: Bed/Bath upstairs  Home Equipment: Cane  ADL Assistance: Independent  Ambulation Assistance: Independent  Transfer Assistance: Independent  Active : No     Long Term Goals  Time Frame for Long Term Goals : In one week:  Long Term Goal 1: Pt will complete all bed mobility with minAx1  Long Term Goal 2: Pt will complete sit <> stand transfers with minAx1  Long Term Goal 3: Pt will ambulate 250 feet with minAx1 with LRAD  Long Term Goal 4: Pt will 
1037 pt arrived to PACU from OR. Monitors applied and alarms in place. Report rec'd from Jonathan MTZ.  1047 Pt medicated for pain  1059 Pt medicated for pain  1115 ostomy bag leaking d/t wrinkle in outer waffer. New ostomy applied and linen changed.   1130 Pt medicated for pain  1135 Report called to Robyn SAAVEDRA 3E. Old drainage marked on dressing   1148 Transport at bedside      
4 Eyes Skin Assessment     NAME:  Best Rodriguez  YOB: 1955  MEDICAL RECORD NUMBER:  [id number]    The patient is being assessed for  Admission    I agree that at least one RN has performed a thorough Head to Toe Skin Assessment on the patient. ALL assessment sites listed below have been assessed.      Areas assessed by both nurses:    Head, Face, Ears, Shoulders, Back, Chest, Arms, Elbows, Hands, Sacrum. Buttock, Coccyx, Ischium, Legs. Feet and Heels, and Under Medical Devices         Does the Patient have a Wound? No noted wound(s)  Dry legs and feed, scattered small scabs to bilateral legs       [place] Prevention initiated by RN: No  Wound Care Orders initiated by RN: No    Pressure Injury (Stage 3,4, Unstageable, DTI, NWPT, and Complex wounds) if present, place Wound referral order by RN under : No    New Ostomies, if present place, Ostomy referral order under : No     Nurse 1 eSignature: Electronically signed by Kari Chamberlain RN on 2/8/24 at 3:36 PM EST    **SHARE this note so that the co-signing nurse can place an eSignature**    Nurse 2 eSignature: Electronically signed by Mica Smith RN on 2/8/24 at 7:57 PM EST    
Attempted to call Cal Rodriguez at [phone] no answer message left to call back.  
Attempted to call [facility] ties 2 no answer [phone].   
Attempted to call report to John Meraz [phone] no answer.  
Called tray line to see if his phone is on one of his trays.  Waiting on call back.  Phone was found under the bed.  
Comprehensive Nutrition Assessment    Type and Reason for Visit:  Initial, Consult (oral nutrition supplement, low BMI, weight loss)    Nutrition Recommendations/Plan:   Continue easy to chew diet   Will offer high calorie oral nutrition supplement with meals   Encourage meal intake  Will continue to follow up during stay      Malnutrition Assessment:  Malnutrition Status:  Severe malnutrition (02/09/24 1108)    Context:  Chronic Illness     Findings of the 6 clinical characteristics of malnutrition:  Energy Intake:  Unable to assess  Weight Loss:  Unable to assess     Body Fat Loss:  Severe body fat loss Buccal region, Triceps, Orbital   Muscle Mass Loss:  Severe muscle mass loss Clavicles (pectoralis & deltoids), Thigh (quadraceps), Calf (gastrocnemius), Hand (interosseous)  Fluid Accumulation:  No significant fluid accumulation Extremities   Strength:  Not Performed    Nutrition Assessment:    Admit with pneumonia, weakness. Hx rectal cancer, drug abuse. Currently on easy to chew diet, appropriate with poor dentition. Meal intake x 1 meal so far 50-75%. Low weight with muscle, fat wasting noted, currently meeting criteria for severe malnutrition. Usual meal plan at home unclear, communication with patient difficult as very hard of hearing. Agreeable to oral nutrition supplement. Provided information from nutrition care manual for underweight nutrition. Will follow at high nutrition risk at this time.    Nutrition Related Findings:    resting in bed, sleeping but did wake but very [place],  poor dentition Wound Type: None       Current Nutrition Intake & Therapies:    Average Meal Intake: 51-75% (x 1 meal)  Average Supplements Intake: Unable to assess  ADULT DIET; Easy to Chew  ADULT ORAL NUTRITION SUPPLEMENT; AM Snack, PM Snack, HS Snack; Standard High Calorie/High Protein Oral Supplement    Anthropometric Measures:  Height: 180.3 cm (5' 11\")  Ideal Body Weight (IBW): 172 lbs (78 kg)    Admission Body Weight: 
Comprehensive Nutrition Assessment    Type and Reason for Visit:  Reassess    Nutrition Recommendations/Plan:   Continue current diet   Please consider a tube feed     Malnutrition Assessment:  Malnutrition Status:  Severe malnutrition (02/09/24 1108)    Context:  Chronic Illness     Findings of the 6 clinical characteristics of malnutrition:  Energy Intake:  Unable to assess  Weight Loss:  Unable to assess     Body Fat Loss:  Severe body fat loss Buccal region, Triceps, Orbital   Muscle Mass Loss:  Severe muscle mass loss Clavicles (pectoralis & deltoids), Thigh (quadraceps), Calf (gastrocnemius), Hand (interosseous)  Fluid Accumulation:  No significant fluid accumulation Extremities   Strength:  Not Performed    Nutrition Assessment:    Pt has now had a colostomy placed and the pt is now on a clear liquid diet with severe pain. Pt will likely be unable to meet his estimated needs through PO intake. Pending pt goals of care, a tube feed may be needed    Nutrition Related Findings:    resting in bed, sleeping but did wake but very [place],  poor dentition Wound Type: None       Current Nutrition Intake & Therapies:    Average Meal Intake: Refusing to eat  Average Supplements Intake: None Ordered  ADULT DIET; Clear Liquid    Anthropometric Measures:  Height: 180.3 cm (5' 11\")  Ideal Body Weight (IBW): 172 lbs (78 kg)    Admission Body Weight: 54.5 kg (120 lb 2.4 oz)  Current Body Weight: 56.4 kg (124 lb 5.4 oz), 72.3 % IBW. Weight Source: Bed Scale  Current BMI (kg/m2): 17.3  Usual Body Weight:  (limited recent wt hx-2019 130#, chronically low weight)     Weight Adjustment For: No Adjustment                 BMI Categories: Underweight (BMI less than 18.5)    Estimated Daily Nutrient Needs:  Energy Requirements Based On: Kcal/kg  Weight Used for Energy Requirements: Current  Energy (kcal/day): 7086-6610 (30-35 anika/kg with additional calories as needed for gain)  Weight Used for Protein Requirements: Current  Protein 
Comprehensive Nutrition Assessment    Type and Reason for Visit:  Reassess    Nutrition Recommendations/Plan:   Continue current diet  Add gelatin and frozen oral nutrition supplements for variety     Malnutrition Assessment:  Malnutrition Status:  Severe malnutrition (02/09/24 1108)    Context:  Chronic Illness     Findings of the 6 clinical characteristics of malnutrition:  Energy Intake:  Unable to assess  Weight Loss:  Unable to assess     Body Fat Loss:  Severe body fat loss Buccal region, Triceps, Orbital   Muscle Mass Loss:  Severe muscle mass loss Clavicles (pectoralis & deltoids), Thigh (quadraceps), Calf (gastrocnemius), Hand (interosseous)  Fluid Accumulation:  No significant fluid accumulation Extremities   Strength:  Not Performed    Nutrition Assessment:    Pt continues with sub optimal po intake, generally consuming less than half of Easy To Chew Diet. Reports has not been receiving oral supplements? Will add variety to his supplements and continue to follow as high nutrition risk.    Nutrition Related Findings:    Cr 0.6   Wound Type: None       Current Nutrition Intake & Therapies:    Average Meal Intake: 1-25%, 26-50%, 51-75%  Average Supplements Intake: Unable to assess  ADULT DIET; Easy to Chew  ADULT ORAL NUTRITION SUPPLEMENT; Breakfast, Lunch, Dinner; Standard High Calorie/High Protein Oral Supplement    Anthropometric Measures:  Height: 180.3 cm (5' 11\")  Ideal Body Weight (IBW): 172 lbs (78 kg)    Admission Body Weight: 54.5 kg (120 lb 2.4 oz)  Current Body Weight: 57.4 kg (126 lb 8.7 oz), 72.3 % IBW. Weight Source: Bed Scale  Current BMI (kg/m2): 17.7  Usual Body Weight:  (limited recent wt hx-2019 130#, chronically low weight)     Weight Adjustment For: No Adjustment                 BMI Categories: Underweight (BMI less than 18.5)    Estimated Daily Nutrient Needs:  Energy Requirements Based On: Kcal/kg  Weight Used for Energy Requirements: Current  Energy (kcal/day): 4355-1892 (30-35 
GENERAL SURGERY PROGRESS NOTE    Best Rodriguez is a 68 y.o. male with 4 Days Post-Op lap, converted to open diverting colostomy placement.                 Subjective:    Pain: \"some\"  BM: +ve bowel sweat via ostomy   Diet: ADULT DIET; Easy to Chew  ADULT ORAL NUTRITION SUPPLEMENT; Breakfast, Dinner; Standard High Calorie/High Protein Oral Supplement  ADULT ORAL NUTRITION SUPPLEMENT; Lunch; Fortified Gelatin Oral Supplement  ADULT ORAL NUTRITION SUPPLEMENT; Dinner; Frozen Oral Supplement    Pt is very [place]. Assessment performed today by writing on paper to communicate. Pt denies N/V. Pain is improving and overall well controlled. Reports acid reflux and hiccups.     Review of Systems   Constitutional:  Negative for chills and fever.   HENT:  Positive for hearing loss. Negative for ear pain, mouth sores, sore throat and tinnitus.    Eyes:  Negative for photophobia, redness and itching.   Respiratory:  Negative for apnea, choking and stridor.    Cardiovascular:  Negative for chest pain and palpitations.   Gastrointestinal:  Positive for abdominal pain. Negative for anal bleeding, constipation, nausea, rectal pain and vomiting.        Acid reflux   Endocrine: Negative for polydipsia.   Genitourinary:  Negative for enuresis, flank pain and hematuria.   Musculoskeletal:  Negative for back pain, joint swelling and myalgias.   Skin:  Negative for color change and pallor.   Allergic/Immunologic: Negative for environmental allergies.   Neurological:  Negative for syncope and speech difficulty.   Psychiatric/Behavioral:  Negative for confusion and hallucinations.        Objective:    Vitals: VITALS:  /68   Pulse 88   Temp 97.8 °F (36.6 °C)   Resp 16   Ht 1.803 m (5' 11\")   Wt 57.4 kg (126 lb 8.7 oz)   SpO2 94%   BMI 17.65 kg/m²   TEMPERATURE:  Current - Temp: 97.8 °F (36.6 °C); Max - Temp  Av.9 °F (36.6 °C)  Min: 97.5 °F (36.4 °C)  Max: 98.3 °F (36.8 °C)    I/O:  0701 -  0700  In: -   Out: 100 
GENERAL SURGERY PROGRESS NOTE    Best Rodriguez is a 68 y.o. male with 5 Days Post-Op lap, converted to open diverting colostomy placement.                 Subjective:    Pain: \"some\"  BM: +ve bowel sweat via ostomy   Diet: ADULT DIET; Easy to Chew  ADULT ORAL NUTRITION SUPPLEMENT; Breakfast, Dinner; Standard High Calorie/High Protein Oral Supplement  ADULT ORAL NUTRITION SUPPLEMENT; Lunch; Fortified Gelatin Oral Supplement  ADULT ORAL NUTRITION SUPPLEMENT; Dinner; Frozen Oral Supplement    Pt is very [place]. Assessment performed today by writing on paper to communicate. Pt denies N/V. Pain is improving and overall well controlled. Reports acid reflux is improved.     Review of Systems   Constitutional:  Negative for chills and fever.   HENT:  Positive for hearing loss. Negative for ear pain, mouth sores, sore throat and tinnitus.    Eyes:  Negative for photophobia, redness and itching.   Respiratory:  Negative for apnea, choking and stridor.    Cardiovascular:  Negative for chest pain and palpitations.   Gastrointestinal:  Positive for abdominal pain. Negative for anal bleeding, constipation, nausea, rectal pain and vomiting.   Endocrine: Negative for polydipsia.   Genitourinary:  Negative for enuresis, flank pain and hematuria.   Musculoskeletal:  Negative for back pain, joint swelling and myalgias.   Skin:  Negative for color change and pallor.   Allergic/Immunologic: Negative for environmental allergies.   Neurological:  Negative for syncope and speech difficulty.   Psychiatric/Behavioral:  Negative for confusion and hallucinations.        Objective:    Vitals: VITALS:  /72   Pulse (!) 104   Temp 98 °F (36.7 °C) (Oral)   Resp 18   Ht 1.803 m (5' 11\")   Wt 56.4 kg (124 lb 5.4 oz)   SpO2 93%   BMI 17.34 kg/m²   TEMPERATURE:  Current - Temp: 98 °F (36.7 °C); Max - Temp  Av.8 °F (36.6 °C)  Min: 97.4 °F (36.3 °C)  Max: 98 °F (36.7 °C)    I/O:  0701 -  0700  In: 480 [P.O.:480]  Out: 325 
General Surgery Progress Note      Hospital Day: 6    Chief Complaint on Admission: Pneumonia      Subjective:     Best Rodriguez is a 68 y.o. male with   Rectal stricture s/p low anterior resection in 2018. Pt with some LLQ abd pain intermittently. Denies Pain currently. Pt with chronic diarrhea, no BM since yesterday. MRI results noted - Oncology on board and planning for Contrast MRI. Dr. Patel to take for sigmoidoscopy today.     Of note, pt is profoundly [place]. History was performed with use of pen and paper for myself to communicate with the patient.     ROS:  Review of Systems   Constitutional:  Negative for chills and fever.   HENT:  Positive for hearing loss. Negative for ear pain, mouth sores, sore throat and tinnitus.    Eyes:  Negative for photophobia, redness and itching.   Respiratory:  Negative for apnea, choking and stridor.    Cardiovascular:  Negative for chest pain and palpitations.   Gastrointestinal:  Positive for abdominal pain (intermittent) and diarrhea. Negative for anal bleeding, constipation and rectal pain.   Endocrine: Negative for polydipsia.   Genitourinary:  Negative for enuresis, flank pain and hematuria.   Musculoskeletal:  Negative for back pain, joint swelling and myalgias.   Skin:  Negative for color change and pallor.   Allergic/Immunologic: Negative for environmental allergies.   Neurological:  Negative for syncope and speech difficulty.   Psychiatric/Behavioral:  Negative for confusion and hallucinations.        Allergies  Ibuprofen and Oxycodone          Diagnosis Date    Cancer (HCC)     Rectal cancer--treated with both chemo and radiation--finished 01/2019    Chest pain     Cocaine abuse (HCC)     Positive UDS 11/2021    Colostomy present (HCC)     Dissecting AAA (abdominal aortic aneurysm) ([facility]) 02/10/2024    on CTA - Chronic    Hypertension     Malnutrition (HCC)     Profound hearing loss of both ears     no hearing in right ear --very little in left    PVD (peripheral 
General Surgery-Dr Phillip    Hospital Day: 8    ChiefComplaint on Admission: s/p ex lap and diverting colostomy placement       Subjective:     Best Rodriguez is a 68 y.o. male with anal stricture s/p ex lap and diverting colostomy placement . Patient reports back pain. Tolerating ADULT DIET; Clear Liquid. - BM.     ROS:  Review of Systems   Constitutional:  Negative for chills and fever.   HENT:  Negative for ear pain, mouth sores, sore throat and tinnitus.    Eyes:  Negative for photophobia, redness and itching.   Respiratory:  Negative for apnea, choking and stridor.    Cardiovascular:  Negative for chest pain and palpitations.   Gastrointestinal:  Positive for abdominal pain. Negative for anal bleeding, constipation and rectal pain.   Endocrine: Negative for polydipsia.   Genitourinary:  Negative for enuresis, flank pain and hematuria.   Musculoskeletal:  Negative for back pain, joint swelling and myalgias.   Skin:  Negative for color change and pallor.   Allergic/Immunologic: Negative for environmental allergies.   Neurological:  Negative for syncope and speech difficulty.   Psychiatric/Behavioral:  Negative for confusion and hallucinations.        Allergies  Ibuprofen and Oxycodone          Diagnosis Date    Cancer (HCC)     Rectal cancer--treated with both chemo and radiation--finished 2019    Chest pain     Cocaine abuse ([facility])     Positive UDS 2021    Colostomy present ([facility])     Dissecting AAA (abdominal aortic aneurysm) ([facility]) 02/10/2024    on CTA - Chronic    Hypertension     Malnutrition (HCC)     Profound hearing loss of both ears     no hearing in right ear --very little in left    PVD (peripheral vascular disease) ([facility])        Objective:     Vitals:    24 0752   BP: 129/87   Pulse: 76   Resp: 18   Temp: 98 °F (36.7 °C)   SpO2: 99%       TEMPERATURE:  Current - Temp: 98 °F (36.7 °C); Max - Temp  Av.8 °F (36.6 °C)  Min: 97.3 °F (36.3 °C)  Max: 98.2 °F (36.8 °C)    I/O this shift:  In: 120 
Hematology Oncology Inpatient Progress Note    Patient Name:  Best Rodriguez  Patient :  1955  Patient MRN:  [id number]     Primary Oncologist: Thomas Rey MD  Referring Provider: No primary care provider on file.    Best Rodriguez is a 68 y.o. male with a history of rectal adenocarcinoma diagnosed in 2018 status post diverting colostomy followed by neoadjuvant chemoradiation.  He underwent low anterior colon resection with Dr. Phillip in 2018.  He did have residual foci of adenocarcinoma on resection with treatment effect, mesenteric lymph nodes 20 out of 20 negative metastatic disease.  He had open ileostomy reversal with small bowel resection May 2019.  He was hesitant to do adjuvant treatment and received 1 dose of FOLFOX in 2019 after which he did not return to clinic.  He was last seen in clinic in 2021 at which time there is no apparent recurrent or metastatic disease on CT performed in 2021.  CEA level was 3.7.  He was seen in May 2022 by Dr. Phillip which noted stricture at his rectal anastomosis which cannot be  traversed by colonoscope.  MRI was ordered at that time not completed.  He presented this admission complaining of generalized malaise and weakness in bilateral legs with bilateral leg pain.  He is being treated for multifocal pneumonia.  CTA of the aorta was runoff was significant for right occluded anterior tibial artery, chronic long segment occlusion of right SFA, and left occluded anterior tibial artery.  Imaging incidentally showed marked distention of the large bowel.  Cardiology is following and plans for outpatient echo and peripheral angiogram as well as adding Pletal once surgical plan with GI/general surgery is complete.  General surgery is following for possible diversion however wished for GI evaluation first as it is unknown whether this is a benign or malignant stricture.  Dr. Patel plans for colonoscopy tomorrow. MRI of the lumbar spine without contrast was 
Hematology Oncology Inpatient Progress Note    Patient Name:  Best Rodriguez  Patient :  1955  Patient MRN:  [id number]     Primary Oncologist: Thomas Rey MD  Referring Provider: No primary care provider on file.    Best Rodriguez is a 68 y.o. male with a history of rectal adenocarcinoma diagnosed in 2018 status post diverting colostomy followed by neoadjuvant chemoradiation.  He underwent low anterior colon resection with Dr. Phillip in 2018.  He did have residual foci of adenocarcinoma on resection with treatment effect, mesenteric lymph nodes 20 out of 20 negative metastatic disease.  He had open ileostomy reversal with small bowel resection May 2019.  He was hesitant to do adjuvant treatment and received 1 dose of FOLFOX in 2019 after which he did not return to clinic.  He was last seen in clinic in 2021 at which time there is no apparent recurrent or metastatic disease on CT performed in 2021.  CEA level was 3.7.  He was seen in May 2022 by Dr. Phillip which noted stricture at his rectal anastomosis which cannot be  traversed by colonoscope.  MRI was ordered at that time not completed.  He presented this admission complaining of generalized malaise and weakness in bilateral legs with bilateral leg pain.  He is being treated for multifocal pneumonia.  CTA of the aorta was runoff was significant for right occluded anterior tibial artery, chronic long segment occlusion of right SFA, and left occluded anterior tibial artery.  Imaging incidentally showed marked distention of the large bowel.  Cardiology is following and plans for outpatient echo and peripheral angiogram as well as adding Pletal once surgical plan with GI/general surgery is complete.  General surgery is following for possible diversion however wished for GI evaluation first as it is unknown whether this is a benign or malignant stricture.  Dr. Patel plans for colonoscopy tomorrow. MRI of the lumbar spine without contrast was 
Hematology Oncology Plan of Care    Attempted to see pt however in OR this morning.  MRI with contrast without enhancement, not consistent with leptomeningeal carcinomatosis which correlates to clinical picture. PS sent to hospitalist, recommend neurology evaluation for ?arachnoiditis.    Celeste Judd PA-C    
Nurse just changed pouching system to colostomy site due to leakage-started with stool output. Pt in bed. If still admitted, will plan to practice pouching system change tomorrow. Pt agreeable. Plans to go to SNF at discharge.   
Occupational Therapy    Attempted at 1445 hrs (call light activated) c pt requesting use of urinal. Pt encouraged for active participation for functional mobility to bathroom for toileting and politely declined. Pt cites \"feeling too bad\" today as rationale for no participation at this time. Will continue per OT POC as schedule allows.    Electronically signed by:    JAZZ Murphy  2/16/2024, 2:05 PM    
Occupational Therapy    Occupational Therapy Treatment Note    Name: Best Rodriguez MRN: [id number] :   1955   Date:  2024   Admission Date: 2024 Room:  [address] [place] [address]-A     D/c rec:  SNF    Restrictions/Precautions:  Restrictions/Precautions  Restrictions/Precautions: General Precautions, Fall Risk           Communication with other providers: RN cleared pt for therapy    Subjective:  Patient states:  \"my back feels better now that I'm up\"  Pain:   Location, Type, Intensity (0/10 to 10/10):  reports mild pain in abdomen but does not rate    Objective:    Observation: pt sitting in chair upon arrival, agreeable to therapy    Objective Measures:  room air    Treatment, including education:  Therapeutic Activity Training:   Therapeutic activity training was instructed today.  Cues were given for safety, sequence, UE/LE placement, awareness, and balance.      Sit to stand from chair x2 with CGA for balance safety. Donned depends sitting/standing in chair with SBA for balance safety. Ambulated 40ft x2 with CGA using 2ww with standing rest break, min visual cues for pathway navigation.  Pt educated on role of OT, benefits of OT, and rationale for therapeutic intervention. Educated on need to be patient advocate, assist to fullest ability with ADL completion, and benefits of OOB activity. Pt left in chair, alarm on, call light within reach, all current needs met    Assessment / Impression:    Patient's tolerance of treatment: Well  Adverse Reaction: None  Significant change in status and impact: Improved from initial evaluation  Barriers to improvement: None noted      Plan for Next Session:    Continue OT POC    Time in:  1049  Time out:  1104  Timed treatment minutes:  15  Total treatment time:  15      Electronically signed by:    Monae Meraz OT,   2024, 1:57 PM         
Occupational Therapy  [facility] ACUTE CARE OCCUPATIONAL THERAPY EVALUATION    Best Rodriguez, 1955, 3011/3011-A, 2/11/2024    Discharge Recommendation: Skilled Nursing Facility    History:  [place]:  The primary encounter diagnosis was Multifocal pneumonia. Diagnoses of Hyponatremia, Leukopenia, unspecified type, Elevated troponin, IZA (acute kidney injury) (HCC), and Elevated brain natriuretic peptide (BNP) level were also pertinent to this visit.  Past Medical History:   Diagnosis Date    Cancer (HCC)     Rectal cancer--treated with both chemo and radiation--finished 01/2019    Chest pain     Cocaine abuse (HCC)     Positive UDS 11/2021    Colostomy present (HCC)     Dissecting AAA (abdominal aortic aneurysm) (HCC) 02/10/2024    on CTA - Chronic    Hypertension     Malnutrition (HCC)     Profound hearing loss of both ears     no hearing in right ear --very little in left    PVD (peripheral vascular disease) (HCC)        Subjective:  Patient states: \"I can't believe my legs are this bad\"  Pain:  denies  Communication with other providers: co-eval w/ PT, handoff to RN  Restrictions: General Precautions, Fall Risk    Home Setup/Prior level of function:  Social/Functional History  Lives With: Alone  Type of Home: House  Home Layout: Bed/Bath upstairs  Home Equipment: Cane  ADL Assistance: Independent  Ambulation Assistance: Independent  Transfer Assistance: Independent  Active : No     Examination:  Observation: Seated in recliner upon arrival, agreeable to therapy eval.  Vision: WFL  Hearing: very [place]  Vitals: Stable vitals throughout session      Body Systems and functions:  ROM: WFL   Strength: B UE grossly 4-/5 across all major joints   Sensation: WFL  Tone: Normal  Coordination: WFL  Perception: WNL      Cognitive and Psychosocial Functioning:  Overall cognitive status: alert, oriented x3  Affect: Normal   Arousal/Alertness: Delayed responses to stimuli  Following Commands: Follows all 
Physical Therapy    Physical Therapy Treatment Note  Name: Best Rodriguez MRN: [id number] :   1955   Date:  2024   Admission Date: 2024 Room:  [address] [place] [address]   Restrictions/Precautions:  Restrictions/Precautions  Restrictions/Precautions: General Precautions, Fall Risk       colostomy   Communication with other providers:  per nurse ok to tx  Subjective:  Patient states:  pt agreeable to tx after education and benefits of participating in tx.   Pain:   Location, Type, Intensity (0/10 to 10/10):  no c/o during tx session  Objective:    Observation:  alert and oriented to self    Treatment, including education/measures:  Sup<=>sit with min assist needing increased time and effort. Pt splinting abdomin with transfer.  Sit<=>stand cga and cues.  Amb with rw 180' cga and cues for posture. Pt has slight limp with gt  Safety  Patient left safely in the bed, with call light/phone in reach with alarm applied. Gait belt was used for transfers and gait.   Assessment / Impression:      Patient's tolerance of treatment:  fair   Adverse Reaction: na  Significant change in status and impact:  na  Barriers to improvement:  strength and safety  Plan for Next Session:    Cont. POC  Time in:  1515  Time out:  1540  Timed treatment minutes:  25  Total treatment time:  25    Previously filed items:  Social/Functional History  Lives With: Alone  Type of Home: House  Home Layout: Bed/Bath upstairs  Home Equipment: Cane  ADL Assistance: Independent  Ambulation Assistance: Independent  Transfer Assistance: Independent  Active : No     Long Term Goals  Time Frame for Long Term Goals : In one week:  Long Term Goal 1: Pt will complete all bed mobility with minAx1  Long Term Goal 2: Pt will complete sit <> stand transfers with minAx1  Long Term Goal 3: Pt will ambulate 250 feet with minAx1 with LRAD  Long Term Goal 4: Pt will independently complete 3 sets of 10 reps of BLE AROM exercises       Electronically signed by:  
Physical Therapy  Facility/Department: [facility] 3E  Physical Therapy Initial Assessment    Name: Best Rodriguez  : 1955  MRN: [id number]  Date of Service: 2024    Discharge Recommendations:  Subacute/Skilled Nursing Facility          Patient Diagnosis(es): The primary encounter diagnosis was Multifocal pneumonia. Diagnoses of Hyponatremia, Leukopenia, unspecified type, Elevated troponin, IZA (acute kidney injury) (HCC), and Elevated brain natriuretic peptide (BNP) level were also pertinent to this visit.  Past Medical History:  has a past medical history of Cancer (HCC), Chest pain, Cocaine abuse (HCC), Colostomy present (HCC), Dissecting AAA (abdominal aortic aneurysm) (HCC), Hypertension, Malnutrition (HCC), Profound hearing loss of both ears, and PVD (peripheral vascular disease) (HCC).  Past Surgical History:  has a past surgical history that includes colostomy (N/A, 2018); pr insj prph ctr vad w/subq port age 6 yr/> (N/A, 2018); joint replacement (Bilateral, early ); Small intestine surgery (N/A, 3/13/2019); sigmoidoscopy (N/A, 2019); Small intestine surgery (N/A, 2019); Colonoscopy; and sigmoidoscopy (N/A, 2022).    Assessment   Body Structures, Functions, Activity Limitations Requiring Skilled Therapeutic Intervention: Decreased functional mobility ;Decreased safe awareness;Decreased high-level IADLs;Decreased ADL status;Decreased cognition;Decreased endurance;Decreased balance;Increased pain;Decreased strength  Therapy Prognosis: Good  Decision Making: High Complexity  Clinical Presentation: unpredictable characteristics  Requires PT Follow-Up: Yes  Activity Tolerance  Activity Tolerance: Patient tolerated evaluation without incident     Plan   Physical Therapy Plan  General Plan: 3-5 times per week  Current Treatment Recommendations: Strengthening, ROM, Balance training, Functional mobility training, Transfer training, ADL/Self-care training, IADL training, 
Physical Therapy  PT approached patient on this date for participation in therapy services. Patient had procedure this AM and reports pain in abdomen and defer services at this time. Will follow up as schedule allows.     
Pt in bed. Pouching system to LLQ of abdomen intact from change yesterday. No stool output seen. Will continue to follow.   
Spoke w nurse about completing pt MRI screening for, so we can clear & schedule pt for exam.  
THIS IS MY GAVIN SUPERVISORY AND SHARED VISIT NOTE:    I personally saw the patient and made/approved the management plan and take responsibility for the patient management.    History:   Patient states he hurts all over. From his upper body down to his legs. Family states he has had a cough as well. He does not wear oxygen at home and does not smoke and does not take an inhaler. He lives alone at home. Denies any fevers. No difficulty urinating.     Exam:   No acute distress, hard of hearing  S1s2 regular rate  Decreased breath sounds, on nasal canula  Abd soft nt nd  No pitting edema  Able to raise legs, move feet, has sensation in lower extremities  No back tenderness to palpation      MDM:   ***    I independently interpreted the following study(s): *** which show ***    I personally discussed the patient's management with ***, who state ***    
cancerCARDIOLOGY CONSULT NOTE   Reason for consultation:  PAD    Referring physician:  Wendy Powell MD     Primary care physician: No primary care provider on file.      Dear  Dr. Wendy Powell MD   Thanks for the consult.    Chief Complaints :  Chief Complaint   Patient presents with    Leg Pain     Leg pain and weakness that started about a week ago         History of present illness:Best is a 68 y.o.year old who presents with complaints of bilateral leg weakness pain abdominal discomfort being worked up during evaluation had a CTA showing right SFA occlusion and left single-vessel flow below knee possible popliteal occlusion he has numbness and weakness of his legs which is acute on chronic he is being evaluated by gastroenterology and general surgery for abdominal pain and rectal strictures as well there are no open leg ulcer    Past medical history:    has a past medical history of Cancer (HCC), Chest pain, Cocaine abuse (HCC), Colostomy present (HCC), Dissecting AAA (abdominal aortic aneurysm) (HCC), Hypertension, Malnutrition (HCC), Profound hearing loss of both ears, and PVD (peripheral vascular disease) ([facility]).  Past surgical history:   has a past surgical history that includes colostomy (N/A, 11/2/2018); pr insj prph ctr vad w/subq port age 6 yr/> (N/A, 11/6/2018); joint replacement (Bilateral, early 2010's); Small intestine surgery (N/A, 3/13/2019); sigmoidoscopy (N/A, 5/13/2019); Small intestine surgery (N/A, 5/21/2019); Colonoscopy; and sigmoidoscopy (N/A, 5/4/2022).  Social History:   reports that he has been smoking cigarettes. He has never used smokeless tobacco. He reports current drug use. Drugs: Cocaine and Marijuana (Weed). He reports that he does not drink alcohol.  Family history:   no family history of CAD, STROKE of DM at early age    Allergies   Allergen Reactions    Ibuprofen Nausea And Vomiting    Oxycodone Nausea And Vomiting       guaiFENesin-dextromethorphan (ROBITUSSIN DM) 100-10 MG/5ML 
day    enoxaparin  40 mg SubCUTAneous Daily    cefTRIAXone (ROCEPHIN) IV  1,000 mg IntraVENous Q24H    famotidine  20 mg Oral Daily    ipratropium 0.5 mg-albuterol 2.5 mg  1 Dose Inhalation BID RT      Infusions:    lactated ringers IV soln      sodium chloride 20 mL/hr at 02/12/24 0538     PRN Meds: guaiFENesin-dextromethorphan, 5 mL, Q4H PRN  dicyclomine, 10 mg, TID PRN  sodium phosphate, 1 enema, BID PRN  sodium chloride, 1 spray, Q2H PRN  sodium chloride flush, 5-40 mL, PRN  sodium chloride, , PRN  ondansetron, 4 mg, Q8H PRN   Or  ondansetron, 4 mg, Q6H PRN  acetaminophen, 650 mg, Q6H PRN   Or  acetaminophen, 650 mg, Q6H PRN  ipratropium 0.5 mg-albuterol 2.5 mg, 1 Dose, Q4H PRN        Labs      Recent Results (from the past 24 hour(s))   Protime/INR & PTT    Collection Time: 02/12/24  3:16 AM   Result Value Ref Range    Protime 11.1 (L) 11.7 - 14.5 SECONDS    INR 0.8 INDEX    aPTT 28.6 25.1 - 37.1 SECONDS   Lipase    Collection Time: 02/12/24  3:16 AM   Result Value Ref Range    Lipase 37 13 - 60 IU/L   Amylase    Collection Time: 02/12/24  3:16 AM   Result Value Ref Range    Amylase 143 (H) 25 - 115 U/L   CBC with Auto Differential    Collection Time: 02/12/24  3:16 AM   Result Value Ref Range    WBC 3.8 (L) 4.0 - 10.5 K/CU MM    RBC 4.55 (L) 4.6 - 6.2 M/CU MM    Hemoglobin 12.7 (L) 13.5 - 18.0 GM/DL    Hematocrit 39.5 (L) 42 - 52 %    MCV 86.8 78 - 100 FL    MCH 27.9 27 - 31 PG    MCHC 32.2 32.0 - 36.0 %    RDW 15.4 (H) 11.7 - 14.9 %    Platelets 180 140 - 440 K/CU MM    MPV 10.2 7.5 - 11.1 FL    Immature Neutrophil % 0.0 0 - 0.43 %    Bands Relative 4 (L) 5 - 11 %    Segs Relative 61.0 36 - 66 %    Eosinophils % 1.0 0 - 3 %    Basophils % 1.0 0 - 1 %    Lymphocytes % 25.0 24 - 44 %    Monocytes % 8.0 (H) 0 - 4 %    Total Immature Neutrophil 0.00 K/CU MM    Bands Absolute 0.15 K/CU MM    Segs Absolute 2.3 K/CU MM    Eosinophils Absolute 0.0 K/CU MM    Basophils Absolute 0.0 K/CU MM    Lymphocytes Absolute 1.0 
artifact.      2.  Dilated gas-filled colon is again noted from the cecum through the distal   sigmoid.  Ascending colon measures up to 9 cm in the descending colon   measures up to 7 cm.  Postsurgical changes and calcifications at the expected   lower portion of the rectum.  Limited detail for residual mass, stenosis or   inflammation.      3.  Had abnormal appearance of the aorta on previous exam and see previous   studies for better detail.  Current study has heavy calcification and some   areas of aneurysmal enlargement/postsurgical changes but cannot make a   detailed assessment on the current study.      4.  Multifocal opacities are present in the lungs with greater distribution   in the lower lobes than within the upper lobes.  Cardiac chambers are   borderline in size but the distribution suggests pneumonia rather than edema.   Sequela of old granulomatous disease as well.         XR CHEST PORTABLE   Final Result   1.  Bibasilar heterogeneous pulmonary opacities may represent atelectasis,   infection or edema.  Short-term follow-up PA and lateral chest radiographs   with improved inspiration may be helpful for further evaluation.      2.  Gaseous distention/dilatation of bowel in the visualized upper abdomen,   not well evaluated on this study.  If there is high clinical concern for   intra-pathology then follow-up CT may be helpful for further evaluation.      3.  Left subclavian port with tip directed laterally in the region of the   proximal SVC.           Assessment/Plan:  #Cauda Equina Clumping  -on non-contrasted MRI read as likely adhesive arachnoiditis with LMD in differential given history of malignancy  -Pt was in surveillance when lost to follow-up, current non contrasted CAP and CTA abdomen without clear evidence of malignancy. Rectal adenocarcinoma is not commonly associated with CNS disease.  -Reviewed MRI L spine w/wo contrast reviewed. No feature of leptomenigeal carcinomatosis. Most likely 
Adenocarcinoma  -S/p neoadjuvant chemoradiation in 2018 followed by resection and subsequent ileostomy reversal.  Received 1 dose of adjuvant FOLFOX only before declining further treatment.  He has been lost to follow-up since March 2021 at which time he was MODE.    Remainder of care per primary and consulting teams.  He is being treated for multifocal pneumonia. He has a noted history of polysubstance abuse. Dr Green is following for PAD and will manage conservatively for now with OP follow-up.    Thank you.   
no pleural effusion or pneumothorax. Soft Tissues/Bones: Focal area of sclerosis in the anterolateral right 7th rib.  No acute fractures are appreciated.  There is no soft tissue emphysema. Slight curvature of the spine.  No new collapse or subluxation at the spine. Left MediPort with the catheter near the junction of the left brachiocephalic vein and SVC. Abdomen/Pelvis: Organs: The lack of IV contrast reduces evaluation of the organs and vasculature.  Streak artifact from the arms and very thin patient with lack of intra-abdominal fat.  The overall evaluation of the abdominal organs is poor.  No obvious mass is appreciated at the unenhanced liver.  The spleen is not enlarged.  Poor detail and separation of pancreas from the adjacent tissues.  No obvious calcifications at the expected pancreas.  Cannot assess for inflammation or fluid around the pancreas and upper organs.  Limited detail of the adrenal glands.  No renal calculus or hydronephrosis are identified. GI/Bowel: The stomach and small bowel are not dilated.  The small bowel loops are concentrated into the midline and anterior abdomen.  There is gaseous dilatation of the colon with a dilated cecum and ascending colon.  The ascending colon measures up to 9 cm in diameter.  Dilatation of the transverse colon and descending colon with the descending colon up to 7 cm. Artifact from the hip arthroplasties degrades evaluation of the pelvis including the inferior-most cecum and the rectosigmoid.  Small amount of fecal material are present in the cecum and rectum without a large amounts of solid fecal material in the colon.  Limited evaluation for thickening and or deformity at the rectum.  Calcification and or postsurgical changes at the inferior rectum.  No definite pneumoperitoneum is identified. Pelvis: Artifact across the pelvis degrading the evaluation.  The urinary bladder is not dilated but limited detail.  Cannot assess the prostate. 
sclerosis in the anterolateral right 7th rib.  No acute fractures are appreciated.  There is no soft tissue emphysema. Slight curvature of the spine.  No new collapse or subluxation at the spine. Left MediPort with the catheter near the junction of the left brachiocephalic vein and SVC. Abdomen/Pelvis: Organs: The lack of IV contrast reduces evaluation of the organs and vasculature.  Streak artifact from the arms and very thin patient with lack of intra-abdominal fat.  The overall evaluation of the abdominal organs is poor.  No obvious mass is appreciated at the unenhanced liver.  The spleen is not enlarged.  Poor detail and separation of pancreas from the adjacent tissues.  No obvious calcifications at the expected pancreas.  Cannot assess for inflammation or fluid around the pancreas and upper organs.  Limited detail of the adrenal glands.  No renal calculus or hydronephrosis are identified. GI/Bowel: The stomach and small bowel are not dilated.  The small bowel loops are concentrated into the midline and anterior abdomen.  There is gaseous dilatation of the colon with a dilated cecum and ascending colon.  The ascending colon measures up to 9 cm in diameter.  Dilatation of the transverse colon and descending colon with the descending colon up to 7 cm. Artifact from the hip arthroplasties degrades evaluation of the pelvis including the inferior-most cecum and the rectosigmoid.  Small amount of fecal material are present in the cecum and rectum without a large amounts of solid fecal material in the colon.  Limited evaluation for thickening and or deformity at the rectum.  Calcification and or postsurgical changes at the inferior rectum.  No definite pneumoperitoneum is identified. Pelvis: Artifact across the pelvis degrading the evaluation.  The urinary bladder is not dilated but limited detail.  Cannot assess the prostate. Peritoneum/Retroperitoneum: Poor detail of the aorta.  Evaluation of the true aortic caliber 
transverse colon and descending colon with the descending colon up to 7 cm. Artifact from the hip arthroplasties degrades evaluation of the pelvis including the inferior-most cecum and the rectosigmoid.  Small amount of fecal material are present in the cecum and rectum without a large amounts of solid fecal material in the colon.  Limited evaluation for thickening and or deformity at the rectum.  Calcification and or postsurgical changes at the inferior rectum.  No definite pneumoperitoneum is identified. Pelvis: Artifact across the pelvis degrading the evaluation.  The urinary bladder is not dilated but limited detail.  Cannot assess the prostate. Peritoneum/Retroperitoneum: Poor detail of the aorta.  Evaluation of the true aortic caliber is limited.  Head previous aneurysm or dissection and previous surgical intervention.  The right and left common iliac arteries are dilated up to 19 mm on the right side and on the left side.  No obvious high density hematoma. Bones/Soft Tissues: Right and left hip arthroplasty with associated beam hardening artifact across the pelvis.  Degenerative disc disease, chronic endplate changes, and sclerosis in the L4-L5 space and edge of the vertebra. Facet arthropathy is prominent as well.     1.  Very limited details with the lack of contrast, very thin patient, and areas artifact. 2.  Dilated gas-filled colon is again noted from the cecum through the distal sigmoid.  Ascending colon measures up to 9 cm in the descending colon measures up to 7 cm.  Postsurgical changes and calcifications at the expected lower portion of the rectum.  Limited detail for residual mass, stenosis or inflammation. 3.  Had abnormal appearance of the aorta on previous exam and see previous studies for better detail.  Current study has heavy calcification and some areas of aneurysmal enlargement/postsurgical changes but cannot make a detailed assessment on the current study. 4.  Multifocal opacities are 
and very thin patient with lack of intra-abdominal fat.  The overall evaluation of the abdominal organs is poor.  No obvious mass is appreciated at the unenhanced liver.  The spleen is not enlarged.  Poor detail and separation of pancreas from the adjacent tissues.  No obvious calcifications at the expected pancreas.  Cannot assess for inflammation or fluid around the pancreas and upper organs.  Limited detail of the adrenal glands.  No renal calculus or hydronephrosis are identified. GI/Bowel: The stomach and small bowel are not dilated.  The small bowel loops are concentrated into the midline and anterior abdomen.  There is gaseous dilatation of the colon with a dilated cecum and ascending colon.  The ascending colon measures up to 9 cm in diameter.  Dilatation of the transverse colon and descending colon with the descending colon up to 7 cm. Artifact from the hip arthroplasties degrades evaluation of the pelvis including the inferior-most cecum and the rectosigmoid.  Small amount of fecal material are present in the cecum and rectum without a large amounts of solid fecal material in the colon.  Limited evaluation for thickening and or deformity at the rectum.  Calcification and or postsurgical changes at the inferior rectum.  No definite pneumoperitoneum is identified. Pelvis: Artifact across the pelvis degrading the evaluation.  The urinary bladder is not dilated but limited detail.  Cannot assess the prostate. Peritoneum/Retroperitoneum: Poor detail of the aorta.  Evaluation of the true aortic caliber is limited.  Head previous aneurysm or dissection and previous surgical intervention.  The right and left common iliac arteries are dilated up to 19 mm on the right side and on the left side.  No obvious high density hematoma. Bones/Soft Tissues: Right and left hip arthroplasty with associated beam hardening artifact across the pelvis.  Degenerative disc disease, chronic endplate changes, and sclerosis in the 
and lateral chest radiographs with improved inspiration may be helpful for further evaluation. 2.  Gaseous distention/dilatation of bowel in the visualized upper abdomen, not well evaluated on this study.  If there is high clinical concern for intra-pathology then follow-up CT may be helpful for further evaluation. 3.  Left subclavian port with tip directed laterally in the region of the proximal SVC.       Electronically signed by RUBEN HENRY CNP on 2/10/2024 at 9:08 AM

## 2024-02-19 NOTE — DISCHARGE INSTR - DIET

## 2024-02-19 NOTE — CARE COORDINATION
Per Raffi pt has been APPROVED for [facility];  [id number]  [id number]  Started: 02/16/2024  Day auth: 02/19/2024-02/21/2024Approved

## 2024-02-19 NOTE — DISCHARGE SUMMARY
72 hours.  Lipids:   Lab Results   Component Value Date/Time    CHOL 167 09/29/2018 02:00 AM    HDL 45 09/29/2018 02:00 AM    TRIG 56 09/29/2018 02:00 AM     Hemoglobin A1C: No results found for: \"LABA1C\"  TSH: No results found for: \"TSH\"  Troponin:   Lab Results   Component Value Date/Time    TROPONINT <0.010 06/04/2021 04:59 PM    TROPONINT <0.010 09/29/2018 02:00 AM    TROPONINT <0.010 09/28/2018 07:58 PM     Lactic Acid: No results for input(s): \"LACTA\" in the last 72 hours.  BNP: No results for input(s): \"PROBNP\" in the last 72 hours.  UA:  Lab Results   Component Value Date/Time    NITRU NEGATIVE 02/08/2024 05:00 AM    COLORU YELLOW 02/08/2024 05:00 AM    WBCUA 1 02/08/2024 05:00 AM    RBCUA <1 02/08/2024 05:00 AM    MUCUS FEW 02/08/2024 05:00 AM    TRICHOMONAS NONE SEEN 02/08/2024 05:00 AM    BACTERIA NEGATIVE 02/08/2024 05:00 AM    CLARITYU CLEAR 02/08/2024 05:00 AM    SPECGRAV >1.030 02/08/2024 05:00 AM    LEUKOCYTESUR NEGATIVE 02/08/2024 05:00 AM    UROBILINOGEN 0.2 02/08/2024 05:00 AM    BILIRUBINUR NEGATIVE 02/08/2024 05:00 AM    BLOODU TRACE 02/08/2024 05:00 AM    KETUA NEGATIVE 02/08/2024 05:00 AM     Urine Cultures: No results found for: \"LABURIN\"  Blood Cultures: No results found for: \"BC\"  No results found for: \"BLOODCULT2\"  Organism: No results found for: \"ORG\"    Time Spent Discharging patient 35 minutes    Electronically signed by Mani Powell MD on 2/19/2024 at 3:56 PM

## 2024-02-24 NOTE — ANESTHESIA POSTPROCEDURE EVALUATION
Department of Anesthesiology  Postprocedure Note    Patient: Best Rodriguez  MRN: [id number]  YOB: 1955  Date of evaluation: 2/24/2024    Procedure Summary       Date: 02/13/24 Room / Location: [address]    Anesthesia Start: 0912 Anesthesia Stop: 1043    Procedure: COLOSTOMY LAPAROSCOPIC converted to open Diagnosis:       Bowel dysfunction      (Bowel dysfunction [K59.9])    Surgeons: Cary Phillip MD Responsible Provider: Juice Duval MD    Anesthesia Type: general ASA Status: 4 - Emergent            Anesthesia Type: No value filed.    Tiera Phase I: Tiera Score: 10    Tiera Phase II:      Anesthesia Post Evaluation    Patient location during evaluation: PACU  Patient participation: complete - patient participated  Level of consciousness: sleepy but conscious  Pain score: 2  Airway patency: patent  Nausea & Vomiting: no nausea and no vomiting  Cardiovascular status: hemodynamically stable  Respiratory status: acceptable  Hydration status: euvolemic  Pain management: adequate    No notable events documented.

## 2024-03-04 ENCOUNTER — HOSPITAL ENCOUNTER (OUTPATIENT)
Age: 69
Setting detail: SPECIMEN
Discharge: HOME | End: 2024-03-04
Payer: MEDICARE

## 2024-03-04 LAB
ANION GAP SERPL CALCULATED.3IONS-SCNC: 19 MMOL/L (ref 7–16)
BUN SERPL-MCNC: 153 MG/DL (ref 6–23)
CALCIUM SERPL-MCNC: 8.8 MG/DL (ref 8.3–10.6)
CHLORIDE BLD-SCNC: 73 MMOL/L (ref 99–110)
CO2: 30 MMOL/L (ref 21–32)
CREAT SERPL-MCNC: 3.3 MG/DL (ref 0.9–1.3)
GFR SERPL CREATININE-BSD FRML MDRD: 20 ML/MIN/1.73M2
GLUCOSE SERPL-MCNC: 122 MG/DL (ref 70–99)
HCT VFR BLD CALC: 46.9 % (ref 42–52)
HEMOGLOBIN: 15.1 GM/DL (ref 13.5–18)
MCH RBC QN AUTO: 26.8 PG (ref 27–31)
MCHC RBC AUTO-ENTMCNC: 32.2 % (ref 32–36)
MCV RBC AUTO: 83.2 FL (ref 78–100)
PDW BLD-RTO: 14.9 % (ref 11.7–14.9)
PLATELET # BLD: 247 K/CU MM (ref 140–440)
PMV BLD AUTO: 11.1 FL (ref 7.5–11.1)
POTASSIUM SERPL-SCNC: 4.4 MMOL/L (ref 3.5–5.1)
RBC # BLD: 5.64 M/CU MM (ref 4.6–6.2)
SODIUM BLD-SCNC: 122 MMOL/L (ref 135–145)
WBC # BLD: 8.4 K/CU MM (ref 4–10.5)

## 2024-03-04 PROCEDURE — 9900360100 HC STAT COLLECTION FEE SNF

## 2024-03-04 PROCEDURE — 85027 COMPLETE CBC AUTOMATED: CPT

## 2024-03-04 PROCEDURE — 80048 BASIC METABOLIC PNL TOTAL CA: CPT

## 2024-03-04 PROCEDURE — 36415 COLL VENOUS BLD VENIPUNCTURE: CPT

## 2024-03-05 ENCOUNTER — HOSPITAL ENCOUNTER (OUTPATIENT)
Age: 69
Setting detail: SPECIMEN
Discharge: HOME | End: 2024-03-05

## 2024-03-05 LAB
ANION GAP SERPL CALCULATED.3IONS-SCNC: 17 MMOL/L (ref 7–16)
BUN SERPL-MCNC: 156 MG/DL (ref 6–23)
CALCIUM SERPL-MCNC: 9.2 MG/DL (ref 8.3–10.6)
CHLORIDE BLD-SCNC: 71 MMOL/L (ref 99–110)
CO2: 36 MMOL/L (ref 21–32)
CREAT SERPL-MCNC: 3.3 MG/DL (ref 0.9–1.3)
GFR SERPL CREATININE-BSD FRML MDRD: 20 ML/MIN/1.73M2
GLUCOSE SERPL-MCNC: 110 MG/DL (ref 70–99)
HCT VFR BLD CALC: 45.4 % (ref 42–52)
HEMOGLOBIN: 15.5 GM/DL (ref 13.5–18)
MCH RBC QN AUTO: 27.4 PG (ref 27–31)
MCHC RBC AUTO-ENTMCNC: 34.1 % (ref 32–36)
MCV RBC AUTO: 80.4 FL (ref 78–100)
PDW BLD-RTO: 14.2 % (ref 11.7–14.9)
PLATELET # BLD: 261 K/CU MM (ref 140–440)
PMV BLD AUTO: 10.6 FL (ref 7.5–11.1)
POTASSIUM SERPL-SCNC: 4 MMOL/L (ref 3.5–5.1)
RBC # BLD: 5.65 M/CU MM (ref 4.6–6.2)
SODIUM BLD-SCNC: 124 MMOL/L (ref 135–145)
WBC # BLD: 9.3 K/CU MM (ref 4–10.5)

## 2024-03-05 PROCEDURE — 36415 COLL VENOUS BLD VENIPUNCTURE: CPT

## 2024-03-05 PROCEDURE — 80048 BASIC METABOLIC PNL TOTAL CA: CPT

## 2024-03-05 PROCEDURE — 85027 COMPLETE CBC AUTOMATED: CPT

## 2024-03-05 PROCEDURE — 9900360100 HC STAT COLLECTION FEE SNF

## 2024-03-07 ENCOUNTER — HOSPITAL ENCOUNTER (OUTPATIENT)
Age: 69
Setting detail: SPECIMEN
Discharge: HOME | End: 2024-03-07
Payer: MEDICARE

## 2024-03-07 ENCOUNTER — OFFICE VISIT (OUTPATIENT)
Dept: SURGERY | Age: 69
End: 2024-03-07
Payer: MEDICARE

## 2024-03-07 VITALS
DIASTOLIC BLOOD PRESSURE: 70 MMHG | WEIGHT: 123 LBS | HEIGHT: 71 IN | OXYGEN SATURATION: 100 % | BODY MASS INDEX: 17.22 KG/M2 | HEART RATE: 80 BPM | SYSTOLIC BLOOD PRESSURE: 110 MMHG

## 2024-03-07 DIAGNOSIS — E43 SEVERE PROTEIN-CALORIE MALNUTRITION (HCC): Primary | ICD-10-CM

## 2024-03-07 DIAGNOSIS — Z93.3 S/P COLOSTOMY (HCC): ICD-10-CM

## 2024-03-07 LAB
ANION GAP SERPL CALCULATED.3IONS-SCNC: 22 MMOL/L (ref 7–16)
BUN SERPL-MCNC: 166 MG/DL (ref 6–23)
CALCIUM SERPL-MCNC: 10 MG/DL (ref 8.3–10.6)
CHLORIDE BLD-SCNC: 75 MMOL/L (ref 99–110)
CO2: 29 MMOL/L (ref 21–32)
CREAT SERPL-MCNC: 3.4 MG/DL (ref 0.9–1.3)
GFR SERPL CREATININE-BSD FRML MDRD: 19 ML/MIN/1.73M2
GLUCOSE SERPL-MCNC: 106 MG/DL (ref 70–99)
HCT VFR BLD CALC: 48.3 % (ref 42–52)
HEMOGLOBIN: 15.8 GM/DL (ref 13.5–18)
MCH RBC QN AUTO: 26.8 PG (ref 27–31)
MCHC RBC AUTO-ENTMCNC: 32.7 % (ref 32–36)
MCV RBC AUTO: 82 FL (ref 78–100)
PDW BLD-RTO: 14.6 % (ref 11.7–14.9)
PLATELET # BLD: 239 K/CU MM (ref 140–440)
PMV BLD AUTO: 10.9 FL (ref 7.5–11.1)
POTASSIUM SERPL-SCNC: 4.6 MMOL/L (ref 3.5–5.1)
RBC # BLD: 5.89 M/CU MM (ref 4.6–6.2)
SODIUM BLD-SCNC: 126 MMOL/L (ref 135–145)
WBC # BLD: 8.2 K/CU MM (ref 4–10.5)

## 2024-03-07 PROCEDURE — 3074F SYST BP LT 130 MM HG: CPT | Performed by: PHYSICIAN ASSISTANT

## 2024-03-07 PROCEDURE — 1123F ACP DISCUSS/DSCN MKR DOCD: CPT | Performed by: PHYSICIAN ASSISTANT

## 2024-03-07 PROCEDURE — 80048 BASIC METABOLIC PNL TOTAL CA: CPT

## 2024-03-07 PROCEDURE — 36415 COLL VENOUS BLD VENIPUNCTURE: CPT

## 2024-03-07 PROCEDURE — G8419 CALC BMI OUT NRM PARAM NOF/U: HCPCS | Performed by: PHYSICIAN ASSISTANT

## 2024-03-07 PROCEDURE — G8484 FLU IMMUNIZE NO ADMIN: HCPCS | Performed by: PHYSICIAN ASSISTANT

## 2024-03-07 PROCEDURE — 4004F PT TOBACCO SCREEN RCVD TLK: CPT | Performed by: PHYSICIAN ASSISTANT

## 2024-03-07 PROCEDURE — 3078F DIAST BP <80 MM HG: CPT | Performed by: PHYSICIAN ASSISTANT

## 2024-03-07 PROCEDURE — 85027 COMPLETE CBC AUTOMATED: CPT

## 2024-03-07 PROCEDURE — 3017F COLORECTAL CA SCREEN DOC REV: CPT | Performed by: PHYSICIAN ASSISTANT

## 2024-03-07 PROCEDURE — 99213 OFFICE O/P EST LOW 20 MIN: CPT | Performed by: PHYSICIAN ASSISTANT

## 2024-03-07 PROCEDURE — 1111F DSCHRG MED/CURRENT MED MERGE: CPT | Performed by: PHYSICIAN ASSISTANT

## 2024-03-07 PROCEDURE — G8427 DOCREV CUR MEDS BY ELIG CLIN: HCPCS | Performed by: PHYSICIAN ASSISTANT

## 2024-03-07 ASSESSMENT — ENCOUNTER SYMPTOMS
EYE REDNESS: 0
APNEA: 0
PHOTOPHOBIA: 0
STRIDOR: 0
EYE ITCHING: 0
RECTAL PAIN: 0
BACK PAIN: 0
SORE THROAT: 0
CHOKING: 0
ANAL BLEEDING: 0
CONSTIPATION: 0
COLOR CHANGE: 0
ABDOMINAL PAIN: 0

## 2024-03-07 NOTE — PROGRESS NOTES
well healed.     1. Severe protein-calorie malnutrition (HCC)    2. S/P colostomy (HCC)        PLAN:  Staples removed without difficulty and steri strips applied to incisions.   Will proceed with PEG placement next week so that he can have supplemental TF.  Pt is to increase activities as tolerated.      No orders of the defined types were placed in this encounter.       No orders of the defined types were placed in this encounter.       Follow Up: Return in about 2 weeks (around 3/21/2024).    Norma Monge PA-C

## 2024-03-08 ENCOUNTER — TELEPHONE (OUTPATIENT)
Dept: SURGERY | Age: 69
End: 2024-03-08

## 2024-03-08 NOTE — PROGRESS NOTES
[facility] arrive at 1315 at  [facility] on 3/12/2024 for his procedure at 1445.    NOTHING TO EAT OR DRINK AFTER MIDNIGHT DAY OF SURGERY    1. Enter thru the hospital main entrance on day of surgery, check in at the Information Desk. If you arrive prior to 6:00am, enter thru the ER entrance.    2. Follow the directions as prescribed by the doctor for your procedure and medications.         Morning of surgery take:         Stop vitamins, supplements and NSAIDS:      3. Check with your Doctor regarding stopping blood thinners and follow their instructions.    4. Do not smoke, vape or use chewing tobacco morning of surgery. Do not drink any alcoholic beverages 24 hours prior to surgery.       This includes NA Beer. No street drugs 7 days prior to surgery.    5. If you have dentures, contacts of glasses they will be removed before going to the OR; please bring a case.    6. Please bring picture ID, insurance card, paperwork from the doctor’s office (H & P, Consent, & card for implantable devices).    7. Take a shower with an antibacterial soap the night before surgery and the morning of surgery. Do not put anything on your skin      After your morning shower.    8. You will need a responsible adult to drive you home and check on you after surgery.

## 2024-03-08 NOTE — TELEPHONE ENCOUNTER
SPOKE TO / NURSE AT [facility] FOR Best Rodriguez REGARDING SURGERY (G-TUBE PLACEMENT) SCHEDULED @ [facility]. NOTIFIED OF DATES, TIMES AND LOCATION    PHONE ASSESSMENT   SURGERY -3/12/24@ 2:45  P/O -3/25!11:00    NPO AFTER MIDNIGHT    HOLD BLOOD THINNERS - NA

## 2024-03-12 ENCOUNTER — ANESTHESIA (OUTPATIENT)
Dept: ENDOSCOPY | Age: 69
End: 2024-03-12
Payer: MEDICARE

## 2024-03-12 ENCOUNTER — HOSPITAL ENCOUNTER (OUTPATIENT)
Age: 69
Setting detail: OBSERVATION
Discharge: NURSING, REHAB OR LONG-TERM CARE | End: 2024-03-14
Attending: SURGERY | Admitting: SURGERY
Payer: MEDICARE

## 2024-03-12 ENCOUNTER — ANESTHESIA EVENT (OUTPATIENT)
Dept: ENDOSCOPY | Age: 69
End: 2024-03-12
Payer: MEDICARE

## 2024-03-12 PROBLEM — R62.7 FAILURE TO THRIVE IN ADULT: Status: ACTIVE | Noted: 2024-03-12

## 2024-03-12 PROCEDURE — 3609013300 HC EGD TUBE PLACEMENT: Performed by: SURGERY

## 2024-03-12 PROCEDURE — 3700000000 HC ANESTHESIA ATTENDED CARE: Performed by: SURGERY

## 2024-03-12 PROCEDURE — 2500000003 HC RX 250 WO HCPCS

## 2024-03-12 PROCEDURE — 7100000010 HC PHASE II RECOVERY - FIRST 15 MIN: Performed by: SURGERY

## 2024-03-12 PROCEDURE — G0378 HOSPITAL OBSERVATION PER HR: HCPCS

## 2024-03-12 PROCEDURE — 2709999900 HC NON-CHARGEABLE SUPPLY: Performed by: SURGERY

## 2024-03-12 PROCEDURE — 7100000011 HC PHASE II RECOVERY - ADDTL 15 MIN: Performed by: SURGERY

## 2024-03-12 PROCEDURE — 6360000002 HC RX W HCPCS

## 2024-03-12 PROCEDURE — 3700000001 HC ADD 15 MINUTES (ANESTHESIA): Performed by: SURGERY

## 2024-03-12 PROCEDURE — 2500000003 HC RX 250 WO HCPCS: Performed by: SURGERY

## 2024-03-12 RX ORDER — SODIUM CHLORIDE, SODIUM LACTATE, POTASSIUM CHLORIDE, CALCIUM CHLORIDE 600; 310; 30; 20 MG/100ML; MG/100ML; MG/100ML; MG/100ML
INJECTION, SOLUTION INTRAVENOUS CONTINUOUS
Status: DISCONTINUED | OUTPATIENT
Start: 2024-03-12 | End: 2024-03-12

## 2024-03-12 RX ORDER — LIDOCAINE HYDROCHLORIDE 10 MG/ML
INJECTION, SOLUTION EPIDURAL; INFILTRATION; INTRACAUDAL; PERINEURAL PRN
Status: DISCONTINUED | OUTPATIENT
Start: 2024-03-12 | End: 2024-03-12 | Stop reason: ALTCHOICE

## 2024-03-12 RX ORDER — MORPHINE SULFATE 2 MG/ML
2 INJECTION, SOLUTION INTRAMUSCULAR; INTRAVENOUS EVERY 4 HOURS PRN
Status: DISCONTINUED | OUTPATIENT
Start: 2024-03-12 | End: 2024-03-14 | Stop reason: HOSPADM

## 2024-03-12 RX ORDER — LIDOCAINE HYDROCHLORIDE 20 MG/ML
INJECTION, SOLUTION INFILTRATION; PERINEURAL PRN
Status: DISCONTINUED | OUTPATIENT
Start: 2024-03-12 | End: 2024-03-12 | Stop reason: SDUPTHER

## 2024-03-12 RX ORDER — PROPOFOL 10 MG/ML
INJECTION, EMULSION INTRAVENOUS PRN
Status: DISCONTINUED | OUTPATIENT
Start: 2024-03-12 | End: 2024-03-12 | Stop reason: SDUPTHER

## 2024-03-12 RX ADMIN — PROPOFOL 50 MG: 10 INJECTION, EMULSION INTRAVENOUS at 17:51

## 2024-03-12 RX ADMIN — PROPOFOL 50 MG: 10 INJECTION, EMULSION INTRAVENOUS at 17:48

## 2024-03-12 RX ADMIN — LIDOCAINE HYDROCHLORIDE 50 MG: 20 INJECTION, SOLUTION INFILTRATION; PERINEURAL at 17:45

## 2024-03-12 RX ADMIN — PROPOFOL 50 MG: 10 INJECTION, EMULSION INTRAVENOUS at 17:54

## 2024-03-12 ASSESSMENT — PAIN - FUNCTIONAL ASSESSMENT
PAIN_FUNCTIONAL_ASSESSMENT: 0-10
PAIN_FUNCTIONAL_ASSESSMENT: 0-10

## 2024-03-12 ASSESSMENT — PAIN SCALES - WONG BAKER
WONGBAKER_NUMERICALRESPONSE: 0
WONGBAKER_NUMERICALRESPONSE: 0

## 2024-03-12 ASSESSMENT — PAIN SCALES - GENERAL
PAINLEVEL_OUTOF10: 0
PAINLEVEL_OUTOF10: 0

## 2024-03-12 NOTE — PROGRESS NOTES
4 Eyes Skin Assessment     NAME:  Best Rodriguez  YOB: 1955  MEDICAL RECORD NUMBER:  [id number]    The patient is being assessed for  Admission    I agree that at least one RN has performed a thorough Head to Toe Skin Assessment on the patient. ALL assessment sites listed below have been assessed.      Areas assessed by both nurses:    Head, Face, Ears, Shoulders, Back, Chest, Arms, Elbows, Hands, Sacrum. Buttock, Coccyx, Ischium, Legs. Feet and Heels, and Under Medical Devices         Does the Patient have a Wound? No noted wound(s)       Duncan Prevention initiated by RN: Yes  Wound Care Orders initiated by RN: No    Pressure Injury (Stage 3,4, Unstageable, DTI, NWPT, and Complex wounds) if present, place Wound referral order by RN under : No    New Ostomies, if present place, Ostomy referral order under : No     New J Tube placed 3/12, recent colostomy. Steri strips in place to abdomen, escobar    Nurse 1 eSignature: Electronically signed by Rosario Velazco RN on 3/12/24 at 7:10 PM EDT    **SHARE this note so that the co-signing nurse can place an eSignature**    Nurse 2 eSignature: Electronically signed by Robby Vasquez LPN on 3/12/24 at 7:12 PM EDT

## 2024-03-12 NOTE — H&P
SUBJECTIVE:  Patient presents today for his 1st post op visit following lap converted to open diverting colostomy placement.  Pt reports that pain is none.  Pt is  tolerating a regular diet, but is not eating much at all. He is losing weight, and completing less than 25% of his meals per Nurse Aide present with the pt. BMs are  intermittent via colostomy .     Incisions: min bruising and no discharge. staples intact.     Review of Systems   Constitutional:  Positive for activity change, appetite change and unexpected weight change. Negative for chills and fever.   HENT:  Negative for ear pain, mouth sores, sore throat and tinnitus.    Eyes:  Negative for photophobia, redness and itching.   Respiratory:  Negative for apnea, choking and stridor.    Cardiovascular:  Negative for chest pain and palpitations.   Gastrointestinal:  Negative for abdominal pain, anal bleeding, constipation and rectal pain.   Endocrine: Negative for polydipsia.   Genitourinary:  Negative for enuresis, flank pain and hematuria.   Musculoskeletal:  Negative for back pain, joint swelling and myalgias.   Skin:  Negative for color change and pallor.   Allergic/Immunologic: Negative for environmental allergies.   Neurological:  Negative for syncope and speech difficulty.   Psychiatric/Behavioral:  Negative for confusion and hallucinations.             Past Surgical History         Past Surgical History:   Procedure Laterality Date    COLONOSCOPY        COLONOSCOPY N/A 2/12/2024     COLONOSCOPY WITH BIOPSY up to ascending colon performed by Akash Patel MD at [facility] ENDOSCOPY    COLOSTOMY N/A 11/2/2018     DIVERTING COLOSTOMY PLACEMENT LAPAROSCOPIC performed by Cary Phillip MD at [facility] OR    COLOSTOMY N/A 2/13/2024     COLOSTOMY LAPAROSCOPIC converted to open performed by Cary Phillip MD at [facility] OR    JOINT REPLACEMENT Bilateral early 2010's     hips    NC INSJ PRPH CTR VAD W/SUBQ PORT AGE 5 YR/> N/A 11/6/2018     PORT INSERTION performed by Cary

## 2024-03-12 NOTE — ANESTHESIA PRE PROCEDURE
Department of Anesthesiology  Preprocedure Note       Name:  Best Rodriguez   Age:  68 y.o.  :  1955                                          MRN:  [id number]         Date:  3/12/2024      Surgeon: Surgeon(s):  Cary Phillip MD    Procedure: Procedure(s):  ESOPHAGOGASTRODUODENOSCOPY PERCUTANEOUS ENDOSCOPIC GASTROSTOMY TUBE PLACEMENT    Medications prior to admission:   Prior to Admission medications    Medication Sig Start Date End Date Taking? Authorizing Provider   Incontinence Supplies MISC Use as needed for incontinence  Patient not taking: Reported on 2022   Thomas Rey MD   ondansetron (ZOFRAN ODT) 4 MG disintegrating tablet Take 1 tablet by mouth every 8 hours as needed for Nausea 21   Ailin Lange MD   Nutritional Supplements (NUTRITIONAL SUPPLEMENT PLUS) LIQD Take 237 mLs by mouth 3 times daily (with meals) 4/22/19 10/26/20  Norma Monge PA-C       Current medications:    Current Facility-Administered Medications   Medication Dose Route Frequency Provider Last Rate Last Admin   • lactated ringers IV soln infusion   IntraVENous Continuous Ze Franz MD           Allergies:    Allergies   Allergen Reactions   • Ibuprofen Nausea And Vomiting   • Oxycodone Nausea And Vomiting       Problem List:    Patient Active Problem List   Diagnosis Code   • PVD (peripheral vascular disease) ([facility]) I73.9   • Chest pain R07.9   • Rectal tumor D49.0   • Hypertension I10   • Colostomy present ([facility]) Z93.3   • Rectal cancer ([facility]) C20   • Severe malnutrition ([facility]) E43   • Attention to ileostomy ([facility]) Z43.2   • Multifocal pneumonia J18.9   • General weakness R53.1   • IZA (acute kidney injury) ([facility]) N17.9   • Cancer (HCC) C80.1   • Dissecting AAA (abdominal aortic aneurysm) ([facility]) I71.02   • Malnutrition ([facility]) E46   • Profound hearing loss of both ears H91.93       Past Medical History:        Diagnosis Date   • Cancer (HCC)     Rectal cancer--treated with both chemo and radiation--finished

## 2024-03-12 NOTE — PROGRESS NOTES
1810 Pt received from Mercedes and report received from Brii SAAVEDRA. Pt denies c/o. Family to bedside. Pt has Abdominal binder on. Gtube to gravity bag. Call light in reach.  Yo catheter patent draining yellow urine. 1840 Report called to Chavo SAAVEDRA. 1847 Pt transported to 1101 per transport and with family.

## 2024-03-12 NOTE — ANESTHESIA POSTPROCEDURE EVALUATION
Department of Anesthesiology  Postprocedure Note    Patient: Best Rodriguez  MRN: [id number]  YOB: 1955  Date of evaluation: 3/12/2024    Procedure Summary       Date: 03/12/24 Room / Location: [place] [address] / [facility]    Anesthesia Start: 1743 Anesthesia Stop: 1803    Procedure: ESOPHAGOGASTRODUODENOSCOPY PERCUTANEOUS ENDOSCOPIC GASTROSTOMY TUBE PLACEMENT Diagnosis:       Severe protein-calorie malnutrition (HCC)      (Severe protein-calorie malnutrition (HCC) [E43])    Surgeons: Cary Phillip MD Responsible Provider: Phillip Martinez MD    Anesthesia Type: MAC ASA Status: 3            Anesthesia Type: No value filed.    Tiera Phase I: Tiera Score: 10    Tiera Phase II:      Anesthesia Post Evaluation    Patient location during evaluation: bedside  Patient participation: complete - patient participated  Level of consciousness: awake  Pain score: 0  Airway patency: patent  Nausea & Vomiting: no vomiting and no nausea  Cardiovascular status: hemodynamically stable  Respiratory status: acceptable, airway suctioned, spontaneous ventilation and nasal cannula  Hydration status: stable  Pain management: adequate    No notable events documented.

## 2024-03-13 LAB
ALBUMIN SERPL-MCNC: 3 GM/DL (ref 3.4–5)
ALP BLD-CCNC: 75 IU/L (ref 40–128)
ALT SERPL-CCNC: 10 U/L (ref 10–40)
ANION GAP SERPL CALCULATED.3IONS-SCNC: 11 MMOL/L (ref 7–16)
AST SERPL-CCNC: 16 IU/L (ref 15–37)
BILIRUB SERPL-MCNC: 0.4 MG/DL (ref 0–1)
BUN SERPL-MCNC: 27 MG/DL (ref 6–23)
CALCIUM SERPL-MCNC: 8.2 MG/DL (ref 8.3–10.6)
CHLORIDE BLD-SCNC: 98 MMOL/L (ref 99–110)
CO2: 28 MMOL/L (ref 21–32)
CREAT SERPL-MCNC: 0.8 MG/DL (ref 0.9–1.3)
GFR SERPL CREATININE-BSD FRML MDRD: >60 ML/MIN/1.73M2
GLUCOSE SERPL-MCNC: 131 MG/DL (ref 70–99)
MAGNESIUM: 1.4 MG/DL (ref 1.8–2.4)
POTASSIUM SERPL-SCNC: 3.1 MMOL/L (ref 3.5–5.1)
SODIUM BLD-SCNC: 137 MMOL/L (ref 135–145)
TOTAL PROTEIN: 6 GM/DL (ref 6.4–8.2)

## 2024-03-13 PROCEDURE — 36415 COLL VENOUS BLD VENIPUNCTURE: CPT

## 2024-03-13 PROCEDURE — 6360000002 HC RX W HCPCS: Performed by: SURGERY

## 2024-03-13 PROCEDURE — 80053 COMPREHEN METABOLIC PANEL: CPT

## 2024-03-13 PROCEDURE — G0378 HOSPITAL OBSERVATION PER HR: HCPCS

## 2024-03-13 PROCEDURE — 83735 ASSAY OF MAGNESIUM: CPT

## 2024-03-13 PROCEDURE — 94761 N-INVAS EAR/PLS OXIMETRY MLT: CPT

## 2024-03-13 RX ORDER — MAGNESIUM SULFATE IN WATER 40 MG/ML
2000 INJECTION, SOLUTION INTRAVENOUS ONCE
Status: COMPLETED | OUTPATIENT
Start: 2024-03-13 | End: 2024-03-13

## 2024-03-13 RX ADMIN — MAGNESIUM SULFATE HEPTAHYDRATE 2000 MG: 40 INJECTION, SOLUTION INTRAVENOUS at 19:49

## 2024-03-13 ASSESSMENT — PAIN SCALES - GENERAL
PAINLEVEL_OUTOF10: 0
PAINLEVEL_OUTOF10: 0

## 2024-03-13 ASSESSMENT — PAIN SCALES - WONG BAKER
WONGBAKER_NUMERICALRESPONSE: 0

## 2024-03-13 NOTE — PROGRESS NOTES
Attempted to give patient Morphine for pain but EPIC flagged a contraindication because of the creatinine level from 3-7-24. Dr. Phillip ordered this nurse to hold the morphine and hold the d/c. Dr. Phillip also instructed to consult hospitalist.

## 2024-03-13 NOTE — OP NOTE
Procedure Note:    Patient ID:  Best Rodriguez  [id number]  68 y.o.  1955    Indications: Malnutrition     Pre-operative Diagnosis: Malnutrition    Post-operative Diagnosis: same    Procedure:  PEG tube placement    Surgeon: CHRISSY KLEIN MD    Findings:  same    Estimated Blood Loss:  Minimal           Total IV Fluids: 100 ml            Complications:  None; patient tolerated the procedure well.           Disposition: PACU - hemodynamically stable.           Condition: stable      Procedure Details:    The patient was taken to the operating room. Topical analgesia of the oropharynx was induced using lidocaine spray. After adequate sedation, a mouthpiece was placed in the patient's mouth and the endoscope was passed down into the stomach. No abnormalities were noted.  The stomach was insufflated with air and the endoscope positioned  in the midportion and directed towards the anterior abdominal wall. With the room darkened and intensity turned up on the endoscope, a good light reflex was noted on the skin of the abdominal wall  in the left upper quadrant. Finger pressure was applied at the light reflex with adequate  indentation on the stomach wall on endoscopy. A polypectomy snare was passed into the stomach, opened fully, and positioned so that the loop encircled the point of demonstrated finger indentation. The overlying skin was anesthetized with lidocaine and a 1 cm incision was made at the chosen site. The introducer needle with overlying  catheter was passed through this incision and into the stomach under visualization with the gastroscope. The needle and catheter were gently captured by the endoscopic snare. The guidewire was passed and snared and the endoscope, snare, and guidewire were then withdrawn and pulled back out of the mouth.  The gastrostomy tube was attached to the loop of the guidewire and the whole thing was pulled back into the stomach until the 2 cm isadora of the gastrostomy tube was

## 2024-03-13 NOTE — PROGRESS NOTES
Received a consult for creatinine 3.4, reviewed chart patient's creatinine 3.4 since 3/7 no recent labs, ordered CMP will follow.  Full note will follow, patient is DNR CC and patient is for discharge to  LTC.

## 2024-03-13 NOTE — CARE COORDINATION
Tube feed rx left on this RN CM's desk. LVM for Екатерина with  admissions advising her on tube feeds and have asked her to return my call at her earliest convenience.     1324 - Patient is okay to return to  LTC with tube feeds. The state patient was on pureed diet and honey thickened liquids at . They will need tube feed rx sent with patient at [facility].

## 2024-03-13 NOTE — DISCHARGE SUMMARY
Physician Discharge Summary     Patient ID:  Best Rodriguez  [id number]  68 y.o.  1955    Admit date: 3/12/2024    Discharge date and time: No discharge date for patient encounter.     Admitting Physician: Cary Phillip MD     Discharge Physician:same    Admission Diagnoses: Severe protein-calorie malnutrition (HCC) [E43]  Failure to thrive in adult [R62.7]    Discharge Diagnoses: same    Admission Condition:good    Discharged Condition: Good    Indication for Admission: post procedure    Hospital Course:  Patient Had uneventful hospital course. Nutritionist recommendation for TF was sent to SNF. Pt is to get nocturnal TF and be allowed to eat soft food during daytime for FFT with BMI 15.     Consults: non    Outstanding Order Results       No orders found for last 30 day(s).            Treatments: IV hydration and surgery: PEG tube placement    Discharge Exam:    Physical Exam    Disposition: [facility]    Patient Instructions:      Medication List        CONTINUE taking these medications      Incontinence Supplies Misc  Use as needed for incontinence     Nutritional Supplement Plus Liqd  Take 237 mLs by mouth 3 times daily (with meals)     ondansetron 4 MG disintegrating tablet  Commonly known as: Zofran ODT  Take 1 tablet by mouth every 8 hours as needed for Nausea              Activity: activity as tolerated    Diet:  soft diet and nocturnal TF    Wound Care: keep wound clean and dry    Follow-up with Dr Cary Phillip by calling [phone].  The Office staff will determine follow up time per protocol.    Signed:  CARY PHILLIP MD

## 2024-03-13 NOTE — CARE COORDINATION
03/13/24 0942   Service Assessment   Patient Orientation Alert and Oriented   Cognition Alert   History Provided By Patient;Medical Record;Child/Family;Physician   Primary Caregiver Other (Comment)  ([facility])   Support Systems Children;Other (Comment)   Patient's Healthcare Decision Maker is: Named in Scanned ACP Document   PCP Verified by CM No   Prior Functional Level Assistance with the following:;Bathing;Toileting;Housework;Shopping;Mobility   Current Functional Level Assistance with the following:;Bathing;Toileting;Mobility   Can patient return to prior living arrangement Yes   Ability to make needs known: Good   Family able to assist with home care needs: No   Would you like for me to discuss the discharge plan with any other family members/significant others, and if so, who? Yes   Financial Resources Medicare;Medicaid   Community Resources ECF/Home Care     This RN case manager to the bedside to initiate DC planning. Patient is very [place]. He gave this RN CM permission to call brother Cal and dtr thea. I called Cal and he stated he was no longer the POA for patient and that pt's dtr, Thea Monge, is now pt's primary decision maker. I called Thea. She states patient is from  LTC. Plan is for patient to return to  LTC. I spoke with Екатерина with  admissions, and she verified patient is LTC and has a bedhold and can return when discharged. No other needs at this time. Pt's nurse, Gt, updated on pt's DC plan.

## 2024-03-13 NOTE — PROGRESS NOTES
Inpatient consult to dietitian placed via md gutierres. Indicated okay to discharge if nutrition sees patient today. Possible discharge today.

## 2024-03-13 NOTE — PROGRESS NOTES
Comprehensive Nutrition Assessment    Type and Reason for Visit:  Initial, Consult, Positive Nutrition Screen (TF recs only, positive screen with poor intake, weight loss)    Nutrition Recommendations/Plan:   Continue easy to chew diet as tolerates   Consider tube feeding with osmolite 1.5, continuous feeding to start, 10 ml/hr with plan for slow increase to goal rate 45 ml/hr with risk of refeeding.   Will continue to follow up during stay      Malnutrition Assessment:  Malnutrition Status:  Severe malnutrition (03/13/24 0903)    Context:  Social/Environmental Circumstances (FTT)     Findings of the 6 clinical characteristics of malnutrition:  Energy Intake:  50% or less estimated energy requirements for 1 month or longer (poor intake prior to admit last month, 25% intake prior to this admit noted)  Weight Loss:  Greater than 5% over 1 month     Body Fat Loss:  Severe body fat loss Orbital, Triceps, Fat Overlying Ribs, Buccal region   Muscle Mass Loss:  Severe muscle mass loss Clavicles (pectoralis & deltoids), Temples (temporalis)  Fluid Accumulation:    Extremities   Strength:  Not Performed    Nutrition Assessment:    Admit with failure to thrive, recent open colostomy, need for PEG with plan for supplemental tube feeding. Remains with severe malnutrition with ongoing poor intake, weight loss and muscle and fat wasting. Able to have easy to chew diet as needed/tolerates. With hx of poor intake over prolonged time, recommend starting tube feeding via pump at low rate with risk for refeeding. Consider osmolite 1.5 tube feeding, 10 ml/hr to start with slow progression to goal rate 45 ml/hr to provide 1080 ml, 1620 calories, 67 g protein to meet minimum needs. If tolerates tube feeding at goal rate may change to nocturnal feeding 60 ml/hour for 12 hours to encourage po intake during the day. Will follow at high nutrition risk at this time.    Nutrition Related Findings:    resting in bed asking to go home,

## 2024-03-13 NOTE — PROGRESS NOTES
This nurse assumed care at 1345. Patient's escobar catheter was not collecting urine appropriately and patient's bed was saturated. Dr. Phillip notified and instructed to replace the escobar catheter. Escobar exchanged under sterile procedure with 250 ml urine output.

## 2024-03-14 VITALS
BODY MASS INDEX: 14.94 KG/M2 | DIASTOLIC BLOOD PRESSURE: 84 MMHG | HEIGHT: 71 IN | RESPIRATION RATE: 15 BRPM | TEMPERATURE: 98.2 F | OXYGEN SATURATION: 93 % | WEIGHT: 106.7 LBS | SYSTOLIC BLOOD PRESSURE: 113 MMHG | HEART RATE: 93 BPM

## 2024-03-14 PROCEDURE — G0378 HOSPITAL OBSERVATION PER HR: HCPCS

## 2024-03-14 PROCEDURE — 94761 N-INVAS EAR/PLS OXIMETRY MLT: CPT

## 2024-03-14 NOTE — DISCHARGE INSTR - COC
Facility/ Agency   Name:   Address:  Phone:  Fax:    Dialysis Facility (if applicable)   Name:  Address:  Dialysis Schedule:  Phone:  Fax:    / signature: {Esignature:[id number]}    PHYSICIAN SECTION    Prognosis: {Prognosis:9575690948}  Nutrition Therapy:  Current Nutrition Therapy:   { MICHELLE Diet List:126032682}    Routes of Feeding: {CHP DME Other Feedings:989555665}  Liquids: {Slp liquid thickness:94347}  Daily Fluid Restriction: {CHP DME Yes amt example:043554209}  Last Modified Barium Swallow with Video (Video Swallowing Test): {Done Not Done Date:}    Treatments at the Time of Hospital Discharge:   Respiratory Treatments: ***  Oxygen Therapy:  {Therapy; copd oxygen:85482}  Ventilator:    { CC Vent List:873653251}    Rehab Therapies: {THERAPEUTIC INTERVENTION:2812201710}  Weight Bearing Status/Restrictions: { CC Weight Bearin}  Other Medical Equipment (for information only, NOT a DME order):  {EQUIPMENT:563313508}  Other Treatments: ***  Condition at Discharge: { Patient Condition:020602600}    Rehab Potential (if transferring to Rehab): {Prognosis:3752538938}    Recommended Labs or Other Treatments After Discharge: ***    Physician Certification: I certify the above information and transfer of Best Rodriguez  is necessary for the continuing treatment of the diagnosis listed and that he requires {Admit to Appropriate Level of Care:11421} for {GREATER/LESS:806786574} 30 days.     Update Admission H&P: {CHP DME Changes in HandP:728272478}    PHYSICIAN SIGNATURE:  {Esignature:928008433}

## 2024-03-14 NOTE — CARE COORDINATION
[facility] was was [facility]. [facility]катерина with [facility] called and they are rescheduling cancer center appointment. Transport scheduled for noon by RN. MAUREEN made aware.     Dc'ing nurse, please complete the following:    **Upon discharge patient will be going to   [facility]    * Call facility to advise patient is ready to be discharged.     *Please notify family.    * Please fax  H/P,Scripts, AVS and  Completed MICHELLE to [phone]  *Please call report to [phone]      **Needs Completed MICHELLE

## 2024-03-14 NOTE — DISCHARGE SUMMARY
Physician Discharge Summary     Patient ID:  Best Rodriguez  [id number]  68 y.o.  1955    Admit date: 3/12/2024    Discharge date and time: 3/14/24     Admitting Physician: Cary Phillip MD     Discharge Physician:same    Admission Diagnoses: Severe protein-calorie malnutrition (HCC) [E43]  Failure to thrive in adult [R62.7]    Discharge Diagnoses: same    Admission Condition:good    Discharged Condition: Good    Indication for Admission: post procedure    Hospital Course:  Patient Had uneventful hospital course. Nutritionist recommendation for TF was sent to SNF. Pt is to get nocturnal TF and be allowed to eat soft food during daytime for FFT with BMI 15.     Consults: non    Outstanding Order Results       No orders found from 2/12/2024 to 3/13/2024.            Treatments: IV hydration and surgery: PEG tube placement    Discharge Exam:    Physical Exam  Constitutional:       Appearance: He is well-developed.   HENT:      Head: Normocephalic.   Eyes:      Pupils: Pupils are equal, round, and reactive to light.   Cardiovascular:      Rate and Rhythm: Normal rate.   Pulmonary:      Effort: Pulmonary effort is normal.   Abdominal:      General: There is no distension.      Palpations: Abdomen is soft. There is no mass.      Tenderness: There is no abdominal tenderness. There is no guarding or rebound.      Comments: G-tube intact   Musculoskeletal:         General: Normal range of motion.      Cervical back: Normal range of motion and neck supple.   Skin:     General: Skin is warm.   Neurological:      Mental Status: He is alert and oriented to person, place, and time.         Disposition: [facility]    Patient Instructions:      Medication List        CONTINUE taking these medications      Incontinence Supplies Misc  Use as needed for incontinence     Nutritional Supplement Plus Liqd  Take 237 mLs by mouth 3 times daily (with meals)     ondansetron 4 MG disintegrating tablet  Commonly known as: Zofran ODT  Take 1

## 2024-03-25 ENCOUNTER — OFFICE VISIT (OUTPATIENT)
Dept: SURGERY | Age: 69
End: 2024-03-25
Payer: MEDICARE

## 2024-03-25 VITALS
HEART RATE: 61 BPM | WEIGHT: 106 LBS | HEIGHT: 71 IN | OXYGEN SATURATION: 99 % | SYSTOLIC BLOOD PRESSURE: 110 MMHG | BODY MASS INDEX: 14.84 KG/M2 | DIASTOLIC BLOOD PRESSURE: 68 MMHG

## 2024-03-25 DIAGNOSIS — Z93.1 GASTROSTOMY IN PLACE (HCC): Primary | ICD-10-CM

## 2024-03-25 DIAGNOSIS — E43 SEVERE PROTEIN-CALORIE MALNUTRITION (HCC): ICD-10-CM

## 2024-03-25 PROCEDURE — G8484 FLU IMMUNIZE NO ADMIN: HCPCS | Performed by: PHYSICIAN ASSISTANT

## 2024-03-25 PROCEDURE — G8427 DOCREV CUR MEDS BY ELIG CLIN: HCPCS | Performed by: PHYSICIAN ASSISTANT

## 2024-03-25 PROCEDURE — 3017F COLORECTAL CA SCREEN DOC REV: CPT | Performed by: PHYSICIAN ASSISTANT

## 2024-03-25 PROCEDURE — 1123F ACP DISCUSS/DSCN MKR DOCD: CPT | Performed by: PHYSICIAN ASSISTANT

## 2024-03-25 PROCEDURE — G8419 CALC BMI OUT NRM PARAM NOF/U: HCPCS | Performed by: PHYSICIAN ASSISTANT

## 2024-03-25 PROCEDURE — 99213 OFFICE O/P EST LOW 20 MIN: CPT | Performed by: PHYSICIAN ASSISTANT

## 2024-03-25 PROCEDURE — 3074F SYST BP LT 130 MM HG: CPT | Performed by: PHYSICIAN ASSISTANT

## 2024-03-25 PROCEDURE — 3078F DIAST BP <80 MM HG: CPT | Performed by: PHYSICIAN ASSISTANT

## 2024-03-25 PROCEDURE — 4004F PT TOBACCO SCREEN RCVD TLK: CPT | Performed by: PHYSICIAN ASSISTANT

## 2024-03-25 ASSESSMENT — ENCOUNTER SYMPTOMS
RECTAL PAIN: 0
CHOKING: 0
EYE REDNESS: 0
ABDOMINAL PAIN: 0
STRIDOR: 0
SORE THROAT: 0
BACK PAIN: 0
EYE ITCHING: 0
CONSTIPATION: 0
ANAL BLEEDING: 0
APNEA: 0
PHOTOPHOBIA: 0
COLOR CHANGE: 0

## 2024-03-25 ASSESSMENT — PATIENT HEALTH QUESTIONNAIRE - PHQ9
SUM OF ALL RESPONSES TO PHQ QUESTIONS 1-9: 0
SUM OF ALL RESPONSES TO PHQ9 QUESTIONS 1 & 2: 0
1. LITTLE INTEREST OR PLEASURE IN DOING THINGS: NOT AT ALL
SUM OF ALL RESPONSES TO PHQ QUESTIONS 1-9: 0
SUM OF ALL RESPONSES TO PHQ QUESTIONS 1-9: 0
2. FEELING DOWN, DEPRESSED OR HOPELESS: NOT AT ALL
SUM OF ALL RESPONSES TO PHQ QUESTIONS 1-9: 0

## 2024-03-25 NOTE — PROGRESS NOTES
Chief Complaint   Patient presents with    Follow-up     FU G-Tube placement @ [facility] 3/12/24         SUBJECTIVE:  HPI: Patient presents today s/p G-tube placement. Pt is tolerating night TF, and is starting to gain weight. Reports 3-4 lbs weight gain since placement. Reports more energy as well as increased appetite. Denies abd pain. Colostomy is working well.     I have reviewed the patient's (pertinent information to this visit) medical history, family history (scanned in  the Media tab under \"patient questioner\"), social history and review of systems with the patient today in the office.            Past Surgical History:   Procedure Laterality Date    COLONOSCOPY      COLONOSCOPY N/A 2/12/2024    COLONOSCOPY WITH BIOPSY up to ascending colon performed by Akash Patel MD at [facility] ENDOSCOPY    COLOSTOMY N/A 11/2/2018    DIVERTING COLOSTOMY PLACEMENT LAPAROSCOPIC performed by Cary Phillip MD at [facility] OR    COLOSTOMY N/A 2/13/2024    COLOSTOMY LAPAROSCOPIC converted to open performed by Cary Phillip MD at [facility] OR    JOINT REPLACEMENT Bilateral early 2010's    hips    [place] INSJ PRPH CTR VAD W/SUBQ PORT AGE 5 YR/> N/A 11/6/2018    PORT INSERTION performed by Cary Phillip MD at [facility] OR    SIGMOIDOSCOPY N/A 5/13/2019    SIGMOIDOSCOPY BIOPSY FLEXIBLE performed by Cary Phillip MD at [facility] ENDOSCOPY    SIGMOIDOSCOPY N/A 5/4/2022    SIGMOIDOSCOPY DIAGNOSTIC FLEXIBLE performed by Cary Phillip MD at [facility] ENDOSCOPY    SMALL INTESTINE SURGERY N/A 3/13/2019    BOWEL RESECTION LOW ANTERIOR LAPAROSCOPIC ROBOTIC WITH DIVERTING LOOP ILEOSTOMY performed by Cary Phillip MD at [facility] OR    SMALL INTESTINE SURGERY N/A 5/21/2019    COLOSTOMY ILEOSTOMY TAKEDOWN/REVERSAL performed by Cary Phillip MD at [facility] OR    UPPER GASTROINTESTINAL ENDOSCOPY N/A 3/12/2024    ESOPHAGOGASTRODUODENOSCOPY PERCUTANEOUS ENDOSCOPIC GASTROSTOMY TUBE INSERTION performed by Cary Phillip MD at [facility] ENDOSCOPY     Past Medical History:   Diagnosis Date    Cancer

## 2024-04-02 ENCOUNTER — OFFICE VISIT (OUTPATIENT)
Dept: ONCOLOGY | Age: 69
End: 2024-04-02

## 2024-04-02 ENCOUNTER — HOSPITAL ENCOUNTER (OUTPATIENT)
Dept: INFUSION THERAPY | Age: 69
Discharge: HOME | End: 2024-04-02
Payer: MEDICARE

## 2024-04-02 VITALS
TEMPERATURE: 98.6 F | OXYGEN SATURATION: 97 % | DIASTOLIC BLOOD PRESSURE: 100 MMHG | HEART RATE: 101 BPM | SYSTOLIC BLOOD PRESSURE: 166 MMHG | RESPIRATION RATE: 18 BRPM

## 2024-04-02 DIAGNOSIS — C20 RECTAL CANCER (HCC): Primary | ICD-10-CM

## 2024-04-02 PROCEDURE — 99211 OFF/OP EST MAY X REQ PHY/QHP: CPT

## 2024-04-02 RX ORDER — METHOCARBAMOL 750 MG/1
750 TABLET, FILM COATED ORAL 2 TIMES DAILY
COMMUNITY

## 2024-04-02 RX ORDER — MIRTAZAPINE 15 MG/1
15 TABLET, FILM COATED ORAL NIGHTLY
COMMUNITY

## 2024-04-02 RX ORDER — MIDODRINE HYDROCHLORIDE 5 MG/1
10 TABLET ORAL 3 TIMES DAILY
COMMUNITY

## 2024-04-02 RX ORDER — HYDROCODONE BITARTRATE AND ACETAMINOPHEN 5; 325 MG/1; MG/1
1 TABLET ORAL EVERY 4 HOURS PRN
COMMUNITY

## 2024-04-02 RX ORDER — ONDANSETRON 4 MG/1
4 TABLET, FILM COATED ORAL EVERY 8 HOURS PRN
COMMUNITY

## 2024-04-02 NOTE — PROGRESS NOTES
MA Rooming Questions  Patient: Best Rodriguez  MRN: [id number]    Date: 4/2/2024        1. Do you have any new issues?   yes -          2. Do you need any refills on medications?    no    3. Have you had any imaging done since your last visit?   yes - MRI    4. Have you been hospitalized or seen in the emergency room since your last visit here?   yes -     5. Did the patient have a depression screening completed today? No    No data recorded     PHQ-9 Given to (if applicable):               PHQ-9 Score (if applicable):                     [] Positive     []  Negative              Does question #9 need addressed (if applicable)                     [] Yes    []  No               Kari Morales CMA    
as well as radiation.  We did talk about her considering the original presentation and having had significant benefit from the treatment still would like for him to receive additional adjuvant chemotherapy.  We might consider giving him 4 months of treatment with FOLFOX type regimen but may hold off oxaliplatin after the first couple months he and his brother were were comfortable.    May 16, 2019 he did undergo a sigmoidoscopy, was noted to have normal anastomosis.  Subsequently on May 21, 2019 he did undergo open ileostomy reversal with small bowel resection.  Tolerated the procedure well.    June 20, 2019 we did talk about starting him back on adjuvant therapy with FOLFOX type regimen and may be stopping oxaliplatin after couple of months of the treatment.  He was comfortable with that approach.    But when seen in the office for the treatment on July 10 he did not want to spend 3 hours here and actually wanted to hold off any further treatments.  He was subsequently advised to see me on July 23.    He finally agreed to have chemotherapy and he received his first cycle of FOLFOX on 7/31/2020. After first treatment, he seems to move to [place] to live with her daughter. He didn't receive any more therapy after that.     He was admitted to [facility] between 2/7/24 and 2/19/24. Presented with b/l leg pain and numbness. Non-contrasted MRI showed adhesive arachnoiditis with LMD in differential given history of malignancy. MRI L spine w/wo contrast showed no feature of leptomenigeal carcinomatosis. Most likely d/t arachnoiditis. Neurosurgery evaluation revealed no symptoms of cauda equina syndrome.  Do not recommend any intervention currently.    On April 2, 2024, he presented to follow up. I have been following him for clinical stage IIIB rectal adenocarcinoma and he is status post neoadjuvant chemoradiation therapy, followed by surgery on 3/13/2019. He received one therapy with FOLFOX after surgery on 7/31/2019. He

## 2024-04-09 NOTE — ADDENDUM NOTE
Encounter addended by: Lorena Barrientos on: 4/9/2024 10:16 AM   Actions taken: Charge Capture section accepted

## 2024-05-20 ENCOUNTER — OFFICE VISIT (OUTPATIENT)
Dept: SURGERY | Age: 69
End: 2024-05-20
Payer: MEDICARE

## 2024-05-20 VITALS
SYSTOLIC BLOOD PRESSURE: 120 MMHG | BODY MASS INDEX: 18.07 KG/M2 | DIASTOLIC BLOOD PRESSURE: 90 MMHG | OXYGEN SATURATION: 97 % | HEART RATE: 63 BPM | WEIGHT: 129.1 LBS | HEIGHT: 71 IN

## 2024-05-20 DIAGNOSIS — Z93.1 GASTROSTOMY IN PLACE (HCC): Primary | ICD-10-CM

## 2024-05-20 PROCEDURE — 3074F SYST BP LT 130 MM HG: CPT | Performed by: SURGERY

## 2024-05-20 PROCEDURE — 1123F ACP DISCUSS/DSCN MKR DOCD: CPT | Performed by: SURGERY

## 2024-05-20 PROCEDURE — G8427 DOCREV CUR MEDS BY ELIG CLIN: HCPCS | Performed by: SURGERY

## 2024-05-20 PROCEDURE — 4004F PT TOBACCO SCREEN RCVD TLK: CPT | Performed by: SURGERY

## 2024-05-20 PROCEDURE — 99213 OFFICE O/P EST LOW 20 MIN: CPT | Performed by: SURGERY

## 2024-05-20 PROCEDURE — G8419 CALC BMI OUT NRM PARAM NOF/U: HCPCS | Performed by: SURGERY

## 2024-05-20 PROCEDURE — 3017F COLORECTAL CA SCREEN DOC REV: CPT | Performed by: SURGERY

## 2024-05-20 PROCEDURE — 3080F DIAST BP >= 90 MM HG: CPT | Performed by: SURGERY

## 2024-05-20 RX ORDER — COLLAGENASE SANTYL 250 [ARB'U]/G
OINTMENT TOPICAL
COMMUNITY
Start: 2024-04-26

## 2024-05-21 ASSESSMENT — ENCOUNTER SYMPTOMS
ANAL BLEEDING: 0
SORE THROAT: 0
RECTAL PAIN: 0
CHOKING: 0
COLOR CHANGE: 0
EYE REDNESS: 0
STRIDOR: 0
EYE ITCHING: 0
BACK PAIN: 0
PHOTOPHOBIA: 0
CONSTIPATION: 0
APNEA: 0

## 2024-05-21 NOTE — PROGRESS NOTES
Chief Complaint   Patient presents with    Follow-up     feeding tube bothering his chest POTIARA Sidhu Daughter asks for call for up         SUBJECTIVE:  HPI: Patient is here with complaints of wanting his g-tube removed. Pt is s/p diverting colostomy several months ago and was having trouble maintaining his wt and needed feeding tube. He has gained over 30 lbs and eating well. He does need dentures.    I have reviewed the patient's(pertinent information to this visit) medical history, family history(scanned in  the [employer] under \"patient questioner\"), social history and review of systems with the patient today in the office.            Past Surgical History:   Procedure Laterality Date    COLONOSCOPY      COLONOSCOPY N/A 2/12/2024    COLONOSCOPY WITH BIOPSY up to ascending colon performed by Akash Patel MD at [facility] ENDOSCOPY    COLOSTOMY N/A 11/2/2018    DIVERTING COLOSTOMY PLACEMENT LAPAROSCOPIC performed by Cary Phillip MD at [facility] OR    COLOSTOMY N/A 2/13/2024    COLOSTOMY LAPAROSCOPIC converted to open performed by Cary Phillip MD at [facility] OR    JOINT REPLACEMENT Bilateral early 2010's    hips    [place] INSJ PRPH CTR VAD W/SUBQ PORT AGE 5 YR/> N/A 11/6/2018    PORT INSERTION performed by Cary Phillip MD at [facility] OR    SIGMOIDOSCOPY N/A 5/13/2019    SIGMOIDOSCOPY BIOPSY FLEXIBLE performed by Cary Phillip MD at [facility] ENDOSCOPY    SIGMOIDOSCOPY N/A 5/4/2022    SIGMOIDOSCOPY DIAGNOSTIC FLEXIBLE performed by Cary Phillip MD at [facility] ENDOSCOPY    SMALL INTESTINE SURGERY N/A 3/13/2019    BOWEL RESECTION LOW ANTERIOR LAPAROSCOPIC ROBOTIC WITH DIVERTING LOOP ILEOSTOMY performed by Cary Phillip MD at [facility] OR    SMALL INTESTINE SURGERY N/A 5/21/2019    COLOSTOMY ILEOSTOMY TAKEDOWN/REVERSAL performed by Cary Phillip MD at [facility] OR    UPPER GASTROINTESTINAL ENDOSCOPY N/A 3/12/2024    ESOPHAGOGASTRODUODENOSCOPY PERCUTANEOUS ENDOSCOPIC GASTROSTOMY TUBE INSERTION performed by Cary Phillip MD at [facility] ENDOSCOPY     Past Medical

## 2024-06-05 ENCOUNTER — TELEPHONE (OUTPATIENT)
Dept: SURGERY | Age: 69
End: 2024-06-05

## 2024-06-05 NOTE — TELEPHONE ENCOUNTER
Will confirm with Dr Phillip in AM about Best.    Spoke with Dr Phillip, Best will need full dental extraction prior to being ready for surgery.

## 2024-06-05 NOTE — TELEPHONE ENCOUNTER
Director of Hospice called. Dr Jose Seth called and was wanting some answer about Best Rodriguez about his surgery. Please call back at [phone]. Thank you

## 2024-06-07 NOTE — TELEPHONE ENCOUNTER
Called and spoke to Dr Seth as he is head of hospice care for Best he is wanting to know if Best can come off of hospice in regards to his health. Explained our next steps in care. Dr Seth will reach out to Dr Rey to see how we are doing as far is [place] care.

## 2024-06-24 ENCOUNTER — OFFICE VISIT (OUTPATIENT)
Dept: SURGERY | Age: 69
End: 2024-06-24
Payer: MEDICARE

## 2024-06-24 VITALS
HEIGHT: 71 IN | BODY MASS INDEX: 20.37 KG/M2 | HEART RATE: 63 BPM | DIASTOLIC BLOOD PRESSURE: 90 MMHG | WEIGHT: 145.5 LBS | SYSTOLIC BLOOD PRESSURE: 134 MMHG | OXYGEN SATURATION: 97 %

## 2024-06-24 DIAGNOSIS — K62.4 RECTAL STRICTURE: ICD-10-CM

## 2024-06-24 DIAGNOSIS — Z93.3 S/P COLOSTOMY (HCC): Primary | ICD-10-CM

## 2024-06-24 PROCEDURE — 1123F ACP DISCUSS/DSCN MKR DOCD: CPT | Performed by: PHYSICIAN ASSISTANT

## 2024-06-24 PROCEDURE — G8420 CALC BMI NORM PARAMETERS: HCPCS | Performed by: PHYSICIAN ASSISTANT

## 2024-06-24 PROCEDURE — 3075F SYST BP GE 130 - 139MM HG: CPT | Performed by: PHYSICIAN ASSISTANT

## 2024-06-24 PROCEDURE — 99213 OFFICE O/P EST LOW 20 MIN: CPT | Performed by: PHYSICIAN ASSISTANT

## 2024-06-24 PROCEDURE — G8427 DOCREV CUR MEDS BY ELIG CLIN: HCPCS | Performed by: PHYSICIAN ASSISTANT

## 2024-06-24 PROCEDURE — 4004F PT TOBACCO SCREEN RCVD TLK: CPT | Performed by: PHYSICIAN ASSISTANT

## 2024-06-24 PROCEDURE — 3017F COLORECTAL CA SCREEN DOC REV: CPT | Performed by: PHYSICIAN ASSISTANT

## 2024-06-24 PROCEDURE — 3080F DIAST BP >= 90 MM HG: CPT | Performed by: PHYSICIAN ASSISTANT

## 2024-06-25 ENCOUNTER — PREP FOR PROCEDURE (OUTPATIENT)
Dept: SURGERY | Age: 69
End: 2024-06-25

## 2024-06-25 DIAGNOSIS — K62.4 ANAL STRICTURE: ICD-10-CM

## 2024-06-25 ASSESSMENT — ENCOUNTER SYMPTOMS
APNEA: 0
SORE THROAT: 0
NAUSEA: 0
CONSTIPATION: 0
ABDOMINAL PAIN: 0
STRIDOR: 0
EYE REDNESS: 0
VOMITING: 0
CHOKING: 0
BACK PAIN: 0
COLOR CHANGE: 0
PHOTOPHOBIA: 0
EYE ITCHING: 0
RECTAL PAIN: 0
ANAL BLEEDING: 0

## 2024-06-25 NOTE — PROGRESS NOTES
Chief Complaint   Patient presents with    Follow-up     3mon F/U on G-Tube Placement @ [facility] 3/12/24         SUBJECTIVE:  HPI: Patient presents today with complaints of rectal stricture. Pt with colostomy in place that is working well. Pt had g-tube removed at last visit. Reports that his appetite is good and that he has been eating well. He has continued to gain weight. Is up to 145 lbs. Pt has not been to a dentist for evaluation yet. Mostly eating soft foods. Denies pain, N/V.    I have reviewed the patient's (pertinent information to this visit) medical history, family history (scanned in  the Media tab under \"patient questioner\"), social history and review of systems with the patient today in the office.            Past Surgical History:   Procedure Laterality Date    COLONOSCOPY      COLONOSCOPY N/A 2/12/2024    COLONOSCOPY WITH BIOPSY up to ascending colon performed by Akash Patel MD at [facility] ENDOSCOPY    COLOSTOMY N/A 11/2/2018    DIVERTING COLOSTOMY PLACEMENT LAPAROSCOPIC performed by Cary Phillip MD at [facility] OR    COLOSTOMY N/A 2/13/2024    COLOSTOMY LAPAROSCOPIC converted to open performed by Cary Phillip MD at [facility] OR    JOINT REPLACEMENT Bilateral early 2010's    hips    GA INSJ PRPH CTR VAD W/SUBQ PORT AGE 5 YR/> N/A 11/6/2018    PORT INSERTION performed by Cary Phillip MD at [facility] OR    SIGMOIDOSCOPY N/A 5/13/2019    SIGMOIDOSCOPY BIOPSY FLEXIBLE performed by Cary Phillip MD at [facility] ENDOSCOPY    SIGMOIDOSCOPY N/A 5/4/2022    SIGMOIDOSCOPY DIAGNOSTIC FLEXIBLE performed by Cary Phillip MD at [facility] ENDOSCOPY    SMALL INTESTINE SURGERY N/A 3/13/2019    BOWEL RESECTION LOW ANTERIOR LAPAROSCOPIC ROBOTIC WITH DIVERTING LOOP ILEOSTOMY performed by Cary Phillip MD at [facility] OR    SMALL INTESTINE SURGERY N/A 5/21/2019    COLOSTOMY ILEOSTOMY TAKEDOWN/REVERSAL performed by Cary Phillip MD at [facility] OR    UPPER GASTROINTESTINAL ENDOSCOPY N/A 3/12/2024    ESOPHAGOGASTRODUODENOSCOPY PERCUTANEOUS ENDOSCOPIC

## 2024-06-28 ENCOUNTER — TELEPHONE (OUTPATIENT)
Dept: ONCOLOGY | Age: 69
End: 2024-06-28

## 2024-06-28 NOTE — TELEPHONE ENCOUNTER
6/28/24 called the main # in chart, pt's daughter Thea, and I had to leave a vm.  Dr Rey would like to see the pt with the next few weeks and I asked for a return call to get him on Dr Rey's schedule.

## 2024-07-01 ENCOUNTER — TELEPHONE (OUTPATIENT)
Dept: SURGERY | Age: 69
End: 2024-07-01

## 2024-07-01 NOTE — TELEPHONE ENCOUNTER
LEFT MESSAGE FOR Best Rodriguez REGARDING SURGERY (RECTAL EUA) SCHEDULED @ [facility]. NOTIFIED OF DATES, TIMES AND LOCATION    PHONE ASSESSMENT   SURGERY - 7/19 @ 10AM ARRIVAL @ 8AM  P/O - 7/29 @ 10AM    NPO AFTER MIDNIGHT    HOLD BLOOD THINNERS - NA

## 2024-07-11 NOTE — PROGRESS NOTES
7/11/24 - .LM with my call-back # concerning  surgery @ [facility] on  7/19/24.  Please call the PAT Nurse for a phone assessment and surgery instructions.

## 2024-07-12 NOTE — PROGRESS NOTES
7/12/24 - Daughter Thea returned my call. She is the POA for her father but he is residing at [facility] at this time. Times were reviewed with Thea who will also be here for the surgery on 7/19/24.  [facility] contact, his nurse - Ermias Woodson LPN will fax requested documents and I will fax back surgery instructions for 7/19/24. Surgery scheduling advised not  to change surgery time on 7/19/24 due to using transportation service.

## 2024-07-17 RX ORDER — GABAPENTIN 100 MG/1
100 CAPSULE ORAL 3 TIMES DAILY
COMMUNITY

## 2024-07-17 NOTE — PROGRESS NOTES
7/17/24 - Information not received as requested from [facility]. I talked to [facility]'s Nurse today, Rody Burden, RN who will fax me the required information, then I can fax back surgery instructions.  Update @ 1203: information received, instructions faxed to [facility]    .Surgery @ [facility] on 7/19/24 at 0845, arrival 0645    NOTHING TO EAT OR DRINK AFTER MIDNIGHT DAY OF SURGERY    1. Enter thru the hospital main entrance on day of surgery, check in at the Information Desk. If you arrive prior to 6:00am, enter thru the ER entrance.    2. Follow the directions as prescribed by the doctor for your procedure and medications.         Morning of surgery take: Midodrine & Gabapentin with sips of water         Stop vitamins, supplements and NSAIDS:  NOW    3. Check with your Doctor regarding stopping blood thinners and follow their instructions.    4. Do not smoke, vape or use chewing tobacco morning of surgery. Do not drink any alcoholic beverages 24 hours prior to surgery.       This includes NA Beer. No street drugs 7 days prior to surgery.    5. If you have dentures, contacts of glasses they will be removed before going to the OR; please bring a case.    6. Please bring picture ID, insurance card, paperwork from the doctor’s office (H & P, Consent, & card for implantable devices).    7. Take a shower with an antibacterial soap the night before surgery and the morning of surgery. Do not put anything on your skin      After your morning shower.    8. You will need a responsible adult to drive you home and check on you after surgery.

## 2024-07-18 ENCOUNTER — ANESTHESIA EVENT (OUTPATIENT)
Dept: OPERATING ROOM | Age: 69
End: 2024-07-18
Payer: MEDICARE

## 2024-07-18 NOTE — H&P
General Surgery - H&P  Dr. Townsend Andom  Norma Monge PA-C      The patient was seen and examined. There have been no changes to the H&P since the patient was seen in the office on 6/24/24.    We will proceed with rectal exam under anesthesia with possible dilation for rectal stricture.       Norma Monge PA-C

## 2024-07-18 NOTE — ANESTHESIA PRE PROCEDURE
Department of Anesthesiology  Preprocedure Note       Name:  Best Rodriguez   Age:  68 y.o.  :  1955                                          MRN:  [id number]         Date:  2024      Surgeon: Surgeon(s):  Cary Phillip MD    Procedure: Procedure(s):  RECTAL EXAM UNDER ANESTHESIA    Medications prior to admission:   Prior to Admission medications    Medication Sig Start Date End Date Taking? Authorizing Provider   gabapentin (NEURONTIN) 100 MG capsule Take 1 capsule by mouth 3 times daily.   Yes Judy Wynn MD   SANTYL 250 UNIT/GM ointment  24   Judy Wynn MD   midodrine (PROAMATINE) 10 MG tablet Take 1 tablet by mouth 3 times daily as needed    Judy Wynn MD   HYDROcodone-acetaminophen (NORCO) 5-325 MG per tablet Take 1 tablet by mouth every 4 hours as needed for Pain.    Judy Wynn MD   mirtazapine (REMERON) 15 MG tablet Take 1 tablet by mouth nightly    Judy Wynn MD   methocarbamol (ROBAXIN) 750 MG tablet Take 1 tablet by mouth in the morning and at bedtime    Judy Wynn MD   ondansetron (ZOFRAN) 4 MG tablet Take 1 tablet by mouth every 8 hours as needed for Nausea or Vomiting    Judy Wynn MD   Incontinence Supplies MISC Use as needed for incontinence 21   Thomas Rey MD   Nutritional Supplements (NUTRITIONAL SUPPLEMENT PLUS) LIQD Take 237 mLs by mouth 3 times daily (with meals) 19  Norma Monge PA-C       Current medications:    No current facility-administered medications for this encounter.     Current Outpatient Medications   Medication Sig Dispense Refill    gabapentin (NEURONTIN) 100 MG capsule Take 1 capsule by mouth 3 times daily.      SANTYL 250 UNIT/GM ointment  (Patient not taking: Reported on 2024)      midodrine (PROAMATINE) 10 MG tablet Take 1 tablet by mouth 3 times daily as needed      HYDROcodone-acetaminophen (NORCO) 5-325 MG per tablet Take 1 tablet by mouth every 4 hours

## 2024-07-19 ENCOUNTER — HOSPITAL ENCOUNTER (OUTPATIENT)
Age: 69
Setting detail: OUTPATIENT SURGERY
Discharge: HOME | End: 2024-07-19
Attending: SURGERY | Admitting: SURGERY
Payer: MEDICARE

## 2024-07-19 ENCOUNTER — ANESTHESIA (OUTPATIENT)
Dept: OPERATING ROOM | Age: 69
End: 2024-07-19
Payer: MEDICARE

## 2024-07-19 VITALS
TEMPERATURE: 97.4 F | SYSTOLIC BLOOD PRESSURE: 153 MMHG | WEIGHT: 145 LBS | HEART RATE: 82 BPM | DIASTOLIC BLOOD PRESSURE: 93 MMHG | HEIGHT: 71 IN | OXYGEN SATURATION: 96 % | BODY MASS INDEX: 20.3 KG/M2 | RESPIRATION RATE: 16 BRPM

## 2024-07-19 PROCEDURE — 3700000000 HC ANESTHESIA ATTENDED CARE: Performed by: SURGERY

## 2024-07-19 PROCEDURE — 6360000002 HC RX W HCPCS: Performed by: NURSE ANESTHETIST, CERTIFIED REGISTERED

## 2024-07-19 PROCEDURE — 3600000015 HC SURGERY LEVEL 5 ADDTL 15MIN: Performed by: SURGERY

## 2024-07-19 PROCEDURE — 7100000011 HC PHASE II RECOVERY - ADDTL 15 MIN: Performed by: SURGERY

## 2024-07-19 PROCEDURE — 7100000001 HC PACU RECOVERY - ADDTL 15 MIN: Performed by: SURGERY

## 2024-07-19 PROCEDURE — 7100000000 HC PACU RECOVERY - FIRST 15 MIN: Performed by: SURGERY

## 2024-07-19 PROCEDURE — 2709999900 HC NON-CHARGEABLE SUPPLY: Performed by: SURGERY

## 2024-07-19 PROCEDURE — 7100000010 HC PHASE II RECOVERY - FIRST 15 MIN: Performed by: SURGERY

## 2024-07-19 PROCEDURE — 3600000005 HC SURGERY LEVEL 5 BASE: Performed by: SURGERY

## 2024-07-19 PROCEDURE — 2720000010 HC SURG SUPPLY STERILE: Performed by: SURGERY

## 2024-07-19 PROCEDURE — 46600 DIAGNOSTIC ANOSCOPY SPX: CPT | Performed by: SURGERY

## 2024-07-19 PROCEDURE — 2580000003 HC RX 258: Performed by: NURSE ANESTHETIST, CERTIFIED REGISTERED

## 2024-07-19 PROCEDURE — 3700000001 HC ADD 15 MINUTES (ANESTHESIA): Performed by: SURGERY

## 2024-07-19 RX ORDER — CEFAZOLIN SODIUM 1 G/3ML
INJECTION, POWDER, FOR SOLUTION INTRAMUSCULAR; INTRAVENOUS PRN
Status: DISCONTINUED | OUTPATIENT
Start: 2024-07-19 | End: 2024-07-19 | Stop reason: SDUPTHER

## 2024-07-19 RX ORDER — SODIUM CHLORIDE 9 MG/ML
INJECTION, SOLUTION INTRAVENOUS PRN
Status: DISCONTINUED | OUTPATIENT
Start: 2024-07-19 | End: 2024-07-19 | Stop reason: HOSPADM

## 2024-07-19 RX ORDER — SODIUM CHLORIDE, SODIUM LACTATE, POTASSIUM CHLORIDE, CALCIUM CHLORIDE 600; 310; 30; 20 MG/100ML; MG/100ML; MG/100ML; MG/100ML
INJECTION, SOLUTION INTRAVENOUS CONTINUOUS
Status: DISCONTINUED | OUTPATIENT
Start: 2024-07-19 | End: 2024-07-19 | Stop reason: HOSPADM

## 2024-07-19 RX ORDER — NALOXONE HYDROCHLORIDE 0.4 MG/ML
INJECTION, SOLUTION INTRAMUSCULAR; INTRAVENOUS; SUBCUTANEOUS PRN
Status: DISCONTINUED | OUTPATIENT
Start: 2024-07-19 | End: 2024-07-19 | Stop reason: HOSPADM

## 2024-07-19 RX ORDER — LIDOCAINE HYDROCHLORIDE 20 MG/ML
INJECTION, SOLUTION INTRAVENOUS PRN
Status: DISCONTINUED | OUTPATIENT
Start: 2024-07-19 | End: 2024-07-19 | Stop reason: SDUPTHER

## 2024-07-19 RX ORDER — OXYCODONE HYDROCHLORIDE 5 MG/1
5 TABLET ORAL PRN
Status: DISCONTINUED | OUTPATIENT
Start: 2024-07-19 | End: 2024-07-19 | Stop reason: HOSPADM

## 2024-07-19 RX ORDER — PROPOFOL 10 MG/ML
INJECTION, EMULSION INTRAVENOUS PRN
Status: DISCONTINUED | OUTPATIENT
Start: 2024-07-19 | End: 2024-07-19 | Stop reason: SDUPTHER

## 2024-07-19 RX ORDER — SODIUM CHLORIDE 0.9 % (FLUSH) 0.9 %
5-40 SYRINGE (ML) INJECTION EVERY 12 HOURS SCHEDULED
Status: DISCONTINUED | OUTPATIENT
Start: 2024-07-19 | End: 2024-07-19 | Stop reason: HOSPADM

## 2024-07-19 RX ORDER — SODIUM CHLORIDE 0.9 % (FLUSH) 0.9 %
5-40 SYRINGE (ML) INJECTION PRN
Status: DISCONTINUED | OUTPATIENT
Start: 2024-07-19 | End: 2024-07-19 | Stop reason: HOSPADM

## 2024-07-19 RX ORDER — FENTANYL CITRATE 50 UG/ML
INJECTION, SOLUTION INTRAMUSCULAR; INTRAVENOUS PRN
Status: DISCONTINUED | OUTPATIENT
Start: 2024-07-19 | End: 2024-07-19 | Stop reason: SDUPTHER

## 2024-07-19 RX ORDER — METRONIDAZOLE 500 MG/100ML
500 INJECTION, SOLUTION INTRAVENOUS ONCE
Status: DISCONTINUED | OUTPATIENT
Start: 2024-07-19 | End: 2024-07-19 | Stop reason: HOSPADM

## 2024-07-19 RX ORDER — LABETALOL HYDROCHLORIDE 5 MG/ML
10 INJECTION, SOLUTION INTRAVENOUS
Status: DISCONTINUED | OUTPATIENT
Start: 2024-07-19 | End: 2024-07-19 | Stop reason: HOSPADM

## 2024-07-19 RX ORDER — OXYCODONE HYDROCHLORIDE 5 MG/1
10 TABLET ORAL PRN
Status: DISCONTINUED | OUTPATIENT
Start: 2024-07-19 | End: 2024-07-19 | Stop reason: HOSPADM

## 2024-07-19 RX ORDER — SODIUM CHLORIDE 9 MG/ML
INJECTION, SOLUTION INTRAVENOUS CONTINUOUS PRN
Status: DISCONTINUED | OUTPATIENT
Start: 2024-07-19 | End: 2024-07-19 | Stop reason: SDUPTHER

## 2024-07-19 RX ORDER — FENTANYL CITRATE 50 UG/ML
25 INJECTION, SOLUTION INTRAMUSCULAR; INTRAVENOUS EVERY 5 MIN PRN
Status: DISCONTINUED | OUTPATIENT
Start: 2024-07-19 | End: 2024-07-19 | Stop reason: HOSPADM

## 2024-07-19 RX ADMIN — SODIUM CHLORIDE: 9 INJECTION, SOLUTION INTRAVENOUS at 08:14

## 2024-07-19 RX ADMIN — FENTANYL CITRATE 100 MCG: 50 INJECTION, SOLUTION INTRAMUSCULAR; INTRAVENOUS at 08:19

## 2024-07-19 RX ADMIN — PROPOFOL 200 MG: 10 INJECTION, EMULSION INTRAVENOUS at 08:19

## 2024-07-19 RX ADMIN — CEFAZOLIN 2 G: 1 INJECTION, POWDER, FOR SOLUTION INTRAMUSCULAR; INTRAVENOUS; PARENTERAL at 08:23

## 2024-07-19 RX ADMIN — HYDROMORPHONE HYDROCHLORIDE 0.5 MG: 1 INJECTION, SOLUTION INTRAMUSCULAR; INTRAVENOUS; SUBCUTANEOUS at 08:31

## 2024-07-19 RX ADMIN — LIDOCAINE HYDROCHLORIDE 100 MG: 20 INJECTION, SOLUTION INTRAVENOUS at 08:19

## 2024-07-19 NOTE — PROGRESS NOTES
Pt returned to room from pacu      Report received from Mica Roberts A&O, call light placed within reach, side rails up x's 2  1050 Discharge instructions given to daughter ,voiced understanding. Transport arranged with [facility] for 1100  1105 Pt escorted to main entrance via wheelchair for discharge to [facility].

## 2024-07-19 NOTE — PROGRESS NOTES
0843- pt received from OR. Monitors placed and alarms on. Report received from [facility]. Oral airway in place upon arrival to PACU.  0856- oral airway removed.  0905- pt resting comfortably. Denies any pain or nausea.  0915- pt resting comfortably. Denies any needs currently.  0927- pt turned and repositioned in bed. Linens clean and dry.  0947- pt transported to [facility] by RN and bedside report given to Dominga SAAVEDRA.

## 2024-07-19 NOTE — OP NOTE
Operative Note      Patient: Best Rodriguez  YOB: 1955  MRN: [id number]    Date of Procedure: 7/19/2024    Pre-Op Diagnosis Codes:     * Anal stricture [K62.4]    Post-Op Diagnosis: Same and rectal anastomosis stricture       Procedure(s):  RECTAL EXAM UNDER ANESTHESIA    Surgeon(s):  Cary Phillip MD    Assistant:   * No surgical staff found *    Anesthesia: General    Estimated Blood Loss (mL): Minimal    Complications: None    Specimens:   ID Type Source Tests Collected by Time Destination   A :  Tissue Tissue SURGICAL PATHOLOGY Cary Phillip MD 7/19/2024 0742        Implants:  * No implants in log *      Drains:   Gastrostomy/Enterostomy/Jejunostomy Tube Percutaneous Endoscopic Gastrostomy (PEG) LUQ 1 20 fr (Active)       Colostomy LLQ (Active)       Findings:  Infection Present At Time Of Surgery (PATOS) (choose all levels that have infection present):  No infection present  Other Findings: same    Detailed Description of Procedure:   The patient was brought to the operating room and placed supine. Pt was induced and repositioned in a lithotomy position and the perineum was prepped and draped in the usual sterile fashion. Digital rectal exam was done and the anastomosis was at the 4 cm into the anus. This was fully strictured and and unable to pass even suction though the opening. At this point, I elected to stop. Pt has diverting loop colostomy.     Electronically signed by CARY PHILLIP MD on 7/19/2024 at 11:28 AM

## 2024-07-19 NOTE — DISCHARGE INSTRUCTIONS
[facility]  [phone]    Do not drive, work around machines or use equipment.  Do not drink any alcoholic beverages.  Do not smoke while alone.  Avoid making important decisions.  Plan to spend a quiet, relaxed evening @ home.  Resume normal activities as you begin to feel better.  Eat lightly for your first meal, then gradually increase your diet to what is normal for you.  In case of nausea, avoid food and drink only clear liquids.  Resume food as nausea ceases.  Notify your surgeon if you experience fever, chills, large amount of bleeding, difficulty breathing, persistent nausea and vomiting or any other disturbing problem.  Call for a follow-up appointment with your surgeon.

## 2024-07-22 ENCOUNTER — TELEPHONE (OUTPATIENT)
Dept: SURGERY | Age: 69
End: 2024-07-22

## 2024-07-22 NOTE — TELEPHONE ENCOUNTER
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     I called the pt today to see how they were doing post-operatively. I spoke to the pts daughter and she stated that the pt's pain is well controlled with current pain control regimen.   Pt's incisions are doing well. Denies erythema, swelling or drainage.   Pt is tolerating eating without N/V, and is having bowel movements.   I reviewed the post-operative instructions, addressed any concerns and answered the patient's questions.     I confirmed the patient's post-op appointment on 7/29/2024 at 10:00 AM         Marycarmen Villareal LPN

## 2024-07-23 NOTE — ANESTHESIA POSTPROCEDURE EVALUATION
Department of Anesthesiology  Postprocedure Note    Patient: Best Rodriguez  MRN: [id number]  YOB: 1955  Date of evaluation: 7/23/2024    Procedure Summary       Date: 07/19/24 Room / Location: [address]    Anesthesia Start: 0814 Anesthesia Stop: 0846    Procedure: RECTAL EXAM UNDER ANESTHESIA (Rectum) Diagnosis:       Anal stricture      (Anal stricture [K62.4])    Surgeons: Cary Phillip MD Responsible Provider: Vignesh Sheridan MD    Anesthesia Type: General ASA Status: 3            Anesthesia Type: General    Tiera Phase I: Tiera Score: 10    Tiera Phase II: Tiera Score: 10    Anesthesia Post Evaluation    Patient location during evaluation: PACU  Patient participation: complete - patient participated  Level of consciousness: awake  Airway patency: patent  Nausea & Vomiting: no nausea  Cardiovascular status: blood pressure returned to baseline  Respiratory status: acceptable  Hydration status: euvolemic  Multimodal analgesia pain management approach  Pain management: adequate    No notable events documented.

## 2024-07-29 ENCOUNTER — OFFICE VISIT (OUTPATIENT)
Dept: SURGERY | Age: 69
End: 2024-07-29
Payer: MEDICARE

## 2024-07-29 VITALS
HEIGHT: 71 IN | SYSTOLIC BLOOD PRESSURE: 136 MMHG | HEART RATE: 90 BPM | DIASTOLIC BLOOD PRESSURE: 88 MMHG | WEIGHT: 145 LBS | BODY MASS INDEX: 20.3 KG/M2 | OXYGEN SATURATION: 99 %

## 2024-07-29 DIAGNOSIS — C20 RECTAL CANCER (HCC): Primary | ICD-10-CM

## 2024-07-29 PROCEDURE — 3079F DIAST BP 80-89 MM HG: CPT | Performed by: SURGERY

## 2024-07-29 PROCEDURE — G8427 DOCREV CUR MEDS BY ELIG CLIN: HCPCS | Performed by: SURGERY

## 2024-07-29 PROCEDURE — 3017F COLORECTAL CA SCREEN DOC REV: CPT | Performed by: SURGERY

## 2024-07-29 PROCEDURE — 3075F SYST BP GE 130 - 139MM HG: CPT | Performed by: SURGERY

## 2024-07-29 PROCEDURE — 99213 OFFICE O/P EST LOW 20 MIN: CPT | Performed by: SURGERY

## 2024-07-29 PROCEDURE — G8420 CALC BMI NORM PARAMETERS: HCPCS | Performed by: SURGERY

## 2024-07-29 PROCEDURE — 4004F PT TOBACCO SCREEN RCVD TLK: CPT | Performed by: SURGERY

## 2024-07-29 PROCEDURE — 1123F ACP DISCUSS/DSCN MKR DOCD: CPT | Performed by: SURGERY

## 2024-07-29 ASSESSMENT — PATIENT HEALTH QUESTIONNAIRE - PHQ9
SUM OF ALL RESPONSES TO PHQ QUESTIONS 1-9: 0
SUM OF ALL RESPONSES TO PHQ9 QUESTIONS 1 & 2: 0
SUM OF ALL RESPONSES TO PHQ QUESTIONS 1-9: 0
2. FEELING DOWN, DEPRESSED OR HOPELESS: NOT AT ALL
1. LITTLE INTEREST OR PLEASURE IN DOING THINGS: NOT AT ALL
SUM OF ALL RESPONSES TO PHQ QUESTIONS 1-9: 0
SUM OF ALL RESPONSES TO PHQ QUESTIONS 1-9: 0

## 2024-08-08 ASSESSMENT — ENCOUNTER SYMPTOMS
EYE ITCHING: 0
ANAL BLEEDING: 0
CONSTIPATION: 0
RECTAL PAIN: 0
SORE THROAT: 0
COLOR CHANGE: 0
EYE REDNESS: 0
PHOTOPHOBIA: 0
STRIDOR: 0
CHOKING: 0
BACK PAIN: 0
APNEA: 0

## 2024-08-08 NOTE — PROGRESS NOTES
Chief Complaint   Patient presents with    Post-Op Check     1st PO EUA @ [facility] 7/19/24         SUBJECTIVE:  HPI: Patient is here with complaints of s/p .    I have reviewed the patient's(pertinent information to this visit) medical history, family history(scanned in  the [employer] under \"patient questioner\"), social history and review of systems with the patient today in the office.            Past Surgical History:   Procedure Laterality Date    COLONOSCOPY      COLONOSCOPY N/A 2/12/2024    COLONOSCOPY WITH BIOPSY up to ascending colon performed by Akash Patel MD at [facility] ENDOSCOPY    COLOSTOMY N/A 11/2/2018    DIVERTING COLOSTOMY PLACEMENT LAPAROSCOPIC performed by Cary Phillip MD at [facility] OR    COLOSTOMY N/A 2/13/2024    COLOSTOMY LAPAROSCOPIC converted to open performed by Cary Phillip MD at [facility] OR    JOINT REPLACEMENT Bilateral early 2010's    hips    FL INSJ PRPH CTR VAD W/SUBQ PORT AGE 5 YR/> N/A 11/6/2018    PORT INSERTION performed by Cary Phillip MD at [facility] OR    RECTAL EXAM N/A 7/19/2024    RECTAL EXAM UNDER ANESTHESIA performed by Cary Phillip MD at [facility] OR    SIGMOIDOSCOPY N/A 5/13/2019    SIGMOIDOSCOPY BIOPSY FLEXIBLE performed by Cary Phillip MD at [facility] ENDOSCOPY    SIGMOIDOSCOPY N/A 5/4/2022    SIGMOIDOSCOPY DIAGNOSTIC FLEXIBLE performed by Cary Phillip MD at [facility] ENDOSCOPY    SMALL INTESTINE SURGERY N/A 3/13/2019    BOWEL RESECTION LOW ANTERIOR LAPAROSCOPIC ROBOTIC WITH DIVERTING LOOP ILEOSTOMY performed by Cary Phillip MD at [facility] OR    SMALL INTESTINE SURGERY N/A 5/21/2019    COLOSTOMY ILEOSTOMY TAKEDOWN/REVERSAL performed by Cary Phillip MD at [facility] OR    UPPER GASTROINTESTINAL ENDOSCOPY N/A 3/12/2024    ESOPHAGOGASTRODUODENOSCOPY PERCUTANEOUS ENDOSCOPIC GASTROSTOMY TUBE INSERTION performed by Cary Phillip MD at [facility] ENDOSCOPY     Past Medical History:   Diagnosis Date    Cancer (HCC)     Rectal cancer--treated with both chemo and radiation--finished 01/2019    Chest pain     Cocaine

## 2024-08-26 ENCOUNTER — OFFICE VISIT (OUTPATIENT)
Dept: SURGERY | Age: 69
End: 2024-08-26
Payer: MEDICARE

## 2024-08-26 VITALS
HEIGHT: 71 IN | BODY MASS INDEX: 20.22 KG/M2 | OXYGEN SATURATION: 96 % | DIASTOLIC BLOOD PRESSURE: 80 MMHG | SYSTOLIC BLOOD PRESSURE: 124 MMHG | HEART RATE: 69 BPM

## 2024-08-26 DIAGNOSIS — K62.4 ANAL STRICTURE: ICD-10-CM

## 2024-08-26 DIAGNOSIS — C20 RECTAL CANCER (HCC): Primary | ICD-10-CM

## 2024-08-26 PROCEDURE — 99213 OFFICE O/P EST LOW 20 MIN: CPT | Performed by: SURGERY

## 2024-08-26 PROCEDURE — 3079F DIAST BP 80-89 MM HG: CPT | Performed by: SURGERY

## 2024-08-26 PROCEDURE — 4004F PT TOBACCO SCREEN RCVD TLK: CPT | Performed by: SURGERY

## 2024-08-26 PROCEDURE — G8420 CALC BMI NORM PARAMETERS: HCPCS | Performed by: SURGERY

## 2024-08-26 PROCEDURE — 1123F ACP DISCUSS/DSCN MKR DOCD: CPT | Performed by: SURGERY

## 2024-08-26 PROCEDURE — G8427 DOCREV CUR MEDS BY ELIG CLIN: HCPCS | Performed by: SURGERY

## 2024-08-26 PROCEDURE — 3074F SYST BP LT 130 MM HG: CPT | Performed by: SURGERY

## 2024-08-26 PROCEDURE — 3017F COLORECTAL CA SCREEN DOC REV: CPT | Performed by: SURGERY

## 2024-08-29 ASSESSMENT — ENCOUNTER SYMPTOMS
BACK PAIN: 0
STRIDOR: 0
CONSTIPATION: 0
COLOR CHANGE: 0
EYE REDNESS: 0
SORE THROAT: 0
PHOTOPHOBIA: 0
APNEA: 0
EYE ITCHING: 0
CHOKING: 0
RECTAL PAIN: 0
ANAL BLEEDING: 0

## 2024-08-29 NOTE — PROGRESS NOTES
Chief Complaint   Patient presents with    Follow-up     2nd F/U EUA @ [facility] 7/19/24         SUBJECTIVE:  HPI: Patient is here with complaints of status post EUA with anastomotic rectal stenosis.  Patient is diverted now.  I was unable to dilate this area manually in the OR.  The stricture is severe and likely due to the patient's previous radiation treatment for his rectal cancer.  He is gaining weight and able to eat well.  Patient has been cocaine free.  He has strong family support and getting him back on his feet.  He still has not seen the dentist for full mouth extraction and I know this has affected his nutritional intake.    I have reviewed the patient's(pertinent information to this visit) medical history, family history(scanned in  the [employer] under \"patient questioner\"), social history and review of systems with the patient today in the office.            Past Surgical History:   Procedure Laterality Date    COLONOSCOPY      COLONOSCOPY N/A 2/12/2024    COLONOSCOPY WITH BIOPSY up to ascending colon performed by Akash Patel MD at [facility] ENDOSCOPY    COLOSTOMY N/A 11/2/2018    DIVERTING COLOSTOMY PLACEMENT LAPAROSCOPIC performed by Cary Phillip MD at [facility] OR    COLOSTOMY N/A 2/13/2024    COLOSTOMY LAPAROSCOPIC converted to open performed by Cary Phillip MD at [facility] OR    JOINT REPLACEMENT Bilateral early 2010's    hips    AL INSJ PRPH CTR VAD W/SUBQ PORT AGE 5 YR/> N/A 11/6/2018    PORT INSERTION performed by Cary Phillip MD at [facility] OR    RECTAL EXAM N/A 7/19/2024    RECTAL EXAM UNDER ANESTHESIA performed by Cary Phillip MD at [facility] OR    SIGMOIDOSCOPY N/A 5/13/2019    SIGMOIDOSCOPY BIOPSY FLEXIBLE performed by Cary Phillip MD at [facility] ENDOSCOPY    SIGMOIDOSCOPY N/A 5/4/2022    SIGMOIDOSCOPY DIAGNOSTIC FLEXIBLE performed by Cary Phillip MD at [facility] ENDOSCOPY    SMALL INTESTINE SURGERY N/A 3/13/2019    BOWEL RESECTION LOW ANTERIOR LAPAROSCOPIC ROBOTIC WITH DIVERTING LOOP ILEOSTOMY performed by Cary

## 2024-10-02 ENCOUNTER — HOSPITAL ENCOUNTER (OUTPATIENT)
Dept: INFUSION THERAPY | Age: 69
Discharge: HOME | End: 2024-10-02
Payer: MEDICARE

## 2024-10-02 ENCOUNTER — OFFICE VISIT (OUTPATIENT)
Dept: ONCOLOGY | Age: 69
End: 2024-10-02
Payer: MEDICARE

## 2024-10-02 VITALS
TEMPERATURE: 97.8 F | SYSTOLIC BLOOD PRESSURE: 112 MMHG | DIASTOLIC BLOOD PRESSURE: 66 MMHG | RESPIRATION RATE: 16 BRPM | OXYGEN SATURATION: 96 % | HEIGHT: 71 IN | HEART RATE: 86 BPM | BODY MASS INDEX: 20.23 KG/M2

## 2024-10-02 DIAGNOSIS — C20 RECTAL CANCER (HCC): ICD-10-CM

## 2024-10-02 DIAGNOSIS — C20 RECTAL CANCER (HCC): Primary | ICD-10-CM

## 2024-10-02 LAB
ALBUMIN SERPL-MCNC: 3.9 G/DL (ref 3.4–5)
ALBUMIN/GLOB SERPL: 1.5 {RATIO} (ref 1.1–2.2)
ALP SERPL-CCNC: 95 U/L (ref 40–129)
ALT SERPL-CCNC: 9 U/L (ref 10–40)
ANION GAP SERPL CALCULATED.3IONS-SCNC: 10 MMOL/L (ref 9–17)
AST SERPL-CCNC: 14 U/L (ref 15–37)
BASOPHILS # BLD: 0.01 K/UL
BASOPHILS NFR BLD: 0 % (ref 0–1)
BILIRUB SERPL-MCNC: <0.2 MG/DL (ref 0–1)
BUN SERPL-MCNC: 11 MG/DL (ref 7–20)
CALCIUM SERPL-MCNC: 9 MG/DL (ref 8.3–10.6)
CEA SERPL-MCNC: 2.6 NG/ML (ref 0–5)
CHLORIDE SERPL-SCNC: 106 MMOL/L (ref 99–110)
CO2 SERPL-SCNC: 25 MMOL/L (ref 21–32)
CREAT SERPL-MCNC: 0.7 MG/DL (ref 0.8–1.3)
EOSINOPHIL # BLD: 0.21 K/UL
EOSINOPHILS RELATIVE PERCENT: 4 % (ref 0–3)
ERYTHROCYTE [DISTWIDTH] IN BLOOD BY AUTOMATED COUNT: 18.1 % (ref 11.7–14.9)
GFR, ESTIMATED: >90 ML/MIN/1.73M2
GLUCOSE SERPL-MCNC: 104 MG/DL (ref 74–99)
HCT VFR BLD AUTO: 40.9 % (ref 42–52)
HGB BLD-MCNC: 13.2 G/DL (ref 13.5–18)
LYMPHOCYTES NFR BLD: 2.07 K/UL
LYMPHOCYTES RELATIVE PERCENT: 35 % (ref 24–44)
MCH RBC QN AUTO: 26.3 PG (ref 27–31)
MCHC RBC AUTO-ENTMCNC: 32.3 G/DL (ref 32–36)
MCV RBC AUTO: 81.6 FL (ref 78–100)
MONOCYTES NFR BLD: 0.53 K/UL
MONOCYTES NFR BLD: 9 % (ref 0–4)
NEUTROPHILS NFR BLD: 52 % (ref 36–66)
NEUTS SEG NFR BLD: 3.04 K/UL
PLATELET # BLD AUTO: 234 K/UL (ref 140–440)
PMV BLD AUTO: 9.3 FL (ref 7.5–11.1)
POTASSIUM SERPL-SCNC: 4.1 MMOL/L (ref 3.5–5.1)
PROT SERPL-MCNC: 6.5 G/DL (ref 6.4–8.2)
PSA SERPL-MCNC: 0.7 NG/ML (ref 0–4)
RBC # BLD AUTO: 5.01 M/UL (ref 4.6–6.2)
SODIUM SERPL-SCNC: 142 MMOL/L (ref 136–145)
WBC OTHER # BLD: 5.9 K/UL (ref 4–10.5)

## 2024-10-02 PROCEDURE — 99213 OFFICE O/P EST LOW 20 MIN: CPT | Performed by: INTERNAL MEDICINE

## 2024-10-02 PROCEDURE — 3017F COLORECTAL CA SCREEN DOC REV: CPT | Performed by: INTERNAL MEDICINE

## 2024-10-02 PROCEDURE — 4004F PT TOBACCO SCREEN RCVD TLK: CPT | Performed by: INTERNAL MEDICINE

## 2024-10-02 PROCEDURE — G8420 CALC BMI NORM PARAMETERS: HCPCS | Performed by: INTERNAL MEDICINE

## 2024-10-02 PROCEDURE — 1123F ACP DISCUSS/DSCN MKR DOCD: CPT | Performed by: INTERNAL MEDICINE

## 2024-10-02 PROCEDURE — 36415 COLL VENOUS BLD VENIPUNCTURE: CPT

## 2024-10-02 PROCEDURE — G8427 DOCREV CUR MEDS BY ELIG CLIN: HCPCS | Performed by: INTERNAL MEDICINE

## 2024-10-02 PROCEDURE — 3074F SYST BP LT 130 MM HG: CPT | Performed by: INTERNAL MEDICINE

## 2024-10-02 PROCEDURE — 82378 CARCINOEMBRYONIC ANTIGEN: CPT

## 2024-10-02 PROCEDURE — 99211 OFF/OP EST MAY X REQ PHY/QHP: CPT

## 2024-10-02 PROCEDURE — 3078F DIAST BP <80 MM HG: CPT | Performed by: INTERNAL MEDICINE

## 2024-10-02 PROCEDURE — 80053 COMPREHEN METABOLIC PANEL: CPT

## 2024-10-02 PROCEDURE — 85025 COMPLETE CBC W/AUTO DIFF WBC: CPT

## 2024-10-02 PROCEDURE — G8484 FLU IMMUNIZE NO ADMIN: HCPCS | Performed by: INTERNAL MEDICINE

## 2024-10-02 PROCEDURE — 84153 ASSAY OF PSA TOTAL: CPT

## 2024-10-02 NOTE — PROGRESS NOTES
MA Rooming Questions  Patient: Best Rodriguez  MRN: [id number]    Date: 10/2/2024        1. Do you have any new issues?   no         2. Do you need any refills on medications?    no    3. Have you had any imaging done since your last visit?   no    4. Have you been hospitalized or seen in the emergency room since your last visit here?   No     5. Did the patient have a depression screening completed today? No    No data recorded     PHQ-9 Given to (if applicable):               PHQ-9 Score (if applicable):                     [] Positive     []  Negative              Does question #9 need addressed (if applicable)                     [] Yes    []  No               Kari Morales CMA      
up with us after 7/31/2019, until 10/26/2020.     He had sigmoidoscopy in 5/16/2019 by Dr. Phillip before colostomy reversal.     CT scan of the chest, abdomen and pelvis done on 2/9/24 and MRI lumbar spine w/wo contrast were reviewed.  I also reviewed with him findings on laboratory test.  His CEA level was 5 on 2/11/24. His PSA was 13.85 ng/ml.     He was seen by urology and repeat PSA was normal. They believe his elevated PSA was due to escobar insertion at that time.     There is no apparent sign of recurrent or metastatic disease seen on CT scan.  He is asymptomatic and I recommend to continue with close observation. Will check his CEA level today.     Recent imaging and labs were reviewed and discussed with the patient.

## 2024-10-03 ENCOUNTER — CLINICAL DOCUMENTATION (OUTPATIENT)
Dept: ONCOLOGY | Age: 69
End: 2024-10-03

## 2024-10-03 NOTE — PROGRESS NOTES
This nurse attempted to call [facility] 3 times without success, to notify the staff that the patient's labs are fine per the physician.

## 2024-11-25 ENCOUNTER — OFFICE VISIT (OUTPATIENT)
Dept: SURGERY | Age: 69
End: 2024-11-25
Payer: MEDICARE

## 2024-11-25 VITALS
SYSTOLIC BLOOD PRESSURE: 122 MMHG | HEART RATE: 84 BPM | WEIGHT: 148.1 LBS | OXYGEN SATURATION: 97 % | BODY MASS INDEX: 20.73 KG/M2 | DIASTOLIC BLOOD PRESSURE: 88 MMHG | HEIGHT: 71 IN

## 2024-11-25 DIAGNOSIS — K62.4 ANAL STRICTURE: Primary | ICD-10-CM

## 2024-11-25 PROCEDURE — 3074F SYST BP LT 130 MM HG: CPT | Performed by: SURGERY

## 2024-11-25 PROCEDURE — G8427 DOCREV CUR MEDS BY ELIG CLIN: HCPCS | Performed by: SURGERY

## 2024-11-25 PROCEDURE — 1159F MED LIST DOCD IN RCRD: CPT | Performed by: SURGERY

## 2024-11-25 PROCEDURE — G8420 CALC BMI NORM PARAMETERS: HCPCS | Performed by: SURGERY

## 2024-11-25 PROCEDURE — 3079F DIAST BP 80-89 MM HG: CPT | Performed by: SURGERY

## 2024-11-25 PROCEDURE — 1123F ACP DISCUSS/DSCN MKR DOCD: CPT | Performed by: SURGERY

## 2024-11-25 PROCEDURE — 3017F COLORECTAL CA SCREEN DOC REV: CPT | Performed by: SURGERY

## 2024-11-25 PROCEDURE — G8484 FLU IMMUNIZE NO ADMIN: HCPCS | Performed by: SURGERY

## 2024-11-25 PROCEDURE — 4004F PT TOBACCO SCREEN RCVD TLK: CPT | Performed by: SURGERY

## 2024-11-25 PROCEDURE — 99213 OFFICE O/P EST LOW 20 MIN: CPT | Performed by: SURGERY

## 2025-02-04 ENCOUNTER — TELEPHONE (OUTPATIENT)
Dept: ONCOLOGY | Age: 70
End: 2025-02-04

## 2025-02-04 NOTE — TELEPHONE ENCOUNTER
Called patient to reschedule their no show appointment on 2/4/25 with Dr. Rey.  Left message for patient to return call to reschedule appointment.  Sending a message for  to send out a NO SHOW letter.

## 2025-02-05 ENCOUNTER — TELEPHONE (OUTPATIENT)
Dept: INFUSION THERAPY | Age: 70
End: 2025-02-05

## 2025-02-12 ASSESSMENT — ENCOUNTER SYMPTOMS
ANAL BLEEDING: 0
EYE REDNESS: 0
PHOTOPHOBIA: 0
SORE THROAT: 0
RECTAL PAIN: 0
CONSTIPATION: 0
COLOR CHANGE: 0
APNEA: 0
EYE ITCHING: 0
BACK PAIN: 0
STRIDOR: 0
CHOKING: 0

## 2025-02-12 NOTE — PROGRESS NOTES
Strain: Not on file   Food Insecurity: No Food Insecurity (3/12/2024)    Hunger Vital Sign     Worried About Running Out of Food in the Last Year: Never true     Ran Out of Food in the Last Year: Never true   Transportation Needs: No Transportation Needs (3/12/2024)    PRAPARE - Transportation     Lack of Transportation (Medical): No     Lack of Transportation (Non-Medical): No   Physical Activity: Not on file   Stress: Not on file   Social Connections: Not on file   Intimate Partner Violence: Not on file   Housing Stability: Low Risk  (3/12/2024)    Housing Stability Vital Sign     Unable to Pay for Housing in the Last Year: No     Number of Places Lived in the Last Year: 1     Unstable Housing in the Last Year: No       Current Outpatient Medications   Medication Sig Dispense Refill    gabapentin (NEURONTIN) 100 MG capsule Take 1 capsule by mouth 3 times daily.      SANTYL 250 UNIT/GM ointment       midodrine (PROAMATINE) 10 MG tablet Take 1 tablet by mouth 3 times daily as needed      HYDROcodone-acetaminophen (NORCO) 5-325 MG per tablet Take 1 tablet by mouth every 4 hours as needed for Pain.      mirtazapine (REMERON) 15 MG tablet Take 1 tablet by mouth nightly      methocarbamol (ROBAXIN) 750 MG tablet Take 1 tablet by mouth in the morning and at bedtime      ondansetron (ZOFRAN) 4 MG tablet Take 1 tablet by mouth every 8 hours as needed for Nausea or Vomiting      Incontinence Supplies MISC Use as needed for incontinence 90 each 3    Nutritional Supplements (NUTRITIONAL SUPPLEMENT PLUS) LIQD Take 237 mLs by mouth 3 times daily (with meals) 90 Can 0     No current facility-administered medications for this visit.      Allergies   Allergen Reactions    Ibuprofen Nausea And Vomiting    Oxycodone Nausea And Vomiting       Review of Systems:         Review of Systems   Constitutional:  Positive for appetite change. Negative for chills and fever.   HENT:  Negative for ear pain, mouth sores, sore throat and

## 2025-04-10 ENCOUNTER — OFFICE VISIT (OUTPATIENT)
Dept: SURGERY | Age: 70
End: 2025-04-10
Payer: MEDICARE

## 2025-04-10 VITALS
OXYGEN SATURATION: 95 % | WEIGHT: 148 LBS | HEART RATE: 87 BPM | HEIGHT: 71 IN | DIASTOLIC BLOOD PRESSURE: 90 MMHG | BODY MASS INDEX: 20.72 KG/M2 | SYSTOLIC BLOOD PRESSURE: 142 MMHG

## 2025-04-10 DIAGNOSIS — D49.0 RECTAL TUMOR: Primary | ICD-10-CM

## 2025-04-10 PROCEDURE — 99213 OFFICE O/P EST LOW 20 MIN: CPT | Performed by: SURGERY

## 2025-04-10 PROCEDURE — 4004F PT TOBACCO SCREEN RCVD TLK: CPT | Performed by: SURGERY

## 2025-04-10 PROCEDURE — G8420 CALC BMI NORM PARAMETERS: HCPCS | Performed by: SURGERY

## 2025-04-10 PROCEDURE — G8427 DOCREV CUR MEDS BY ELIG CLIN: HCPCS | Performed by: SURGERY

## 2025-04-10 PROCEDURE — 3077F SYST BP >= 140 MM HG: CPT | Performed by: SURGERY

## 2025-04-10 PROCEDURE — 3080F DIAST BP >= 90 MM HG: CPT | Performed by: SURGERY

## 2025-04-10 PROCEDURE — 3017F COLORECTAL CA SCREEN DOC REV: CPT | Performed by: SURGERY

## 2025-04-10 PROCEDURE — 1123F ACP DISCUSS/DSCN MKR DOCD: CPT | Performed by: SURGERY

## 2025-04-10 ASSESSMENT — ENCOUNTER SYMPTOMS
CONSTIPATION: 0
APNEA: 0
COLOR CHANGE: 0
EYE REDNESS: 0
PHOTOPHOBIA: 0
STRIDOR: 0
EYE ITCHING: 0
CHOKING: 0
SORE THROAT: 0
ANAL BLEEDING: 0
RECTAL PAIN: 0
BACK PAIN: 0

## 2025-04-10 NOTE — PROGRESS NOTES
[facility] OR    UPPER GASTROINTESTINAL ENDOSCOPY N/A 3/12/2024    ESOPHAGOGASTRODUODENOSCOPY PERCUTANEOUS ENDOSCOPIC GASTROSTOMY TUBE INSERTION performed by Cary Phillip MD at [facility] ENDOSCOPY     Past Medical History:   Diagnosis Date    Cancer (HCC)     Rectal cancer--treated with both chemo and radiation--finished 01/2019    Chest pain     Cocaine abuse (HCC)     Positive UDS 11/2021    Colostomy present (HCC)     Dissecting AAA (abdominal aortic aneurysm) (HCC) 02/10/2024    on CTA - Chronic    Hypertension     Malnutrition     Profound hearing loss of both ears     no hearing in right ear --very little in left    PVD (peripheral vascular disease)      Family History   Problem Relation Age of Onset    Dementia Mother     Early Death Father     High Blood Pressure Father     Early Death Sister     Diabetes Brother      Social History     Socioeconomic History    Marital status: Single     Spouse name: Not on file    Number of children: Not on file    Years of education: Not on file    Highest education level: Not on file   Occupational History    Not on file   Tobacco Use    Smoking status: Some Days     Types: Cigarettes    Smokeless tobacco: Never    Tobacco comments:     per friend \"smokes cigarettes now & then\"   Vaping Use    Vaping status: Never Used   Substance and Sexual Activity    Alcohol use: No    Drug use: Not Currently     Types: Cocaine, Marijuana (Weed)     Comment: last used one month ago per pt    Sexual activity: Not on file   Other Topics Concern    Not on file   Social History Narrative    Not on file     Social Drivers of Health     Financial Resource Strain: Not on file   Food Insecurity: No Food Insecurity (3/12/2024)    Hunger Vital Sign     Worried About Running Out of Food in the Last Year: Never true     Ran Out of Food in the Last Year: Never true   Transportation Needs: No Transportation Needs (3/12/2024)    PRAPARE - Transportation     Lack of Transportation (Medical): No     Lack of

## (undated) DEVICE — PENCIL ES CRD L10FT HND SWCHING ROCK SWCH W/ EDGE COAT BLDE

## (undated) DEVICE — RELOAD STPL L60MM H1.5-3.6MM REG TISS BLU GRIPPING SURF B

## (undated) DEVICE — GLOVE SURG SZ 75 L12IN FNGR THK87MIL WHT LTX FREE

## (undated) DEVICE — Z DISCONTINUED NO SUB IDED TUBING ETCO2 AD L6.5FT NSL ORAL CVD PRNG NONFLARED TIP OVR

## (undated) DEVICE — TRAY PREP DRY W/ PREM GLV 2 APPL 6 SPNG 2 UNDPD 1 OVERWRAP

## (undated) DEVICE — ACCESS PLATFORM FOR MINIMALLY INVASIVE SURGERY.: Brand: GELPORT® LAPAROSCOPIC  SYSTEM

## (undated) DEVICE — DRESSING TRNSPAR W5XL4.5IN FLM SHT SEMIPERMEABLE WIND

## (undated) DEVICE — MARKER,SKIN,WI/RULER AND LABELS: Brand: MEDLINE

## (undated) DEVICE — SUTURE PERMAHAND SZ 2-0 L18IN NONABSORBABLE BLK L26MM SH C012D

## (undated) DEVICE — SPONGE GZ W4XL8IN COT WVN 12 PLY

## (undated) DEVICE — GLOVE ORANGE PI 7 1/2   MSG9075

## (undated) DEVICE — BANDAGE ADH W1XL3IN NAT FAB WVN FLX DURABLE N ADH PD SEAL

## (undated) DEVICE — SET TBNG DISP TIP FOR AHTO

## (undated) DEVICE — COVER,MAYO STAND,STERILE: Brand: MEDLINE

## (undated) DEVICE — TROCARS: Brand: KII® BALLOON BLUNT TIP SYSTEM

## (undated) DEVICE — SUTURE PERMAHAND SZ 2-0 L17X18IN NONABSORBABLE BLK SILK SA65H

## (undated) DEVICE — SHEARS ENDOSCP L36CM DIA5MM ULTRASONIC CRV TIP ADAPTIVE

## (undated) DEVICE — NEEDLE HYPO 23GA L1.5IN TURQ POLYPR HUB S STL THN WALL IM

## (undated) DEVICE — DRAPE SHEET ULTRAGARD: Brand: MEDLINE

## (undated) DEVICE — REDUCER: Brand: ENDOWRIST

## (undated) DEVICE — STAPLER INT DIA33MM STPL L4MM KNF DIA24.4MM 2 ROW

## (undated) DEVICE — SUTURE PROL SZ 3-0 L36IN NONABSORBABLE BLU L26MM SH 1/2 CIR 8522H

## (undated) DEVICE — STANDARD HYPODERMIC NEEDLE,POLYPROPYLENE HUB: Brand: MONOJECT

## (undated) DEVICE — VESSEL SEALER EXTEND: Brand: ENDOWRIST

## (undated) DEVICE — CHLORAPREP 26ML ORANGE

## (undated) DEVICE — LARGE SUTURE CUT NEEDLE DRIVER: Brand: ENDOWRIST

## (undated) DEVICE — GLOVE SURG SZ 6 L12IN FNGR THK87MIL WHT LTX FREE

## (undated) DEVICE — TUBING FLTR PLUME AWAY EVAC W/ SUCT DEV DISP PUREVIEW

## (undated) DEVICE — SUTURE VCRL SZ 4-0 L18IN ABSRB UD L19MM PS-2 3/8 CIR PRIM J496H

## (undated) DEVICE — 500ML,PRESSURE INFUSER W/STOPCOCK: Brand: MEDLINE

## (undated) DEVICE — PACK SURG LAP CHOLE

## (undated) DEVICE — SUTURE ETHLN SZ 3-0 L18IN NONABSORBABLE BLK FS-1 L24MM 3/8 663H

## (undated) DEVICE — GLOVE SURG SZ 7 L12IN FNGR THK87MIL WHT LTX FREE

## (undated) DEVICE — SUTURE VCRL SZ 3-0 L27IN ABSRB UD L26MM SH 1/2 CIR J416H

## (undated) DEVICE — NEEDLE HYPO 20GA L1.5IN YEL POLYPR HUB S STL REG BVL STR

## (undated) DEVICE — SOLUTION IV IRRIG POUR BRL 0.9% SODIUM CHL 2F7124

## (undated) DEVICE — GOWN,SIRUS,FABRNF,RAGLAN,L,ST,30/CS: Brand: MEDLINE

## (undated) DEVICE — CORD ES L15FT PT RET REUSE VALLEYLAB REM

## (undated) DEVICE — SOLUTION IV IRRIG WATER 1000ML POUR BRL 2F7114

## (undated) DEVICE — SUTURE VCRL SZ 3-0 L36IN ABSRB VLT SH L26MM 1/2 CIR DBL J527H

## (undated) DEVICE — TROCAR: Brand: KII FIOS FIRST ENTRY

## (undated) DEVICE — ENDOSCOPY KIT: Brand: MEDLINE INDUSTRIES, INC.

## (undated) DEVICE — COUNTER NDL 60 COUNT FOAM STRP SGL MAG

## (undated) DEVICE — YANKAUER,FLEXIBLE HANDLE,REGLR CAPACITY: Brand: MEDLINE INDUSTRIES, INC.

## (undated) DEVICE — STAPLER INT L28CM DIA29MM CLS STPL H10-2.5MM OPN LEG L5.5MM

## (undated) DEVICE — SUTURE PROL SZ 0 L30IN NONABSORBABLE BLU L36MM CT-1 1/2 CIR 8424H

## (undated) DEVICE — 20 ML SYRINGE LUER-LOCK TIP: Brand: MONOJECT

## (undated) DEVICE — FORCEPS BX L240CM JAW DIA2.8MM L CAP W/ NDL MIC MESH TOOTH

## (undated) DEVICE — 3M™ RANGER™ BLOOD/FLUID WARMING STANDARD FLOW SET, 24250, 10/CASE: Brand: 3M™ RANGER™

## (undated) DEVICE — INTENDED FOR TISSUE SEPARATION, AND OTHER PROCEDURES THAT REQUIRE A SHARP SURGICAL BLADE TO PUNCTURE OR CUT.: Brand: BARD-PARKER ® STAINLESS STEEL BLADES

## (undated) DEVICE — DRESSING BORDERED ADH GZ UNIV GEN USE 4INX10IN AND 2INX8IN

## (undated) DEVICE — 2-PIECE DRAINABLE OSTOMY POUCH: Brand: NEW IMAGE

## (undated) DEVICE — SUTURE PERMAHAND SZ 3-0 L18IN NONABSORBABLE BLK L26MM SH C013D

## (undated) DEVICE — DRAPE THYROID

## (undated) DEVICE — DRESSING TRNSPAR W2XL2.75IN FLM SHT SEMIPERMEABLE WIND

## (undated) DEVICE — DISPOSABLE GRASPER: Brand: EPIX LAPAROSCOPIC GRASPER

## (undated) DEVICE — STAPLER EXT 65MM S STL AUTO DISP PURSTRING

## (undated) DEVICE — SUTURE VCRL SZ 3-0 L18IN ABSRB UD L26MM SH 1/2 CIR J864D

## (undated) DEVICE — DUAL LUMEN STOMACH TUBE: Brand: SALEM SUMP

## (undated) DEVICE — SURGICAL PROCEDURE PACK LITH ROBOTIC

## (undated) DEVICE — SUTURE PERMAHAND SZ 3-0 L30IN NONABSORBABLE BLK L26MM SH C017D

## (undated) DEVICE — TROCAR ENDOSCP L100MM DIA12MM BLDELSS OBT RADLUC STBL SL

## (undated) DEVICE — SPONGE LAP W18XL18IN WHT COT 4 PLY FLD STRUNG RADPQ DISP ST

## (undated) DEVICE — 2-PIECE OSTOMY SKIN BARRIER, CERAPLUS: Brand: NEW IMAGE

## (undated) DEVICE — POSITIONER,HEAD,RING CUSHION,9IN,32CS: Brand: MEDLINE

## (undated) DEVICE — TUBING, SUCTION, 9/32" X 10', STRAIGHT: Brand: MEDLINE

## (undated) DEVICE — GOWN,SIRUS,POLYRNF,BRTHSLV,XLN/XL,20/CS: Brand: MEDLINE

## (undated) DEVICE — DRAIN SURG 15FR SIL RND CHN W/ TRCR FULL FLUT DBL WRP TRAD

## (undated) DEVICE — SOLUTION IV 1000ML 0.9% SOD CHL FOR IRRIG PLAS CONT

## (undated) DEVICE — TIP COVER ACCESSORY

## (undated) DEVICE — SUTURE STRATAFIX SYMMETRIC SZ 1 L18IN ABSRB VLT CT1 L36CM SXPP1A404

## (undated) DEVICE — LINER,SEMI-RIGID,3000CC,50EA/CS: Brand: MEDLINE

## (undated) DEVICE — TRAY CATH 16FR F INCLUDE SIL DRNGE BG STATLOK STBL DEV

## (undated) DEVICE — BLADELESS OBTURATOR: Brand: WECK VISTA

## (undated) DEVICE — Z INACTIVE USE 2874142 HOOK RETRCT 12MM S STL BLNT E STAY LONE STAR

## (undated) DEVICE — PRESSURE MONITORING SET: Brand: TRUWAVE

## (undated) DEVICE — TOWEL,OR,DSP,ST,BLUE,STD,6/PK,12PK/CS: Brand: MEDLINE

## (undated) DEVICE — SUTURE SZ 0 27IN 5/8 CIR UR-6  TAPER PT VIOLET ABSRB VICRYL J603H

## (undated) DEVICE — Device

## (undated) DEVICE — SUTURE COAT VCRL SZ 4-0 L18IN ABSRB UD L19MM PS-2 1/2 CIR J496G

## (undated) DEVICE — SPONGE LAP W18XL18IN WHT COT 4 PLY FLD STRUNG RADPQ DISP ST 2 PER PACK

## (undated) DEVICE — GLOVE SURG SZ 65 L12IN FNGR THK87MIL WHT LTX FREE

## (undated) DEVICE — GAUZE,SPONGE,2"X2",8PLY,STERILE,LF,2'S: Brand: MEDLINE

## (undated) DEVICE — GAUZE,SPONGE,4"X4",16PLY,XRAY,STRL,LF: Brand: MEDLINE

## (undated) DEVICE — [HIGH FLOW HEATED INSUFFLATOR TUBING,  DO NOT USE IF PACKAGE IS DAMAGED]

## (undated) DEVICE — SYRINGE IRRIG 60ML SFT PLIABLE BLB EZ TO GRP 1 HND USE W/

## (undated) DEVICE — SEALER ENDOSCP L37CM NANO COAT BLNT TIP LAP DIV

## (undated) DEVICE — Z INACTIVE USE 2660663 SOLUTION IRRIG 1000ML STRIL H2O USP PLAS POUR BTL

## (undated) DEVICE — 1315 FOAM STRIP NEEDLE COUNTER: Brand: DEVON

## (undated) DEVICE — GLOVE SURG SZ 65 THK91MIL LTX FREE SYN POLYISOPRENE

## (undated) DEVICE — SUTURE PROL SZ 2-0 L30IN NONABSORBABLE BLU L26MM SH 1/2 CIR 8833H

## (undated) DEVICE — LINER SUCT CANSTR 1500CC SEMI RIG W/ POR HYDROPHOBIC SHUT

## (undated) DEVICE — SOLUTION PREP POVIDONE IOD FOR SKIN MUCOUS MEM PRIOR TO

## (undated) DEVICE — YANKAUER,BULB TIP,W/O VENT,RIGID,STERILE: Brand: MEDLINE

## (undated) DEVICE — SNAP KOVER: Brand: UNBRANDED

## (undated) DEVICE — SUTURE PERMAHAND SZ 3-0 L30IN NONABSORBABLE BLK SH L26MM K832H

## (undated) DEVICE — SKIN AFFIX SURG ADHESIVE 72/CS 0.55ML: Brand: MEDLINE

## (undated) DEVICE — COLUMN DRAPE

## (undated) DEVICE — LARGE HEM-O-LOK CLIP APPLIER: Brand: ENDOWRIST

## (undated) DEVICE — RELOAD STPL L55MM OPN H3.8MM CLS H1.5MM WIRE DIA0.2MM REG

## (undated) DEVICE — JELLY,LUBE,STERILE,FLIP TOP,TUBE,2-OZ: Brand: MEDLINE

## (undated) DEVICE — SPONGE DRN W4XL4IN RAYON/POLYESTER 6 PLY NONWOVEN PRECUT

## (undated) DEVICE — STAPLER 45 RELOAD: Brand: ENDOWRIST

## (undated) DEVICE — GLOVE SURG SZ 6 THK91MIL LTX FREE SYN POLYISOPRENE ANTI

## (undated) DEVICE — SOLUTION ANTIFOG VIS SYS CLEARIFY LAPSCP

## (undated) DEVICE — PRESSURE TUBING: Brand: TRUWAVE

## (undated) DEVICE — Device: Brand: DISPOSABLE BULB & BLADDER

## (undated) DEVICE — APPLIER CLP M/L SHFT DIA5MM 15 LIG LIGAMAX 5

## (undated) DEVICE — TOTAL TRAY, DB, 100% SILI FOLEY, 16FR 10: Brand: MEDLINE

## (undated) DEVICE — COUNTER NDL 30 COUNT FOAM STRP SGL MAG

## (undated) DEVICE — SKIN MARKER REGULAR TIP WITH RULER CAP AND LABELS: Brand: DEVON

## (undated) DEVICE — SUTURE PERMA-HAND SZ 3-0 L18IN 17 STRND NONABSORBABLE BLK SA64H

## (undated) DEVICE — SEAL

## (undated) DEVICE — DRESSING,GAUZE,XEROFORM,CURAD,5"X9",ST: Brand: CURAD

## (undated) DEVICE — 12 ML SYRINGE,LUER-LOCK TIP: Brand: MONOJECT

## (undated) DEVICE — DISSECTOR ENDOSCP L21CM TIP CURVATURE 40DEG FN CRV JAW VES

## (undated) DEVICE — ANESTHESIA CIRCUIT ADULT-LF: Brand: MEDLINE INDUSTRIES, INC.

## (undated) DEVICE — RESERVOIR,SUCTION,100CC,SILICONE: Brand: MEDLINE

## (undated) DEVICE — DRAPE LITHOTOMY W/POUCH/LEGGINGS

## (undated) DEVICE — GLOVE ORANGE PI 7   MSG9070

## (undated) DEVICE — SEALER ENDOSCP NANO COAT OPN DIV CRV L JAW LIGASURE IMPACT

## (undated) DEVICE — TUBING INSUFFLATOR HEAT HI FLO SET PNEUMOCLEAR

## (undated) DEVICE — CIRCUIT BREATHING SINGLE LIMB AD 72 IN 3 LT 2 FILTER LIMBO

## (undated) DEVICE — GYN LAP PK

## (undated) DEVICE — SUTURE PERMA-HAND SZ 2-0 L30IN NONABSORBABLE BLK L26MM SH K833H

## (undated) DEVICE — CATHETER URETH 18FR RED RUB INTMIT ALL PURP

## (undated) DEVICE — STAPLER INT L34CM 60MM LNG ENDOSCP ARTC PWR + ECHELON FLX

## (undated) DEVICE — TOTAL TRAY, 16FR 10ML SIL FOLEY, URN: Brand: MEDLINE

## (undated) DEVICE — FIRST ENTRY KIOS THRD 5X100MM ST

## (undated) DEVICE — STAPLER INT L55MM CUT LN L53MM STPL LN L57MM BLU B FRM 8

## (undated) DEVICE — SYRINGE MED 30ML STD CLR PLAS LUERSLIP TIP N CTRL DISP

## (undated) DEVICE — PROTECTOR EYE PT SELF ADH NS OPT GRD LF

## (undated) DEVICE — SUTURE PDS II SZ 3-0 L18IN ABSRB VLT L26MM SH TAPERPOINT Z774D

## (undated) DEVICE — MATERNITY KNIT PANTS,SEAMLESS: Brand: WINGS

## (undated) DEVICE — BLADE CLIPPER GEN PURP NS

## (undated) DEVICE — SET CATH 20GA L1.5IN RAD ART POLYUR RADPQ W/ INTEGR 0.018IN

## (undated) DEVICE — WOUND RETRACTOR AND PROTECTOR: Brand: ALEXIS WOUND PROTECTOR-RETRACTOR

## (undated) DEVICE — SYRINGE MED 20ML STD CLR PLAS LUERLOCK TIP N CTRL DISP

## (undated) DEVICE — SUTURE PROL SZ 0 L18IN NONABSORBABLE BLU L36MM CT-1 1/2 CIR C821G

## (undated) DEVICE — CANNULA SEAL

## (undated) DEVICE — ELECTRODE ES AD CRDLSS PT RET REM POLYHESIVE

## (undated) DEVICE — Z INACTIVE USE 2735373 APPLICATOR FBR LAIN COT WOOD TIP ECONOMICAL

## (undated) DEVICE — TROCAR ENDOSCP L100MM DIA5MM BLDELSS STBL SL THRD OPT VW

## (undated) DEVICE — Z DISCONTINUED USE 2425483 (LOW STOCK PER MEDLINE) TAPE UMB L18IN DIA1/8IN WHT COT NONABSORBABLE W/O NDL FOR

## (undated) DEVICE — LEGGINGS, PAIR, CLEAR, STERILE: Brand: MEDLINE

## (undated) DEVICE — PACK,BASIC,IX: Brand: MEDLINE

## (undated) DEVICE — Device: Brand: LEVEL 1

## (undated) DEVICE — SWAB FBR TIP L8IN CELOS REG SH HNDL CHEVRON STYL PEEL PCH

## (undated) DEVICE — SHEET, T, LAPAROTOMY, STERILE: Brand: MEDLINE

## (undated) DEVICE — Z DISCONTINUED SYRINGE INSULIN 1ML CLR BRL PLAS LUERLOK TIP FLAT TOP STYL

## (undated) DEVICE — ARM DRAPE

## (undated) DEVICE — Z INACTIVE USE 2660664 SOLUTION IRRIG 3000ML 0.9% SOD CHL USP UROMATIC PLAS CONT

## (undated) DEVICE — Z INACTIVE USE 2863041 SPONGE GZ W4XL4IN 100% COT 16 PLY RADPQ HIGHLY ABSRB

## (undated) DEVICE — DECANTER FLD 9IN ST BG FOR ASEP TRNSF OF FLD

## (undated) DEVICE — GLOVE EXAM M L95IN FNGR THK35MIL PALM THK24MIL OFF WHT

## (undated) DEVICE — POSITIONER HD AD W4.5XH8XL9IN HIGHLY RESILIENT FOAM CMFRT

## (undated) DEVICE — TUBE GASTROSTMY DIA20FR STD STR REPL ENDOVIVE

## (undated) DEVICE — SEALER LAP L37CM SHFT DIA10MM TISS FUS HAND/FOOT SWCH BLNT

## (undated) DEVICE — TUBING, SUCTION, 3/16" X 10', STRAIGHT: Brand: MEDLINE

## (undated) DEVICE — Z DUP USE 2641840 CLIP INT L POLYMER LOK LIG HEM O LOK

## (undated) DEVICE — SUTURE CHROMIC GUT SZ 3-0 L27IN ABSRB BRN L26MM SH 1/2 CIR G122H

## (undated) DEVICE — CATHETER,URETHRAL,REDRUBBER,STRL,18FR: Brand: MEDLINE

## (undated) DEVICE — 3M™ STERI-STRIP™ REINFORCED ADHESIVE SKIN CLOSURES, R1546, 1/4 IN X 4 IN (6 MM X 100 MM), 10 STRIPS/ENVELOPE: Brand: 3M™ STERI-STRIP™

## (undated) DEVICE — SUTURE PERMAHAND SZ 2-0 L18IN NONABSORBABLE BLK L26MM FS 685G

## (undated) DEVICE — 3M™ IOBAN™ 2 ANTIMICROBIAL INCISE DRAPE 6650EZ: Brand: IOBAN™ 2

## (undated) DEVICE — TISSUE RETRIEVAL SYSTEM: Brand: INZII RETRIEVAL SYSTEM

## (undated) DEVICE — SUTURE ETHLN SZ 3-0 L30IN NONABSORBABLE BLK FS-1 L24MM 3/8 669H

## (undated) DEVICE — SUTURE ABSRB L30CM 2-0 VLT SPRL PDS + STRATAFIX SXPP1B410

## (undated) DEVICE — SIGMOIDOSCOPE MED L25CM DIA20MM W/ OBT DISP KLEENSPEC

## (undated) DEVICE — SET CATH 20GA L1.75IN RAD ART POLYUR RADPQ W/ INTEGR

## (undated) DEVICE — STAPLER SHEATH: Brand: ENDOWRIST

## (undated) DEVICE — TROCAR: Brand: KII® SLEEVE

## (undated) DEVICE — TROCAR ENDOSCP L100MM DIA5MM BLDELSS STBL SL OBT RADLUC

## (undated) DEVICE — SUTURE ETHLN SZ 4-0 L18IN NONABSORBABLE BLK L19MM PS-2 3/8 1667H

## (undated) DEVICE — STRIP SKIN CLSR W0.25XL4IN WHT SPUNBOUND FBR NYL HI ADH

## (undated) DEVICE — SUTURE PERMAHAND SZ 2-0 L12X18IN NONABSORBABLE BLK SILK A185H

## (undated) DEVICE — STRIP,CLOSURE,WOUND,MEDI-STRIP,1/2X4: Brand: MEDLINE

## (undated) DEVICE — SUTURE VCRL SZ 3-0 L27IN ABSRB UD L36MM CT-1 1/2 CIR J258H

## (undated) DEVICE — SMOKE EVACUATION TUBING WITH 7/8 IN TO 1/4 IN REDUCER: Brand: BUFFALO FILTER